# Patient Record
Sex: MALE | Race: WHITE | NOT HISPANIC OR LATINO | Employment: UNEMPLOYED | URBAN - NONMETROPOLITAN AREA
[De-identification: names, ages, dates, MRNs, and addresses within clinical notes are randomized per-mention and may not be internally consistent; named-entity substitution may affect disease eponyms.]

---

## 2019-09-17 ENCOUNTER — OFFICE VISIT (OUTPATIENT)
Dept: CARDIOLOGY | Facility: CLINIC | Age: 79
End: 2019-09-17

## 2019-09-17 VITALS
BODY MASS INDEX: 34.85 KG/M2 | OXYGEN SATURATION: 96 % | HEIGHT: 73 IN | DIASTOLIC BLOOD PRESSURE: 66 MMHG | SYSTOLIC BLOOD PRESSURE: 108 MMHG | WEIGHT: 263 LBS | HEART RATE: 63 BPM

## 2019-09-17 DIAGNOSIS — I48.91 ATRIAL FIBRILLATION, UNSPECIFIED TYPE (HCC): Primary | ICD-10-CM

## 2019-09-17 DIAGNOSIS — E78.2 MIXED HYPERLIPIDEMIA: ICD-10-CM

## 2019-09-17 DIAGNOSIS — I25.84 CORONARY ARTERY DISEASE DUE TO CALCIFIED CORONARY LESION: ICD-10-CM

## 2019-09-17 DIAGNOSIS — Z99.89 OSA ON CPAP: ICD-10-CM

## 2019-09-17 DIAGNOSIS — G47.33 OSA ON CPAP: ICD-10-CM

## 2019-09-17 DIAGNOSIS — R00.2 PALPITATIONS: ICD-10-CM

## 2019-09-17 DIAGNOSIS — Z95.1 S/P CABG (CORONARY ARTERY BYPASS GRAFT): ICD-10-CM

## 2019-09-17 DIAGNOSIS — R07.2 PRECORDIAL PAIN: ICD-10-CM

## 2019-09-17 DIAGNOSIS — I25.10 CORONARY ARTERY DISEASE DUE TO CALCIFIED CORONARY LESION: ICD-10-CM

## 2019-09-17 DIAGNOSIS — R42 DIZZINESS: ICD-10-CM

## 2019-09-17 DIAGNOSIS — I10 ESSENTIAL HYPERTENSION: ICD-10-CM

## 2019-09-17 DIAGNOSIS — R06.02 SHORTNESS OF BREATH: ICD-10-CM

## 2019-09-17 DIAGNOSIS — R53.83 FATIGUE, UNSPECIFIED TYPE: ICD-10-CM

## 2019-09-17 DIAGNOSIS — R60.0 BILATERAL LEG EDEMA: ICD-10-CM

## 2019-09-17 PROBLEM — N40.0 BPH (BENIGN PROSTATIC HYPERPLASIA): Status: ACTIVE | Noted: 2019-09-17

## 2019-09-17 PROBLEM — K21.9 GASTROESOPHAGEAL REFLUX DISEASE WITHOUT ESOPHAGITIS: Status: ACTIVE | Noted: 2019-09-17

## 2019-09-17 PROBLEM — E03.9 HYPOTHYROID: Status: ACTIVE | Noted: 2019-09-17

## 2019-09-17 PROCEDURE — 99204 OFFICE O/P NEW MOD 45 MIN: CPT | Performed by: INTERNAL MEDICINE

## 2019-09-17 PROCEDURE — 93000 ELECTROCARDIOGRAM COMPLETE: CPT | Performed by: INTERNAL MEDICINE

## 2019-09-17 RX ORDER — ALPRAZOLAM 1 MG/1
1 TABLET ORAL 2 TIMES DAILY PRN
Status: ON HOLD | COMMUNITY
End: 2019-10-04

## 2019-09-17 RX ORDER — METOPROLOL TARTRATE 100 MG/1
100 TABLET ORAL 2 TIMES DAILY
COMMUNITY
End: 2019-09-17 | Stop reason: ALTCHOICE

## 2019-09-17 RX ORDER — METOLAZONE 2.5 MG/1
TABLET ORAL
Qty: 30 TABLET | Refills: 5 | Status: ON HOLD | OUTPATIENT
Start: 2019-09-17 | End: 2019-10-04

## 2019-09-17 RX ORDER — PANTOPRAZOLE SODIUM 40 MG/1
40 TABLET, DELAYED RELEASE ORAL DAILY
COMMUNITY
End: 2022-11-11

## 2019-09-17 RX ORDER — LEVOTHYROXINE SODIUM 0.03 MG/1
25 TABLET ORAL DAILY
COMMUNITY

## 2019-09-17 RX ORDER — ASPIRIN 81 MG/1
81 TABLET ORAL DAILY
COMMUNITY
End: 2021-08-10 | Stop reason: SDUPTHER

## 2019-09-17 RX ORDER — OMEGA-3S/DHA/EPA/FISH OIL/D3 300MG-1000
400 CAPSULE ORAL DAILY
Status: ON HOLD | COMMUNITY
End: 2019-10-04

## 2019-09-17 RX ORDER — ATORVASTATIN CALCIUM 80 MG/1
80 TABLET, FILM COATED ORAL NIGHTLY
COMMUNITY
End: 2021-08-10 | Stop reason: SDUPTHER

## 2019-09-17 RX ORDER — LISINOPRIL 20 MG/1
20 TABLET ORAL DAILY
Status: ON HOLD | COMMUNITY
End: 2019-10-04

## 2019-09-17 RX ORDER — VENLAFAXINE HYDROCHLORIDE 150 MG/1
150 CAPSULE, EXTENDED RELEASE ORAL DAILY
COMMUNITY
End: 2020-12-16

## 2019-09-17 RX ORDER — FUROSEMIDE 20 MG/1
60 TABLET ORAL 2 TIMES DAILY
COMMUNITY
End: 2021-08-10 | Stop reason: SDUPTHER

## 2019-09-17 RX ORDER — SENNOSIDES 8.6 MG
650 CAPSULE ORAL EVERY 8 HOURS PRN
COMMUNITY

## 2019-09-17 RX ORDER — TAMSULOSIN HYDROCHLORIDE 0.4 MG/1
1 CAPSULE ORAL DAILY
COMMUNITY

## 2019-09-17 RX ORDER — NITROGLYCERIN 0.4 MG/1
TABLET SUBLINGUAL
Qty: 25 TABLET | Refills: 3 | Status: SHIPPED | OUTPATIENT
Start: 2019-09-17 | End: 2021-01-18 | Stop reason: SDUPTHER

## 2019-09-17 RX ORDER — POTASSIUM CHLORIDE 750 MG/1
20 TABLET, FILM COATED, EXTENDED RELEASE ORAL 3 TIMES DAILY
COMMUNITY
End: 2021-08-10 | Stop reason: SDUPTHER

## 2019-09-17 RX ORDER — LANOLIN ALCOHOL/MO/W.PET/CERES
1000 CREAM (GRAM) TOPICAL NIGHTLY
COMMUNITY
End: 2021-04-21 | Stop reason: SDUPTHER

## 2019-09-17 RX ORDER — CLOPIDOGREL BISULFATE 75 MG/1
75 TABLET ORAL DAILY
Qty: 30 TABLET | Refills: 11 | Status: SHIPPED | OUTPATIENT
Start: 2019-09-17 | End: 2019-09-18 | Stop reason: SDUPTHER

## 2019-09-17 RX ORDER — ALLOPURINOL 300 MG/1
300 TABLET ORAL DAILY
COMMUNITY

## 2019-09-17 RX ORDER — ISOSORBIDE MONONITRATE 30 MG/1
15 TABLET, EXTENDED RELEASE ORAL DAILY
Qty: 15 TABLET | Refills: 11 | Status: SHIPPED | OUTPATIENT
Start: 2019-09-17 | End: 2019-09-18 | Stop reason: SDUPTHER

## 2019-09-17 RX ORDER — METOPROLOL TARTRATE 50 MG/1
50 TABLET, FILM COATED ORAL 2 TIMES DAILY
Qty: 60 TABLET | Refills: 11 | Status: SHIPPED | OUTPATIENT
Start: 2019-09-17 | End: 2019-09-18 | Stop reason: SDUPTHER

## 2019-09-17 NOTE — PATIENT INSTRUCTIONS
"Fat and Cholesterol Restricted Eating Plan  Getting too much fat and cholesterol in your diet may cause health problems. Choosing the right foods helps keep your fat and cholesterol at normal levels. This can keep you from getting certain diseases.  Your doctor may recommend an eating plan that includes:  · Total fat: ______% or less of total calories a day.  · Saturated fat: ______% or less of total calories a day.  · Cholesterol: less than _________mg a day.  · Fiber: ______g a day.  What are tips for following this plan?  General tips    · Work with your doctor to lose weight if you need to.  · Avoid:  ? Foods with added sugar.  ? Fried foods.  ? Foods with partially hydrogenated oils.  · Limit alcohol intake to no more than 1 drink a day for nonpregnant women and 2 drinks a day for men. One drink equals 12 oz of beer, 5 oz of wine, or 1½ oz of hard liquor.  Reading food labels  · Check food labels for:  ? Trans fats.  ? Partially hydrogenated oils.  ? Saturated fat (g) in each serving.  ? Cholesterol (mg) in each serving.  ? Fiber (g) in each serving.  · Choose foods with healthy fats, such as:  ? Monounsaturated fats.  ? Polyunsaturated fats.  ? Omega-3 fats.  · Choose grain products that have whole grains. Look for the word \"whole\" as the first word in the ingredient list.  Cooking  · Cook foods using low-fat methods. These include baking, boiling, grilling, and broiling.  · Eat more home-cooked foods. Eat at restaurants and buffets less often.  · Avoid cooking using saturated fats, such as butter, cream, palm oil, palm kernel oil, and coconut oil.  Meal planning    · At meals, divide your plate into four equal parts:  ? Fill one-half of your plate with vegetables and green salads.  ? Fill one-fourth of your plate with whole grains.  ? Fill one-fourth of your plate with low-fat (lean) protein foods.  · Eat fish that is high in omega-3 fats at least two times a week. This includes mackerel, tuna, sardines, and " salmon.  · Eat foods that are high in fiber, such as whole grains, beans, apples, broccoli, carrots, peas, and barley.  Recommended foods  Grains  · Whole grains, such as whole wheat or whole grain breads, crackers, cereals, and pasta. Unsweetened oatmeal, bulgur, barley, quinoa, or brown rice. Corn or whole wheat flour tortillas.  Vegetables  · Fresh or frozen vegetables (raw, steamed, roasted, or grilled). Green salads.  Fruits  · All fresh, canned (in natural juice), or frozen fruits.  Meats and other protein foods  · Ground beef (85% or leaner), grass-fed beef, or beef trimmed of fat. Skinless chicken or turkey. Ground chicken or turkey. Pork trimmed of fat. All fish and seafood. Egg whites. Dried beans, peas, or lentils. Unsalted nuts or seeds. Unsalted canned beans. Nut butters without added sugar or oil.  Dairy  · Low-fat or nonfat dairy products, such as skim or 1% milk, 2% or reduced-fat cheeses, low-fat and fat-free ricotta or cottage cheese, or plain low-fat and nonfat yogurt.  Fats and oils  · Tub margarine without trans fats. Light or reduced-fat mayonnaise and salad dressings. Avocado. Olive, canola, sesame, or safflower oils.  The items listed above may not be a complete list of recommended foods or beverages. Contact your dietitian for more options.  The items listed above may not be a complete list of foods and beverages [you/your child] can eat. Contact a dietitian for more information.  Foods to avoid  Grains  · White bread. White pasta. White rice. Cornbread. Bagels, pastries, and croissants. Crackers and snack foods that contain trans fat and hydrogenated oils.  Vegetables  · Vegetables cooked in cheese, cream, or butter sauce. Fried vegetables.  Fruits  · Canned fruit in heavy syrup. Fruit in cream or butter sauce. Fried fruit.  Meats and other protein foods  · Fatty cuts of meat. Ribs, chicken wings, kaur, sausage, bologna, salami, chitterlings, fatback, hot dogs, bratwurst, and packaged  lunch meats. Liver and organ meats. Whole eggs and egg yolks. Chicken and turkey with skin. Fried meat.  Dairy  · Whole or 2% milk, cream, half-and-half, and cream cheese. Whole milk cheeses. Whole-fat or sweetened yogurt. Full-fat cheeses. Nondairy creamers and whipped toppings. Processed cheese, cheese spreads, and cheese curds.  Beverages  · Alcohol. Sugar-sweetened drinks such as sodas, lemonade, and fruit drinks.  Fats and oils  · Butter, stick margarine, lard, shortening, ghee, or kaur fat. Coconut, palm kernel, and palm oils.  Sweets and desserts  · Corn syrup, sugars, honey, and molasses. Candy. Jam and jelly. Syrup. Sweetened cereals. Cookies, pies, cakes, donuts, muffins, and ice cream.  The items listed above may not be a complete list of foods and beverages to avoid. Contact your dietitian for more information.  The items listed above may not be a complete list of foods and beverages [you/your child] should avoid. Contact a dietitian for more information.  Summary  · Choosing the right foods helps keep your fat and cholesterol at normal levels. This can keep you from getting certain diseases.  · At meals, fill one-half of your plate with vegetables and green salads.  · Eat high-fiber foods, like whole grains, beans, apples, carrots, peas, and barley.  · Limit added sugar, saturated fats, alcohol, and fried foods.  This information is not intended to replace advice given to you by your health care provider. Make sure you discuss any questions you have with your health care provider.  Document Released: 06/18/2013 Document Revised: 09/04/2018 Document Reviewed: 09/04/2018  [employer] Interactive Patient Education © 2019 [employer]  BMI for Adults    Body mass index (BMI) is a number that is calculated from a person's weight and height. BMI may help to estimate how much of a person's weight is composed of fat. BMI can help identify those who may be at higher risk for certain medical problems.  How is BMI  "used with adults?  BMI is used as a screening tool to identify possible weight problems. It is used to check whether a person is obese, overweight, healthy weight, or underweight.  How is BMI calculated?  BMI measures your weight and compares it to your height. This can be done either in English (U[place] or metric measurements. Note that charts are available to help you find your BMI quickly and easily without having to do these calculations yourself.  To calculate your BMI in English (U.[place] measurements, your health care provider will:  1. Measure your weight in pounds (lb).  2. Multiply the number of pounds by 703.  ? For example, for a person who weighs 180 lb, multiply that number by 703, which equals 126,540.  3. Measure your height in inches (in). Then multiply that number by itself to get a measurement called \"inches squared.\"  ? For example, for a person who is 70 in tall, the \"inches squared\" measurement is 70 in x 70 in, which equals 4900 inches squared.  4. Divide the total from Step 2 (number of lb x 703) by the total from Step 3 (inches squared): 126,540 ÷ 4900 = 25.8. This is your BMI.  To calculate your BMI in metric measurements, your health care provider will:  1. Measure your weight in kilograms (kg).  2. Measure your height in meters (m). Then multiply that number by itself to get a measurement called \"meters squared.\"  ? For example, for a person who is 1.75 m tall, the \"meters squared\" measurement is 1.75 m x 1.75 m, which is equal to 3.1 meters squared.  3. Divide the number of kilograms (your weight) by the meters squared number. In this example: 70 ÷ 3.1 = 22.6. This is your BMI.  How is BMI interpreted?  To interpret your results, your health care provider will use BMI charts to identify whether you are underweight, normal weight, overweight, or obese. The following guidelines will be used:  · Underweight: BMI less than 18.5.  · Normal weight: BMI between 18.5 and 24.9.  · Overweight: BMI " between 25 and 29.9.  · Obese: BMI of 30 and above.  Please note:  · Weight includes both fat and muscle, so someone with a muscular build, such as an athlete, may have a BMI that is higher than 24.9. In cases like these, BMI is not an accurate measure of body fat.  · To determine if excess body fat is the cause of a BMI of 25 or higher, further assessments may need to be done by a health care provider.  · BMI is usually interpreted in the same way for men and women.  Why is BMI a useful tool?  BMI is useful in two ways:  · Identifying a weight problem that may be related to a medical condition, or that may increase the risk for medical problems.  · Promoting lifestyle and diet changes in order to reach a healthy weight.  Summary  · Body mass index (BMI) is a number that is calculated from a person's weight and height.  · BMI may help to estimate how much of a person's weight is composed of fat. BMI can help identify those who may be at higher risk for certain medical problems.  · BMI can be measured using English measurements or metric measurements.  · To interpret your results, your health care provider will use BMI charts to identify whether you are underweight, normal weight, overweight, or obese.  This information is not intended to replace advice given to you by your health care provider. Make sure you discuss any questions you have with your health care provider.  Document Released: 08/29/2005 Document Revised: 10/31/2018 Document Reviewed: 10/31/2018  [employer] Interactive Patient Education © 2019 [employer]

## 2019-09-17 NOTE — PROGRESS NOTES
Subjective   Nick Caraballo is a 79 y.o. male     Chief Complaint   Patient presents with   • Establish Care     Here to establish care        PROBLEM LIST:     1. CAD, MI in 1998  1.1 Stenting x 5 total at [facility]. data  1.2 CABG x2 in Dec. 2014 at [facility] by Dr. Álvarez in setting of ACS, LIMA to LAD and reversed vein graft from aorta to diag. With RLE vein graft  2. A-Fib, s/p CABG per patient report  3. HTN  4. Hyperlipidemia  5. GOPI, uses c-pap  6. Hypothyroidism  7. BPH  8. GERD  9. RA  10. Former smoker                Specialty Problems     None            HPI:  Mr. Nick Caraballo is a 79-year-old male patient of Dr. Selvin Abbott seen today to establish care.    The patient has a long-standing history of coronary artery disease.  He presumably suffered a myocardial infarction in 1998 but did not undergo invasive evaluation at that time.  In the year 2000 he suffered a myocardial infarction and underwent stenting of the LAD by Dr. Lopez with a 4 x 18 mm stent.  Mr. Caraballo was told at that time that he had had a previous myocardial infarction and had significant left ventricular dysfunction.  Per his report, Mr. Caraballo again suffered a myocardial infarction in 2002 in 2003 with stenting on both occasions.    The patient had a symptom-free interval from 2003 until 2014 when he again suffered chest pain.  He was evaluated at [facility] underwent cardiac catheterization and subsequently coronary artery bypass grafting x2 with LIMA to LAD and vein graft to diagonal.  The patient did well after bypass until approximately 1-1/2 to 2 years ago and he began to notice recurrent angina.  Mr. Caraballo will develop chest discomfort with high levels of physical activity or emotional stress.  He describes this as a heaviness or tightness which is sometimes associated with shortness of breath and radiation to the left shoulder.  Symptoms last only minutes, occur approximately  once every 3 to 4 months, and have not been progressive over time.  Mr. Caraballo has not used nitroglycerin for these episodes.    In approximately the same timeframe as the recurrence of chest pain Mr. Caraballo developed lower extremity edema.  This is been progressive over the last several years.  She also describes episodes of palpitation is rapid forceful heartbeats which are associated with lightheadedness, weakness and fatigue.  These last minutes, are without obvious precipitating or ameliorating factors, and occur monthly.  Of note, the patient had postoperative atrial fibrillation documented, but has had no documentation of ongoing dysrhythmia.  The patient denies symptoms of TIA or stroke, and he has no other symptoms to suggest peripheral arterial disease or arterial embolic events.                  CURRENT MEDICATION:    Current Outpatient Medications   Medication Sig Dispense Refill   • acetaminophen (TYLENOL) 650 MG 8 hr tablet Take 650 mg by mouth Every 8 (Eight) Hours As Needed for Mild Pain .     • allopurinol (ZYLOPRIM) 300 MG tablet Take 300 mg by mouth Daily.     • ALPRAZolam (XANAX) 1 MG tablet Take 1 mg by mouth 2 (Two) Times a Day As Needed for Anxiety. Takes 1/2 tablet bid prn anxiety     • aspirin 81 MG EC tablet Take 81 mg by mouth Daily.     • atorvastatin (LIPITOR) 80 MG tablet Take 80 mg by mouth Every Night.     • cholecalciferol (VITAMIN D3) 400 units tablet Take 400 Units by mouth Daily.     • furosemide (LASIX) 20 MG tablet Take 60 mg by mouth 2 (Two) Times a Day.     • levothyroxine (SYNTHROID, LEVOTHROID) 25 MCG tablet Take 25 mcg by mouth Daily.     • lisinopril (PRINIVIL,ZESTRIL) 20 MG tablet Take 20 mg by mouth Daily. Takes 1/2 tablet daily     • Melatonin 3 MG capsule Take 3 mg by mouth Every Night.     • metoprolol tartrate (LOPRESSOR) 100 MG tablet Take 100 mg by mouth 2 (Two) Times a Day. Takes 1/2 tablet bid     • niacin (SLO-NIACIN) 500 MG CR tablet Take 1,000 mg by mouth  Every Night.     • pantoprazole (PROTONIX) 40 MG EC tablet Take 40 mg by mouth Daily.     • potassium chloride ER (K-TAB) 20 MEQ tablet controlled-release ER tablet Take 20 mEq by mouth 2 (Two) Times a Day.     • Probiotic Product (PROBIOTIC DAILY PO) Take  by mouth Daily.     • tamsulosin (FLOMAX) 0.4 MG capsule 24 hr capsule Take 1 capsule by mouth Daily.     • venlafaxine XR (EFFEXOR-XR) 75 MG 24 hr capsule Take 75 mg by mouth Daily.       No current facility-administered medications for this visit.        ALLERGIES:    Patient has no known allergies.    PAST MEDICAL HISTORY:    Past Medical History:   Diagnosis Date   • Acid reflux    • Anxiety    • Coronary artery disease    • Gout    • Hyperlipidemia    • Hypertension    • Hypothyroid    • Myocardial infarction (CMS/HCC)    • Rheumatoid arthritis (CMS/HCC)    • Sleep apnea        SURGICAL HISTORY:    Past Surgical History:   Procedure Laterality Date   • CARDIAC CATHETERIZATION      5 stents    • CATARACT EXTRACTION, BILATERAL     • CORONARY ARTERY BYPASS GRAFT      Double by-pass    • HEMORRHOIDECTOMY     • MULTIPLE TOOTH EXTRACTIONS     • REPLACEMENT TOTAL KNEE BILATERAL     • TONSILLECTOMY         SOCIAL HISTORY:    Social History     Socioeconomic History   • Marital status:      Spouse name: Not on file   • Number of children: Not on file   • Years of education: Not on file   • Highest education level: Not on file   Tobacco Use   • Smoking status: Former Smoker     Packs/day: 1.00     Years: 45.00     Pack years: 45.00     Last attempt to quit:      Years since quittin.7   • Smokeless tobacco: Never Used   Substance and Sexual Activity   • Alcohol use: No     Frequency: Never   • Drug use: No   • Sexual activity: Defer       FAMILY HISTORY:    Family History   Problem Relation Age of Onset   • No Known Problems Mother    • Colon cancer Father    • Heart disease Brother        Review of Systems   Constitutional: Positive for  "diaphoresis and fatigue (Not as much stamina ). Negative for chills, fever and unexpected weight change.   HENT: Negative.    Eyes: Positive for visual disturbance (reading glasses ).   Respiratory: Positive for apnea (Has a Cpap, but hasn't been using it) and shortness of breath (SOA occasionally with exertion ). Negative for chest tightness.    Cardiovascular: Positive for chest pain (Occasional soreness in chest. first noticed approx. 1.5 yrs. ago. ), palpitations (Rare flutters ) and leg swelling (LE edema which doesn't always resolve overnight ).        Nodule in right breast   Gastrointestinal: Negative.  Negative for blood in stool (no melena,hematuria,hemoptysis,hematochezia), constipation and diarrhea.   Endocrine: Positive for cold intolerance and heat intolerance (always hot ).   Genitourinary: Positive for frequency. Negative for dysuria, hematuria and urgency.   Musculoskeletal: Positive for arthralgias (B/L hip pain. Currently getting PT for this). Negative for back pain.   Skin: Negative.  Negative for rash and wound.   Allergic/Immunologic: Negative.  Negative for environmental allergies and food allergies.   Neurological: Positive for light-headedness (when bending over or turning too quickly ). Negative for dizziness and weakness.   Hematological: Bruises/bleeds easily (Bruises and bleeds easily ).   Psychiatric/Behavioral: Negative.  Negative for sleep disturbance (Denies waking with smothering or SOA).       VISIT VITALS:  Vitals:    09/17/19 1318   BP: 108/66   BP Location: Left arm   Patient Position: Sitting   Pulse: 63   SpO2: 96%   Weight: 119 kg (263 lb)   Height: 185.4 cm (73\")      /66 (BP Location: Left arm, Patient Position: Sitting)   Pulse 63   Ht 185.4 cm (73\")   Wt 119 kg (263 lb)   SpO2 96%   BMI 34.70 kg/m²     RECENT LABS:    Objective       Physical Exam   Constitutional: He is oriented to person, place, and time. He appears well-developed and well-nourished. No " distress.   HENT:   Head: Normocephalic and atraumatic.   No oral lesions   Eyes: Conjunctivae and EOM are normal. Pupils are equal, round, and reactive to light.   Mild ptosis left eye, no lid lag  FIORELLA  Extra-ocular motions intact  Arcus senilis bilat.      Neck: Normal range of motion. Neck supple. No JVD present. Carotid bruit is not present. No tracheal deviation present.   Nl. Carotid upstrokes     Cardiovascular: Normal rate, regular rhythm, S1 normal, S2 normal and intact distal pulses. Exam reveals gallop and S4. Exam reveals no S3 and no friction rub.   No murmur heard.  Pulses:       Radial pulses are 2+ on the right side, and 2+ on the left side.   Pulmonary/Chest: Effort normal and breath sounds normal. He has no wheezes. He has no rhonchi. He has no rales.   Nl. Expir. Phase  Nl. Breath sound intensity     Abdominal: Soft. Bowel sounds are normal. He exhibits no distension, no abdominal bruit and no mass. There is no tenderness. There is no rebound and no guarding.   No organomegaly   Musculoskeletal: Normal range of motion. He exhibits edema. He exhibits no tenderness or deformity.   LLE, 2+ edema to mid calf, nl.cap. Refill, DP pedal pulse, unable to palpate PT due to edema  RLE, 3+ edema to mid calf, nl. Cap. Refill, unable to palpate pedal pulses   Neurological: He is alert and oriented to person, place, and time.   Skin: Skin is warm and dry. No rash noted. No erythema. No pallor.   Psychiatric: He has a normal mood and affect. His behavior is normal. Judgment and thought content normal.   Nursing note and vitals reviewed.        ECG 12 Lead  Date/Time: 9/17/2019 2:33 PM  Performed by: Errol Lai MD  Authorized by: Errol Lai MD   Comparison: not compared with previous ECG   Previous ECG: no previous ECG available  Comments: Sinus rhythm at 65 bpm with marked first-degree AV block.  DE interval is roughly 500 ms.              Assessment/Plan   #1.  Coronary artery disease.  The  patient is status post stenting and bypass as described above.  He has had recurrence of angina for the past 18 to 24 months but has not had risk stratification over that time..  Therefore, we will perform stress testing for risk stratification and to direct therapy.    2.  Exertional dyspnea.  Has a history of decreased LV systolic function.  We will utilize echocardiography to reassess ejection fraction, assess LV filling pressures, and to look for nonischemic causes of chest pain and dyspnea.    3.  Palpitations sometimes associated with significant dizziness.  Symptoms are worrisome for the possibility of ventricular tachycardia given known coronary disease and decreased LV systolic function.  We will reassess EF as above and perform 30-day event monitoring to further evaluate dysrhythmic substrate.    4.  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  I would like to decrease metoprolol to 50 mg twice daily and follow symptoms of chest pain, heart rate, and blood pressures closely.    5.  Angina.  We will start isosorbide mononitrate 15 mg daily going to 30 mg daily as tolerated.  The patient will be given a prescription for sublingual nitroglycerin and instructions in its use.  We will add Plavix 75 mg daily to the patient's current regimen.    6.  Lower extremity edema.  I think this is multifactorial in etiology.  I would like to trial adding metolazone 2.5 mg to a.m. Lasix dosing and follow labs closely.  We also discussed physical maneuvers to decrease edema as well as continued sodium restriction.    7.  Mr. Caraballo will follow with Dr. Abbott as instructed, and in our office after testing or on a as needed basis for symptoms, blood pressures, or medication intolerance or labs as discussed.   Diagnosis Plan   1. Atrial fibrillation, unspecified type (CMS/HCC)     2. Coronary artery disease due to calcified coronary lesion     3. Essential hypertension     4. Mixed hyperlipidemia     5.  Palpitations     6. GOPI on CPAP     7. Shortness of breath     8. Precordial pain     9. Dizziness     10. Fatigue, unspecified type     11. S/P CABG (coronary artery bypass graft)         No Follow-up on file.       Patient's Body mass index is 34.7 kg/m². BMI is above normal parameters. Recommendations include: educational material and referral to primary care.       Jinny Perry LPN    Scribed for Dr. Errol Lai by Jinny Perry LPN September 17, 2019 2:04 PM         Electronically signed by:            This note is dictated utilizing voice recognition software.  Although this record has been proof read, transcriptional errors may still be present. If questions occur regarding the content of this record please do not hesitate to call our office.

## 2019-09-18 ENCOUNTER — LAB (OUTPATIENT)
Dept: LAB | Facility: HOSPITAL | Age: 79
End: 2019-09-18

## 2019-09-18 ENCOUNTER — TELEPHONE (OUTPATIENT)
Dept: CARDIOLOGY | Facility: CLINIC | Age: 79
End: 2019-09-18

## 2019-09-18 DIAGNOSIS — Z95.1 S/P CABG (CORONARY ARTERY BYPASS GRAFT): ICD-10-CM

## 2019-09-18 DIAGNOSIS — I25.84 CORONARY ARTERY DISEASE DUE TO CALCIFIED CORONARY LESION: ICD-10-CM

## 2019-09-18 DIAGNOSIS — R07.2 PRECORDIAL PAIN: ICD-10-CM

## 2019-09-18 DIAGNOSIS — I10 ESSENTIAL HYPERTENSION: ICD-10-CM

## 2019-09-18 DIAGNOSIS — R00.2 PALPITATIONS: ICD-10-CM

## 2019-09-18 DIAGNOSIS — R53.83 FATIGUE, UNSPECIFIED TYPE: ICD-10-CM

## 2019-09-18 DIAGNOSIS — R42 DIZZINESS: ICD-10-CM

## 2019-09-18 DIAGNOSIS — E78.2 MIXED HYPERLIPIDEMIA: ICD-10-CM

## 2019-09-18 DIAGNOSIS — I25.10 CORONARY ARTERY DISEASE DUE TO CALCIFIED CORONARY LESION: ICD-10-CM

## 2019-09-18 DIAGNOSIS — R06.02 SHORTNESS OF BREATH: ICD-10-CM

## 2019-09-18 LAB
ANION GAP SERPL CALCULATED.3IONS-SCNC: 12.2 MMOL/L (ref 5–15)
BUN BLD-MCNC: 16 MG/DL (ref 8–23)
BUN/CREAT SERPL: 15.1 (ref 7–25)
CALCIUM SPEC-SCNC: 10 MG/DL (ref 8.6–10.5)
CHLORIDE SERPL-SCNC: 100 MMOL/L (ref 98–107)
CO2 SERPL-SCNC: 28.8 MMOL/L (ref 22–29)
CREAT BLD-MCNC: 1.06 MG/DL (ref 0.76–1.27)
GFR SERPL CREATININE-BSD FRML MDRD: 68 ML/MIN/1.73
GLUCOSE BLD-MCNC: 116 MG/DL (ref 65–99)
MAGNESIUM SERPL-MCNC: 1.9 MG/DL (ref 1.6–2.4)
POTASSIUM BLD-SCNC: 5.2 MMOL/L (ref 3.5–5.2)
SODIUM BLD-SCNC: 141 MMOL/L (ref 136–145)

## 2019-09-18 PROCEDURE — 83735 ASSAY OF MAGNESIUM: CPT | Performed by: INTERNAL MEDICINE

## 2019-09-18 PROCEDURE — 80048 BASIC METABOLIC PNL TOTAL CA: CPT | Performed by: INTERNAL MEDICINE

## 2019-09-18 PROCEDURE — 36415 COLL VENOUS BLD VENIPUNCTURE: CPT

## 2019-09-18 RX ORDER — CLOPIDOGREL BISULFATE 75 MG/1
75 TABLET ORAL DAILY
Qty: 90 TABLET | Refills: 3 | Status: SHIPPED | OUTPATIENT
Start: 2019-09-18 | End: 2020-08-20

## 2019-09-18 RX ORDER — ISOSORBIDE MONONITRATE 30 MG/1
15 TABLET, EXTENDED RELEASE ORAL DAILY
Qty: 45 TABLET | Refills: 3 | Status: ON HOLD | OUTPATIENT
Start: 2019-09-18 | End: 2019-10-04

## 2019-09-18 NOTE — TELEPHONE ENCOUNTER
Patient wanted to get dosing change on metoprolol changed yest. Clarified. States had been getting 100 mg tabs and taking a half tab bid which is what his dose was changed to yest. V/O per Dr. Lai to decrease Metoprolol to 25 mg po bid #180 with 3 refills. Patient aware of dosage change. Patient requesting to have plavix,metoprolol, and imdur scripts sent to [facility] 90 day supplies with 3 refills and [facility] Scripts electro'd. MONIKA,LPN            ----- Message from Alayna Evangelista sent at 9/18/2019 10:29 AM EDT -----   Pt says that his Metoprolol was on his list as 100mg, but it is being filled at 50mg bid. He also wants you to know that his monitor is on it's way and he will be having his labs drawn in [facility] today.

## 2019-09-19 ENCOUNTER — HOSPITAL ENCOUNTER (OUTPATIENT)
Dept: CARDIOLOGY | Facility: HOSPITAL | Age: 79
Discharge: HOME | End: 2019-09-19

## 2019-09-19 DIAGNOSIS — I25.10 CORONARY ARTERY DISEASE DUE TO CALCIFIED CORONARY LESION: ICD-10-CM

## 2019-09-19 DIAGNOSIS — R06.02 SHORTNESS OF BREATH: ICD-10-CM

## 2019-09-19 DIAGNOSIS — R07.2 PRECORDIAL PAIN: ICD-10-CM

## 2019-09-19 DIAGNOSIS — R00.2 PALPITATIONS: ICD-10-CM

## 2019-09-19 DIAGNOSIS — I10 ESSENTIAL HYPERTENSION: ICD-10-CM

## 2019-09-19 DIAGNOSIS — I25.84 CORONARY ARTERY DISEASE DUE TO CALCIFIED CORONARY LESION: ICD-10-CM

## 2019-09-19 DIAGNOSIS — R42 DIZZINESS: ICD-10-CM

## 2019-09-19 DIAGNOSIS — Z95.1 S/P CABG (CORONARY ARTERY BYPASS GRAFT): ICD-10-CM

## 2019-09-19 DIAGNOSIS — R53.83 FATIGUE, UNSPECIFIED TYPE: ICD-10-CM

## 2019-09-19 DIAGNOSIS — E78.2 MIXED HYPERLIPIDEMIA: ICD-10-CM

## 2019-09-19 PROCEDURE — A9500 TC99M SESTAMIBI: HCPCS | Performed by: INTERNAL MEDICINE

## 2019-09-19 PROCEDURE — 0 TECHNETIUM SESTAMIBI: Performed by: INTERNAL MEDICINE

## 2019-09-19 PROCEDURE — 25010000002 REGADENOSON 0.4 MG/5ML SOLUTION: Performed by: INTERNAL MEDICINE

## 2019-09-19 PROCEDURE — 93306 TTE W/DOPPLER COMPLETE: CPT

## 2019-09-19 PROCEDURE — 93017 CV STRESS TEST TRACING ONLY: CPT

## 2019-09-19 PROCEDURE — 93018 CV STRESS TEST I&R ONLY: CPT | Performed by: INTERNAL MEDICINE

## 2019-09-19 PROCEDURE — 78452 HT MUSCLE IMAGE SPECT MULT: CPT

## 2019-09-19 PROCEDURE — 78452 HT MUSCLE IMAGE SPECT MULT: CPT | Performed by: INTERNAL MEDICINE

## 2019-09-19 PROCEDURE — 93306 TTE W/DOPPLER COMPLETE: CPT | Performed by: INTERNAL MEDICINE

## 2019-09-19 RX ADMIN — TECHNETIUM TC 99M SESTAMIBI 1 DOSE: 1 INJECTION INTRAVENOUS at 10:45

## 2019-09-19 RX ADMIN — TECHNETIUM TC 99M SESTAMIBI 1 DOSE: 1 INJECTION INTRAVENOUS at 08:54

## 2019-09-19 RX ADMIN — REGADENOSON 0.4 MG: 0.08 INJECTION, SOLUTION INTRAVENOUS at 10:45

## 2019-09-20 ENCOUNTER — TELEPHONE (OUTPATIENT)
Dept: CARDIOLOGY | Facility: CLINIC | Age: 79
End: 2019-09-20

## 2019-09-20 NOTE — TELEPHONE ENCOUNTER
QUESTIONS ADDRESSED ABOUT EVENT MONITOR. RASHI FRANK            ----- Message from Alayna Evangelista sent at 9/20/2019 11:28 AM EDT -----   PT RQ TO SPEAK TO YOU. DIDN'T LEAVE A REASON ON VM. CAN BE REACHED [phone]

## 2019-09-24 LAB
BH CV NUCLEAR PRIOR STUDY: 3
BH CV STRESS BP STAGE 1: NORMAL
BH CV STRESS COMMENTS STAGE 1: NORMAL
BH CV STRESS DOSE REGADENOSON STAGE 1: 0.4
BH CV STRESS DURATION MIN STAGE 1: 0
BH CV STRESS DURATION SEC STAGE 1: 10
BH CV STRESS HR STAGE 1: 69
BH CV STRESS PROTOCOL 1: NORMAL
BH CV STRESS RECOVERY BP: NORMAL MMHG
BH CV STRESS RECOVERY HR: 68 BPM
BH CV STRESS STAGE 1: 1
MAXIMAL PREDICTED HEART RATE: 142 BPM
PERCENT MAX PREDICTED HR: 48.59 %
STRESS BASELINE BP: NORMAL MMHG
STRESS BASELINE HR: 48 BPM
STRESS PERCENT HR: 57 %
STRESS POST PEAK BP: NORMAL MMHG
STRESS POST PEAK HR: 69 BPM
STRESS TARGET HR: 121 BPM

## 2019-09-25 ENCOUNTER — TELEPHONE (OUTPATIENT)
Dept: CARDIOLOGY | Facility: CLINIC | Age: 79
End: 2019-09-25

## 2019-09-25 NOTE — TELEPHONE ENCOUNTER
PATIENT AWARE STRESS TEST ABNL. AND APPT. SCHEDULED FOR [facility]. AM. PATIENT STATES WILL BE HERE. RASHI FRANK        ----- Message from Errol Lai MD sent at 9/25/2019  1:05 PM EDT -----  1-2 week f/u.

## 2019-09-25 NOTE — TELEPHONE ENCOUNTER
BELOW DISCUSSED WITH PATIENT AND IS COMING TO OFFICE IN AM FOR F/U ON ABNL. STRESS TEST. PH,LPN          ----- Message from Annie Hunter sent at 9/25/2019  1:10 PM EDT -----  Regarding: [employer] harassment  Contact: [phone]  Ms. Aaron this patient called and said that [employer] has got on his last nerve. He is very agitated. He wants to know if Dr. Lai ordered the protocol of the company calling him each time he pushes a button with a sympton. He is tired of answering their phone calls but wants to be in compliance with what Dr. Lai needs information wise from the monitor.

## 2019-09-26 ENCOUNTER — OFFICE VISIT (OUTPATIENT)
Dept: CARDIOLOGY | Facility: CLINIC | Age: 79
End: 2019-09-26

## 2019-09-26 ENCOUNTER — LAB (OUTPATIENT)
Dept: LAB | Facility: HOSPITAL | Age: 79
End: 2019-09-26

## 2019-09-26 VITALS
DIASTOLIC BLOOD PRESSURE: 63 MMHG | WEIGHT: 256.2 LBS | OXYGEN SATURATION: 96 % | SYSTOLIC BLOOD PRESSURE: 92 MMHG | HEART RATE: 90 BPM | HEIGHT: 73 IN | BODY MASS INDEX: 33.95 KG/M2

## 2019-09-26 DIAGNOSIS — R07.2 PRECORDIAL PAIN: Primary | ICD-10-CM

## 2019-09-26 DIAGNOSIS — R07.2 PRECORDIAL PAIN: ICD-10-CM

## 2019-09-26 DIAGNOSIS — I48.91 ATRIAL FIBRILLATION, UNSPECIFIED TYPE (HCC): ICD-10-CM

## 2019-09-26 DIAGNOSIS — I25.10 CORONARY ARTERY DISEASE DUE TO CALCIFIED CORONARY LESION: ICD-10-CM

## 2019-09-26 DIAGNOSIS — I10 ESSENTIAL HYPERTENSION: ICD-10-CM

## 2019-09-26 DIAGNOSIS — R53.83 FATIGUE, UNSPECIFIED TYPE: ICD-10-CM

## 2019-09-26 DIAGNOSIS — R06.02 SHORTNESS OF BREATH: ICD-10-CM

## 2019-09-26 DIAGNOSIS — I25.84 CORONARY ARTERY DISEASE DUE TO CALCIFIED CORONARY LESION: ICD-10-CM

## 2019-09-26 DIAGNOSIS — Z99.89 OSA ON CPAP: ICD-10-CM

## 2019-09-26 DIAGNOSIS — Z95.1 S/P CABG (CORONARY ARTERY BYPASS GRAFT): ICD-10-CM

## 2019-09-26 DIAGNOSIS — E78.2 MIXED HYPERLIPIDEMIA: ICD-10-CM

## 2019-09-26 DIAGNOSIS — R94.39 ABNORMAL STRESS TEST: ICD-10-CM

## 2019-09-26 DIAGNOSIS — G47.33 OSA ON CPAP: ICD-10-CM

## 2019-09-26 DIAGNOSIS — R00.2 PALPITATIONS: ICD-10-CM

## 2019-09-26 LAB
BASOPHILS # BLD AUTO: 0.02 10*3/MM3 (ref 0–0.2)
BASOPHILS NFR BLD AUTO: 0.2 % (ref 0–1.5)
DEPRECATED RDW RBC AUTO: 47.5 FL (ref 37–54)
EOSINOPHIL # BLD AUTO: 0.21 10*3/MM3 (ref 0–0.4)
EOSINOPHIL NFR BLD AUTO: 2.4 % (ref 0.3–6.2)
ERYTHROCYTE [DISTWIDTH] IN BLOOD BY AUTOMATED COUNT: 14.5 % (ref 12.3–15.4)
HCT VFR BLD AUTO: 47.5 % (ref 37.5–51)
HGB BLD-MCNC: 15.7 G/DL (ref 13–17.7)
IMM GRANULOCYTES # BLD AUTO: 0.02 10*3/MM3 (ref 0–0.05)
IMM GRANULOCYTES NFR BLD AUTO: 0.2 % (ref 0–0.5)
LYMPHOCYTES # BLD AUTO: 2.88 10*3/MM3 (ref 0.7–3.1)
LYMPHOCYTES NFR BLD AUTO: 32.5 % (ref 19.6–45.3)
MCH RBC QN AUTO: 30.1 PG (ref 26.6–33)
MCHC RBC AUTO-ENTMCNC: 33.1 G/DL (ref 31.5–35.7)
MCV RBC AUTO: 91.2 FL (ref 79–97)
MONOCYTES # BLD AUTO: 1.31 10*3/MM3 (ref 0.1–0.9)
MONOCYTES NFR BLD AUTO: 14.8 % (ref 5–12)
NEUTROPHILS # BLD AUTO: 4.43 10*3/MM3 (ref 1.7–7)
NEUTROPHILS NFR BLD AUTO: 49.9 % (ref 42.7–76)
PLATELET # BLD AUTO: 298 10*3/MM3 (ref 140–450)
PMV BLD AUTO: 10.5 FL (ref 6–12)
RBC # BLD AUTO: 5.21 10*6/MM3 (ref 4.14–5.8)
WBC NRBC COR # BLD: 8.87 10*3/MM3 (ref 3.4–10.8)

## 2019-09-26 PROCEDURE — 99214 OFFICE O/P EST MOD 30 MIN: CPT | Performed by: INTERNAL MEDICINE

## 2019-09-26 PROCEDURE — 36415 COLL VENOUS BLD VENIPUNCTURE: CPT

## 2019-09-26 PROCEDURE — 85025 COMPLETE CBC W/AUTO DIFF WBC: CPT | Performed by: INTERNAL MEDICINE

## 2019-09-26 NOTE — PATIENT INSTRUCTIONS
Obesity, Adult  Obesity is the condition of having too much total body fat. Being overweight or obese means that your weight is greater than what is considered healthy for your body size. Obesity is determined by a measurement called BMI. BMI is an estimate of body fat and is calculated from height and weight. For adults, a BMI of 30 or higher is considered obese.  Obesity can eventually lead to other health concerns and major illnesses, including:  · Stroke.  · Coronary artery disease (CAD).  · Type 2 diabetes.  · Some types of cancer, including cancers of the colon, breast, uterus, and gallbladder.  · Osteoarthritis.  · High blood pressure (hypertension).  · High cholesterol.  · Sleep apnea.  · Gallbladder stones.  · Infertility problems.  What are the causes?  The main cause of obesity is taking in (consuming) more calories than your body uses for energy. Other factors that contribute to this condition may include:  · Being born with genes that make you more likely to become obese.  · Having a medical condition that causes obesity. These conditions include:  ? Hypothyroidism.  ? Polycystic ovarian syndrome (PCOS).  ? Binge-eating disorder.  ? Cushing syndrome.  · Taking certain medicines, such as steroids, antidepressants, and seizure medicines.  · Not being physically active (sedentary lifestyle).  · Living where there are limited places to exercise safely or buy healthy foods.  · Not getting enough sleep.  What increases the risk?  The following factors may increase your risk of this condition:  · Having a family history of obesity.  · Being a woman of -American descent.  · Being a man of  descent.  What are the signs or symptoms?  Having excessive body fat is the main symptom of this condition.  How is this diagnosed?  This condition may be diagnosed based on:  · Your symptoms.  · Your medical history.  · A physical exam. Your health care provider may measure:  ? Your BMI. If you are an adult  with a BMI between 25 and less than 30, you are considered overweight. If you are an adult with a BMI of 30 or higher, you are considered obese.  ? The distances around your hips and your waist (circumferences). These may be compared to each other to help diagnose your condition.  ? Your skinfold thickness. Your health care provider may gently pinch a fold of your skin and measure it.  How is this treated?  Treatment for this condition often includes changing your lifestyle. Treatment may include some or all of the following:  · Dietary changes. Work with your health care provider and a dietitian to set a weight-loss goal that is healthy and reasonable for you. Dietary changes may include eating:  ? Smaller portions. A portion size is the amount of a particular food that is healthy for you to eat at one time. This varies from person to person.  ? Low-calorie or low-fat options.  ? More whole grains, fruits, and vegetables.  · Regular physical activity. This may include aerobic activity (cardio) and strength training.  · Medicine to help you lose weight. Your health care provider may prescribe medicine if you are unable to lose 1 pound a week after 6 weeks of eating more healthily and doing more physical activity.  · Surgery. Surgical options may include gastric banding and gastric bypass. Surgery may be done if:  ? Other treatments have not helped to improve your condition.  ? You have a BMI of 40 or higher.  ? You have life-threatening health problems related to obesity.  Follow these instructions at home:    Eating and drinking    · Follow recommendations from your health care provider about what you eat and drink. Your health care provider may advise you to:  ? Limit fast foods, sweets, and processed snack foods.  ? Choose low-fat options, such as low-fat milk instead of whole milk.  ? Eat 5 or more servings of fruits or vegetables every day.  ? Eat at home more often. This gives you more control over what you  eat.  ? Choose healthy foods when you eat out.  ? Learn what a healthy portion size is.  ? Keep low-fat snacks on hand.  ? Avoid sugary drinks, such as soda, fruit juice, iced tea sweetened with sugar, and flavored milk.  ? Eat a healthy breakfast.  · Drink enough water to keep your urine clear or pale yellow.  · Do not go without eating for long periods of time (do not fast) or follow a fad diet. Fasting and fad diets can be unhealthy and even dangerous.  Physical Activity  · Exercise regularly, as told by your health care provider. Ask your health care provider what types of exercise are safe for you and how often you should exercise.  · Warm up and stretch before being active.  · Cool down and stretch after being active.  · Rest between periods of activity.  Lifestyle  · Limit the time that you spend in front of your TV, computer, or video game system.  · Find ways to reward yourself that do not involve food.  · Limit alcohol intake to no more than 1 drink a day for nonpregnant women and 2 drinks a day for men. One drink equals 12 oz of beer, 5 oz of wine, or 1½ oz of hard liquor.  General instructions  · Keep a weight loss journal to keep track of the food you eat and how much you exercise you get.  · Take over-the-counter and prescription medicines only as told by your health care provider.  · Take vitamins and supplements only as told by your health care provider.  · Consider joining a support group. Your health care provider may be able to recommend a support group.  · Keep all follow-up visits as told by your health care provider. This is important.  Contact a health care provider if:  · You are unable to meet your weight loss goal after 6 weeks of dietary and lifestyle changes.  This information is not intended to replace advice given to you by your health care provider. Make sure you discuss any questions you have with your health care provider.  Document Released: 01/25/2006 Document Revised: 05/22/2017  Document Reviewed: 10/05/2016  [employer] Interactive Patient Education © 2019 [employer]  MyPlate from [employer]    MyPlate is an outline of a general healthy diet based on the 2010 Dietary Guidelines for Americans, from the U.S. Department of Agriculture (USDA). It sets guidelines for how much food you should eat from each food group based on your age, sex, and level of physical activity.  What are tips for following MyPlate?  To follow MyPlate recommendations:  · Eat a wide variety of fruits and vegetables, grains, and protein foods.  · Serve smaller portions and eat less food throughout the day.  · Limit portion sizes to avoid overeating.  · Enjoy your food.  · Get at least 150 minutes of exercise every week. This is about 30 minutes each day, 5 or more days per week.  It can be difficult to have every meal look like MyPlate. Think about MyPlate as eating guidelines for an entire day, rather than each individual meal.  Fruits and vegetables  · Make half of your plate fruits and vegetables.  · Eat many different colors of fruits and vegetables each day.  · For a 2,000 calorie daily food plan, eat:  ? 2½ cups of vegetables every day.  ? 2 cups of fruit every day.  · 1 cup is equal to:  ? 1 cup raw or cooked vegetables.  ? 1 cup raw fruit.  ? 1 medium-sized orange, apple, or banana.  ? 1 cup 100% fruit or vegetable juice.  ? 2 cups raw leafy greens, such as lettuce, spinach, or kale.  ? ½ cup dried fruit.  Grains  · One fourth of your plate should be grains.  · Make at least half of the grains you eat each day whole grains.  · For a 2,000 calorie daily food plan, eat 6 oz of grains every day.  · 1 oz is equal to:  ? 1 slice bread.  ? 1 cup cereal.  ? ½ cup cooked rice, cereal, or pasta.  Protein  · One fourth of your plate should be protein.  · Eat a wide variety of protein foods, including meat, poultry, fish, eggs, beans, nuts, and tofu.  · For a 2,000 calorie daily food plan, eat 5½ oz of protein every day.  · 1 oz  is equal to:  ? 1 oz meat, poultry, or fish.  ? ¼ cup cooked beans.  ? 1 egg.  ? ½ oz nuts or seeds.  ? 1 Tbsp peanut butter.  Dairy  · Drink fat-free or low-fat (1%) milk.  · Eat or drink dairy as a side to meals.  · For a 2,000 calorie daily food plan, eat or drink 3 cups of dairy every day.  · 1 cup is equal to:  ? 1 cup milk, yogurt, cottage cheese, or soy milk (soy beverage).  ? 2 oz processed cheese.  ? 1½ oz natural cheese.  Fats, oils, salt, and sugars  · Only small amounts of oils are recommended.  · Avoid foods that are high in calories and low in nutritional value (empty calories), like foods high in fat or added sugars.  · Choose foods that are low in salt (sodium). Choose foods that have less than 140 milligrams (mg) of sodium per serving.  · Drink water instead of sugary drinks. Drink enough water each day to keep your urine pale yellow.  Where to find support  · Work with your health care provider or a nutrition specialist (dietitian) to develop a customized eating plan that is right for you.  · Download an luna (mobile application) to help you track your daily food intake.  Where to find more information  · Go to ChooseMyPlate.gov for more information.  · Learn more and log your daily food intake according to MyPlate using USDA's SuperTracker: www.supertracker.usda.gov  Summary  · MyPlate is a general guideline for healthy eating from the USDA. It is based on the 2010 Dietary Guidelines for Americans.  · In general, fruits and vegetables should take up ½ of your plate, grains should take up ¼ of your plate, and protein should take up ¼ of your plate.  This information is not intended to replace advice given to you by your health care provider. Make sure you discuss any questions you have with your health care provider.  Document Released: 01/06/2009 Document Revised: 03/19/2018 Document Reviewed: 03/19/2018  [employer] Interactive Patient Education © 2019 [employer]

## 2019-09-26 NOTE — PROGRESS NOTES
Subjective   Nick Caraballo is a 79 y.o. male     Chief Complaint   Patient presents with   • Coronary Artery Disease     Here for abnl. stress test   • Hypertension   • Hyperlipidemia   • Atrial Fibrillation   • Sleep Apnea   • Chest Pain   • Shortness of Breath       PROBLEM LIST:       1. CAD, MI in 1998  1.1 Stenting x 5 total at [facility]. data  1.2 CABG x2 in Dec. 2014 at [facility] by Dr. Álvarez in setting of ACS, LIMA to LAD and reversed vein graft from aorta to diag. With RLE vein graft  1.3 Stress test, 9-19-19, mild anterolateral ischemia on scintigraphy.  2. A-Fib, s/p CABG per patient report  3. HTN  4. Hyperlipidemia  5. GOPI, uses c-pap  6. Hypothyroidism  7. BPH  8. GERD  9. RA  10. Former smoker    HPI: Ilya is a 79-year-old  male who presented to the office today for follow-up of stress test, and echo,.  Mr. Caraballo has previously had CABG x2 with the last being in 2014 by Dr. Álvarez at [facility].  Patient over the last 8 months has experienced multiple symptoms that have continued to increase in frequency and severity.  Patient can planes of fatigue point where he has no energy and just wants to sleep, diaphoresis becomes clammy when just sitting, chest pressure in the left chest and the left shoulder down the under the arm described as a mild ache and also midsternal.  Shortness of air with very little activity such as bringing the groceries in, he often wakes up and feels like he is panting for breath and orthopnea.  Increasing imbalance.  Patient also verbalized having a decreased exercise tolerance with frequent palpitations that occur daily.  Describes the rotations as rapid and irregular in its worst in the morning and with any activity.  Reports also having significant issues with swelling in his legs and his feet and bruises easily even despite wearing shoes.  Patient reports having to sleep in a lazy boy for comfort and shortness of air.  Also  complains of decreased mobility due to arthralgias as well.  Patient did deny any strokelike symptoms.  Does get regular exercise through physical therapy when which she is undergoing for his hips he walks approximately 1 mile and pedals about 3 miles which does induce some pressure and some mild pain into his chest and shortness of air.      Specialty Problems        Cardiology Problems    Atrial fibrillation (CMS/HCC)        Coronary artery disease due to calcified coronary lesion        Essential hypertension        Mixed hyperlipidemia        Palpitations            CURRENT MEDICATION:    Current Outpatient Medications   Medication Sig Dispense Refill   • acetaminophen (TYLENOL) 650 MG 8 hr tablet Take 650 mg by mouth Every 8 (Eight) Hours As Needed for Mild Pain .     • allopurinol (ZYLOPRIM) 300 MG tablet Take 300 mg by mouth Daily.     • ALPRAZolam (XANAX) 1 MG tablet Take 1 mg by mouth 2 (Two) Times a Day As Needed for Anxiety. Takes 1/2 tablet bid prn anxiety     • aspirin 81 MG EC tablet Take 81 mg by mouth Daily.     • atorvastatin (LIPITOR) 80 MG tablet Take 80 mg by mouth Every Night.     • cholecalciferol (VITAMIN D3) 400 units tablet Take 400 Units by mouth Daily.     • clopidogrel (PLAVIX) 75 MG tablet Take 1 tablet by mouth Daily. 90 tablet 3   • furosemide (LASIX) 20 MG tablet Take 60 mg by mouth 2 (Two) Times a Day.     • isosorbide mononitrate (IMDUR) 30 MG 24 hr tablet Take 0.5 tablets by mouth Daily. 45 tablet 3   • levothyroxine (SYNTHROID, LEVOTHROID) 25 MCG tablet Take 25 mcg by mouth Daily.     • lisinopril (PRINIVIL,ZESTRIL) 20 MG tablet Take 20 mg by mouth Daily. Takes 1/2 tablet daily     • Melatonin 3 MG capsule Take 3 mg by mouth Every Night.     • metOLazone (ZAROXOLYN) 2.5 MG tablet Take 2.5 mg po daily minutes 30 minutes prior to am lasix dose 30 tablet 5   • metoprolol tartrate (LOPRESSOR) 25 MG tablet Take 1 tablet by mouth 2 (Two) Times a Day. 180 tablet 3   • niacin (SLO-NIACIN)  500 MG CR tablet Take 1,000 mg by mouth Every Night.     • pantoprazole (PROTONIX) 40 MG EC tablet Take 40 mg by mouth Daily.     • potassium chloride ER (K-TAB) 20 MEQ tablet controlled-release ER tablet Take 20 mEq by mouth 2 (Two) Times a Day.     • Probiotic Product (PROBIOTIC DAILY PO) Take  by mouth Daily.     • tamsulosin (FLOMAX) 0.4 MG capsule 24 hr capsule Take 1 capsule by mouth Daily.     • venlafaxine XR (EFFEXOR-XR) 75 MG 24 hr capsule Take 75 mg by mouth Daily.     • nitroglycerin (NITROSTAT) 0.4 MG SL tablet 1 under the tongue as needed for angina, may repeat q5mins for up three doses within 15 minutes 25 tablet 3     No current facility-administered medications for this visit.        ALLERGIES:    Patient has no known allergies.    PAST MEDICAL HISTORY:    Past Medical History:   Diagnosis Date   • Acid reflux    • Anxiety    • Coronary artery disease    • Gout    • Hyperlipidemia    • Hypertension    • Hypothyroid    • Myocardial infarction (CMS/HCC)    • Rheumatoid arthritis (CMS/[facility])    • Sleep apnea        SURGICAL HISTORY:    Past Surgical History:   Procedure Laterality Date   • CARDIAC CATHETERIZATION      5 stents    • CATARACT EXTRACTION, BILATERAL     • CORONARY ARTERY BYPASS GRAFT      Double by-pass    • CYST REMOVAL     • HEMORRHOIDECTOMY     • MULTIPLE TOOTH EXTRACTIONS     • REPLACEMENT TOTAL KNEE BILATERAL     • TONSILLECTOMY         SOCIAL HISTORY:    Social History     Socioeconomic History   • Marital status:      Spouse name: Not on file   • Number of children: Not on file   • Years of education: Not on file   • Highest education level: Not on file   Tobacco Use   • Smoking status: Former Smoker     Packs/day: 1.00     Years: 45.00     Pack years: 45.00     Last attempt to quit:      Years since quittin.7   • Smokeless tobacco: Never Used   Substance and Sexual Activity   • Alcohol use: No     Frequency: Never   • Drug use: No   • Sexual activity:  "Defer       FAMILY HISTORY:    Family History   Problem Relation Age of Onset   • No Known Problems Mother    • Colon cancer Father    • Heart disease Brother        Review of Systems   Constitutional: Positive for diaphoresis (Becomes Clammy at times) and fatigue (to the point where he just wants to sleep all the time).   HENT: Negative.    Eyes: Positive for visual disturbance (glasses prn).   Respiratory: Positive for shortness of breath (with any exertion).    Cardiovascular: Positive for chest pain (Pressure in left shoulder down left arm and under the arm.  Also midsternal), palpitations (rapid irregular occurs daily) and leg swelling.   Gastrointestinal: Negative.    Endocrine: Negative.    Genitourinary: Negative.    Musculoskeletal: Positive for arthralgias (bilat hips and knees) and myalgias.   Skin: Negative.    Allergic/Immunologic: Negative.    Neurological: Positive for dizziness and light-headedness.   Hematological: Bruises/bleeds easily (bruises just by wearing socks and shoes).   Psychiatric/Behavioral: Negative.        VISIT VITALS:  Vitals:    09/26/19 0845   BP: 92/63   BP Location: Left arm   Patient Position: Sitting   Pulse: 90   SpO2: 96%   Weight: 116 kg (256 lb 3.2 oz)   Height: 185.4 cm (72.99\")      BP 92/63 (BP Location: Left arm, Patient Position: Sitting)   Pulse 90   Ht 185.4 cm (72.99\")   Wt 116 kg (256 lb 3.2 oz)   SpO2 96%   BMI 33.81 kg/m²     RECENT LABS:    Objective       Physical Exam   Constitutional: He is oriented to person, place, and time. Vital signs are normal. He appears well-developed and well-nourished.   HENT:   Head: Normocephalic and atraumatic.   Eyes: Conjunctivae, EOM and lids are normal. Pupils are equal, round, and reactive to light.   Neck: Trachea normal and normal range of motion. Neck supple.   Cardiovascular: Normal rate, regular rhythm, normal heart sounds and intact distal pulses.   Pulses:       Dorsalis pedis pulses are 2+ on the right side, " and 2+ on the left side.   Pulmonary/Chest: Effort normal and breath sounds normal.   Abdominal: Soft. Normal appearance and bowel sounds are normal. There is no tenderness.   Neurological: He is alert and oriented to person, place, and time. He has normal strength. A sensory deficit is present.   deCrease sensation in bilateral lower extremities   Skin: Skin is warm, dry and intact.   Psychiatric: He has a normal mood and affect. His speech is normal and behavior is normal. Judgment and thought content normal. Cognition and memory are normal.       Procedures      Assessment/Plan      Diagnosis Plan   1. Precordial pain  [facility] Cath    CBC & Differential   2. Atrial fibrillation, unspecified type (CMS/HCC)     3. Coronary artery disease due to calcified coronary lesion  [facility] Cath    CBC & Differential   4. Essential hypertension  [facility]    CBC & Differential   5. Mixed hyperlipidemia     6. Palpitations     7. GOPI on CPAP     8. Shortness of breath  [facility] Cath    CBC & Differential   9. Fatigue, unspecified type     10. S/P CABG (coronary artery bypass graft)     11. Abnormal stress test  [facility] Cath    CBC & Differential       Return [facility] F/U, TO BE RESCHEDULED FOR NEXT WEEK.     #1 coronary artery disease, precordial chest pain, fatigue, shortness of air, abnormal stress test, atrial fibrillation, and palpitations will all be assessed through heart catheterization.  Patient reports anginal and anginal type pain and symptoms.  Times are progressively gotten worse over the last 8 months.  She had a abnormal stress test with anterior lateral portion with an ejection fraction of 50% and no evidence of pharmacological induced ischemia in the dilation nor or increase of lung uptake of radiopharmaceutical.  Due to the high risk symptoms and an abnormal stress test patient will be scheduled for left heart catheterization with Dr. Lai within the next week.  #2 blood  pressure on today's visit was 92/63 in the left arm she is currently asymptomatic and denies dizziness we will continue current medication regimen with Prinivil 1 and metoprolol 100 mg twice daily,.0 mg p.o. daily,  #3 hyperlipidemia is stable adjustments of medications will be made patient should continue atorvastatin 80 mg which is a high intensity statin which is appropriate.  #4 should also complains of decreased sensation in the legs will evaluate B12, folate, PRP, and sed rate.  Martha with patient about any new or worsening symptoms to give the office a call or follow-up with the emergency department.  Patient will be scheduled for left heart catheterization within the next week.  She should continue all regular medications and does not require any adjustments of medications at this time.         Patient's Body mass index is 33.81 kg/m². BMI is above normal parameters. Recommendations include: educational material and referral to primary care.     DAVID Stone seen patient in conjunction with Dr. Lai and served as scribe  DAVID Stone    Scribed for Dr. Errol Lai by Jinny Perry LPN September 26, 2019 2:01 PM         Electronically signed by:            This note is dictated utilizing voice recognition software.  Although this record has been proof read, transcriptional errors may still be present. If questions occur regarding the content of this record please do not hesitate to call our office.

## 2019-09-27 ENCOUNTER — TELEPHONE (OUTPATIENT)
Dept: CARDIOLOGY | Facility: CLINIC | Age: 79
End: 2019-09-27

## 2019-09-27 NOTE — TELEPHONE ENCOUNTER
Patient contacts office stating his event monitor through [email or web address] has been defective since receiving the device.  Reports he is not sure his device is recording his symptoms and is upset because he does not want to be charged for something he is not able to use.  States when he presses the button he is getting an error message and is not able to get the proper signal with the device.  Encouraged patient to contact [email or web address] for a new device.  A specialist with the company will be able to view his cardiac activity and consider if a new device is needed.  Patient reassured if a new device is necessary, Sudha can and will get a new device overnighted to his home.  Patient verbalizes understanding.  He will contact [email or web address] for a new monitor.

## 2019-09-29 LAB
BH CV ECHO MEAS - ACS: 1.9 CM
BH CV ECHO MEAS - AO MEAN PG: 2.3 MMHG
BH CV ECHO MEAS - AO ROOT AREA (BSA CORRECTED): 1.3
BH CV ECHO MEAS - AO ROOT AREA: 8.3 CM^2
BH CV ECHO MEAS - AO ROOT DIAM: 3.2 CM
BH CV ECHO MEAS - AO V2 MEAN: 72.4 CM/SEC
BH CV ECHO MEAS - AO V2 VTI: 24.1 CM
BH CV ECHO MEAS - BSA(HAYCOCK): 2.5 M^2
BH CV ECHO MEAS - BSA: 2.4 M^2
BH CV ECHO MEAS - BZI_BMI: 34.7 KILOGRAMS/M^2
BH CV ECHO MEAS - BZI_METRIC_HEIGHT: 185.4 CM
BH CV ECHO MEAS - BZI_METRIC_WEIGHT: 119.3 KG
BH CV ECHO MEAS - EDV(CUBED): 42.2 ML
BH CV ECHO MEAS - EDV(MOD-SP4): 106 ML
BH CV ECHO MEAS - EDV(TEICH): 50.2 ML
BH CV ECHO MEAS - EF(CUBED): 58.9 %
BH CV ECHO MEAS - EF(MOD-SP4): 63.2 %
BH CV ECHO MEAS - EF(TEICH): 51.5 %
BH CV ECHO MEAS - ESV(CUBED): 17.4 ML
BH CV ECHO MEAS - ESV(MOD-SP4): 39 ML
BH CV ECHO MEAS - ESV(TEICH): 24.4 ML
BH CV ECHO MEAS - FS: 25.6 %
BH CV ECHO MEAS - IVS/LVPW: 1.1
BH CV ECHO MEAS - IVSD: 1.7 CM
BH CV ECHO MEAS - LA DIMENSION: 4.3 CM
BH CV ECHO MEAS - LA/AO: 1.3
BH CV ECHO MEAS - LV DIASTOLIC VOL/BSA (35-75): 43.8 ML/M^2
BH CV ECHO MEAS - LV IVRT: 0.09 SEC
BH CV ECHO MEAS - LV MASS(C)D: 212.1 GRAMS
BH CV ECHO MEAS - LV MASS(C)DI: 87.7 GRAMS/M^2
BH CV ECHO MEAS - LV SYSTOLIC VOL/BSA (12-30): 16.1 ML/M^2
BH CV ECHO MEAS - LVIDD: 3.5 CM
BH CV ECHO MEAS - LVIDS: 2.6 CM
BH CV ECHO MEAS - LVLD AP4: 8.1 CM
BH CV ECHO MEAS - LVLS AP4: 6.8 CM
BH CV ECHO MEAS - LVOT AREA (M): 4.2 CM^2
BH CV ECHO MEAS - LVOT AREA: 4.1 CM^2
BH CV ECHO MEAS - LVOT DIAM: 2.3 CM
BH CV ECHO MEAS - LVPWD: 1.5 CM
BH CV ECHO MEAS - MV DEC SLOPE: 471.5 CM/SEC^2
BH CV ECHO MEAS - MV E MAX VEL: 89.8 CM/SEC
BH CV ECHO MEAS - RAP SYSTOLE: 10 MMHG
BH CV ECHO MEAS - RVDD: 3.8 CM
BH CV ECHO MEAS - RVSP: 40.4 MMHG
BH CV ECHO MEAS - SI(AO): 82.4 ML/M^2
BH CV ECHO MEAS - SI(CUBED): 10.3 ML/M^2
BH CV ECHO MEAS - SI(MOD-SP4): 27.7 ML/M^2
BH CV ECHO MEAS - SI(TEICH): 10.7 ML/M^2
BH CV ECHO MEAS - SV(AO): 199.3 ML
BH CV ECHO MEAS - SV(CUBED): 24.9 ML
BH CV ECHO MEAS - SV(MOD-SP4): 67 ML
BH CV ECHO MEAS - SV(TEICH): 25.9 ML
BH CV ECHO MEAS - TR MAX VEL: 275.5 CM/SEC
MAXIMAL PREDICTED HEART RATE: 142 BPM
STRESS TARGET HR: 121 BPM

## 2019-10-02 ENCOUNTER — OUTSIDE FACILITY SERVICE (OUTPATIENT)
Dept: CARDIOLOGY | Facility: CLINIC | Age: 79
End: 2019-10-02

## 2019-10-02 PROCEDURE — 93459 L HRT ART/GRFT ANGIO: CPT | Performed by: INTERNAL MEDICINE

## 2019-10-03 ENCOUNTER — DOCUMENTATION (OUTPATIENT)
Dept: CARDIOLOGY | Facility: CLINIC | Age: 79
End: 2019-10-03

## 2019-10-03 NOTE — PROGRESS NOTES
T/O PER DR. SWANN TO HAVE PATIENT COME IN FOR AN APPT, IN NEXT DAY ORM SO TO REVIEW CHART FOR POSSIBLE PPM. GRAFTS PATENT AT [facility], 10-20-19, BUT HAS HIGH GRADE CONDUCTION DISEASE. APPT. SCHEDULED FOR ALFRED. AT 9:00 AM. PATIENT AWARE STATES WILL BE HERE. RASHI FRANK

## 2019-10-04 ENCOUNTER — HOSPITAL ENCOUNTER (INPATIENT)
Facility: HOSPITAL | Age: 79
LOS: 1 days | Discharge: HOME | End: 2019-10-05
Attending: INTERNAL MEDICINE | Admitting: INTERNAL MEDICINE

## 2019-10-04 ENCOUNTER — OFFICE VISIT (OUTPATIENT)
Dept: CARDIOLOGY | Facility: CLINIC | Age: 79
End: 2019-10-04

## 2019-10-04 VITALS
OXYGEN SATURATION: 95 % | DIASTOLIC BLOOD PRESSURE: 70 MMHG | HEART RATE: 102 BPM | HEIGHT: 72 IN | WEIGHT: 261 LBS | BODY MASS INDEX: 35.35 KG/M2 | SYSTOLIC BLOOD PRESSURE: 100 MMHG

## 2019-10-04 DIAGNOSIS — R06.02 SHORTNESS OF BREATH: ICD-10-CM

## 2019-10-04 DIAGNOSIS — I25.10 CORONARY ARTERY DISEASE DUE TO CALCIFIED CORONARY LESION: ICD-10-CM

## 2019-10-04 DIAGNOSIS — Z95.1 S/P CABG (CORONARY ARTERY BYPASS GRAFT): ICD-10-CM

## 2019-10-04 DIAGNOSIS — I44.2 HEART BLOCK AV COMPLETE (HCC): Primary | ICD-10-CM

## 2019-10-04 DIAGNOSIS — I25.84 CORONARY ARTERY DISEASE DUE TO CALCIFIED CORONARY LESION: ICD-10-CM

## 2019-10-04 DIAGNOSIS — R00.2 PALPITATIONS: ICD-10-CM

## 2019-10-04 DIAGNOSIS — R42 DIZZINESS: ICD-10-CM

## 2019-10-04 DIAGNOSIS — R53.82 CHRONIC FATIGUE: ICD-10-CM

## 2019-10-04 PROBLEM — I49.5 SSS (SICK SINUS SYNDROME): Status: ACTIVE | Noted: 2019-10-04

## 2019-10-04 PROCEDURE — 33208 INSRT HEART PM ATRIAL & VENT: CPT | Performed by: INTERNAL MEDICINE

## 2019-10-04 PROCEDURE — 0JH606Z INSERTION OF PACEMAKER, DUAL CHAMBER INTO CHEST SUBCUTANEOUS TISSUE AND FASCIA, OPEN APPROACH: ICD-10-PCS | Performed by: INTERNAL MEDICINE

## 2019-10-04 PROCEDURE — 25010000002 FENTANYL CITRATE (PF) 100 MCG/2ML SOLUTION: Performed by: INTERNAL MEDICINE

## 2019-10-04 PROCEDURE — 99213 OFFICE O/P EST LOW 20 MIN: CPT | Performed by: NURSE PRACTITIONER

## 2019-10-04 PROCEDURE — C1898 LEAD, PMKR, OTHER THAN TRANS: HCPCS | Performed by: INTERNAL MEDICINE

## 2019-10-04 PROCEDURE — C1785 PMKR, DUAL, RATE-RESP: HCPCS | Performed by: INTERNAL MEDICINE

## 2019-10-04 PROCEDURE — 02H63JZ INSERTION OF PACEMAKER LEAD INTO RIGHT ATRIUM, PERCUTANEOUS APPROACH: ICD-10-PCS | Performed by: INTERNAL MEDICINE

## 2019-10-04 PROCEDURE — 25010000003 LIDOCAINE 1 % SOLUTION: Performed by: INTERNAL MEDICINE

## 2019-10-04 PROCEDURE — 25010000002 VANCOMYCIN: Performed by: INTERNAL MEDICINE

## 2019-10-04 PROCEDURE — 02HK3JZ INSERTION OF PACEMAKER LEAD INTO RIGHT VENTRICLE, PERCUTANEOUS APPROACH: ICD-10-PCS | Performed by: INTERNAL MEDICINE

## 2019-10-04 PROCEDURE — C1892 INTRO/SHEATH,FIXED,PEEL-AWAY: HCPCS | Performed by: INTERNAL MEDICINE

## 2019-10-04 PROCEDURE — 25010000002 MIDAZOLAM PER 1 MG: Performed by: INTERNAL MEDICINE

## 2019-10-04 PROCEDURE — 0 IOPAMIDOL PER 1 ML: Performed by: INTERNAL MEDICINE

## 2019-10-04 DEVICE — GEN PM ASSURITY MRI DR RF PM2272: Type: IMPLANTABLE DEVICE | Status: FUNCTIONAL

## 2019-10-04 DEVICE — LD PM TENDRIL STS 6F52CM 2088TC52: Type: IMPLANTABLE DEVICE | Status: FUNCTIONAL

## 2019-10-04 DEVICE — LD PM TENDRIL STS 6F58CM 2088TC58: Type: IMPLANTABLE DEVICE | Status: FUNCTIONAL

## 2019-10-04 RX ORDER — LIDOCAINE HYDROCHLORIDE 10 MG/ML
INJECTION, SOLUTION INFILTRATION; PERINEURAL AS NEEDED
Status: DISCONTINUED | OUTPATIENT
Start: 2019-10-04 | End: 2019-10-04 | Stop reason: HOSPADM

## 2019-10-04 RX ORDER — CLOPIDOGREL BISULFATE 75 MG/1
75 TABLET ORAL DAILY
Status: DISCONTINUED | OUTPATIENT
Start: 2019-10-05 | End: 2019-10-05 | Stop reason: HOSPADM

## 2019-10-04 RX ORDER — MELATONIN
2000 DAILY
Status: DISCONTINUED | OUTPATIENT
Start: 2019-10-05 | End: 2019-10-05 | Stop reason: HOSPADM

## 2019-10-04 RX ORDER — TAMSULOSIN HYDROCHLORIDE 0.4 MG/1
0.4 CAPSULE ORAL DAILY
Status: DISCONTINUED | OUTPATIENT
Start: 2019-10-05 | End: 2019-10-05 | Stop reason: HOSPADM

## 2019-10-04 RX ORDER — MIDAZOLAM HYDROCHLORIDE 1 MG/ML
INJECTION INTRAMUSCULAR; INTRAVENOUS AS NEEDED
Status: DISCONTINUED | OUTPATIENT
Start: 2019-10-04 | End: 2019-10-04 | Stop reason: HOSPADM

## 2019-10-04 RX ORDER — ATORVASTATIN CALCIUM 40 MG/1
80 TABLET, FILM COATED ORAL NIGHTLY
Status: DISCONTINUED | OUTPATIENT
Start: 2019-10-04 | End: 2019-10-05 | Stop reason: HOSPADM

## 2019-10-04 RX ORDER — METOPROLOL TARTRATE 5 MG/5ML
INJECTION INTRAVENOUS AS NEEDED
Status: DISCONTINUED | OUTPATIENT
Start: 2019-10-04 | End: 2019-10-04 | Stop reason: HOSPADM

## 2019-10-04 RX ORDER — MORPHINE SULFATE 2 MG/ML
1 INJECTION, SOLUTION INTRAMUSCULAR; INTRAVENOUS EVERY 4 HOURS PRN
Status: DISCONTINUED | OUTPATIENT
Start: 2019-10-04 | End: 2019-10-05 | Stop reason: HOSPADM

## 2019-10-04 RX ORDER — ALLOPURINOL 300 MG/1
300 TABLET ORAL DAILY
Status: DISCONTINUED | OUTPATIENT
Start: 2019-10-05 | End: 2019-10-05 | Stop reason: HOSPADM

## 2019-10-04 RX ORDER — ACETAMINOPHEN 325 MG/1
650 TABLET ORAL EVERY 6 HOURS PRN
Status: DISCONTINUED | OUTPATIENT
Start: 2019-10-04 | End: 2019-10-05 | Stop reason: HOSPADM

## 2019-10-04 RX ORDER — SODIUM CHLORIDE 9 MG/ML
250 INJECTION, SOLUTION INTRAVENOUS CONTINUOUS
Status: ACTIVE | OUTPATIENT
Start: 2019-10-04 | End: 2019-10-04

## 2019-10-04 RX ORDER — NALOXONE HCL 0.4 MG/ML
0.4 VIAL (ML) INJECTION
Status: DISCONTINUED | OUTPATIENT
Start: 2019-10-04 | End: 2019-10-05 | Stop reason: HOSPADM

## 2019-10-04 RX ORDER — LEVOTHYROXINE SODIUM 0.03 MG/1
25 TABLET ORAL
Status: DISCONTINUED | OUTPATIENT
Start: 2019-10-05 | End: 2019-10-05 | Stop reason: HOSPADM

## 2019-10-04 RX ORDER — ONDANSETRON 2 MG/ML
4 INJECTION INTRAMUSCULAR; INTRAVENOUS EVERY 6 HOURS PRN
Status: DISCONTINUED | OUTPATIENT
Start: 2019-10-04 | End: 2019-10-05 | Stop reason: HOSPADM

## 2019-10-04 RX ORDER — ALPRAZOLAM 0.5 MG/1
0.5 TABLET ORAL 2 TIMES DAILY PRN
COMMUNITY
End: 2020-12-21

## 2019-10-04 RX ORDER — TEMAZEPAM 7.5 MG/1
7.5 CAPSULE ORAL NIGHTLY PRN
Status: DISCONTINUED | OUTPATIENT
Start: 2019-10-04 | End: 2019-10-05 | Stop reason: HOSPADM

## 2019-10-04 RX ORDER — CHOLECALCIFEROL (VITAMIN D3) 125 MCG
2000 CAPSULE ORAL DAILY
COMMUNITY

## 2019-10-04 RX ORDER — FENTANYL CITRATE 50 UG/ML
INJECTION, SOLUTION INTRAMUSCULAR; INTRAVENOUS AS NEEDED
Status: DISCONTINUED | OUTPATIENT
Start: 2019-10-04 | End: 2019-10-04 | Stop reason: HOSPADM

## 2019-10-04 RX ORDER — ALPRAZOLAM 0.5 MG/1
0.5 TABLET ORAL 2 TIMES DAILY PRN
Status: DISCONTINUED | OUTPATIENT
Start: 2019-10-04 | End: 2019-10-05 | Stop reason: HOSPADM

## 2019-10-04 RX ORDER — PANTOPRAZOLE SODIUM 40 MG/1
40 TABLET, DELAYED RELEASE ORAL
Status: DISCONTINUED | OUTPATIENT
Start: 2019-10-05 | End: 2019-10-05 | Stop reason: HOSPADM

## 2019-10-04 RX ORDER — LISINOPRIL 10 MG/1
10 TABLET ORAL DAILY
COMMUNITY
End: 2020-12-16

## 2019-10-04 RX ORDER — ASPIRIN 81 MG/1
81 TABLET ORAL DAILY
Status: DISCONTINUED | OUTPATIENT
Start: 2019-10-05 | End: 2019-10-05 | Stop reason: HOSPADM

## 2019-10-04 RX ORDER — VENLAFAXINE HYDROCHLORIDE 75 MG/1
75 CAPSULE, EXTENDED RELEASE ORAL DAILY
Status: DISCONTINUED | OUTPATIENT
Start: 2019-10-05 | End: 2019-10-05 | Stop reason: HOSPADM

## 2019-10-04 RX ADMIN — ALPRAZOLAM 0.5 MG: 0.5 TABLET ORAL at 20:27

## 2019-10-04 RX ADMIN — ATORVASTATIN CALCIUM 80 MG: 40 TABLET, FILM COATED ORAL at 20:27

## 2019-10-04 RX ADMIN — TEMAZEPAM 7.5 MG: 7.5 CAPSULE ORAL at 22:50

## 2019-10-04 RX ADMIN — VANCOMYCIN HYDROCHLORIDE 1750 MG: 10 INJECTION, POWDER, LYOPHILIZED, FOR SOLUTION INTRAVENOUS at 17:20

## 2019-10-04 RX ADMIN — METOPROLOL TARTRATE 25 MG: 25 TABLET ORAL at 20:27

## 2019-10-04 NOTE — PROGRESS NOTES
Subjective    HPI    Nick Caraballo is a 79 y.o. male who presents to day for Follow-up (cath , potential pacemaker placement, currently wearing holter monitor).  Patient was reevaluated and EKG was repeated.  Patient had a left heart catheterization earlier this week which identified no obstructive disease.  However patient did have a great conduction disease.  Reported that his symptoms in which sent him to heart cath has continued and are currently present.  Patient reports chest tightness, shortness of air all the time, orthopnea to the point where he sleeps in a recliner, dizziness, unsteady gait, constant fatigue where he wants to sleep all the time, lower extremity edema with the right being worse than the left, palpitations, generalized weakness, and exercise intolerance.  Patient reports at times his heart pounding.  Also got a critical alert on his event monitor to identify his tachycardia with focal PVCs and couplets.    CHIEF COMPLIANT  Chief Complaint   Patient presents with   • Follow-up     cath , potential pacemaker placement, currently wearing holter monitor     PROBLEM LIST:         1. CAD, MI in 1998  1.1 Stenting x 5 total at [facility]. data  1.2 CABG x2 in Dec. 2014 at [facility] by Dr. Álvarez in setting of ACS, LIMA to LAD and reversed vein graft from aorta to diag. With RLE vein graft  1.3 Stress test, 9-19-19, mild anterolateral ischemia on scintigraphy.  2. A-Fib, s/p CABG per patient report  3. HTN  4. Hyperlipidemia  5. GOPI, uses c-pap  6. Hypothyroidism  7. BPH  8. GERD  9. RA  10. Former smoker  Active Problems:  Problem List Items Addressed This Visit        Cardiovascular and Mediastinum    Palpitations    Relevant Orders    ECG 12 Lead    Coronary artery disease due to calcified coronary lesion    Relevant Orders    ECG 12 Lead       Respiratory    Shortness of breath       Other    Fatigue    Dizziness    S/P CABG (coronary artery bypass graft)      Other Visit Diagnoses      Heart block AV complete (CMS/HCC)    -  Primary          HPI  HPI    MEDS  Current Outpatient Medications   Medication Sig Dispense Refill   • acetaminophen (TYLENOL) 650 MG 8 hr tablet Take 650 mg by mouth Every 8 (Eight) Hours As Needed for Mild Pain .     • allopurinol (ZYLOPRIM) 300 MG tablet Take 300 mg by mouth Daily.     • ALPRAZolam (XANAX) 1 MG tablet Take 1 mg by mouth 2 (Two) Times a Day As Needed for Anxiety. Takes 1/2 tablet bid prn anxiety     • aspirin 81 MG EC tablet Take 81 mg by mouth Daily.     • atorvastatin (LIPITOR) 80 MG tablet Take 80 mg by mouth Every Night.     • cholecalciferol (VITAMIN D3) 400 units tablet Take 400 Units by mouth Daily.     • clopidogrel (PLAVIX) 75 MG tablet Take 1 tablet by mouth Daily. 90 tablet 3   • furosemide (LASIX) 20 MG tablet Take 60 mg by mouth 2 (Two) Times a Day.     • isosorbide mononitrate (IMDUR) 30 MG 24 hr tablet Take 0.5 tablets by mouth Daily. (Patient taking differently: Take 30 mg by mouth Daily.) 45 tablet 3   • levothyroxine (SYNTHROID, LEVOTHROID) 25 MCG tablet Take 25 mcg by mouth Daily.     • lisinopril (PRINIVIL,ZESTRIL) 20 MG tablet Take 20 mg by mouth Daily. Takes 1/2 tablet daily     • Melatonin 3 MG capsule Take 3 mg by mouth Every Night.     • metOLazone (ZAROXOLYN) 2.5 MG tablet Take 2.5 mg po daily minutes 30 minutes prior to am lasix dose 30 tablet 5   • Multiple Vitamins-Minerals (MULTIVITAMIN ADULT PO) Take  by mouth Daily.     • niacin (SLO-NIACIN) 500 MG CR tablet Take 1,000 mg by mouth Every Night.     • nitroglycerin (NITROSTAT) 0.4 MG SL tablet 1 under the tongue as needed for angina, may repeat q5mins for up three doses within 15 minutes 25 tablet 3   • pantoprazole (PROTONIX) 40 MG EC tablet Take 40 mg by mouth Daily.     • potassium chloride ER (K-TAB) 20 MEQ tablet controlled-release ER tablet Take 20 mEq by mouth 2 (Two) Times a Day.     • Probiotic Product (PROBIOTIC DAILY PO) Take  by mouth Daily.     • tamsulosin  (FLOMAX) 0.4 MG capsule 24 hr capsule Take 1 capsule by mouth Daily.     • venlafaxine XR (EFFEXOR-XR) 75 MG 24 hr capsule Take 75 mg by mouth Daily.       No current facility-administered medications for this visit.        ALLERGIES  Patient has no known allergies.    HISTORY  Past Medical History:   Diagnosis Date   • Acid reflux    • Anxiety    • Complete heart block (CMS/HCC)    • Coronary artery disease    • Gout    • Hyperlipidemia    • Hypertension    • Hypothyroid    • Myocardial infarction (CMS/HCC)    • Rheumatoid arthritis (CMS/HCC)    • Sleep apnea        Social History     Socioeconomic History   • Marital status:      Spouse name: Not on file   • Number of children: Not on file   • Years of education: Not on file   • Highest education level: Not on file   Tobacco Use   • Smoking status: Former Smoker     Packs/day: 1.     Years: 45.00     Pack years: 45.00     Last attempt to quit:      Years since quittin.7   • Smokeless tobacco: Never Used   Substance and Sexual Activity   • Alcohol use: No     Frequency: Never   • Drug use: No   • Sexual activity: Defer       Family History   Problem Relation Age of Onset   • No Known Problems Mother    • Colon cancer Father    • Heart disease Brother        Review of Systems   Constitutional: Positive for fatigue (chronic. want to sleep all the time). Negative for chills and fever.   HENT: Positive for postnasal drip and sore throat. Negative for congestion.    Eyes: Positive for visual disturbance (readers).   Respiratory: Positive for apnea (GOPI cpap), cough (productive phlegm white in color, daily for 6 months), chest tightness (pressure , heaviness x one month) and shortness of breath (worse with minimal activity, ).    Cardiovascular: Positive for chest pain (yesterday evening), palpitations (thumping in the morning, when asleep feels like having an electrical ) and leg swelling (more than usual).   Gastrointestinal: Negative.  Negative  "for abdominal pain, blood in stool, constipation, diarrhea, nausea and vomiting.   Endocrine: Positive for heat intolerance. Negative for cold intolerance.   Genitourinary: Positive for difficulty urinating and frequency. Negative for dysuria, hematuria and urgency.   Musculoskeletal: Positive for arthralgias, back pain and neck pain.   Skin: Positive for rash (right lower leg). Negative for wound.   Allergic/Immunologic: Positive for environmental allergies (seasonal). Negative for food allergies.   Neurological: Positive for dizziness (changing positions), weakness (generalized weakness bilat) and light-headedness.   Hematological: Bruises/bleeds easily (bruises and bleedsblood thinner).   Psychiatric/Behavioral: Positive for confusion (one period of confusion and was evaluated in the ED). Negative for suicidal ideas (denies waking with soa or cp, uses cpap).       Objective     VITALS: /70 (BP Location: Left arm, Patient Position: Sitting)   Pulse 102   Ht 182.9 cm (72\")   Wt 118 kg (261 lb)   SpO2 95%   BMI 35.40 kg/m²     LABS:   Lab Results (most recent)     None          IMAGING:   No Images in the past 120 days found..    EXAM:  Physical Exam   Constitutional: He is oriented to person, place, and time. Vital signs are normal. He appears well-developed and well-nourished.   HENT:   Head: Normocephalic and atraumatic.   Eyes: Conjunctivae and EOM are normal. Pupils are equal, round, and reactive to light.   Neck: Trachea normal. Neck supple.   Cardiovascular: Normal heart sounds and intact distal pulses. An irregular rhythm present. Frequent extrasystoles are present. Tachycardia present.   No murmur heard.  Pulses:       Radial pulses are 2+ on the right side, and 2+ on the left side.        Posterior tibial pulses are 2+ on the right side, and 2+ on the left side.   Pulmonary/Chest: Effort normal. He has decreased breath sounds in the right lower field and the left lower field.   Abdominal: Soft. " Normal appearance and bowel sounds are normal. He exhibits no distension. There is no tenderness.   Musculoskeletal: Normal range of motion. He exhibits edema (2+ right worse than left).   Neurological: He is alert and oriented to person, place, and time.   Skin: Skin is warm, dry and intact.   Psychiatric: He has a normal mood and affect. His speech is normal and behavior is normal. Judgment and thought content normal. Cognition and memory are normal.       Procedure     ECG 12 Lead  Date/Time: 10/4/2019 9:08 AM  Performed by: Coleman Harman APRN  Authorized by: Coleman Harman APRN   Comparison: compared with previous ECG from 9/17/2019  Comparison to previous ECG: Patient compares to be in atrial rate of 100 and a ventricular rate of 78 with no association between the atrium and ventricle.  Patient has normal axis.  Rate: normal  BPM: 78  Conduction: 3rd degree AV block  Q waves: II, III and aVF    QRS axis: normal    Clinical impression: abnormal EKG             Assessment/Plan    Diagnosis Plan   1. Heart block AV complete (CMS/HCC)     2. Palpitations  ECG 12 Lead   3. Coronary artery disease due to calcified coronary lesion  ECG 12 Lead   4. Shortness of breath     5. Dizziness     6. Chronic fatigue     7. S/P CABG (coronary artery bypass graft)       Patient seen status post heart catheterization Dr. Lai's recommendations.  Patient had a left heart catheterization that identified no obstructive coronary artery disease.  However patient was determined to have high-grade conduction disease.  Discussing with patient reviewing his event monitor.  Patient has periods of tachycardia with multi PVCs and couplets that are polymorphic.  Patient's symptoms have continued and have gotten no better symptomatic in the office today with dizziness unsteady gait chest pain shortness of air.  Talk with Dr. Claros about patient's current EKG Dr. Claros advised to send patient to the emergency department and have  transferred to [facility] for further evaluation.  She was offered an ambulance and refused.  Patient informed of risk and benefit of a month's transfer to the emergency department.  She called his daughter who transported him to the emergency department.  Return in about 4 weeks (around 11/1/2019), or if symptoms worsen or fail to improve.    Nick was seen today for follow-up.    Diagnoses and all orders for this visit:    Heart block AV complete (CMS/HCC)    Palpitations  -     ECG 12 Lead    Coronary artery disease due to calcified coronary lesion  -     ECG 12 Lead    Shortness of breath    Dizziness    Chronic fatigue    S/P CABG (coronary artery bypass graft)                    Patient's Body mass index is 35.4 kg/m². BMI is above normal parameters. Recommendations include: educational material.           MEDS ORDERED DURING VISIT:  No orders of the defined types were placed in this encounter.          This document has been electronically signed by DAVID Stone Jr.  October 4, 2019 1:35 PM

## 2019-10-04 NOTE — CONSULTS
[facility] Consult Note       Referring Provider: Jamshid Claros MD  Reason for Consultation:      Patient Care Team:  Selvin Abbott MD as PCP - General (Family Medicine)    Chief complaint:   Dizzy spells and near syncope  Subjective .     History of present illness: 79-year-old  white male presenting for evaluation of high degree heart block.  Patient underwent bypass surgery in 2014.  He has chronic hypertension gout hyperlipidemia and hypothyroidism.  For the past 6 to 8 months he has had frequent episodes of dizziness and near syncope.  He describes feeling a pounding in his chest and lightheadedness associated with mild dyspnea which lasts for a few minutes.  He has recently undergone echocardiography which showed an ejection fraction of 50 ±5%.  Nuclear stress testing yesterday was negative for ischemia.  He has not had darion syncope and other than when his chest pounds he denies angina orthopnea or PND.  To evaluate his complaints of palpitations he had a external extended event recorder fitted on September 26.    Review of Systems  A 14 point review of systems was negative except as was stated in the HPI; he does complain of gouty arthritis in his knees and elbows.  He has occasional constipation    History  Past Medical History:   Diagnosis Date   • Acid reflux    • Anxiety    • Complete heart block (CMS/HCC)    • Coronary artery disease    • Gout    • Hyperlipidemia    • Hypertension    • Hypothyroid    • Myocardial infarction (CMS/HCC) 1998   • Rheumatoid arthritis (CMS/HCC)    • Sleep apnea      Past Surgical History:   Procedure Laterality Date   • CARDIAC CATHETERIZATION      5 stents    • CATARACT EXTRACTION, BILATERAL     • CORONARY ARTERY BYPASS GRAFT  2014    Double by-pass    • CYST REMOVAL     • HEMORRHOIDECTOMY  1965   • MULTIPLE TOOTH EXTRACTIONS     • REPLACEMENT TOTAL KNEE BILATERAL     • TONSILLECTOMY  1964     Family History   Problem Relation Age of  Onset   • No Known Problems Mother    • Colon cancer Father    • Heart disease Brother      Social History     Tobacco Use   • Smoking status: Former Smoker     Packs/day: 1.00     Years: 45.00     Pack years: 45.00     Last attempt to quit:      Years since quittin.7   • Smokeless tobacco: Never Used   Substance Use Topics   • Alcohol use: No     Frequency: Never   • Drug use: No     Medications Prior to Admission   Medication Sig Dispense Refill Last Dose   • acetaminophen (TYLENOL) 650 MG 8 hr tablet Take 650 mg by mouth Every 8 (Eight) Hours As Needed for Mild Pain .   Taking   • allopurinol (ZYLOPRIM) 300 MG tablet Take 300 mg by mouth Daily.   Taking   • ALPRAZolam (XANAX) 0.5 MG tablet Take 0.5 mg by mouth 2 (Two) Times a Day As Needed for Anxiety.      • aspirin 81 MG EC tablet Take 81 mg by mouth Daily.   Taking   • atorvastatin (LIPITOR) 80 MG tablet Take 80 mg by mouth Every Night.   Taking   • cholecalciferol (VITAMIN D3) 1000 units tablet Take 2,000 Units by mouth Daily.      • clopidogrel (PLAVIX) 75 MG tablet Take 1 tablet by mouth Daily. 90 tablet 3 Taking   • furosemide (LASIX) 20 MG tablet Take 60 mg by mouth 2 (Two) Times a Day.   10/4/2019 at Unknown time   • isosorbide mononitrate (IMDUR) 30 MG 24 hr tablet Take 0.5 tablets by mouth Daily. (Patient taking differently: Take 30 mg by mouth Daily.) 45 tablet 3 Taking   • levothyroxine (SYNTHROID, LEVOTHROID) 25 MCG tablet Take 25 mcg by mouth Daily.   Taking   • lisinopril (PRINIVIL,ZESTRIL) 10 MG tablet Take 10 mg by mouth Daily.      • Melatonin 3 MG capsule Take 3 mg by mouth Every Night.   Taking   • Multiple Vitamins-Minerals (MULTIVITAMIN ADULT PO) Take  by mouth Daily.      • niacin (SLO-NIACIN) 500 MG CR tablet Take 1,000 mg by mouth Every Night.   Taking   • nitroglycerin (NITROSTAT) 0.4 MG SL tablet 1 under the tongue as needed for angina, may repeat q5mins for up three doses within 15 minutes 25 tablet 3 Taking   • pantoprazole  (PROTONIX) 40 MG EC tablet Take 40 mg by mouth Daily.   Taking   • potassium chloride ER (K-TAB) 20 MEQ tablet controlled-release ER tablet Take 10 mEq by mouth 2 (Two) Times a Day.   Taking   • tamsulosin (FLOMAX) 0.4 MG capsule 24 hr capsule Take 1 capsule by mouth Daily.   Taking   • venlafaxine XR (EFFEXOR-XR) 75 MG 24 hr capsule Take 75 mg by mouth Daily.   Taking     Scheduled Meds:    Continuous Infusions:    No current facility-administered medications for this encounter.   PRN Meds:     Allergies:  Patient has no known allergies.    Objective     Vital Sign Min/Max for last 24 hours  No Data Recorded   BP  Min: 100/70  Max: 100/70   Pulse  Min: 102  Max: 102   No Data Recorded   SpO2  Min: 95 %  Max: 95 %   No Data Recorded   Weight  Min: 118 kg (261 lb)  Max: 118 kg (261 lb)              Physical Exam: Amiable elderly gentleman  General Appearance: Alert, appears stated age and cooperative  Lungs: Clear to auscultation  Heart:: RRR  No Murmurs, Rubs or Gallops  Abdomen: Soft and nontender with adequate bowel sounds.  No organomegaly  Extremities: No cyanosis, clubbing\.  Trace pretibial edema  Pulses: Pulses palpable and equal bilaterally  Skin: Warm and dry with no rash  Psych: Normal  Results Review:       EKG shows sinus rhythm with periodic high degree AV block and a baseline prolonged CO interval.            Impression      Sick sinus syndrome  Clinically stable chronic coronary artery disease  Hypertension            Plan     Dual-chamber pacemaker implantation  Addition of beta-blocker to medical regimen.  Further pending his response to therapy          I discussed the patients findings and my recommendations with patient and family.  He is agreeable to proceeding    Selvin Valerio MD  10/04/19  4:27 PM    Time:

## 2019-10-04 NOTE — NURSING NOTE
ACC REVIEW REPORT: [facility]        PATIENT NAME: Nick Caraballo    PATIENT ID: [id number]    BED: 9801    BED TYPE: CVOU    BED GIVEN TO: Darline in the ED    TIME BED GIVEN: 12:30    YOB: 1940    AGE: 79    GENDER: M    PREVIOUS ADMIT TO [facility]:     PREVIOUS ADMISSION DATE:     PATIENT CLASS:     TODAY'S DATE: 10/4/2019    TRANSFER DATE: 10-4-19    ETA: after 1445    TRANSFERRING FACILITY: [facility]    TRANSFERRING FACILITY PHONE # : [phone]    TRANSFERRING MD: Ignacio    DATE/TIME REQUEST RECEIVED: 10-4-19@10:19    [facility] RN: Isela Alegre    REPORT FROM: Darline    TIME REPORT TAKEN: 12:30    DIAGNOSIS: CHB    REASON FOR TRANSFER TO [facility]: higher level of care    TRANSPORTATION: EMS    CLINICAL REASON FOR TRANSFER TO [facility]: 79 y.o. Was seen by his local cardiologist (Dr Lai) this morning to f/u heart cath that was done 10/2/19 - Dr Lai recommended he seek ER eval due to persistent CP, dizziness, weakness, soa (x6mo) for eval for a pacemaker      CLINICAL INFORMATION    HEIGHT:    WEIGHT: 118kg    ALLERGIES: NKA    MIRANDA: no    INFECTIOUS DISEASE:     ISOLATION:     LAST VITAL SIGNS:  TIME: 12:00  TEMP:   PULSE: 103  B/P: 144/84  RESP:     LAB INFORMATION: bun 29, Cr 1.3, K+ 3.3, glucose 114     CULTURE INFORMATION:     MEDS/IV FLUIDS: #18 rt ac - SL - no meds given in the ED      CARDIAC SYSTEM:    CHEST PAIN: none at time of report    RHYTHM: EKG - AV dissociation, narrow complex, irreg QRS    Is patient taking or has patient been given any drugs that could increase bleeding? yes  (Plavix, Brilinta, Effient, Eliquis, Xarelto, Warfarin, Integrilin, Angiomax)    DRUG: plavix     DOSE/FREQUENCY: 75mg daily    CARDIAC ENZYMES:    DATE: 10-4-19  TIME: 11:04  TROP: <0.015      HEART CATH: done on 10/2/19    HEART CATH RESULTS: widely patent grafts, EF 50-55%    CARDIAC NOTES: unknown cath site; pt does take metoprolol      RESPIRATORY SYSTEM:    LUNG SOUNDS:  clear    OXYGEN: no    O2  SAT: 95% on RA    RADIOLOGY RESULTS: CXR not done    RESPIRATORY STATUS: unlabored      CNS/MUSCULOSKELETAL -    CNS/MUSCULOSKELETAL NOTES: a&o, ambulatory    PAST MEDICAL HISTORY: CAD/stents/CABG, GOPI with CPAP, BPH, GERD, afib, htn, RA, CHF, nargis knee surgeries      Mary Alegre RN  10/4/2019  12:34 PM

## 2019-10-04 NOTE — PATIENT INSTRUCTIONS
"Fat and Cholesterol Restricted Eating Plan  Getting too much fat and cholesterol in your diet may cause health problems. Choosing the right foods helps keep your fat and cholesterol at normal levels. This can keep you from getting certain diseases.  Your doctor may recommend an eating plan that includes:  · Total fat: ______% or less of total calories a day.  · Saturated fat: ______% or less of total calories a day.  · Cholesterol: less than _________mg a day.  · Fiber: ______g a day.  What are tips for following this plan?  General tips    · Work with your doctor to lose weight if you need to.  · Avoid:  ? Foods with added sugar.  ? Fried foods.  ? Foods with partially hydrogenated oils.  · Limit alcohol intake to no more than 1 drink a day for nonpregnant women and 2 drinks a day for men. One drink equals 12 oz of beer, 5 oz of wine, or 1½ oz of hard liquor.  Reading food labels  · Check food labels for:  ? Trans fats.  ? Partially hydrogenated oils.  ? Saturated fat (g) in each serving.  ? Cholesterol (mg) in each serving.  ? Fiber (g) in each serving.  · Choose foods with healthy fats, such as:  ? Monounsaturated fats.  ? Polyunsaturated fats.  ? Omega-3 fats.  · Choose grain products that have whole grains. Look for the word \"whole\" as the first word in the ingredient list.  Cooking  · Cook foods using low-fat methods. These include baking, boiling, grilling, and broiling.  · Eat more home-cooked foods. Eat at restaurants and buffets less often.  · Avoid cooking using saturated fats, such as butter, cream, palm oil, palm kernel oil, and coconut oil.  Meal planning    · At meals, divide your plate into four equal parts:  ? Fill one-half of your plate with vegetables and green salads.  ? Fill one-fourth of your plate with whole grains.  ? Fill one-fourth of your plate with low-fat (lean) protein foods.  · Eat fish that is high in omega-3 fats at least two times a week. This includes mackerel, tuna, sardines, and " salmon.  · Eat foods that are high in fiber, such as whole grains, beans, apples, broccoli, carrots, peas, and barley.  Recommended foods  Grains  · Whole grains, such as whole wheat or whole grain breads, crackers, cereals, and pasta. Unsweetened oatmeal, bulgur, barley, quinoa, or brown rice. Corn or whole wheat flour tortillas.  Vegetables  · Fresh or frozen vegetables (raw, steamed, roasted, or grilled). Green salads.  Fruits  · All fresh, canned (in natural juice), or frozen fruits.  Meats and other protein foods  · Ground beef (85% or leaner), grass-fed beef, or beef trimmed of fat. Skinless chicken or turkey. Ground chicken or turkey. Pork trimmed of fat. All fish and seafood. Egg whites. Dried beans, peas, or lentils. Unsalted nuts or seeds. Unsalted canned beans. Nut butters without added sugar or oil.  Dairy  · Low-fat or nonfat dairy products, such as skim or 1% milk, 2% or reduced-fat cheeses, low-fat and fat-free ricotta or cottage cheese, or plain low-fat and nonfat yogurt.  Fats and oils  · Tub margarine without trans fats. Light or reduced-fat mayonnaise and salad dressings. Avocado. Olive, canola, sesame, or safflower oils.  The items listed above may not be a complete list of recommended foods or beverages. Contact your dietitian for more options.  The items listed above may not be a complete list of foods and beverages [you/your child] can eat. Contact a dietitian for more information.  Foods to avoid  Grains  · White bread. White pasta. White rice. Cornbread. Bagels, pastries, and croissants. Crackers and snack foods that contain trans fat and hydrogenated oils.  Vegetables  · Vegetables cooked in cheese, cream, or butter sauce. Fried vegetables.  Fruits  · Canned fruit in heavy syrup. Fruit in cream or butter sauce. Fried fruit.  Meats and other protein foods  · Fatty cuts of meat. Ribs, chicken wings, kaur, sausage, bologna, salami, chitterlings, fatback, hot dogs, bratwurst, and packaged  lunch meats. Liver and organ meats. Whole eggs and egg yolks. Chicken and turkey with skin. Fried meat.  Dairy  · Whole or 2% milk, cream, half-and-half, and cream cheese. Whole milk cheeses. Whole-fat or sweetened yogurt. Full-fat cheeses. Nondairy creamers and whipped toppings. Processed cheese, cheese spreads, and cheese curds.  Beverages  · Alcohol. Sugar-sweetened drinks such as sodas, lemonade, and fruit drinks.  Fats and oils  · Butter, stick margarine, lard, shortening, ghee, or kaur fat. Coconut, palm kernel, and palm oils.  Sweets and desserts  · Corn syrup, sugars, honey, and molasses. Candy. Jam and jelly. Syrup. Sweetened cereals. Cookies, pies, cakes, donuts, muffins, and ice cream.  The items listed above may not be a complete list of foods and beverages to avoid. Contact your dietitian for more information.  The items listed above may not be a complete list of foods and beverages [you/your child] should avoid. Contact a dietitian for more information.  Summary  · Choosing the right foods helps keep your fat and cholesterol at normal levels. This can keep you from getting certain diseases.  · At meals, fill one-half of your plate with vegetables and green salads.  · Eat high-fiber foods, like whole grains, beans, apples, carrots, peas, and barley.  · Limit added sugar, saturated fats, alcohol, and fried foods.  This information is not intended to replace advice given to you by your health care provider. Make sure you discuss any questions you have with your health care provider.  Document Released: 06/18/2013 Document Revised: 09/04/2018 Document Reviewed: 09/04/2018  [employer] Interactive Patient Education © 2019 [employer]  BMI for Adults    Body mass index (BMI) is a number that is calculated from a person's weight and height. BMI may help to estimate how much of a person's weight is composed of fat. BMI can help identify those who may be at higher risk for certain medical problems.  How is BMI  "used with adults?  BMI is used as a screening tool to identify possible weight problems. It is used to check whether a person is obese, overweight, healthy weight, or underweight.  How is BMI calculated?  BMI measures your weight and compares it to your height. This can be done either in English ([place] or metric measurements. Note that charts are available to help you find your BMI quickly and easily without having to do these calculations yourself.  To calculate your BMI in English ([place] measurements, your health care provider will:  1. Measure your weight in pounds (lb).  2. Multiply the number of pounds by 703.  ? For example, for a person who weighs 180 lb, multiply that number by 703, which equals 126,540.  3. Measure your height in inches (in). Then multiply that number by itself to get a measurement called \"inches squared.\"  ? For example, for a person who is 70 in tall, the \"inches squared\" measurement is 70 in x 70 in, which equals 4900 inches squared.  4. Divide the total from Step 2 (number of lb x 703) by the total from Step 3 (inches squared): 126,540 ÷ 4900 = 25.8. This is your BMI.  To calculate your BMI in metric measurements, your health care provider will:  1. Measure your weight in kilograms (kg).  2. Measure your height in meters (m). Then multiply that number by itself to get a measurement called \"meters squared.\"  ? For example, for a person who is 1.75 m tall, the \"meters squared\" measurement is 1.75 m x 1.75 m, which is equal to 3.1 meters squared.  3. Divide the number of kilograms (your weight) by the meters squared number. In this example: 70 ÷ 3.1 = 22.6. This is your BMI.  How is BMI interpreted?  To interpret your results, your health care provider will use BMI charts to identify whether you are underweight, normal weight, overweight, or obese. The following guidelines will be used:  · Underweight: BMI less than 18.5.  · Normal weight: BMI between 18.5 and 24.9.  · Overweight: BMI " between 25 and 29.9.  · Obese: BMI of 30 and above.  Please note:  · Weight includes both fat and muscle, so someone with a muscular build, such as an athlete, may have a BMI that is higher than 24.9. In cases like these, BMI is not an accurate measure of body fat.  · To determine if excess body fat is the cause of a BMI of 25 or higher, further assessments may need to be done by a health care provider.  · BMI is usually interpreted in the same way for men and women.  Why is BMI a useful tool?  BMI is useful in two ways:  · Identifying a weight problem that may be related to a medical condition, or that may increase the risk for medical problems.  · Promoting lifestyle and diet changes in order to reach a healthy weight.  Summary  · Body mass index (BMI) is a number that is calculated from a person's weight and height.  · BMI may help to estimate how much of a person's weight is composed of fat. BMI can help identify those who may be at higher risk for certain medical problems.  · BMI can be measured using English measurements or metric measurements.  · To interpret your results, your health care provider will use BMI charts to identify whether you are underweight, normal weight, overweight, or obese.  This information is not intended to replace advice given to you by your health care provider. Make sure you discuss any questions you have with your health care provider.  Document Released: 08/29/2005 Document Revised: 10/31/2018 Document Reviewed: 10/31/2018  [employer] Interactive Patient Education © 2019 [employer]

## 2019-10-05 ENCOUNTER — APPOINTMENT (OUTPATIENT)
Dept: GENERAL RADIOLOGY | Facility: HOSPITAL | Age: 79
End: 2019-10-05

## 2019-10-05 VITALS
BODY MASS INDEX: 34.17 KG/M2 | DIASTOLIC BLOOD PRESSURE: 68 MMHG | TEMPERATURE: 97.9 F | HEIGHT: 73 IN | RESPIRATION RATE: 18 BRPM | HEART RATE: 96 BPM | SYSTOLIC BLOOD PRESSURE: 120 MMHG | OXYGEN SATURATION: 97 % | WEIGHT: 257.8 LBS

## 2019-10-05 PROCEDURE — 71046 X-RAY EXAM CHEST 2 VIEWS: CPT

## 2019-10-05 PROCEDURE — 25010000002 VANCOMYCIN 10 G RECONSTITUTED SOLUTION: Performed by: INTERNAL MEDICINE

## 2019-10-05 PROCEDURE — 25010000002 MORPHINE PER 10 MG: Performed by: INTERNAL MEDICINE

## 2019-10-05 PROCEDURE — 99238 HOSP IP/OBS DSCHRG MGMT 30/<: CPT | Performed by: INTERNAL MEDICINE

## 2019-10-05 PROCEDURE — 93005 ELECTROCARDIOGRAM TRACING: CPT | Performed by: INTERNAL MEDICINE

## 2019-10-05 RX ADMIN — ALPRAZOLAM 0.5 MG: 0.5 TABLET ORAL at 08:16

## 2019-10-05 RX ADMIN — CLOPIDOGREL BISULFATE 75 MG: 75 TABLET ORAL at 08:55

## 2019-10-05 RX ADMIN — METOPROLOL TARTRATE 25 MG: 25 TABLET ORAL at 08:55

## 2019-10-05 RX ADMIN — VENLAFAXINE HYDROCHLORIDE 75 MG: 75 CAPSULE, EXTENDED RELEASE ORAL at 08:55

## 2019-10-05 RX ADMIN — MORPHINE SULFATE 1 MG: 2 INJECTION, SOLUTION INTRAMUSCULAR; INTRAVENOUS at 08:16

## 2019-10-05 RX ADMIN — TAMSULOSIN HYDROCHLORIDE 0.4 MG: 0.4 CAPSULE ORAL at 08:55

## 2019-10-05 RX ADMIN — ALLOPURINOL 300 MG: 300 TABLET ORAL at 08:55

## 2019-10-05 RX ADMIN — ASPIRIN 81 MG: 81 TABLET, COATED ORAL at 08:55

## 2019-10-05 RX ADMIN — VANCOMYCIN HYDROCHLORIDE 1750 MG: 10 INJECTION, POWDER, LYOPHILIZED, FOR SOLUTION INTRAVENOUS at 05:58

## 2019-10-05 RX ADMIN — PANTOPRAZOLE SODIUM 40 MG: 40 TABLET, DELAYED RELEASE ORAL at 05:58

## 2019-10-05 RX ADMIN — LEVOTHYROXINE SODIUM 25 MCG: 25 TABLET ORAL at 05:58

## 2019-10-05 RX ADMIN — MORPHINE SULFATE 1 MG: 2 INJECTION, SOLUTION INTRAMUSCULAR; INTRAVENOUS at 13:14

## 2019-10-05 RX ADMIN — VITAMIN D, TAB 1000IU (100/BT) 2000 UNITS: 25 TAB at 08:55

## 2019-10-05 NOTE — PROGRESS NOTES
"[facility] Discharge Note       LOS: 1 day   Patient Care Team:  Selvin Abbott MD as PCP - General (Family Medicine)    Chief Complaint: Sick sinus syndrome    Subjective     Subjective: No complaints this morning.  Minimal discomfort at the pacemaker site.  Had a good night.     Review of Systems:   As above.    Medications:    allopurinol 300 mg Oral Daily   aspirin 81 mg Oral Daily   atorvastatin 80 mg Oral Nightly   cholecalciferol 2,000 Units Oral Daily   clopidogrel 75 mg Oral Daily   levothyroxine 25 mcg Oral Q AM   metoprolol tartrate 25 mg Oral Q12H   pantoprazole 40 mg Oral QAM AC   tamsulosin 0.4 mg Oral Daily   venlafaxine XR 75 mg Oral Daily       Objective     Vital Sign Min/Max for last 24 hours  Temp  Min: 97.8 °F (36.6 °C)  Max: 98.5 °F (36.9 °C)   BP  Min: 100/70  Max: 147/93   Pulse  Min: 74  Max: 112   Resp  Min: 16  Max: 18   SpO2  Min: 87 %  Max: 99 %   Flow (L/min)  Min: 2  Max: 2   Weight  Min: 116 kg (256 lb 2.8 oz)  Max: 118 kg (261 lb)      Intake/Output Summary (Last 24 hours) at 10/5/2019 0802  Last data filed at 10/5/2019 0600  Gross per 24 hour   Intake 800 ml   Output 2100 ml   Net -1300 ml        Flowsheet Rows      First Filed Value   Admission Height  185.4 cm (73\") Documented at 10/04/2019 1604   Admission Weight  116 kg (256 lb 2.8 oz) Documented at 10/04/2019 1604          Physical Exam:    General: Alert and oriented.   Cardiovascular: Heart has a nondisplaced focal PMI. Regular rate and rhythm without murmur, gallop or rub.  The pacemaker site is clean and dry.  Lungs: Clear without rales or wheezes. Equal expansion is noted.   Abdomen: Soft, nontender.  Extremities: Show no edema.   Skin: warm and dry.     Results Review:    I reviewed the patient's new clinical results.  EKG:  Tele: Sinus rhythm with V pacing  CXR: No pneumothorax.  Leads in good position.  Labs:          Invalid input(s): LABALBU, PROT        Invalid input(s): NEUTOPHILPCT,  EOSPCT  No " results found for: TROPONINI, TROPONINT  No results found for: CHOL  No results found for: TRIG  No results found for: HDL  No components found for: LDLCALC  No results found for: INR, PROTIME        Assessment   Assessment:    Sick sinus syndrome s/p [employer] model PM 7882 serial #[id number]  Clinically stable chronic coronary artery disease  Hypertension    Plan:  Home today   No change in home medications  Follow-up with Dr. Lai for wound check in 7 to 10 days    Judie Tadeo MD  10/05/19  8:02 AM

## 2019-10-05 NOTE — PLAN OF CARE
Problem: Patient Care Overview  Goal: Plan of Care Review  Outcome: Ongoing (interventions implemented as appropriate)   10/05/19 5815   Coping/Psychosocial   Plan of Care Reviewed With patient   Plan of Care Review   Progress no change   OTHER   Outcome Summary VSS. No complaints noted during shift. Was NSR earlier in shift and now appears V-Paced on monitor. 2L NC applied while sleeping. Left arm still remains in sling and chest dressing has been clean, dry, and intact.        Problem: Cardiac Rhythm Management Device (Adult)  Goal: Signs and Symptoms of Listed Potential Problems Will be Absent, Minimized or Managed (Cardiac Rhythm Management Device)  Outcome: Ongoing (interventions implemented as appropriate)

## 2019-10-06 ENCOUNTER — NURSE TRIAGE (OUTPATIENT)
Dept: CALL CENTER | Facility: HOSPITAL | Age: 79
End: 2019-10-06

## 2019-10-06 NOTE — TELEPHONE ENCOUNTER
"I accessed the [employer] website and read to patient: The transmitter is easy to set up and use. Patients need an electrical outlet and a telephone landline, cellular adaptor or broadband service. They simply plug in the transmitter next to where they sleep and the RF transmitter automatically monitors their device as they sleep.  He will call back if has any further issues.  He has they customer service phone number and did call it but no one answered.        Reason for Disposition  • General information question, no triage required and triager able to answer question    Additional Information  • Negative: [1] Caller is not with the adult (patient) AND [2] reporting urgent symptoms  • Negative: Lab result questions  • Negative: Medication questions  • Negative: Caller cannot be reached by phone  • Negative: Caller has already spoken to PCP or another triager  • Negative: RN needs further essential information from caller in order to complete triage  • Negative: Requesting regular office appointment  • Negative: [1] Caller requesting NON-URGENT health information AND [2] PCP's office is the best resource  • Negative: Health Information question, no triage required and triager able to answer question    Answer Assessment - Initial Assessment Questions  1. REASON FOR CALL or QUESTION: \"What is your reason for calling today?\" or \"How can I best help you?\" or \"What question do you have that I can help answer?\"      Hooking up the monitor for his pacemaker, [employer] system.    Protocols used: INFORMATION ONLY CALL-ADULT-      "

## 2019-10-07 ENCOUNTER — TELEPHONE (OUTPATIENT)
Dept: CARDIOLOGY | Facility: CLINIC | Age: 79
End: 2019-10-07

## 2019-10-07 NOTE — TELEPHONE ENCOUNTER
S/P PPM implant on Friday and was told by hosp. Can send event monitor back. V/O per Dr. Ming dhillon to send monitor back, which patient has already done. PH,LPN

## 2019-10-07 NOTE — TELEPHONE ENCOUNTER
Patient called office this morning wanting to let us know he had a pacemaker placed on Friday at [facility]. Stated he was doing well. Alayna PRINCE made patient appointment for wound check with a nurse visit on 10-18-19 and has a follow up to see Dr Lai on 10-29-19. Informed patient to call office if anything needed before appointments, Patient verbalized understanding. Danitza Lacey LPN

## 2019-10-08 ENCOUNTER — TELEPHONE (OUTPATIENT)
Dept: CARDIOLOGY | Facility: CLINIC | Age: 79
End: 2019-10-08

## 2019-10-08 NOTE — TELEPHONE ENCOUNTER
Patient had walked in late yest. Evening wanting s/p PPM care instructions, arm sling, shower etc. Instructions. Per Dr. Lai have DR. Olson's office answer these questions. Spoke with Lucy Tang, nurse, at their office and the following are their instructions:    1. Wear arm sling 1 week post implant  2. No heaving lifting > than a gallon of milk for 4 weeks  3. May shower, facing away from direct water for 1-2 weeks  4. No sleeping on left side x 4 weeks  5. Monitor for s/s's of infection.    Relayed these instrucitons to Mr. Caraballo. He has a wound/post-op f/u at their office on Monday and scheduled to have dressing removed then. Will be having home remote checks done through their office. I will confirm if patient needs to be sooner than 10-29-19 f/u appt. Here, or  for a wound check.before that and let him know. PH,LPN       10-9-19 V/O PER DR. LAI DOES NOT NEED VISIT APPT. SINCE WOUND CARE BEING ADDRESSED ON MONDAY AND CAN JUST KEEP 10-29-19 APPT. PATIENT CALLED AWARE. PH,LPN

## 2019-10-14 ENCOUNTER — TELEPHONE (OUTPATIENT)
Dept: CARDIOLOGY | Facility: CLINIC | Age: 79
End: 2019-10-14

## 2019-10-14 NOTE — TELEPHONE ENCOUNTER
----- Message from Danitza Lacey LPN sent at 10/7/2019 10:53 AM EDT -----  Regarding: Patient call  Patient called Monday, would like to talk to you. He is the patient that needed to go to the hospital, had a pacemaker same day her saw .   [phone]

## 2019-10-29 ENCOUNTER — OFFICE VISIT (OUTPATIENT)
Dept: CARDIOLOGY | Facility: CLINIC | Age: 79
End: 2019-10-29

## 2019-10-29 VITALS
OXYGEN SATURATION: 97 % | DIASTOLIC BLOOD PRESSURE: 64 MMHG | SYSTOLIC BLOOD PRESSURE: 98 MMHG | HEART RATE: 80 BPM | HEIGHT: 73 IN | WEIGHT: 255 LBS | BODY MASS INDEX: 33.8 KG/M2

## 2019-10-29 DIAGNOSIS — I48.91 ATRIAL FIBRILLATION, UNSPECIFIED TYPE (HCC): ICD-10-CM

## 2019-10-29 DIAGNOSIS — Z95.1 S/P CABG (CORONARY ARTERY BYPASS GRAFT): ICD-10-CM

## 2019-10-29 DIAGNOSIS — R53.82 CHRONIC FATIGUE: ICD-10-CM

## 2019-10-29 DIAGNOSIS — Z99.89 OSA ON CPAP: ICD-10-CM

## 2019-10-29 DIAGNOSIS — I10 ESSENTIAL HYPERTENSION: ICD-10-CM

## 2019-10-29 DIAGNOSIS — G47.33 OSA ON CPAP: ICD-10-CM

## 2019-10-29 DIAGNOSIS — I25.84 CORONARY ARTERY DISEASE DUE TO CALCIFIED CORONARY LESION: ICD-10-CM

## 2019-10-29 DIAGNOSIS — R06.02 SHORTNESS OF BREATH: ICD-10-CM

## 2019-10-29 DIAGNOSIS — I49.5 SSS (SICK SINUS SYNDROME) (HCC): ICD-10-CM

## 2019-10-29 DIAGNOSIS — E78.2 MIXED HYPERLIPIDEMIA: ICD-10-CM

## 2019-10-29 DIAGNOSIS — I25.10 CORONARY ARTERY DISEASE DUE TO CALCIFIED CORONARY LESION: ICD-10-CM

## 2019-10-29 DIAGNOSIS — R42 DIZZINESS: ICD-10-CM

## 2019-10-29 DIAGNOSIS — R07.2 PRECORDIAL PAIN: ICD-10-CM

## 2019-10-29 DIAGNOSIS — R00.2 PALPITATIONS: Primary | ICD-10-CM

## 2019-10-29 PROCEDURE — 99213 OFFICE O/P EST LOW 20 MIN: CPT | Performed by: INTERNAL MEDICINE

## 2019-10-29 RX ORDER — METOLAZONE 2.5 MG/1
2.5 TABLET ORAL DAILY
COMMUNITY
End: 2021-08-10 | Stop reason: SDUPTHER

## 2019-10-29 NOTE — PATIENT INSTRUCTIONS
"Fat and Cholesterol Restricted Eating Plan  Getting too much fat and cholesterol in your diet may cause health problems. Choosing the right foods helps keep your fat and cholesterol at normal levels. This can keep you from getting certain diseases.  Your doctor may recommend an eating plan that includes:  · Total fat: ______% or less of total calories a day.  · Saturated fat: ______% or less of total calories a day.  · Cholesterol: less than _________mg a day.  · Fiber: ______g a day.  What are tips for following this plan?  General tips    · Work with your doctor to lose weight if you need to.  · Avoid:  ? Foods with added sugar.  ? Fried foods.  ? Foods with partially hydrogenated oils.  · Limit alcohol intake to no more than 1 drink a day for nonpregnant women and 2 drinks a day for men. One drink equals 12 oz of beer, 5 oz of wine, or 1½ oz of hard liquor.  Reading food labels  · Check food labels for:  ? Trans fats.  ? Partially hydrogenated oils.  ? Saturated fat (g) in each serving.  ? Cholesterol (mg) in each serving.  ? Fiber (g) in each serving.  · Choose foods with healthy fats, such as:  ? Monounsaturated fats.  ? Polyunsaturated fats.  ? Omega-3 fats.  · Choose grain products that have whole grains. Look for the word \"whole\" as the first word in the ingredient list.  Cooking  · Cook foods using low-fat methods. These include baking, boiling, grilling, and broiling.  · Eat more home-cooked foods. Eat at restaurants and buffets less often.  · Avoid cooking using saturated fats, such as butter, cream, palm oil, palm kernel oil, and coconut oil.  Meal planning    · At meals, divide your plate into four equal parts:  ? Fill one-half of your plate with vegetables and green salads.  ? Fill one-fourth of your plate with whole grains.  ? Fill one-fourth of your plate with low-fat (lean) protein foods.  · Eat fish that is high in omega-3 fats at least two times a week. This includes mackerel, tuna, sardines, and " salmon.  · Eat foods that are high in fiber, such as whole grains, beans, apples, broccoli, carrots, peas, and barley.  Recommended foods  Grains  · Whole grains, such as whole wheat or whole grain breads, crackers, cereals, and pasta. Unsweetened oatmeal, bulgur, barley, quinoa, or brown rice. Corn or whole wheat flour tortillas.  Vegetables  · Fresh or frozen vegetables (raw, steamed, roasted, or grilled). Green salads.  Fruits  · All fresh, canned (in natural juice), or frozen fruits.  Meats and other protein foods  · Ground beef (85% or leaner), grass-fed beef, or beef trimmed of fat. Skinless chicken or turkey. Ground chicken or turkey. Pork trimmed of fat. All fish and seafood. Egg whites. Dried beans, peas, or lentils. Unsalted nuts or seeds. Unsalted canned beans. Nut butters without added sugar or oil.  Dairy  · Low-fat or nonfat dairy products, such as skim or 1% milk, 2% or reduced-fat cheeses, low-fat and fat-free ricotta or cottage cheese, or plain low-fat and nonfat yogurt.  Fats and oils  · Tub margarine without trans fats. Light or reduced-fat mayonnaise and salad dressings. Avocado. Olive, canola, sesame, or safflower oils.  The items listed above may not be a complete list of recommended foods or beverages. Contact your dietitian for more options.  The items listed above may not be a complete list of foods and beverages [you/your child] can eat. Contact a dietitian for more information.  Foods to avoid  Grains  · White bread. White pasta. White rice. Cornbread. Bagels, pastries, and croissants. Crackers and snack foods that contain trans fat and hydrogenated oils.  Vegetables  · Vegetables cooked in cheese, cream, or butter sauce. Fried vegetables.  Fruits  · Canned fruit in heavy syrup. Fruit in cream or butter sauce. Fried fruit.  Meats and other protein foods  · Fatty cuts of meat. Ribs, chicken wings, kaur, sausage, bologna, salami, chitterlings, fatback, hot dogs, bratwurst, and packaged  lunch meats. Liver and organ meats. Whole eggs and egg yolks. Chicken and turkey with skin. Fried meat.  Dairy  · Whole or 2% milk, cream, half-and-half, and cream cheese. Whole milk cheeses. Whole-fat or sweetened yogurt. Full-fat cheeses. Nondairy creamers and whipped toppings. Processed cheese, cheese spreads, and cheese curds.  Beverages  · Alcohol. Sugar-sweetened drinks such as sodas, lemonade, and fruit drinks.  Fats and oils  · Butter, stick margarine, lard, shortening, ghee, or kaur fat. Coconut, palm kernel, and palm oils.  Sweets and desserts  · Corn syrup, sugars, honey, and molasses. Candy. Jam and jelly. Syrup. Sweetened cereals. Cookies, pies, cakes, donuts, muffins, and ice cream.  The items listed above may not be a complete list of foods and beverages to avoid. Contact your dietitian for more information.  The items listed above may not be a complete list of foods and beverages [you/your child] should avoid. Contact a dietitian for more information.  Summary  · Choosing the right foods helps keep your fat and cholesterol at normal levels. This can keep you from getting certain diseases.  · At meals, fill one-half of your plate with vegetables and green salads.  · Eat high-fiber foods, like whole grains, beans, apples, carrots, peas, and barley.  · Limit added sugar, saturated fats, alcohol, and fried foods.  This information is not intended to replace advice given to you by your health care provider. Make sure you discuss any questions you have with your health care provider.  Document Released: 06/18/2013 Document Revised: 09/04/2018 Document Reviewed: 09/04/2018  [employer] Interactive Patient Education © 2019 [employer]  BMI for Adults    Body mass index (BMI) is a number that is calculated from a person's weight and height. BMI may help to estimate how much of a person's weight is composed of fat. BMI can help identify those who may be at higher risk for certain medical problems.  How is BMI  "used with adults?  BMI is used as a screening tool to identify possible weight problems. It is used to check whether a person is obese, overweight, healthy weight, or underweight.  How is BMI calculated?  BMI measures your weight and compares it to your height. This can be done either in English (U.S[place] or metric measurements. Note that charts are available to help you find your BMI quickly and easily without having to do these calculations yourself.  To calculate your BMI in English (U.S[place] measurements, your health care provider will:  1. Measure your weight in pounds (lb).  2. Multiply the number of pounds by 703.  ? For example, for a person who weighs 180 lb, multiply that number by 703, which equals 126,540.  3. Measure your height in inches (in). Then multiply that number by itself to get a measurement called \"inches squared.\"  ? For example, for a person who is 70 in tall, the \"inches squared\" measurement is 70 in x 70 in, which equals 4900 inches squared.  4. Divide the total from Step 2 (number of lb x 703) by the total from Step 3 (inches squared): 126,540 ÷ 4900 = 25.8. This is your BMI.  To calculate your BMI in metric measurements, your health care provider will:  1. Measure your weight in kilograms (kg).  2. Measure your height in meters (m). Then multiply that number by itself to get a measurement called \"meters squared.\"  ? For example, for a person who is 1.75 m tall, the \"meters squared\" measurement is 1.75 m x 1.75 m, which is equal to 3.1 meters squared.  3. Divide the number of kilograms (your weight) by the meters squared number. In this example: 70 ÷ 3.1 = 22.6. This is your BMI.  How is BMI interpreted?  To interpret your results, your health care provider will use BMI charts to identify whether you are underweight, normal weight, overweight, or obese. The following guidelines will be used:  · Underweight: BMI less than 18.5.  · Normal weight: BMI between 18.5 and 24.9.  · Overweight: BMI " between 25 and 29.9.  · Obese: BMI of 30 and above.  Please note:  · Weight includes both fat and muscle, so someone with a muscular build, such as an athlete, may have a BMI that is higher than 24.9. In cases like these, BMI is not an accurate measure of body fat.  · To determine if excess body fat is the cause of a BMI of 25 or higher, further assessments may need to be done by a health care provider.  · BMI is usually interpreted in the same way for men and women.  Why is BMI a useful tool?  BMI is useful in two ways:  · Identifying a weight problem that may be related to a medical condition, or that may increase the risk for medical problems.  · Promoting lifestyle and diet changes in order to reach a healthy weight.  Summary  · Body mass index (BMI) is a number that is calculated from a person's weight and height.  · BMI may help to estimate how much of a person's weight is composed of fat. BMI can help identify those who may be at higher risk for certain medical problems.  · BMI can be measured using English measurements or metric measurements.  · To interpret your results, your health care provider will use BMI charts to identify whether you are underweight, normal weight, overweight, or obese.  This information is not intended to replace advice given to you by your health care provider. Make sure you discuss any questions you have with your health care provider.  Document Released: 08/29/2005 Document Revised: 10/31/2018 Document Reviewed: 10/31/2018  [employer] Interactive Patient Education © 2019 [employer]

## 2019-10-29 NOTE — PROGRESS NOTES
Subjective   Nick Caraballo is a 79 y.o. male     Chief Complaint   Patient presents with   • Atrial Fibrillation     Here for a follow up on heart cath    • Coronary Artery Disease   • Hypertension       PROBLEM LIST:     1. CAD, MI in 1998  1.1 Stenting x 5 total at [facility]. data  1.2 CABG x2 in Dec. 2014 at [facility] by Dr. Álvarez in setting of ACS, LIMA to LAD and reversed vein graft from aorta to diag. With RLE vein graft  1.3 Stress test, 9-19-19, mild anterolateral ischemia on scintigraphy.  2. A-Fib, s/p CABG per patient report  3. HTN  4. Hyperlipidemia  5. GOPI, uses c-pap  6. Hypothyroidism  7. BPH  8. GERD  9. RA  10. Former smoker        Specialty Problems        Cardiology Problems    Atrial fibrillation (CMS/HCC)        Coronary artery disease due to calcified coronary lesion        Essential hypertension        Mixed hyperlipidemia        Palpitations        Coronary artery disease        SSS (sick sinus syndrome) (CMS/[facility])                HPI:  Mr. Caraballo returns for follow-up after pacemaker implantation by Dr. Valerio.    He has had marked improvement with complete pleat resolution of his symptoms.  Currently, he denies any type of chest pain, orthopnea, PND, or change in lower extremity edema.  He has no palpitations, dizziness, presyncope, or syncope.  Mr. Caraballo has no procedural related complaints.  He has no hematoma or ecchymosis, no drainage from his incision site, and he denies chills, fevers, or left arm swelling.                      PRIOR MEDICATIONS    Current Outpatient Medications on File Prior to Visit   Medication Sig Dispense Refill   • acetaminophen (TYLENOL) 650 MG 8 hr tablet Take 650 mg by mouth Every 8 (Eight) Hours As Needed for Mild Pain .     • allopurinol (ZYLOPRIM) 300 MG tablet Take 300 mg by mouth Daily.     • ALPRAZolam (XANAX) 0.5 MG tablet Take 0.5 mg by mouth 2 (Two) Times a Day As Needed for Anxiety.     • aspirin 81 MG EC tablet Take 81 mg by mouth  Daily.     • atorvastatin (LIPITOR) 80 MG tablet Take 80 mg by mouth Every Night.     • cholecalciferol (VITAMIN D3) 1000 units tablet Take 2,000 Units by mouth Daily.     • clopidogrel (PLAVIX) 75 MG tablet Take 1 tablet by mouth Daily. 90 tablet 3   • furosemide (LASIX) 20 MG tablet Take 60 mg by mouth 2 (Two) Times a Day.     • levothyroxine (SYNTHROID, LEVOTHROID) 25 MCG tablet Take 25 mcg by mouth Daily.     • lisinopril (PRINIVIL,ZESTRIL) 10 MG tablet Take 10 mg by mouth Daily.     • Melatonin 3 MG capsule Take 3 mg by mouth Every Night.     • metOLazone (ZAROXOLYN) 2.5 MG tablet Take 2.5 mg by mouth Daily.     • metoprolol tartrate (LOPRESSOR) 25 MG tablet Take 1 tablet by mouth Every 12 (Twelve) Hours. 60 tablet 11   • Multiple Vitamins-Minerals (MULTIVITAMIN ADULT PO) Take  by mouth Daily.     • niacin (SLO-NIACIN) 500 MG CR tablet Take 1,000 mg by mouth Every Night.     • pantoprazole (PROTONIX) 40 MG EC tablet Take 40 mg by mouth Daily.     • potassium chloride ER (K-TAB) 20 MEQ tablet controlled-release ER tablet Take 10 mEq by mouth 2 (Two) Times a Day.     • tamsulosin (FLOMAX) 0.4 MG capsule 24 hr capsule Take 1 capsule by mouth Daily.     • venlafaxine XR (EFFEXOR-XR) 75 MG 24 hr capsule Take 75 mg by mouth Daily.     • nitroglycerin (NITROSTAT) 0.4 MG SL tablet 1 under the tongue as needed for angina, may repeat q5mins for up three doses within 15 minutes 25 tablet 3     No current facility-administered medications on file prior to visit.        ALLERGIES:    Patient has no known allergies.    PAST MEDICAL HISTORY:    Past Medical History:   Diagnosis Date   • Acid reflux    • Anxiety    • Complete heart block (CMS/HCC)    • Coronary artery disease    • Gout    • Hyperlipidemia    • Hypertension    • Hypothyroid    • Myocardial infarction (CMS/HCC) 1998   • Rheumatoid arthritis (CMS/HCC)    • Sleep apnea        SURGICAL HISTORY:    Past Surgical History:   Procedure Laterality Date   • CARDIAC  CATHETERIZATION      5 stents    • CARDIAC CATHETERIZATION  10/02/2019    No stents    • CARDIAC ELECTROPHYSIOLOGY PROCEDURE N/A 10/4/2019    Procedure: PACEMAKER IMPLANTATION- DC;  Surgeon: Selvin Valerio MD;  Location: [facility] INVASIVE LOCATION;  Service: Cardiology   • CATARACT EXTRACTION, BILATERAL     • CORONARY ARTERY BYPASS GRAFT      Double by-pass    • CYST REMOVAL     • HEMORRHOIDECTOMY     • INSERT / REPLACE / REMOVE PACEMAKER     • MULTIPLE TOOTH EXTRACTIONS     • REPLACEMENT TOTAL KNEE BILATERAL     • TONSILLECTOMY  1964       SOCIAL HISTORY:    Social History     Socioeconomic History   • Marital status:      Spouse name: Not on file   • Number of children: Not on file   • Years of education: Not on file   • Highest education level: Not on file   Tobacco Use   • Smoking status: Former Smoker     Packs/day: 1.00     Years: 45.00     Pack years: 45.00     Last attempt to quit:      Years since quittin.8   • Smokeless tobacco: Never Used   Substance and Sexual Activity   • Alcohol use: No     Frequency: Never   • Drug use: No   • Sexual activity: Defer       FAMILY HISTORY:    Family History   Problem Relation Age of Onset   • No Known Problems Mother    • Colon cancer Father    • Heart disease Brother        Review of Systems   Constitutional: Negative.    HENT: Negative.    Eyes: Positive for visual disturbance (glasses prn).   Respiratory: Positive for cough. Negative for shortness of breath.    Cardiovascular: Negative.    Gastrointestinal: Negative.    Endocrine: Negative.    Genitourinary: Negative.    Musculoskeletal: Positive for arthralgias and myalgias.   Skin: Negative.    Allergic/Immunologic: Positive for environmental allergies.   Neurological: Negative.    Hematological: Bruises/bleeds easily (bruise).   Psychiatric/Behavioral: Negative.        VISIT VITALS:  Vitals:    10/29/19 1054   BP: 98/64   BP Location: Left arm   Patient Position: Sitting   Pulse:  "80   SpO2: 97%   Weight: 116 kg (255 lb)   Height: 185.4 cm (73\")      BP 98/64 (BP Location: Left arm, Patient Position: Sitting)   Pulse 80   Ht 185.4 cm (73\")   Wt 116 kg (255 lb)   SpO2 97%   BMI 33.64 kg/m²     RECENT LABS:    Objective       Physical Exam   Constitutional: He is oriented to person, place, and time. He appears well-developed and well-nourished. No distress.   HENT:   Head: Normocephalic and atraumatic.   Eyes: Conjunctivae and EOM are normal. Pupils are equal, round, and reactive to light.   Neck: Normal range of motion. Neck supple. No hepatojugular reflux and no JVD present. Carotid bruit is not present. No tracheal deviation present.   Slightly decreased Carotid upstrokes   Cardiovascular: Normal rate, regular rhythm, S2 normal and intact distal pulses. Exam reveals no gallop, no S3 and no friction rub.   Murmur heard.   Systolic murmur is present.  2/6 systolic ejection murmur > at  RUSB than LUSB  S1 decreased  PPM site clear   Pulmonary/Chest: Effort normal and breath sounds normal. He has no wheezes. He has no rhonchi. He has no rales.   Nl. Expir. Phase  Nl. Breath sound intensity   Abdominal: Soft. Bowel sounds are normal. He exhibits no distension, no abdominal bruit and no mass. There is no tenderness. There is no rebound and no guarding.   No organomegaly   Musculoskeletal: Normal range of motion. He exhibits no edema, tenderness or deformity.   BLE, no edema, palpable pedal pulses  No supraclavicular or intra clavicular    Neurological: He is alert and oriented to person, place, and time.   Skin: Skin is warm and dry. No rash noted. No erythema. No pallor.   Psychiatric: He has a normal mood and affect. His behavior is normal. Judgment and thought content normal.   Nursing note and vitals reviewed.      Procedures      Assessment/Plan   #1.  Conduction system disease.  Symptoms have completely resolved status post dual-chamber pacemaker implantation.  At home interrogation " routine follow-up will be performed.    2.  Coronary artery disease with a history of chest pain and dyspnea with widely patent grafts by recent catheterization.  We will continue risk modification in that regard.    3.  Controlled systemic hypertension.    4.  Treated dyslipidemia    5.  Department will follow with Dr. Abbott per his instructions, and we will plan on seeing him in 6 months or on a as needed basis as discussed today.   Diagnosis Plan   1. Palpitations     2. Coronary artery disease due to calcified coronary lesion     3. Essential hypertension     4. Mixed hyperlipidemia     5. Atrial fibrillation, unspecified type (CMS/HCC)     6. SSS (sick sinus syndrome) (CMS/HCC)     7. Shortness of breath     8. GOPI on CPAP     9. Chronic fatigue     10. S/P CABG (coronary artery bypass graft)     11. Precordial pain     12. Dizziness         No Follow-up on file.         Patient's Body mass index is 33.64 kg/m². BMI is above normal parameters. Recommendations include: educational material and referral to primary care.       Jinny Perry LPN    Scribed for Dr. Errol Lai by Jinny Perry LPN October 29, 2019 11:08 AM         Electronically signed by:            This note is dictated utilizing voice recognition software.  Although this record has been proof read, transcriptional errors may still be present. If questions occur regarding the content of this record please do not hesitate to call our office.

## 2019-10-30 ENCOUNTER — TELEPHONE (OUTPATIENT)
Dept: CARDIOLOGY | Facility: CLINIC | Age: 79
End: 2019-10-30

## 2019-10-30 NOTE — TELEPHONE ENCOUNTER
THE PT INQUIRED ABOUT ABILITY TO LIFT 20LBS OF MUSIC EQUIPMENT; DR. SWANN APPROVED HIM TO CARRY HIS SPEAKERS INTO THE NURSING HOME.

## 2019-11-05 ENCOUNTER — DOCUMENTATION (OUTPATIENT)
Dept: CARDIOLOGY | Facility: CLINIC | Age: 79
End: 2019-11-05

## 2019-11-05 NOTE — PROGRESS NOTES
INCORRECT PROCESS FOLLOWED WITH DOCUMENTATION ORIGINALLY COMPLETED FOR 9/26/19 VISIT.  DAVID LONGORIA ACTED AS SCRIBE FOR DR. SWANN.

## 2019-11-12 ENCOUNTER — TELEPHONE (OUTPATIENT)
Dept: CARDIOLOGY | Facility: CLINIC | Age: 79
End: 2019-11-12

## 2019-11-12 NOTE — TELEPHONE ENCOUNTER
Pt called and stated he is being scheduled for follow up appts with Dr. Valerio's office ( [facility] [place]) and is confused as to why he needs to follow up there. Pt receives cardiac care from Dr. Lai. Pt was referred to [facility]. Discussed with Pat. Pt informed he does not need to keep appts at [facility] due to Dr. Lai being his primary cardio provider. Pt vu. Pt instructed to call and cancel any further appts with office and continue follow up with us.

## 2019-11-21 ENCOUNTER — TELEPHONE (OUTPATIENT)
Dept: CARDIOLOGY | Facility: CLINIC | Age: 79
End: 2019-11-21

## 2019-11-21 NOTE — TELEPHONE ENCOUNTER
Received voicemail message from patient requesting antibiotic or medication for sinus infection.  Tried contacting patient.  No answer.  Left message for patient to return call.

## 2020-02-24 ENCOUNTER — TELEPHONE (OUTPATIENT)
Dept: CARDIOLOGY | Facility: CLINIC | Age: 80
End: 2020-02-24

## 2020-02-24 NOTE — TELEPHONE ENCOUNTER
Pt is scheduled for a pacemaker check at our office on 5/1/2020 at 8am.     Informed pt of the above, pt states that he started w/ Dr. Lai and that Dr. Lai referred him to Dr. Valerio. States that if he's going to drive to [place], [facility]   He verbalized understanding of pacer appointment.

## 2020-02-24 NOTE — TELEPHONE ENCOUNTER
"----- Message from Jinny Perry LPN sent at 2/24/2020  9:47 AM EST -----  He needs to either get device checks done here at our office or if done at their office, results need to be faxed to our office. Please let him know this. RASHI FRANK  ----- Message -----  From: Lucy Willis  Sent: 2/20/2020  10:32 AM EST  To: Jinny Perry LPN    Pt LVM stating he called regd monitor and wanted to talk to Pat. Asked \"Where do we go from here?\"   #[phone]    Called pt back:  Pt asked if something happens to him what he would need to do where he would need to go, ER vs Urgent care and here vs [place]. I informed pt that if he had CP and nitro wouldn't help w/ it, that he would need to go to the ER, not urgent care. Advised him to go wherever would be closest to him at the time of the CP.   Pt stated that he also sees [facility] and wasn't sure whether to see them or us. Pt stated that his pacemaker is done through his office and that we need to be the ones getting them.     Per chart review, telephone encounter from 11/12/19 states: \"Pt called and stated he is being scheduled for follow up appts with Dr. Valerio's office ( [facility] [place]) and is confused as to why he needs to follow up there. Pt receives cardiac care from Dr. Lai. Pt was referred to [facility]. Discussed with Pat. Pt informed he does not need to keep appts at [facility] office due to Dr. Lai being his primary cardio provider. Pt vu. Pt instructed to call and cancel any further appts with office and continue follow up with us.\"    Pt states that Pat knows the situation and to have her contact their office regd pacemaker reports.       Pat: Please talk to me about this when you can.     "

## 2020-03-17 ENCOUNTER — TELEPHONE (OUTPATIENT)
Dept: CARDIOLOGY | Facility: CLINIC | Age: 80
End: 2020-03-17

## 2020-03-17 NOTE — TELEPHONE ENCOUNTER
"Patient wanted to give our office heads up in case [facility] contacts our office regarding ambulance charges from being transported for ppm placement. PH,LPN          ----- Message from Lucy Willis sent at 3/17/2020  3:19 PM EDT -----  Pt left an additional VM stating that he wants to speak w/ an  or Pat, specificially. Stated that he has a \"matter that's of concern with me right now dealing with the [facility] and [place] [facility].\"   #[phone]  ----- Message -----  From: Lucy Willis  Sent: 3/17/2020  10:50 AM EDT  [facility]    Pt LVM stating he needs to speak w/ someone this am regd services rendered on 10/4/19.   #[phone]      Per chart review, pt had pacemaker implanted on that date.       "

## 2020-03-20 NOTE — TELEPHONE ENCOUNTER
"Pt LVM stating he needed to speak w/ Kristina or Pat   #[phone]    Pt stated that he and Kristina discussed his ambulance bill and he wanted to tell her that he \"fought it and won.\" States that they didn't process [facility] properly through the [facility].     "

## 2020-04-30 ENCOUNTER — OFFICE VISIT (OUTPATIENT)
Dept: CARDIOLOGY | Facility: CLINIC | Age: 80
End: 2020-04-30

## 2020-04-30 VITALS — HEART RATE: 96 BPM | SYSTOLIC BLOOD PRESSURE: 126 MMHG | TEMPERATURE: 99 F | DIASTOLIC BLOOD PRESSURE: 89 MMHG

## 2020-04-30 DIAGNOSIS — I25.10 CORONARY ARTERY DISEASE DUE TO CALCIFIED CORONARY LESION: Primary | ICD-10-CM

## 2020-04-30 DIAGNOSIS — I25.84 CORONARY ARTERY DISEASE DUE TO CALCIFIED CORONARY LESION: Primary | ICD-10-CM

## 2020-04-30 DIAGNOSIS — I49.5 SSS (SICK SINUS SYNDROME) (HCC): ICD-10-CM

## 2020-04-30 DIAGNOSIS — Z95.1 S/P CABG (CORONARY ARTERY BYPASS GRAFT): ICD-10-CM

## 2020-04-30 DIAGNOSIS — I10 ESSENTIAL HYPERTENSION: ICD-10-CM

## 2020-04-30 DIAGNOSIS — E78.2 MIXED HYPERLIPIDEMIA: ICD-10-CM

## 2020-04-30 PROCEDURE — 99442 PR PHYS/QHP TELEPHONE EVALUATION 11-20 MIN: CPT | Performed by: NURSE PRACTITIONER

## 2020-04-30 NOTE — PROGRESS NOTES
Subjective   You have chosen to receive care through a telephone visit. Do you consent to use a telephone visit for your medical care today? Yes    Nick Caraballo is a 79 y.o. male who presents to day for Coronary Artery Disease (dual chamber pacemaker).    CHIEF COMPLIANT  Chief Complaint   Patient presents with   • Coronary Artery Disease     dual chamber pacemaker       Active Problems:  Problem List Items Addressed This Visit        Cardiovascular and Mediastinum    Coronary artery disease due to calcified coronary lesion - Primary    Essential hypertension    Mixed hyperlipidemia    SSS (sick sinus syndrome) (CMS/HCC)       Other    S/P CABG (coronary artery bypass graft)      PROBLEM LIST:      1. CAD, MI in 1998  1.1 Stenting x 5 total at [facility] , [facility]. data  1.2 CABG x2 in Dec. 2014 at [facility] by Dr. Álvarez in setting of ACS, LIMA to LAD and reversed vein graft from aorta to diag. With RLE vein graft  1.3 Stress test, 9-19-19, mild anterolateral ischemia on scintigraphy.  1.4 Cath 10/19: Widely patent grafts with no progressive disease in the RCA or circumflex ejection fraction 50 to 55%, LVEDP 8 to 12 mmHg  2. A-Fib, s/p CABG per patient report  3. HTN  4. Hyperlipidemia  5. GOPI, uses c-pap  6. Hypothyroidism  7. BPH  8. GERD  9. RA  10. Former smoker  11. 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 Caraballo is a 79-year-old male patient who is being evaluated today via telephone visit due to the COVID-19 pandemic.  Patient states that he is doing well overall and is doing well from the cardiovascular standpoint.  He states that his blood pressures been running good and reported a blood pressure of 126/89 with a heart rate of 78 today.  Patient does say that he has some chronic tenderness in his chest that he feels is associated with his open heart surgery says and at times it even throbs a little bit but he really truly does not think it is heart.  No other associated symptoms were  identified as well as aggravating or relieving factors.  Patient states that he has been watching what he is eaten and being healthier and has lost approximately 20 pounds.  Patient also reports that he follows up with his PCP at the end of May and is expected to have an extensive laboratory work-up in which he has approximately every 6 months.  I did request that the patient's lipids to be drawn at this time.  In regards to his Saint Audi dual-chamber pacemaker he says that he uses the Merlin device to report his pacemaker findings and diagnostics.  He also complains of significant allergies in which she is taken Mucinex, Flonase, Allegra, and occasional Coricidin HBP.  Also question whether he should receive the Shingrix vaccination and that he [facility] the [facility]  I will try to get this information for the patient which vaccine he did receive and how long ago it was.  Patient denies any chest pain, shortness of breath, PND, orthopnea, syncope, fatigue, or other neurological changes  PRIOR MEDS  Current Outpatient Medications on File Prior to Visit   Medication Sig Dispense Refill   • acetaminophen (TYLENOL) 650 MG 8 hr tablet Take 650 mg by mouth Every 8 (Eight) Hours As Needed for Mild Pain .     • allopurinol (ZYLOPRIM) 300 MG tablet Take 300 mg by mouth Daily.     • ALPRAZolam (XANAX) 0.5 MG tablet Take 0.5 mg by mouth 2 (Two) Times a Day As Needed for Anxiety.     • aspirin 81 MG EC tablet Take 81 mg by mouth Daily.     • atorvastatin (LIPITOR) 80 MG tablet Take 80 mg by mouth Every Night.     • Cholecalciferol (VITAMIN D3) 50 MCG (2000 UT) tablet Take 2,000 Units by mouth Daily.     • clopidogrel (PLAVIX) 75 MG tablet Take 1 tablet by mouth Daily. 90 tablet 3   • furosemide (LASIX) 20 MG tablet Take 60 mg by mouth 2 (Two) Times a Day.     • levothyroxine (SYNTHROID, LEVOTHROID) 25 MCG tablet Take 25 mcg by mouth Daily.     • lisinopril  (PRINIVIL,ZESTRIL) 10 MG tablet Take 10 mg by mouth Daily.     • Melatonin 3 MG capsule Take 3 mg by mouth Every Night.     • metOLazone (ZAROXOLYN) 2.5 MG tablet Take 2.5 mg by mouth Daily.     • metoprolol tartrate (LOPRESSOR) 25 MG tablet Take 1 tablet by mouth Every 12 (Twelve) Hours. 60 tablet 11   • Multiple Vitamins-Minerals (MULTIVITAMIN ADULT PO) Take  by mouth Daily.     • niacin (SLO-NIACIN) 500 MG CR tablet Take 1,000 mg by mouth Every Night.     • nitroglycerin (NITROSTAT) 0.4 MG SL tablet 1 under the tongue as needed for angina, may repeat q5mins for up three doses within 15 minutes 25 tablet 3   • pantoprazole (PROTONIX) 40 MG EC tablet Take 40 mg by mouth Daily.     • potassium chloride (K-DUR) 10 MEQ CR tablet Take 10 mEq by mouth 2 (Two) Times a Day. 2 tabs twice a day     • tamsulosin (FLOMAX) 0.4 MG capsule 24 hr capsule Take 1 capsule by mouth Daily.     • venlafaxine XR (EFFEXOR-XR) 150 MG 24 hr capsule Take 150 mg by mouth Daily.       No current facility-administered medications on file prior to visit.        ALLERGIES  Patient has no known allergies.    HISTORY  Past Medical History:   Diagnosis Date   • Acid reflux    • Anxiety    • Complete heart block (CMS/HCC)    • Coronary artery disease    • Gout    • Hyperlipidemia    • Hypertension    • Hypothyroid    • Myocardial infarction (CMS/HCC)    • Rheumatoid arthritis (CMS/HCC)    • Sleep apnea        Social History     Socioeconomic History   • Marital status:      Spouse name: Not on file   • Number of children: Not on file   • Years of education: Not on file   • Highest education level: Not on file   Tobacco Use   • Smoking status: Former Smoker     Packs/day: 1.00     Years: 45.00     Pack years: 45.00     Last attempt to quit:      Years since quittin.3   • Smokeless tobacco: Never Used   Substance and Sexual Activity   • Alcohol use: No     Frequency: Never   • Drug use: No   • Sexual activity: Defer       Family  History   Problem Relation Age of Onset   • No Known Problems Mother    • Colon cancer Father    • Heart disease Brother        Review of Systems   Constitutional: Negative.  Negative for chills and fatigue.   HENT: Positive for congestion and sinus pressure.    Eyes: Positive for visual disturbance (readers).   Respiratory: Positive for apnea. Negative for cough, chest tightness and shortness of breath.    Cardiovascular: Positive for chest pain (states not heart related thinks related to surgery). Negative for palpitations and leg swelling.   Gastrointestinal: Negative.  Negative for abdominal pain, blood in stool, constipation, diarrhea, nausea and vomiting.   Endocrine: Negative.  Negative for cold intolerance and heat intolerance.   Genitourinary: Positive for frequency and urgency. Negative for dysuria and hematuria.   Musculoskeletal: Positive for arthralgias. Negative for back pain and neck pain.   Skin: Negative.  Negative for rash and wound.   Allergic/Immunologic: Positive for environmental allergies (seasonal).   Neurological: Positive for dizziness (when lookin up at times). Negative for syncope and light-headedness.   Hematological: Bruises/bleeds easily.   Psychiatric/Behavioral: Positive for sleep disturbance (stopped using cpap, denies waking with soa or cp, sleeps in recliner).       Objective     VITALS: /89 Comment: per patient  Pulse 96   Temp 99 °F (37.2 °C)     LABS:   Lab Results (most recent)     None          IMAGING:   No Images in the past 120 days found..    EXAM:  Physical Exam  Patient was evaluated via telephone due to the coronavirus pandemic.  Per auscultation over the telephone patient's breathing rate was within normal limits, speech was clear, no audible wheezing was noted.  Was able to speak in full sentences without difficulty.  Their mood was appropriate for the situation was able to answer questions appropriately.  There is no signs of apparent distress.  Procedure    Procedures       Assessment/Plan    Diagnosis Plan   1. Coronary artery disease due to calcified coronary lesion     2. S/P CABG (coronary artery bypass graft)     3. SSS (sick sinus syndrome) (CMS/HCC)     4. Essential hypertension     5. Mixed hyperlipidemia     1.  Patient seems to be doing well from the cardiovascular standpoint and denies any angina or anginal equivalent symptoms.  He had a recent heart cath that identified patent grafts from his previous CABG and no progression of disease in the RCA and circumflex with preserved ejection fraction of 50 to 55%.  2.  Ever since the Saint Audi dual-chamber pacemaker was placed patient's symptoms have decreased from his sick sinus syndrome.  Patient states he is doing great no further intervention or medication changes are warranted at this time.  3.  Patient's blood pressure is well controlled on current blood pressure medication regimen.  No medication changes are warranted at this time.  Patient advised to monitor blood pressure on a daily basis and report any persistent highs or lows.  Set goal blood pressure for patient at 130/80 or below.  4.  Patient will have his labs done by his PCP at the end of May.  Requested they send a copy of the laboratory findings to us.  5.  Patient advised signs and symptoms of ACS and advised to seek emergent treatment for any new worsening symptoms.  Patient also advised to follow-up with his family doctor as needed.    Return in about 6 months (around 10/30/2020), or if symptoms worsen or fail to improve.    Nick was seen today for coronary artery disease.    Diagnoses and all orders for this visit:    Coronary artery disease due to calcified coronary lesion    S/P CABG (coronary artery bypass graft)    SSS (sick sinus syndrome) (CMS/HCC)    Essential hypertension    Mixed hyperlipidemia        Nick Caraballo  reports that he quit smoking about 22 years ago. He has a 45.00 pack-year smoking history. He has never used  smokeless tobacco..        Office visit changed to Telephone visit due to Covid19 per CDC recommendations and Gov. Beshear.         Advance Care Planning   ACP discussion was held with the patient during this visit. Patient has an advance directive (not in EMR), copy requested.    MEDS ORDERED DURING VISIT:  No orders of the defined types were placed in this encounter.      This visit has been rescheduled as a phone visit to comply with patient safety concerns in accordance with CDC recommendations. Total time of discussion was 15 minutes.        This document has been electronically signed by Coleman Harman Jr., APRN  April 30, 2020 13:47

## 2020-04-30 NOTE — PATIENT INSTRUCTIONS
Acute Coronary Syndrome    Acute coronary syndrome (ACS) is a serious problem in which there is suddenly not enough blood and oxygen reaching the heart. ACS can result in chest pain or a heart attack.  This condition is a medical emergency. If you have any symptoms of this condition, get help right away.  What are the causes?  This condition may be caused by:  · Buildup of fat and cholesterol inside of the arteries (atherosclerosis). This is the most common cause. The buildup (plaque) can cause blood vessels in the heart (coronary arteries) to become narrow or blocked, which reduces blood flow to the heart. Plaque can also break off and lead to a clot, which can block an artery and cause a heart attack or stroke.  · Sudden tightening of the muscles around the coronary arteries (coronary spasm).  · Tearing of a coronary artery (spontaneous coronary artery dissection).  · Very low blood pressure (hypotension).  · An abnormal heartbeat (arrhythmia).  · Other medical conditions that cause a decrease of oxygen to the heart, such as anemiaorrespiratory failure.  · Using cocaine or methamphetamine.  What increases the risk?  The following factors may make you more likely to develop this condition:  · Age. The risk for ACS increases as you get older.  · History of chest pain, heart attack, peripheral artery disease, or stroke.  · Having taken chemotherapy or immune-suppressing medicines.  · Being male.  · Family history of chest pain, heart disease, or stroke.  · Smoking.  · Not exercising enough.  · Being overweight.  · High cholesterol.  · High blood pressure (hypertension).  · Diabetes.  · Excessive alcohol use.  What are the signs or symptoms?  Common symptoms of this condition include:  · Chest pain. The pain may last a long time, or it may stop and come back (recur). It may feel like:  ? Crushing or squeezing.  ? Tightness, pressure, fullness, or heaviness.  · Arm, neck, jaw, or back pain.  · Heartburn or  indigestion.  · Shortness of breath.  · Nausea.  · Sudden cold sweats.  · Light-headedness.  · Dizziness, or passing out.  · Tiredness (fatigue).  Sometimes there are no symptoms.  How is this diagnosed?  This condition may be diagnosed based on:  · Your medical history and symptoms.  · An electrocardiogram (ECG). This imaging test measures the heart's electrical activity.  · Blood tests. Cardiac blood tests may need to be repeated at designated time intervals.  · Chest X-ray.  · A CT scan of the chest.  · A coronary angiogram. This is a procedure in which dye is injected into the bloodstream and then X-rays are taken to show if there is a blockage in a coronary artery.  · Exercise stress testing.  · Echocardiography. This is a test that uses sound waves to produce detailed images of the heart.  How is this treated?  The treatment is to restore blood flow to the heart as soon as possible. Treatment for this condition may include:  · Oxygen therapy.  · Medicines, such as:  ? Antiplatelet medicines and blood-thinning medicines, such as aspirin. These help prevent blood clots.  ? Medicine that dissolves any blood clots (fibrinolytic therapy).  ? Blood pressure medicines.  ? Nitroglycerin. This helps relieve chest pain and widens blood vessels to improve blood flow.  ? Pain medicine.  ? Cholesterol-lowering medicine.  · Surgery, such as:  ? Coronary angioplasty with stent placement. This involves placing a small piece of metal that looks like mesh or a spring into a narrow coronary artery. This widens the artery and keep it open.  ? Coronary artery bypass surgery. This involves taking a section of a blood vessel from a different part of your body, and placing it on the blocked coronary artery to allow blood to flow around (bypass) the blockage.  · Cardiac rehabilitation. This is a program that helps improve your health and well-being. It includes exercise training, education, and counseling to help you recover.  Follow  these instructions at home:  Eating and drinking  · Eat a heart-healthy diet that includes whole grains, fruits and vegetables, lean proteins, and low-fat or nonfat dairy products.  · Limit how much salt (sodium) you eat as told by your health care provider. Follow instructions from your health care provider about any other eating or drinking restrictions, such as limiting foods that are high in fat and processed sugars.  · Use healthy cooking methods such as roasting, grilling, broiling, baking, poaching, steaming, or stir-frying.  · Talk with a dietitian to learn about healthy cooking methods and how to eat less sodium.  Medicines  · Take over-the-counter and prescription medicines only as told by your health care provider.  · Do not take these medicines unless your health care provider approves:  ? Vitamin supplements that contain vitamin A or vitamin E.  ? Nonsteroidal anti-inflammatory drugs (NSAIDs), such as ibuprofen, naproxen, or celecoxib.  ? Hormone replacement therapy that contains estrogen.  If you are taking blood thinners:  · Talk with your health care provider before you take any medicines that contain aspirin or NSAIDs. These medicines increase your risk for dangerous bleeding.  · Take your medicine exactly as told, at the same time every day.  · Avoid activities that could cause injury or bruising, and follow instructions about how to prevent falls.  · Wear a medical alert bracelet, and carry a card that lists what medicines you take.  Activity  · Join a cardiac rehabilitation program. An exercise plan will be developed for you.  · Ask your health care provider:  ? What activities and exercises are safe for you.  ? If you should follow specific instructions about lifting, driving, or climbing stairs.  Lifestyle  · Do not use any products that contain nicotine or tobacco, such as cigarettes and e-cigarettes. If you need help quitting, ask your health care provider.  · If your health care provider  says that alcohol is safe for you, limit your alcohol intake to no more than 1 drink a day. One drink equals 12 oz of beer, 5 oz of wine, or 1½ oz of hard liquor.  · Maintain a healthy weight. If you need to lose weight, work with your health care provider to do so safely.  General instructions  · Tell all the health care providers who care for you about your heart condition, including your dentist. This may affect the medicines or treatment you receive.  · Manage any other health conditions you have, such as hypertension or diabetes. These conditions affect your heart.  · Learn ways to manage stress.  · Get screened for depression, and get mental health treatment if you need it. People with ACS are at higher risk for depression.  · Keep your vaccinations up to date. Get the flu shot (influenza vaccine) every year.  · If directed, monitor your blood pressure at home.  · Keep all follow-up visits as told by your health care provider. This is important.  Contact a health care provider if:  · You feel overwhelmed or sad.  · You have trouble doing your daily activities.  Get help right away if:  · You have pain in your chest, neck, arm, jaw, stomach, or back that recurs, and:  ? It lasts for more than a few minutes.  ? It is not relieved by taking the medicineyour health care provider prescribed.  · You have unexplained:  ? Heavy sweating.  ? Heartburn or indigestion.  ? Nausea or vomiting.  ? Shortness of breath.  ? Difficulty breathing.  ? Fatigue.  ? Nervousness or anxiety.  ? Weakness.  ? Diarrhea.  ? Dark stools or blood in your stool.  · You have sudden light-headedness or dizziness.  · Your blood pressure is higher than 180/120.  · You faint.  · You have thoughts about hurting yourself.  These symptoms may represent a serious problem that is an emergency. Do not wait to see if the symptoms will go away. Get medical help right away. Call your local emergency services (911 in the [place] Do not drive yourself to the  hospital.   If you ever feel like you may hurt yourself or others, or have thoughts about taking your own life, get help right away. You can go to your nearest emergency department or call:  · Emergency services (911 in the [place]  · A suicide crisis helpline, such as the National Suicide Prevention Lifeline at 1-417.335.5675. This is open 24 hours a day.  Summary  · Acute coronary syndrome (ACS) is when there is not enough blood and oxygen being supplied to the heart. ACS can result in chest pain or a heart attack.  · Acute coronary syndrome is a medical emergency. If you have any symptoms of this condition, get help right away.  · Treatment includes medicines and procedures to open the blocked arteries and restore blood flow.  This information is not intended to replace advice given to you by your health care provider. Make sure you discuss any questions you have with your health care provider.  Document Released: 12/18/2006 Document Revised: 08/28/2018 Document Reviewed: 08/28/2018  [employer] Interactive Patient Education © 2019 [employer]

## 2020-05-19 ENCOUNTER — TELEPHONE (OUTPATIENT)
Dept: CARDIOLOGY | Facility: CLINIC | Age: 80
End: 2020-05-19

## 2020-05-19 NOTE — TELEPHONE ENCOUNTER
----- Message from DAVID Stone sent at 5/19/2020 12:53 PM EDT -----  Please call  malini patel get the information my apologies I have overlooked this message and that if I need to order the shingles vaccine I will do so  ----- Message -----  From: Lucy Willis  Sent: 5/6/2020   3:57 PM EDT  To: DAVID Stone    Pt LVM stating he received a message from JR sadler shingles shot. Stated that he has the info that  requ[facility]. Stated  can call him back at [phone].

## 2020-08-20 RX ORDER — ISOSORBIDE MONONITRATE 30 MG/1
TABLET, EXTENDED RELEASE ORAL
Qty: 45 TABLET | Refills: 2 | OUTPATIENT
Start: 2020-08-20

## 2020-08-20 RX ORDER — CLOPIDOGREL BISULFATE 75 MG/1
TABLET ORAL
Qty: 90 TABLET | Refills: 2 | Status: SHIPPED | OUTPATIENT
Start: 2020-08-20 | End: 2021-05-24 | Stop reason: SDUPTHER

## 2020-08-24 RX ORDER — ISOSORBIDE MONONITRATE 30 MG/1
TABLET, EXTENDED RELEASE ORAL
Qty: 30 TABLET | Refills: 2 | Status: SHIPPED | OUTPATIENT
Start: 2020-08-24 | End: 2020-08-26 | Stop reason: SDUPTHER

## 2020-08-24 NOTE — PROGRESS NOTES
Coleman Harman APRN [place]mstead, Melissa, LPN   Caller: PATIENT              Ok to refill    Previous Messages      ----- Message -----   From: Jinny Perry LPN   Sent: 8/21/2020  12:52 PM EDT   To: DAVID Stoen     REQUESTED REFILL ON [facility] 1/2 OF A 30 MG TAB DAILY. SCRIPT NO LONGER ON MED LIST. STATES HE HAS BEEN TAKING IT. HAS OCCAS. CHEST DISCOMFORT, NOTICES WITH HUMIDITY. REFILL OR NOT? USES [facility]

## 2020-08-26 RX ORDER — ISOSORBIDE MONONITRATE 30 MG/1
TABLET, EXTENDED RELEASE ORAL
Qty: 45 TABLET | Refills: 3 | Status: SHIPPED | OUTPATIENT
Start: 2020-08-26 | End: 2021-08-10 | Stop reason: SDUPTHER

## 2020-08-26 NOTE — TELEPHONE ENCOUNTER
Patient requests that Imdur be sent to [facility]. Rx sent.  MANA JO

## 2020-09-28 ENCOUNTER — TELEPHONE (OUTPATIENT)
Dept: CARDIOLOGY | Facility: CLINIC | Age: 80
End: 2020-09-28

## 2020-09-28 ENCOUNTER — OFFICE VISIT (OUTPATIENT)
Dept: CARDIOLOGY | Facility: CLINIC | Age: 80
End: 2020-09-28

## 2020-09-28 VITALS
HEART RATE: 105 BPM | OXYGEN SATURATION: 96 % | HEIGHT: 73 IN | SYSTOLIC BLOOD PRESSURE: 144 MMHG | BODY MASS INDEX: 35.36 KG/M2 | WEIGHT: 266.8 LBS | DIASTOLIC BLOOD PRESSURE: 79 MMHG

## 2020-09-28 DIAGNOSIS — I49.5 SSS (SICK SINUS SYNDROME) (HCC): ICD-10-CM

## 2020-09-28 DIAGNOSIS — R00.2 PALPITATIONS: Primary | ICD-10-CM

## 2020-09-28 DIAGNOSIS — R06.02 SHORTNESS OF BREATH: ICD-10-CM

## 2020-09-28 DIAGNOSIS — I25.10 CORONARY ARTERY DISEASE DUE TO CALCIFIED CORONARY LESION: ICD-10-CM

## 2020-09-28 DIAGNOSIS — I48.0 PAROXYSMAL ATRIAL FIBRILLATION (HCC): ICD-10-CM

## 2020-09-28 DIAGNOSIS — E78.2 MIXED HYPERLIPIDEMIA: ICD-10-CM

## 2020-09-28 DIAGNOSIS — R42 DIZZINESS: ICD-10-CM

## 2020-09-28 DIAGNOSIS — I25.84 CORONARY ARTERY DISEASE DUE TO CALCIFIED CORONARY LESION: ICD-10-CM

## 2020-09-28 DIAGNOSIS — I10 ESSENTIAL HYPERTENSION: ICD-10-CM

## 2020-09-28 DIAGNOSIS — R53.82 CHRONIC FATIGUE: ICD-10-CM

## 2020-09-28 DIAGNOSIS — R07.2 PRECORDIAL PAIN: ICD-10-CM

## 2020-09-28 PROCEDURE — 99214 OFFICE O/P EST MOD 30 MIN: CPT | Performed by: NURSE PRACTITIONER

## 2020-09-28 PROCEDURE — 93000 ELECTROCARDIOGRAM COMPLETE: CPT | Performed by: NURSE PRACTITIONER

## 2020-09-28 NOTE — TELEPHONE ENCOUNTER
"PATIENT ANSWERED PHONE AND HAD JUST WALKED FROM [place] TO THE PHONE AND HE WAS SHORT OF BREATH. STATES FOR APPROX. A WEEK NOW HE HAS HAD INCREASED SHORTNESS OF BRETH, VERY DIAPHORETIC DESPITE HAVING AC AND CHEST TIGHTNESS. STATES LAST Monday HAD AN EPISODE IN [facility] WITH MASK ON HAD NAUSEA, DIZZINESS, \"TINGLY ALL OVER\", SHORTNESS OF BREATH, AND WENT HE ARRIVED HOME HAD PROFUSE DIAPHORESIS. RELAYED HAS TAKEN X 2 SL NTG. THIS AM. APPT. SCHEDULED FOR TODAY AT 3:00 WITH . PATIENT AWARE, STATES WILL BE HERE. RASHI FRANK            ----- Message from Yaneli Garza MA sent at 9/28/2020 10:43 AM EDT -----  Patient left a msg that he needs to speak to Pat or . Did not say what it was regarding       "

## 2020-10-14 ENCOUNTER — OFFICE VISIT (OUTPATIENT)
Dept: CARDIOLOGY | Facility: CLINIC | Age: 80
End: 2020-10-14

## 2020-10-14 ENCOUNTER — TELEPHONE (OUTPATIENT)
Dept: CARDIOLOGY | Facility: CLINIC | Age: 80
End: 2020-10-14

## 2020-10-14 VITALS
WEIGHT: 264.2 LBS | BODY MASS INDEX: 35.02 KG/M2 | DIASTOLIC BLOOD PRESSURE: 64 MMHG | HEART RATE: 83 BPM | SYSTOLIC BLOOD PRESSURE: 103 MMHG | OXYGEN SATURATION: 97 % | HEIGHT: 73 IN

## 2020-10-14 DIAGNOSIS — I10 ESSENTIAL HYPERTENSION: ICD-10-CM

## 2020-10-14 DIAGNOSIS — I48.0 PAROXYSMAL ATRIAL FIBRILLATION (HCC): ICD-10-CM

## 2020-10-14 DIAGNOSIS — I50.20 HFREF (HEART FAILURE WITH REDUCED EJECTION FRACTION) (HCC): Primary | ICD-10-CM

## 2020-10-14 DIAGNOSIS — B37.9 CANDIDIASIS: ICD-10-CM

## 2020-10-14 DIAGNOSIS — R94.30 CARDIAC LV EJECTION FRACTION 30-35%: ICD-10-CM

## 2020-10-14 PROCEDURE — 99214 OFFICE O/P EST MOD 30 MIN: CPT | Performed by: NURSE PRACTITIONER

## 2020-10-14 RX ORDER — NYSTATIN 100000 [USP'U]/G
POWDER TOPICAL 2 TIMES DAILY
Qty: 30 G | Refills: 1 | Status: SHIPPED | OUTPATIENT
Start: 2020-10-14 | End: 2021-01-18

## 2020-10-14 RX ORDER — SACUBITRIL AND VALSARTAN 24; 26 MG/1; MG/1
1 TABLET, FILM COATED ORAL 2 TIMES DAILY
Qty: 180 TABLET | Refills: 3 | Status: SHIPPED | OUTPATIENT
Start: 2020-10-14 | End: 2021-04-28 | Stop reason: SDUPTHER

## 2020-10-14 RX ORDER — FLUCONAZOLE 100 MG/1
TABLET ORAL
Qty: 2 TABLET | Refills: 0 | Status: SHIPPED | OUTPATIENT
Start: 2020-10-14 | End: 2020-12-21

## 2020-10-14 NOTE — PATIENT INSTRUCTIONS
"Fat and Cholesterol Restricted Eating Plan  Getting too much fat and cholesterol in your diet may cause health problems. Choosing the right foods helps keep your fat and cholesterol at normal levels. This can keep you from getting certain diseases.  Your doctor may recommend an eating plan that includes:  · Total fat: ______% or less of total calories a day.  · Saturated fat: ______% or less of total calories a day.  · Cholesterol: less than _________mg a day.  · Fiber: ______g a day.  What are tips for following this plan?  Meal planning  · At meals, divide your plate into four equal parts:  ? Fill one-half of your plate with vegetables and green salads.  ? Fill one-fourth of your plate with whole grains.  ? Fill one-fourth of your plate with low-fat (lean) protein foods.  · Eat fish that is high in omega-3 fats at least two times a week. This includes mackerel, tuna, sardines, and salmon.  · Eat foods that are high in fiber, such as whole grains, beans, apples, broccoli, carrots, peas, and barley.  General tips    · Work with your doctor to lose weight if you need to.  · Avoid:  ? Foods with added sugar.  ? Fried foods.  ? Foods with partially hydrogenated oils.  · Limit alcohol intake to no more than 1 drink a day for nonpregnant women and 2 drinks a day for men. One drink equals 12 oz of beer, 5 oz of wine, or 1½ oz of hard liquor.  Reading food labels  · Check food labels for:  ? Trans fats.  ? Partially hydrogenated oils.  ? Saturated fat (g) in each serving.  ? Cholesterol (mg) in each serving.  ? Fiber (g) in each serving.  · Choose foods with healthy fats, such as:  ? Monounsaturated fats.  ? Polyunsaturated fats.  ? Omega-3 fats.  · Choose grain products that have whole grains. Look for the word \"whole\" as the first word in the ingredient list.  Cooking  · Cook foods using low-fat methods. These include baking, boiling, grilling, and broiling.  · Eat more home-cooked foods. Eat at restaurants and buffets " less often.  · Avoid cooking using saturated fats, such as butter, cream, palm oil, palm kernel oil, and coconut oil.  Recommended foods    Fruits  · All fresh, canned (in natural juice), or frozen fruits.  Vegetables  · Fresh or frozen vegetables (raw, steamed, roasted, or grilled). Green salads.  Grains  · Whole grains, such as whole wheat or whole grain breads, crackers, cereals, and pasta. Unsweetened oatmeal, bulgur, barley, quinoa, or brown rice. Corn or whole wheat flour tortillas.  Meats and other protein foods  · Ground beef (85% or leaner), grass-fed beef, or beef trimmed of fat. Skinless chicken or turkey. Ground chicken or turkey. Pork trimmed of fat. All fish and seafood. Egg whites. Dried beans, peas, or lentils. Unsalted nuts or seeds. Unsalted canned beans. Nut butters without added sugar or oil.  Dairy  · Low-fat or nonfat dairy products, such as skim or 1% milk, 2% or reduced-fat cheeses, low-fat and fat-free ricotta or cottage cheese, or plain low-fat and nonfat yogurt.  Fats and oils  · Tub margarine without trans fats. Light or reduced-fat mayonnaise and salad dressings. Avocado. Olive, canola, sesame, or safflower oils.  The items listed above may not be a complete list of foods and beverages you can eat. Contact a dietitian for more information.  Foods to avoid  Fruits  · Canned fruit in heavy syrup. Fruit in cream or butter sauce. Fried fruit.  Vegetables  · Vegetables cooked in cheese, cream, or butter sauce. Fried vegetables.  Grains  · White bread. White pasta. White rice. Cornbread. Bagels, pastries, and croissants. Crackers and snack foods that contain trans fat and hydrogenated oils.  Meats and other protein foods  · Fatty cuts of meat. Ribs, chicken wings, kaur, sausage, bologna, salami, chitterlings, fatback, hot dogs, bratwurst, and packaged lunch meats. Liver and organ meats. Whole eggs and egg yolks. Chicken and turkey with skin. Fried meat.  Dairy  · Whole or 2% milk, cream,  half-and-half, and cream cheese. Whole milk cheeses. Whole-fat or sweetened yogurt. Full-fat cheeses. Nondairy creamers and whipped toppings. Processed cheese, cheese spreads, and cheese curds.  Beverages  · Alcohol. Sugar-sweetened drinks such as sodas, lemonade, and fruit drinks.  Fats and oils  · Butter, stick margarine, lard, shortening, ghee, or kaur fat. Coconut, palm kernel, and palm oils.  Sweets and desserts  · Corn syrup, sugars, honey, and molasses. Candy. Jam and jelly. Syrup. Sweetened cereals. Cookies, pies, cakes, donuts, muffins, and ice cream.  The items listed above may not be a complete list of foods and beverages you should avoid. Contact a dietitian for more information.  Summary  · Choosing the right foods helps keep your fat and cholesterol at normal levels. This can keep you from getting certain diseases.  · At meals, fill one-half of your plate with vegetables and green salads.  · Eat high-fiber foods, like whole grains, beans, apples, carrots, peas, and barley.  · Limit added sugar, saturated fats, alcohol, and fried foods.  This information is not intended to replace advice given to you by your health care provider. Make sure you discuss any questions you have with your health care provider.  Document Released: 06/18/2013 Document Revised: 08/21/2019 Document Reviewed: 09/04/2018  [employer] Patient Education © 2020 [employer]  BMI for Adults  What is BMI?  Body mass index (BMI) is a number that is calculated from a person's weight and height. BMI can help estimate how much of a person's weight is composed of fat. BMI does not measure body fat directly. Rather, it is an alternative to procedures that directly measure body fat, which can be difficult and expensive.  BMI can help identify people who may be at higher risk for certain medical problems.  What are BMI measurements used for?  BMI is used as a screening tool to identify possible weight problems. It helps determine whether a  "person is obese, overweight, a healthy weight, or underweight.  BMI is useful for:  · Identifying a weight problem that may be related to a medical condition or may increase the risk for medical problems.  · Promoting changes, such as changes in diet and exercise, to help reach a healthy weight. BMI screening can be repeated to see if these changes are working.  How is BMI calculated?  BMI involves measuring your weight in relation to your height. Both height and weight are measured, and the BMI is calculated from those numbers. This can be done either in English ([place] or metric measurements. Note that charts and online BMI calculators are available to help you find your BMI quickly and easily without having to do these calculations yourself.  To calculate your BMI in English ([place] measurements:    1. Measure your weight in pounds (lb).  2. Multiply the number of pounds by 703.  ? For example, for a person who weighs 180 lb, multiply that number by 703, which equals 126,540.  3. Measure your height in inches. Then multiply that number by itself to get a measurement called \"inches squared.\"  ? For example, for a person who is 70 inches tall, the \"inches squared\" measurement is 70 inches x 70 inches, which equals 4,900 inches squared.  4. Divide the total from step 2 (number of lb x 703) by the total from step 3 (inches squared): 126,540 ÷ 4,900 = 25.8. This is your BMI.  To calculate your BMI in metric measurements:  1. Measure your weight in kilograms (kg).  2. Measure your height in meters (m). Then multiply that number by itself to get a measurement called \"meters squared.\"  ? For example, for a person who is 1.75 m tall, the \"meters squared\" measurement is 1.75 m x 1.75 m, which is equal to 3.1 meters squared.  3. Divide the number of kilograms (your weight) by the meters squared number. In this example: 70 ÷ 3.1 = 22.6. This is your BMI.  What do the results mean?  BMI charts are used to identify whether you " are underweight, normal weight, overweight, or obese. The following guidelines will be used:  · Underweight: BMI less than 18.5.  · Normal weight: BMI between 18.5 and 24.9.  · Overweight: BMI between 25 and 29.9.  · Obese: BMI of 30 or above.  Keep these notes in mind:  · Weight includes both fat and muscle, so someone with a muscular build, such as an athlete, may have a BMI that is higher than 24.9. In cases like these, BMI is not an accurate measure of body fat.  · To determine if excess body fat is the cause of a BMI of 25 or higher, further assessments may need to be done by a health care provider.  · BMI is usually interpreted in the same way for men and women.  Where to find more information  For more information about BMI, including tools to quickly calculate your BMI, go to these websites:  · Centers for Disease Control and Prevention: www.cdc.gov  · American Heart Association: www.heart.org  · National Heart, Lung, and [employer] [place]: www.nhlbi.nih.gov  Summary  · Body mass index (BMI) is a number that is calculated from a person's weight and height.  · BMI may help estimate how much of a person's weight is composed of fat. BMI can help identify those who may be at higher risk for certain medical problems.  · BMI can be measured using English measurements or metric measurements.  · BMI charts are used to identify whether you are underweight, normal weight, overweight, or obese.  This information is not intended to replace advice given to you by your health care provider. Make sure you discuss any questions you have with your health care provider.  Document Released: 08/29/2005 Document Revised: 09/09/2020 Document Reviewed: 07/17/2020  [employer] Patient Education © 2020 [employer]    Heart Failure, Diagnosis    Heart failure means that your heart is not able to pump blood in the right way. This makes it hard for your body to work well. Heart failure is usually a long-term (chronic) condition. You must  take good care of yourself and follow your treatment plan from your doctor.  What are the causes?  This condition may be caused by:  · High blood pressure.  · Build up of cholesterol and fat in the arteries.  · Heart attack. This injures the heart muscle.  · Heart valves that do not open and close properly.  · Damage of the heart muscle. This is also called cardiomyopathy.  · Lung disease.  · Abnormal heart rhythms.  What increases the risk?  The risk of heart failure goes up as a person ages. This condition is also more likely to develop in people who:  · Are overweight.  · Are male.  · Smoke or chew tobacco.  · Abuse alcohol or illegal drugs.  · Have taken medicines that can damage the heart.  · Have diabetes.  · Have abnormal heart rhythms.  · Have thyroid problems.  · Have low blood counts (anemia).  What are the signs or symptoms?  Symptoms of this condition include:  · Shortness of breath.  · Coughing.  · Swelling of the feet, ankles, legs, or belly.  · Losing weight for no reason.  · Trouble breathing.  · Waking from sleep because of the need to sit up and get more air.  · Rapid heartbeat.  · Being very tired.  · Feeling dizzy, or feeling like you may pass out (faint).  · Having no desire to eat.  · Feeling like you may vomit (nauseous).  · Peeing (urinating) more at night.  · Feeling confused.  How is this treated?         This condition may be treated with:  · Medicines. These can be given to treat blood pressure and to make the heart muscles stronger.  · Changes in your daily life. These may include eating a healthy diet, staying at a healthy body weight, quitting tobacco and illegal drug use, or doing exercises.  · Surgery. Surgery can be done to open blocked valves, or to put devices in the heart, such as pacemakers.  · A donor heart (heart transplant). You will receive a healthy heart from a donor.  Follow these instructions at home:  · Treat other conditions as told by your doctor. These may include  high blood pressure, diabetes, thyroid disease, or abnormal heart rhythms.  · Learn as much as you can about heart failure.  · Get support as you need it.  · Keep all follow-up visits as told by your doctor. This is important.  Summary  · Heart failure means that your heart is not able to pump blood in the right way.  · This condition is caused by high blood pressure, heart attack, or damage of the heart muscle.  · Symptoms of this condition include shortness of breath and swelling of the feet, ankles, legs, or belly. You may also feel very tired or feel like you may vomit.  · You may be treated with medicines, surgery, or changes in your daily life.  · Treat other health conditions as told by your doctor.  This information is not intended to replace advice given to you by your health care provider. Make sure you discuss any questions you have with your health care provider.  Document Released: 09/26/2009 Document Revised: 03/06/2020 Document Reviewed: 03/06/2020  [employer] Patient Education © 2020 [employer]    Acute Coronary Syndrome  Acute coronary syndrome (ACS) is a serious problem in which there is suddenly not enough blood and oxygen reaching the heart. ACS can result in chest pain or a heart attack.  This condition is a medical emergency. If you have any symptoms of this condition, get help right away.  What are the causes?  This condition may be caused by:  · A buildup of fat and cholesterol inside the arteries (atherosclerosis). This is the most common cause. The buildup (plaque) can cause blood vessels in the heart (coronary arteries) to become narrow or blocked, which reduces blood flow to the heart. Plaque can also break off and lead to a clot, which can block an artery and cause a heart attack or stroke.  · Sudden tightening of the muscles around the coronary arteries (coronary spasm).  · Tearing of a coronary artery (spontaneous coronary artery dissection).  · Very low blood pressure  (hypotension).  · An abnormal heartbeat (arrhythmia).  · Other medical conditions that cause a decrease of oxygen to the heart, such as anemiaorrespiratory failure.  · Using cocaine or methamphetamine.  What increases the risk?  The following factors may make you more likely to develop this condition:  · Age. The risk for ACS increases as you get older.  · History of chest pain, heart attack, peripheral artery disease, or stroke.  · Having taken chemotherapy or immune-suppressing medicines.  · Being male.  · Family history of chest pain, heart disease, or stroke.  · Smoking.  · Not exercising enough.  · Being overweight.  · High cholesterol.  · High blood pressure (hypertension).  · Diabetes.  · Excessive alcohol use.  What are the signs or symptoms?  Common symptoms of this condition include:  · Chest pain. The pain may last a long time, or it may stop and come back (recur). It may feel like:  ? Crushing or squeezing.  ? Tightness, pressure, fullness, or heaviness.  · Arm, neck, jaw, or back pain.  · Heartburn or indigestion.  · Shortness of breath.  · Nausea.  · Sudden cold sweats.  · Light-headedness.  · Dizziness or passing out.  · Tiredness (fatigue).  Sometimes there are no symptoms.  How is this diagnosed?  This condition may be diagnosed based on:  · Your medical history and symptoms.  · Imaging tests, such as:  ? An electrocardiogram (ECG). This measures the heart's electrical activity.  ? X-rays.  ? CT scan.  ? A coronary angiogram. For this test, dye is injected into the heart arteries and then X-rays are taken.  ? Myocardial perfusion imaging. This test shows how well blood flows through your heart muscle.  · Blood tests. These may be repeated at certain time intervals.  · Exercise stress testing.  · Echocardiogram. This is a test that uses sound waves to produce detailed images of the heart.  How is this treated?  Treatment for this condition may include:  · Oxygen therapy.  · Medicines, such  as:  ? Antiplatelet medicines and blood-thinning medicines, such as aspirin. These help prevent blood clots.  ? Medicine that dissolves any blood clots (fibrinolytic therapy).  ? Blood pressure medicines.  ? Nitroglycerin. This helps widen blood vessels to improve blood flow.  ? Pain medicine.  ? Cholesterol-lowering medicine.  · Surgery, such as:  ? Coronary angioplasty with stent placement. This involves placing a small piece of metal that looks like mesh or a spring into a narrow coronary artery. This widens the artery and keeps it open.  ? Coronary artery bypass surgery. This involves taking a section of a blood vessel from a different part of your body and placing it on the blocked coronary artery to allow blood to flow around the blockage.  · Cardiac rehabilitation. This is a program that includes exercise training, education, and counseling to help you recover.  Follow these instructions at home:  Eating and drinking  · Eat a heart-healthy diet that includes whole grains, fruits and vegetables, lean proteins, and low-fat or nonfat dairy products.  · Limit how much salt (sodium) you eat as told by your health care provider. Follow instructions from your health care provider about any other eating or drinking restrictions, such as limiting foods that are high in fat and processed sugars.  · Use healthy cooking methods such as roasting, grilling, broiling, baking, poaching, steaming, or stir-frying.  · Work with a dietitian to follow a heart-healthy eating plan.  Medicines  · Take over-the-counter and prescription medicines only as told by your health care provider.  · Do not take these medicines unless your health care provider approves:  ? Vitamin supplements that contain vitamin A or vitamin E.  ? NSAIDs, such as ibuprofen, naproxen, or celecoxib.  ? Hormone replacement therapy that contains estrogen.  · If you are taking blood thinners:  ? Talk with your health care provider before you take any medicines  that contain aspirin or NSAIDs. These medicines increase your risk for dangerous bleeding.  ? Take your medicine exactly as told, at the same time every day.  ? Avoid activities that could cause injury or bruising, and follow instructions about how to prevent falls.  ? Wear a medical alert bracelet, and carry a card that lists what medicines you take.  Activity  · Follow your cardiac rehabilitation program. Do exercises as told by your physical therapist.  · Ask your health care provider what activities and exercises are safe for you. Follow his or her instructions about lifting, driving, or climbing stairs.  Lifestyle  · Do not use any products that contain nicotine or tobacco, such as cigarettes, e-cigarettes, and chewing tobacco. If you need help quitting, ask your health care provider.  · Do not drink alcohol if your health care provider tells you not to drink.  · If you drink alcohol:  ? Limit how much you have to 0-1 drink a day.  ? Be aware of how much alcohol is in your drink. In the [place] one drink equals one 12 oz bottle of beer (355 mL), one 5 oz glass of wine (148 mL), or one 1½ oz glass of hard liquor (44 mL).  · Maintain a healthy weight. If you need to lose weight, work with your health care provider to do so safely.  General instructions  · Tell all the health care providers who provide care for you about your heart condition, including your dentist. This may affect the medicines or treatment you receive.  · Manage any other health conditions you have, such as hypertension or diabetes. These conditions affect your heart.  · Pay attention to your mental health. You may be at higher risk for depression.  ? Find ways to manage stress.  ? Talk to your health care provider about depression screening and treatment.  · Keep your vaccinations up to date.  ? Get the flu shot (influenza vaccine) every year.  ? Get the pneumococcal vaccine if you are age 65 or older.  · If directed, monitor your blood pressure  at home.  · Keep all follow-up visits as told by your health care provider. This is important.  Contact a health care provider if you:  · Feel overwhelmed or sad.  · Have trouble doing your daily activities.  Get help right away if you:  · Have pain in your chest, neck, arm, jaw, stomach, or back that recurs, and:  ? It lasts for more than a few minutes.  ? It is not relieved by taking the medicineyour health care provider prescribed.  · Have unexplained:  ? Heavy sweating.  ? Heartburn or indigestion.  ? Nausea or vomiting.  ? Shortness of breath.  ? Difficulty breathing.  ? Fatigue.  ? Nervousness or anxiety.  ? Weakness.  ? Diarrhea.  ? Dark stools or blood in your stool.  · Have sudden light-headedness or dizziness.  · Have blood pressure that is higher than 180/120.  · Faint.  · Have thoughts about hurting yourself.  These symptoms may represent a serious problem that is an emergency. Do not wait to see if the symptoms will go away. Get medical help right away. Call your local emergency services (911 in the [place] Do not drive yourself to the hospital.   Summary  · Acute coronary syndrome (ACS) is when there is not enough blood and oxygen being supplied to the heart. ACS can result in chest pain or a heart attack.  · Acute coronary syndrome is a medical emergency. If you have any symptoms of this condition, get help right away.  · Treatment includes medicines and procedures to open the blocked arteries and restore blood flow.  This information is not intended to replace advice given to you by your health care provider. Make sure you discuss any questions you have with your health care provider.  Document Released: 12/18/2006 Document Revised: 05/20/2020 Document Reviewed: 12/30/2019  Elsevier Patient Education © 2020 [employer]

## 2020-10-14 NOTE — TELEPHONE ENCOUNTER
Returned call, left message to call our office. Danitza Lacey LPN    ----- Message from Yaneli Garza MA sent at 10/14/2020  2:47 PM EDT -----  Regarding: Med question  Call from Elsie at [facility] ([phone]  ext. [phone]). She has questions about 2 of the meds sent in. She mentioned Entresto but not the other one. Would like a call back.

## 2020-10-14 NOTE — PROGRESS NOTES
Subjective     Nick Caraballo is a 80 y.o. male who presents to day for Follow-up (Here for a follow up on heart cath ).    CHIEF COMPLIANT  Chief Complaint   Patient presents with   • Follow-up     Here for a follow up on heart cath        Active Problems:  Problem List Items Addressed This Visit        Cardiovascular and Mediastinum    Essential hypertension    Atrial fibrillation (CMS/HCC)    Relevant Medications    sacubitril-valsartan (Entresto) 24-26 MG tablet      Other Visit Diagnoses     HFrEF (heart failure with reduced ejection fraction) (CMS/HCC)    -  Primary    Relevant Medications    sacubitril-valsartan (Entresto) 24-26 MG tablet    Other Relevant Orders    NUCLEAR MEDICINE CARDIAC BLOOD POOL MUGA REST STRESS    Cardiac LV ejection fraction 30-35%        Relevant Medications    sacubitril-valsartan (Entresto) 24-26 MG tablet    Other Relevant Orders    NUCLEAR MEDICINE CARDIAC BLOOD POOL MUGA REST STRESS    Candidiasis        Relevant Medications    fluconazole (Diflucan) 100 MG tablet    nystatin (MYCOSTATIN) 879974 UNIT/GM powder      PROBLEM LIST:      1. CAD, MI in 1998  1.1 Stenting x 5 total at [facility]. data  1.2 CABG x2 in Dec. 2014 at [facility] by Dr. Álvarez in setting of ACS, LIMA to LAD and reversed vein graft from aorta to diag. With RLE vein graft  1.3 Stress test, 9-19-19, mild anterolateral ischemia on scintigraphy.  1.4 Cath 10/19: Widely patent grafts with no progressive disease in the RCA or circumflex ejection fraction 50 to 55%, LVEDP 8 to 12 mmHg  1.5 left heart catheterization 9/20: Left main normal, LAD  occluded in the mid vessel with a patent LIMA beyond the occluded area, first diagonal has proximal 80% stenosis with a patent SVG to first D1, circumflex normal, RCA normal, EF 30 to 35% with mid anterior and apical wall akinesis and inferior wall hypokinesis  2. A-Fib, s/p CABG per patient report  3. HTN  4. Hyperlipidemia  5. GOPI, uses c-pap  6.  Hypothyroidism  7. BPH  8. GERD  9. RA  10. Former smoker  11. 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 Caraballo is a 80-year-old male patient who is being followed up today status post heart catheterization with hospitalization at [facility].  Patient previously came into the office in severe distress with chest pain and shortness of breath and was sent emergently to the emergency department for evaluation.  During his hospital stay he did go under a left heart catheterization which found that he was relatively well revascularized from previous bypass however his ejection fraction had decreased to 30 to 35% which previously was 50 to 55% in September 2019.  At this time he was started on Entresto to replace his lisinopril.  Patient states that he is doing well since.  He says he does has his chronic tenderness in his chest that has been present since his open heart surgery.  He does feel that this is a associated with that.  He does state that it does intensify at times and feels like a frog but he really does not think it is his heart.  He did not really have any associated symptoms with it.  Patient continues to watch what he eats and monitor his diet.  He explains that he prepares his own meals and has been using a lot healthier choices.  He also has chronic shortness of breath that occurs with mild exertion this is unchanged and historically been about the same.  He also reports palpitations which are not as severe that only occur about 3-4 times a week.  He does have chronic lower extremity edema that does not resolve overnight.  His lower extremity edema is being treated with Lasix.  He is tolerating this therapy well.  His renal function was within normal range while at [facility].  Other labs that patient had performed were unremarkable including a lipid panel that is within goal.  Patient also has chronic arterial hypertension which is well controlled today.  However I would not titrate his medications due  to the soon-to-be initiation of Entresto which hopefully will better control his blood pressure.  Patient denies any syncope, PND, orthopnea, or other neurological changes.    PRIOR MEDS  Current Outpatient Medications on File Prior to Visit   Medication Sig Dispense Refill   • acetaminophen (TYLENOL) 650 MG 8 hr tablet Take 650 mg by mouth Every 8 (Eight) Hours As Needed for Mild Pain .     • allopurinol (ZYLOPRIM) 300 MG tablet Take 300 mg by mouth Daily.     • ALPRAZolam (XANAX) 0.5 MG tablet Take 0.5 mg by mouth 2 (Two) Times a Day As Needed for Anxiety.     • aspirin 81 MG EC tablet Take 81 mg by mouth Daily.     • atorvastatin (LIPITOR) 80 MG tablet Take 80 mg by mouth Every Night.     • Cholecalciferol (VITAMIN D3) 50 MCG (2000 UT) tablet Take 2,000 Units by mouth Daily.     • clopidogrel (PLAVIX) 75 MG tablet TAKE ONE TABLET BY MOUTH DAILY 90 tablet 2   • furosemide (LASIX) 20 MG tablet Take 60 mg by mouth 2 (Two) Times a Day.     • isosorbide mononitrate (IMDUR) 30 MG 24 hr tablet Half tablet daily 45 tablet 3   • levothyroxine (SYNTHROID, LEVOTHROID) 25 MCG tablet Take 25 mcg by mouth Daily.     • lisinopril (PRINIVIL,ZESTRIL) 10 MG tablet Take 10 mg by mouth Daily.     • Melatonin 3 MG capsule Take 3 mg by mouth Every Night.     • metOLazone (ZAROXOLYN) 2.5 MG tablet Take 2.5 mg by mouth Daily.     • metoprolol tartrate (LOPRESSOR) 25 MG tablet Take 1 tablet by mouth Every 12 (Twelve) Hours. 60 tablet 11   • Multiple Vitamins-Minerals (MULTIVITAMIN ADULT PO) Take  by mouth Daily.     • niacin (SLO-NIACIN) 500 MG CR tablet Take 1,000 mg by mouth Every Night.     • nitroglycerin (NITROSTAT) 0.4 MG SL tablet 1 under the tongue as needed for angina, may repeat q5mins for up three doses within 15 minutes 25 tablet 3   • pantoprazole (PROTONIX) 40 MG EC tablet Take 40 mg by mouth Daily.     • potassium chloride (K-DUR) 10 MEQ CR tablet Take 10 mEq by mouth 2 (Two) Times a Day. 2 tabs twice a day     •  tamsulosin (FLOMAX) 0.4 MG capsule 24 hr capsule Take 1 capsule by mouth Daily.     • venlafaxine XR (EFFEXOR-XR) 150 MG 24 hr capsule Take 150 mg by mouth Daily.       No current facility-administered medications on file prior to visit.        ALLERGIES  Patient has no known allergies.    HISTORY  Past Medical History:   Diagnosis Date   • Acid reflux    • Anxiety    • Complete heart block (CMS/HCC)    • Coronary artery disease    • Gout    • Hyperlipidemia    • Hypertension    • Hypothyroid    • Myocardial infarction (CMS/HCC)    • Rheumatoid arthritis (CMS/HCC)    • Sleep apnea        Social History     Socioeconomic History   • Marital status:      Spouse name: Not on file   • Number of children: Not on file   • Years of education: Not on file   • Highest education level: Not on file   Tobacco Use   • Smoking status: Former Smoker     Packs/day: 1.00     Years: 45.00     Pack years: 45.00     Quit date:      Years since quittin.8   • Smokeless tobacco: Never Used   Substance and Sexual Activity   • Alcohol use: No     Frequency: Never   • Drug use: No   • Sexual activity: Defer       Family History   Problem Relation Age of Onset   • No Known Problems Mother    • Colon cancer Father    • Heart disease Brother        Review of Systems   Constitutional: Positive for fatigue (stays tired ). Negative for chills and fever.   HENT: Negative.    Eyes: Positive for visual disturbance (reading glasses ).   Respiratory: Positive for apnea (uses a bipap nightly ), chest tightness (Pressure on chest most of the time, especially in the mornings ) and shortness of breath (with any exertion ).    Cardiovascular: Positive for chest pain, palpitations (Flutters 3-4 times weekly ) and leg swelling (LE edema which doesn't resolve overnight ).   Gastrointestinal: Negative.  Negative for abdominal pain, blood in stool, nausea and vomiting.   Endocrine: Positive for heat intolerance. Negative for cold intolerance.  "  Genitourinary: Positive for difficulty urinating (has to sit to urinate ). Negative for dysuria, frequency, hematuria and urgency.   Musculoskeletal: Positive for arthralgias (joints ) and back pain (Low back pain ).   Skin: Negative.  Negative for rash and wound.   Allergic/Immunologic: Positive for environmental allergies (seasonal ). Negative for food allergies.   Neurological: Positive for weakness (generalized ) and numbness (B/L feet ). Negative for dizziness, syncope and light-headedness.   Hematological: Bruises/bleeds easily (bruises easily ).   Psychiatric/Behavioral: Negative.  Negative for sleep disturbance (denies waking with smothering ).       Objective     VITALS: /64 (BP Location: Left arm, Patient Position: Sitting)   Pulse 83   Ht 185.4 cm (73\")   Wt 120 kg (264 lb 3.2 oz)   SpO2 97%   BMI 34.86 kg/m²     LABS:   Lab Results (most recent)     None          IMAGING:   No Images in the past 120 days found..    EXAM:  Physical Exam  Vitals signs and nursing note reviewed.   Constitutional:       Appearance: Normal appearance. He is well-developed.   HENT:      Head: Normocephalic and atraumatic.   Eyes:      Pupils: Pupils are equal, round, and reactive to light.   Neck:      Musculoskeletal: Neck supple.      Thyroid: No thyroid mass.      Vascular: No carotid bruit or JVD.      Trachea: Trachea and phonation normal.   Cardiovascular:      Rate and Rhythm: Normal rate and regular rhythm.      Pulses:           Radial pulses are 2+ on the right side and 2+ on the left side.        Posterior tibial pulses are 2+ on the right side and 2+ on the left side.      Heart sounds: Normal heart sounds. No murmur. No friction rub. No gallop.    Pulmonary:      Effort: Pulmonary effort is normal. No respiratory distress.      Breath sounds: Normal breath sounds. No wheezing or rales.   Abdominal:      General: Bowel sounds are normal. There is no distension or abdominal bruit.      Palpations: " Abdomen is soft.      Tenderness: There is no abdominal tenderness.   Musculoskeletal: Normal range of motion.         General: Swelling (1+) present.   Skin:     General: Skin is warm and dry.      Capillary Refill: Capillary refill takes less than 2 seconds.      Findings: No rash.   Neurological:      Mental Status: He is alert and oriented to person, place, and time.      Cranial Nerves: No cranial nerve deficit.      Sensory: No sensory deficit.   Psychiatric:         Speech: Speech normal.         Behavior: Behavior normal.         Thought Content: Thought content normal.         Judgment: Judgment normal.         Procedure   Procedures       Assessment/Plan    Diagnosis Plan   1. HFrEF (heart failure with reduced ejection fraction) (CMS/HCC)  sacubitril-valsartan (Entresto) 24-26 MG tablet    NUCLEAR MEDICINE CARDIAC BLOOD POOL MUGA REST STRESS   2. Cardiac LV ejection fraction 30-35%  sacubitril-valsartan (Entresto) 24-26 MG tablet    NUCLEAR MEDICINE CARDIAC BLOOD POOL MUGA REST STRESS   3. Paroxysmal atrial fibrillation (CMS/HCC)     4. Essential hypertension     5. Candidiasis  fluconazole (Diflucan) 100 MG tablet    nystatin (MYCOSTATIN) [id number] UNIT/GM powder   1.  Patient is status post left heart catheterization in which he denies any complication from the cath site.  He was found to be relatively well revascularized from the previous coronary artery bypass grafting however found that his ejection fraction was severely depressed to 30 to 35% per left heart cath.  Patient was started on Entresto by Dr. Selvin Farooq and a MUGA scan was recommended.  Had extensive conversation with patient in regards to an ejection fraction of 30 to 35% and what that means to him.  We did discuss upgrading his pacemaker to an ICD.  However we will try Entresto for a short period of time to see if we can increase his ejection fraction and maybe not need the ICD.  Patient has a [place] heart association heart failure  scale of 3 where he becomes quite symptomatic with exertion but does relatively well during rest.  He has tried lisinopril in the past.  Patient has not started the Entresto yet and is continued lisinopril.  Today his blood pressure is 103/64 with a heart rate of 83 therefore I do not feel it is appropriate to continue to increase his lisinopril and wean would like to move forth with the Entresto.  He is advised to stop his lisinopril for 3 days prior to starting the Entresto.  We will switch his metoprolol tartrate to metoprolol succinate to better follow the guidelines associated with heart failure with reduced ejection fraction.  2.  Patient's atrial fibrillation does seem to be relatively well controlled on his current treatment with metoprolol however we will be switching him to metoprolol succinate to meet the heart failure guidelines.  We will continue the dosing at 25 mg.  3.  Patient's blood pressure is well controlled on current blood pressure medication regimen.  No medication changes are warranted at this time.  Patient advised to monitor blood pressure on a daily basis and report any persistent highs or lows.  Set goal blood pressure for patient at 130/80 or below.  4.  Patient does have significant bilateral lower extremity edema we will do metolazone 2.5 mg for 3 days and discontinue.  After that the Entresto will hopefully give a better diuresis effect if not we may need to switch patient to Bumex.  5.  Informed of signs and symptoms of ACS and advised to seek emergent treatment for any new worsening symptoms.  Patient also advised sooner follow-up as needed.  Also advised to follow-up with family doctor as needed  This note is dictated utilizing voice recognition software.  Although this record has been proof read, transcriptional errors may still be present. If questions occur regarding the content of this record please do not hesitate to call our office.  I have reviewed and confirmed the accuracy  of the ROS as documented by the MA/LPN/RN Coleman Harman, APRN    Return in about 8 weeks (around 12/9/2020), or if symptoms worsen or fail to improve.    Diagnoses and all orders for this visit:    1. HFrEF (heart failure with reduced ejection fraction) (CMS/HCC) (Primary)  -     sacubitril-valsartan (Entresto) 24-26 MG tablet; Take 1 tablet by mouth 2 (Two) Times a Day.  Dispense: 180 tablet; Refill: 3  -     NUCLEAR MEDICINE CARDIAC BLOOD POOL MUGA REST STRESS; Future    2. Cardiac LV ejection fraction 30-35%  -     sacubitril-valsartan (Entresto) 24-26 MG tablet; Take 1 tablet by mouth 2 (Two) Times a Day.  Dispense: 180 tablet; Refill: 3  -     NUCLEAR MEDICINE CARDIAC BLOOD POOL MUGA REST STRESS; Future    3. Paroxysmal atrial fibrillation (CMS/HCC)    4. Essential hypertension    5. Candidiasis  -     fluconazole (Diflucan) 100 MG tablet; One today and second dose in three days  Dispense: 2 tablet; Refill: 0  -     nystatin (MYCOSTATIN) 861750 UNIT/GM powder; Apply  topically to the appropriate area as directed 2 (Two) Times a Day.  Dispense: 30 g; Refill: 1        Nick Caraballo  reports that he quit smoking about 22 years ago. He has a 45.00 pack-year smoking history. He has never used smokeless tobacco.      Patient's Body mass index is 34.86 kg/m². BMI is above normal parameters. Recommendations include: educational material and referral to primary care.           MEDS ORDERED DURING VISIT:  New Medications Ordered This Visit   Medications   • fluconazole (Diflucan) 100 MG tablet     Sig: One today and second dose in three days     Dispense:  2 tablet     Refill:  0   • nystatin (MYCOSTATIN) 469124 UNIT/GM powder     Sig: Apply  topically to the appropriate area as directed 2 (Two) Times a Day.     Dispense:  30 g     Refill:  1   • sacubitril-valsartan (Entresto) 24-26 MG tablet     Sig: Take 1 tablet by mouth 2 (Two) Times a Day.     Dispense:  180 tablet     Refill:  3           This document has been  electronically signed by Coleman Harman Jr., APRN  October 15, 2020 08:19 EDT

## 2020-10-15 ENCOUNTER — TELEPHONE (OUTPATIENT)
Dept: CARDIOLOGY | Facility: CLINIC | Age: 80
End: 2020-10-15

## 2020-10-15 RX ORDER — METOPROLOL SUCCINATE 25 MG/1
25 TABLET, EXTENDED RELEASE ORAL DAILY
Qty: 90 TABLET | Refills: 3 | Status: SHIPPED | OUTPATIENT
Start: 2020-10-15 | End: 2020-12-21 | Stop reason: SDUPTHER

## 2020-10-15 NOTE — TELEPHONE ENCOUNTER
Patient notified of JOEL HERNANDEZ instructions, patient verbalized understanding. Danitza Lacey LPN      ----- Message from DAVID Stone sent at 10/15/2020  8:25 AM EDT -----  Can you please let Mr. Caraballo that we have completed the requirements by [facility] to get his Entresto.  However I do have to switch his metoprolol from metoprolol tartrate to metoprolol succinate which we will go from twice daily to once daily this will be at the same dose.

## 2020-10-15 NOTE — TELEPHONE ENCOUNTER
Attempted to reach [facility] Elsie, if she calls again please notify me. Danitza Lacey LPN

## 2020-10-20 DIAGNOSIS — B37.9 CANDIDIASIS: Primary | ICD-10-CM

## 2020-10-20 RX ORDER — MICONAZOLE NITRATE
1 POWDER (GRAM) MISCELLANEOUS 2 TIMES DAILY
Qty: 100 G | Refills: 1 | Status: ON HOLD | OUTPATIENT
Start: 2020-10-20 | End: 2021-12-09

## 2020-11-04 ENCOUNTER — HOSPITAL ENCOUNTER (OUTPATIENT)
Dept: CARDIOLOGY | Facility: HOSPITAL | Age: 80
End: 2020-11-04

## 2020-12-16 ENCOUNTER — TELEMEDICINE (OUTPATIENT)
Dept: CARDIOLOGY | Facility: CLINIC | Age: 80
End: 2020-12-16

## 2020-12-16 VITALS — HEART RATE: 92 BPM | SYSTOLIC BLOOD PRESSURE: 120 MMHG | TEMPERATURE: 98.4 F | DIASTOLIC BLOOD PRESSURE: 80 MMHG

## 2020-12-16 DIAGNOSIS — R42 DIZZINESS: ICD-10-CM

## 2020-12-16 DIAGNOSIS — G57.93 NEUROPATHIC PAIN OF BOTH LEGS: ICD-10-CM

## 2020-12-16 DIAGNOSIS — I25.84 CORONARY ARTERY DISEASE DUE TO CALCIFIED CORONARY LESION: ICD-10-CM

## 2020-12-16 DIAGNOSIS — I48.0 PAROXYSMAL ATRIAL FIBRILLATION (HCC): ICD-10-CM

## 2020-12-16 DIAGNOSIS — I25.10 CORONARY ARTERY DISEASE DUE TO CALCIFIED CORONARY LESION: ICD-10-CM

## 2020-12-16 DIAGNOSIS — I10 ESSENTIAL HYPERTENSION: ICD-10-CM

## 2020-12-16 DIAGNOSIS — I20.8 STABLE ANGINA PECTORIS (HCC): ICD-10-CM

## 2020-12-16 DIAGNOSIS — R06.02 SOB (SHORTNESS OF BREATH): ICD-10-CM

## 2020-12-16 DIAGNOSIS — I50.20 HEART FAILURE WITH REDUCED EJECTION FRACTION (HCC): Primary | ICD-10-CM

## 2020-12-16 PROCEDURE — 99214 OFFICE O/P EST MOD 30 MIN: CPT | Performed by: NURSE PRACTITIONER

## 2020-12-16 RX ORDER — DULOXETIN HYDROCHLORIDE 60 MG/1
60 CAPSULE, DELAYED RELEASE ORAL DAILY
Qty: 30 CAPSULE | Refills: 11 | Status: SHIPPED | OUTPATIENT
Start: 2020-12-16 | End: 2021-01-12 | Stop reason: SDUPTHER

## 2020-12-16 NOTE — PATIENT INSTRUCTIONS
Acute Coronary Syndrome  Acute coronary syndrome (ACS) is a serious problem in which there is suddenly not enough blood and oxygen reaching the heart. ACS can result in chest pain or a heart attack.  This condition is a medical emergency. If you have any symptoms of this condition, get help right away.  What are the causes?  This condition may be caused by:  · A buildup of fat and cholesterol inside the arteries (atherosclerosis). This is the most common cause. The buildup (plaque) can cause blood vessels in the heart (coronary arteries) to become narrow or blocked, which reduces blood flow to the heart. Plaque can also break off and lead to a clot, which can block an artery and cause a heart attack or stroke.  · Sudden tightening of the muscles around the coronary arteries (coronary spasm).  · Tearing of a coronary artery (spontaneous coronary artery dissection).  · Very low blood pressure (hypotension).  · An abnormal heartbeat (arrhythmia).  · Other medical conditions that cause a decrease of oxygen to the heart, such as anemiaorrespiratory failure.  · Using cocaine or methamphetamine.  What increases the risk?  The following factors may make you more likely to develop this condition:  · Age. The risk for ACS increases as you get older.  · History of chest pain, heart attack, peripheral artery disease, or stroke.  · Having taken chemotherapy or immune-suppressing medicines.  · Being male.  · Family history of chest pain, heart disease, or stroke.  · Smoking.  · Not exercising enough.  · Being overweight.  · High cholesterol.  · High blood pressure (hypertension).  · Diabetes.  · Excessive alcohol use.  What are the signs or symptoms?  Common symptoms of this condition include:  · Chest pain. The pain may last a long time, or it may stop and come back (recur). It may feel like:  ? Crushing or squeezing.  ? Tightness, pressure, fullness, or heaviness.  · Arm, neck, jaw, or back pain.  · Heartburn or  indigestion.  · Shortness of breath.  · Nausea.  · Sudden cold sweats.  · Light-headedness.  · Dizziness or passing out.  · Tiredness (fatigue).  Sometimes there are no symptoms.  How is this diagnosed?  This condition may be diagnosed based on:  · Your medical history and symptoms.  · Imaging tests, such as:  ? An electrocardiogram (ECG). This measures the heart's electrical activity.  ? X-rays.  ? CT scan.  ? A coronary angiogram. For this test, dye is injected into the heart arteries and then X-rays are taken.  ? Myocardial perfusion imaging. This test shows how well blood flows through your heart muscle.  · Blood tests. These may be repeated at certain time intervals.  · Exercise stress testing.  · Echocardiogram. This is a test that uses sound waves to produce detailed images of the heart.  How is this treated?  Treatment for this condition may include:  · Oxygen therapy.  · Medicines, such as:  ? Antiplatelet medicines and blood-thinning medicines, such as aspirin. These help prevent blood clots.  ? Medicine that dissolves any blood clots (fibrinolytic therapy).  ? Blood pressure medicines.  ? Nitroglycerin. This helps widen blood vessels to improve blood flow.  ? Pain medicine.  ? Cholesterol-lowering medicine.  · Surgery, such as:  ? Coronary angioplasty with stent placement. This involves placing a small piece of metal that looks like mesh or a spring into a narrow coronary artery. This widens the artery and keeps it open.  ? Coronary artery bypass surgery. This involves taking a section of a blood vessel from a different part of your body and placing it on the blocked coronary artery to allow blood to flow around the blockage.  · Cardiac rehabilitation. This is a program that includes exercise training, education, and counseling to help you recover.  Follow these instructions at home:  Eating and drinking  · Eat a heart-healthy diet that includes whole grains, fruits and vegetables, lean proteins, and  low-fat or nonfat dairy products.  · Limit how much salt (sodium) you eat as told by your health care provider. Follow instructions from your health care provider about any other eating or drinking restrictions, such as limiting foods that are high in fat and processed sugars.  · Use healthy cooking methods such as roasting, grilling, broiling, baking, poaching, steaming, or stir-frying.  · Work with a dietitian to follow a heart-healthy eating plan.  Medicines  · Take over-the-counter and prescription medicines only as told by your health care provider.  · Do not take these medicines unless your health care provider approves:  ? Vitamin supplements that contain vitamin A or vitamin E.  ? NSAIDs, such as ibuprofen, naproxen, or celecoxib.  ? Hormone replacement therapy that contains estrogen.  · If you are taking blood thinners:  ? Talk with your health care provider before you take any medicines that contain aspirin or NSAIDs. These medicines increase your risk for dangerous bleeding.  ? Take your medicine exactly as told, at the same time every day.  ? Avoid activities that could cause injury or bruising, and follow instructions about how to prevent falls.  ? Wear a medical alert bracelet, and carry a card that lists what medicines you take.  Activity  · Follow your cardiac rehabilitation program. Do exercises as told by your physical therapist.  · Ask your health care provider what activities and exercises are safe for you. Follow his or her instructions about lifting, driving, or climbing stairs.  Lifestyle  · Do not use any products that contain nicotine or tobacco, such as cigarettes, e-cigarettes, and chewing tobacco. If you need help quitting, ask your health care provider.  · Do not drink alcohol if your health care provider tells you not to drink.  · If you drink alcohol:  ? Limit how much you have to 0-1 drink a day.  ? Be aware of how much alcohol is in your drink. In the [place] one drink equals one 12 oz  bottle of beer (355 mL), one 5 oz glass of wine (148 mL), or one 1½ oz glass of hard liquor (44 mL).  · Maintain a healthy weight. If you need to lose weight, work with your health care provider to do so safely.  General instructions  · Tell all the health care providers who provide care for you about your heart condition, including your dentist. This may affect the medicines or treatment you receive.  · Manage any other health conditions you have, such as hypertension or diabetes. These conditions affect your heart.  · Pay attention to your mental health. You may be at higher risk for depression.  ? Find ways to manage stress.  ? Talk to your health care provider about depression screening and treatment.  · Keep your vaccinations up to date.  ? Get the flu shot (influenza vaccine) every year.  ? Get the pneumococcal vaccine if you are age 65 or older.  · If directed, monitor your blood pressure at home.  · Keep all follow-up visits as told by your health care provider. This is important.  Contact a health care provider if you:  · Feel overwhelmed or sad.  · Have trouble doing your daily activities.  Get help right away if you:  · Have pain in your chest, neck, arm, jaw, stomach, or back that recurs, and:  ? It lasts for more than a few minutes.  ? It is not relieved by taking the medicineyour health care provider prescribed.  · Have unexplained:  ? Heavy sweating.  ? Heartburn or indigestion.  ? Nausea or vomiting.  ? Shortness of breath.  ? Difficulty breathing.  ? Fatigue.  ? Nervousness or anxiety.  ? Weakness.  ? Diarrhea.  ? Dark stools or blood in your stool.  · Have sudden light-headedness or dizziness.  · Have blood pressure that is higher than 180/120.  · Faint.  · Have thoughts about hurting yourself.  These symptoms may represent a serious problem that is an emergency. Do not wait to see if the symptoms will go away. Get medical help right away. Call your local emergency services (911 in the [place] Do  not drive yourself to the hospital.   Summary  · Acute coronary syndrome (ACS) is when there is not enough blood and oxygen being supplied to the heart. ACS can result in chest pain or a heart attack.  · Acute coronary syndrome is a medical emergency. If you have any symptoms of this condition, get help right away.  · Treatment includes medicines and procedures to open the blocked arteries and restore blood flow.  This information is not intended to replace advice given to you by your health care provider. Make sure you discuss any questions you have with your health care provider.  Document Revised: 05/20/2020 Document Reviewed: 12/30/2019  [employer] Patient Education © 2020 [employer]      Acute Coronary Syndrome  Acute coronary syndrome (ACS) is a serious problem in which there is suddenly not enough blood and oxygen reaching the heart. ACS can result in chest pain or a heart attack.  This condition is a medical emergency. If you have any symptoms of this condition, get help right away.  What are the causes?  This condition may be caused by:  · A buildup of fat and cholesterol inside the arteries (atherosclerosis). This is the most common cause. The buildup (plaque) can cause blood vessels in the heart (coronary arteries) to become narrow or blocked, which reduces blood flow to the heart. Plaque can also break off and lead to a clot, which can block an artery and cause a heart attack or stroke.  · Sudden tightening of the muscles around the coronary arteries (coronary spasm).  · Tearing of a coronary artery (spontaneous coronary artery dissection).  · Very low blood pressure (hypotension).  · An abnormal heartbeat (arrhythmia).  · Other medical conditions that cause a decrease of oxygen to the heart, such as anemiaorrespiratory failure.  · Using cocaine or methamphetamine.  What increases the risk?  The following factors may make you more likely to develop this condition:  · Age. The risk for ACS increases as  you get older.  · History of chest pain, heart attack, peripheral artery disease, or stroke.  · Having taken chemotherapy or immune-suppressing medicines.  · Being male.  · Family history of chest pain, heart disease, or stroke.  · Smoking.  · Not exercising enough.  · Being overweight.  · High cholesterol.  · High blood pressure (hypertension).  · Diabetes.  · Excessive alcohol use.  What are the signs or symptoms?  Common symptoms of this condition include:  · Chest pain. The pain may last a long time, or it may stop and come back (recur). It may feel like:  ? Crushing or squeezing.  ? Tightness, pressure, fullness, or heaviness.  · Arm, neck, jaw, or back pain.  · Heartburn or indigestion.  · Shortness of breath.  · Nausea.  · Sudden cold sweats.  · Light-headedness.  · Dizziness or passing out.  · Tiredness (fatigue).  Sometimes there are no symptoms.  How is this diagnosed?  This condition may be diagnosed based on:  · Your medical history and symptoms.  · Imaging tests, such as:  ? An electrocardiogram (ECG). This measures the heart's electrical activity.  ? X-rays.  ? CT scan.  ? A coronary angiogram. For this test, dye is injected into the heart arteries and then X-rays are taken.  ? Myocardial perfusion imaging. This test shows how well blood flows through your heart muscle.  · Blood tests. These may be repeated at certain time intervals.  · Exercise stress testing.  · Echocardiogram. This is a test that uses sound waves to produce detailed images of the heart.  How is this treated?  Treatment for this condition may include:  · Oxygen therapy.  · Medicines, such as:  ? Antiplatelet medicines and blood-thinning medicines, such as aspirin. These help prevent blood clots.  ? Medicine that dissolves any blood clots (fibrinolytic therapy).  ? Blood pressure medicines.  ? Nitroglycerin. This helps widen blood vessels to improve blood flow.  ? Pain medicine.  ? Cholesterol-lowering medicine.  · Surgery, such  as:  ? Coronary angioplasty with stent placement. This involves placing a small piece of metal that looks like mesh or a spring into a narrow coronary artery. This widens the artery and keeps it open.  ? Coronary artery bypass surgery. This involves taking a section of a blood vessel from a different part of your body and placing it on the blocked coronary artery to allow blood to flow around the blockage.  · Cardiac rehabilitation. This is a program that includes exercise training, education, and counseling to help you recover.  Follow these instructions at home:  Eating and drinking  · Eat a heart-healthy diet that includes whole grains, fruits and vegetables, lean proteins, and low-fat or nonfat dairy products.  · Limit how much salt (sodium) you eat as told by your health care provider. Follow instructions from your health care provider about any other eating or drinking restrictions, such as limiting foods that are high in fat and processed sugars.  · Use healthy cooking methods such as roasting, grilling, broiling, baking, poaching, steaming, or stir-frying.  · Work with a dietitian to follow a heart-healthy eating plan.  Medicines  · Take over-the-counter and prescription medicines only as told by your health care provider.  · Do not take these medicines unless your health care provider approves:  ? Vitamin supplements that contain vitamin A or vitamin E.  ? NSAIDs, such as ibuprofen, naproxen, or celecoxib.  ? Hormone replacement therapy that contains estrogen.  · If you are taking blood thinners:  ? Talk with your health care provider before you take any medicines that contain aspirin or NSAIDs. These medicines increase your risk for dangerous bleeding.  ? Take your medicine exactly as told, at the same time every day.  ? Avoid activities that could cause injury or bruising, and follow instructions about how to prevent falls.  ? Wear a medical alert bracelet, and carry a card that lists what medicines you  take.  Activity  · Follow your cardiac rehabilitation program. Do exercises as told by your physical therapist.  · Ask your health care provider what activities and exercises are safe for you. Follow his or her instructions about lifting, driving, or climbing stairs.  Lifestyle  · Do not use any products that contain nicotine or tobacco, such as cigarettes, e-cigarettes, and chewing tobacco. If you need help quitting, ask your health care provider.  · Do not drink alcohol if your health care provider tells you not to drink.  · If you drink alcohol:  ? Limit how much you have to 0-1 drink a day.  ? Be aware of how much alcohol is in your drink. In the [place] one drink equals one 12 oz bottle of beer (355 mL), one 5 oz glass of wine (148 mL), or one 1½ oz glass of hard liquor (44 mL).  · Maintain a healthy weight. If you need to lose weight, work with your health care provider to do so safely.  General instructions  · Tell all the health care providers who provide care for you about your heart condition, including your dentist. This may affect the medicines or treatment you receive.  · Manage any other health conditions you have, such as hypertension or diabetes. These conditions affect your heart.  · Pay attention to your mental health. You may be at higher risk for depression.  ? Find ways to manage stress.  ? Talk to your health care provider about depression screening and treatment.  · Keep your vaccinations up to date.  ? Get the flu shot (influenza vaccine) every year.  ? Get the pneumococcal vaccine if you are age 65 or older.  · If directed, monitor your blood pressure at home.  · Keep all follow-up visits as told by your health care provider. This is important.  Contact a health care provider if you:  · Feel overwhelmed or sad.  · Have trouble doing your daily activities.  Get help right away if you:  · Have pain in your chest, neck, arm, jaw, stomach, or back that recurs, and:  ? It lasts for more than a few  minutes.  ? It is not relieved by taking the medicineyour health care provider prescribed.  · Have unexplained:  ? Heavy sweating.  ? Heartburn or indigestion.  ? Nausea or vomiting.  ? Shortness of breath.  ? Difficulty breathing.  ? Fatigue.  ? Nervousness or anxiety.  ? Weakness.  ? Diarrhea.  ? Dark stools or blood in your stool.  · Have sudden light-headedness or dizziness.  · Have blood pressure that is higher than 180/120.  · Faint.  · Have thoughts about hurting yourself.  These symptoms may represent a serious problem that is an emergency. Do not wait to see if the symptoms will go away. Get medical help right away. Call your local emergency services (911 in the [place] Do not drive yourself to the hospital.   Summary  · Acute coronary syndrome (ACS) is when there is not enough blood and oxygen being supplied to the heart. ACS can result in chest pain or a heart attack.  · Acute coronary syndrome is a medical emergency. If you have any symptoms of this condition, get help right away.  · Treatment includes medicines and procedures to open the blocked arteries and restore blood flow.  This information is not intended to replace advice given to you by your health care provider. Make sure you discuss any questions you have with your health care provider.  Document Revised: 05/20/2020 Document Reviewed: 12/30/2019  [employer] Patient Education © 2020 [employer]      Atrial Fibrillation    Atrial fibrillation is a type of heartbeat that is irregular or fast. If you have this condition, your heart beats without any order. This makes it hard for your heart to pump blood in a normal way.  Atrial fibrillation may come and go, or it may become a long-lasting problem. If this condition is not treated, it can put you at higher risk for stroke, heart failure, and other heart problems.  What are the causes?  This condition may be caused by diseases that damage the heart. They include:  · High blood pressure.  · Heart  failure.  · Heart valve disease.  · Heart surgery.  Other causes include:  · Diabetes.  · Thyroid disease.  · Being overweight.  · Kidney disease.  Sometimes the cause is not known.  What increases the risk?  You are more likely to develop this condition if:  · You are older.  · You smoke.  · You exercise often and very hard.  · You have a family history of this condition.  · You are a man.  · You use drugs.  · You drink a lot of alcohol.  · You have lung conditions, such as emphysema, pneumonia, or COPD.  · You have sleep apnea.  What are the signs or symptoms?  Common symptoms of this condition include:  · A feeling that your heart is beating very fast.  · Chest pain or discomfort.  · Feeling short of breath.  · Suddenly feeling light-headed or weak.  · Getting tired easily during activity.  · Fainting.  · Sweating.  In some cases, there are no symptoms.  How is this treated?  Treatment for this condition depends on underlying conditions and how you feel when you have atrial fibrillation. They include:  · Medicines to:  ? Prevent blood clots.  ? Treat heart rate or heart rhythm problems.  · Using devices, such as a pacemaker, to correct heart rhythm problems.  · Doing surgery to remove the part of the heart that sends bad signals.  · Closing an area where clots can form in the heart (left atrial appendage).  In some cases, your doctor will treat other underlying conditions.  Follow these instructions at home:  Medicines  · Take over-the-counter and prescription medicines only as told by your doctor.  · Do not take any new medicines without first talking to your doctor.  · If you are taking blood thinners:  ? Talk with your doctor before you take any medicines that have aspirin or NSAIDs, such as ibuprofen, in them.  ? Take your medicine exactly as told by your doctor. Take it at the same time each day.  ? Avoid activities that could hurt or bruise you. Follow instructions about how to prevent falls.  ? Wear a  "bracelet that says you are taking blood thinners. Or, carry a card that lists what medicines you take.  Lifestyle         · Do not use any products that have nicotine or tobacco in them. These include cigarettes, e-cigarettes, and chewing tobacco. If you need help quitting, ask your doctor.  · Eat heart-healthy foods. Talk with your doctor about the right eating plan for you.  · Exercise regularly as told by your doctor.  · Do not drink alcohol.  · Lose weight if you are overweight.  · Do not use drugs, including cannabis.  General instructions  · If you have a condition that causes breathing to stop for a short period of time (apnea), treat it as told by your doctor.  · Keep a healthy weight. Do not use diet pills unless your doctor says they are safe for you. Diet pills may make heart problems worse.  · Keep all follow-up visits as told by your doctor. This is important.  Contact a doctor if:  · You notice a change in the speed, rhythm, or strength of your heartbeat.  · You are taking a blood-thinning medicine and you get more bruising.  · You get tired more easily when you move or exercise.  · You have a sudden change in weight.  Get help right away if:    · You have pain in your chest or your belly (abdomen).  · You have trouble breathing.  · You have side effects of blood thinners, such as blood in your vomit, poop (stool), or pee (urine), or bleeding that cannot stop.  · You have any signs of a stroke. \"BE FAST\" is an easy way to remember the main warning signs:  ? B - Balance. Signs are dizziness, sudden trouble walking, or loss of balance.  ? E - Eyes. Signs are trouble seeing or a change in how you see.  ? F - Face. Signs are sudden weakness or loss of feeling in the face, or the face or eyelid drooping on one side.  ? A - Arms. Signs are weakness or loss of feeling in an arm. This happens suddenly and usually on one side of the body.  ? S - Speech. Signs are sudden trouble speaking, slurred speech, or " trouble understanding what people say.  ? T - Time. Time to call emergency services. Write down what time symptoms started.  · You have other signs of a stroke, such as:  ? A sudden, very bad headache with no known cause.  ? Feeling like you may vomit (nausea).  ? Vomiting.  ? A seizure.  These symptoms may be an emergency. Do not wait to see if the symptoms will go away. Get medical help right away. Call your local emergency services (911 in the [place] Do not drive yourself to the hospital.  Summary  · Atrial fibrillation is a type of heartbeat that is irregular or fast.  · You are at higher risk of this condition if you smoke, are older, have diabetes, or are overweight.  · Follow your doctor's instructions about medicines, diet, exercise, and follow-up visits.  · Get help right away if you have signs or symptoms of a stroke.  · Get help right away if you cannot catch your breath, or you have chest pain or discomfort.  This information is not intended to replace advice given to you by your health care provider. Make sure you discuss any questions you have with your health care provider.  Document Revised: 06/10/2020 Document Reviewed: 06/10/2020  [employer] Patient Education © 2020 [employer]      Hypertension, Adult  Hypertension is another name for high blood pressure. High blood pressure forces your heart to work harder to pump blood. This can cause problems over time.  There are two numbers in a blood pressure reading. There is a top number (systolic) over a bottom number (diastolic). It is best to have a blood pressure that is below 120/80. Healthy choices can help lower your blood pressure, or you may need medicine to help lower it.  What are the causes?  The cause of this condition is not known. Some conditions may be related to high blood pressure.  What increases the risk?  · Smoking.  · Having type 2 diabetes mellitus, high cholesterol, or both.  · Not getting enough exercise or physical  activity.  · Being overweight.  · Having too much fat, sugar, calories, or salt (sodium) in your diet.  · Drinking too much alcohol.  · Having long-term (chronic) kidney disease.  · Having a family history of high blood pressure.  · Age. Risk increases with age.  · Race. You may be at higher risk if you are .  · Gender. Men are at higher risk than women before age 45. After age 65, women are at higher risk than men.  · Having obstructive sleep apnea.  · Stress.  What are the signs or symptoms?  · High blood pressure may not cause symptoms. Very high blood pressure (hypertensive crisis) may cause:  ? Headache.  ? Feelings of worry or nervousness (anxiety).  ? Shortness of breath.  ? Nosebleed.  ? A feeling of being sick to your stomach (nausea).  ? Throwing up (vomiting).  ? Changes in how you see.  ? Very bad chest pain.  ? Seizures.  How is this treated?  · This condition is treated by making healthy lifestyle changes, such as:  ? Eating healthy foods.  ? Exercising more.  ? Drinking less alcohol.  · Your health care provider may prescribe medicine if lifestyle changes are not enough to get your blood pressure under control, and if:  ? Your top number is above 130.  ? Your bottom number is above 80.  · Your personal target blood pressure may vary.  Follow these instructions at home:  Eating and drinking    · If told, follow the DASH eating plan. To follow this plan:  ? Fill one half of your plate at each meal with fruits and vegetables.  ? Fill one fourth of your plate at each meal with whole grains. Whole grains include whole-wheat pasta, brown rice, and whole-grain bread.  ? Eat or drink low-fat dairy products, such as skim milk or low-fat yogurt.  ? Fill one fourth of your plate at each meal with low-fat (lean) proteins. Low-fat proteins include fish, chicken without skin, eggs, beans, and tofu.  ? Avoid fatty meat, cured and processed meat, or chicken with skin.  ? Avoid pre-made or processed  food.  · Eat less than 1,500 mg of salt each day.  · Do not drink alcohol if:  ? Your doctor tells you not to drink.  ? You are pregnant, may be pregnant, or are planning to become pregnant.  · If you drink alcohol:  ? Limit how much you use to:  § 0-1 drink a day for women.  § 0-2 drinks a day for men.  ? Be aware of how much alcohol is in your drink. In the [place] one drink equals one 12 oz bottle of beer (355 mL), one 5 oz glass of wine (148 mL), or one 1½ oz glass of hard liquor (44 mL).  Lifestyle    · Work with your doctor to stay at a healthy weight or to lose weight. Ask your doctor what the best weight is for you.  · Get at least 30 minutes of exercise most days of the week. This may include walking, swimming, or biking.  · Get at least 30 minutes of exercise that strengthens your muscles (resistance exercise) at least 3 days a week. This may include lifting weights or doing Pilates.  · Do not use any products that contain nicotine or tobacco, such as cigarettes, e-cigarettes, and chewing tobacco. If you need help quitting, ask your doctor.  · Check your blood pressure at home as told by your doctor.  · Keep all follow-up visits as told by your doctor. This is important.  Medicines  · Take over-the-counter and prescription medicines only as told by your doctor. Follow directions carefully.  · Do not skip doses of blood pressure medicine. The medicine does not work as well if you skip doses. Skipping doses also puts you at risk for problems.  · Ask your doctor about side effects or reactions to medicines that you should watch for.  Contact a doctor if you:  · Think you are having a reaction to the medicine you are taking.  · Have headaches that keep coming back (recurring).  · Feel dizzy.  · Have swelling in your ankles.  · Have trouble with your vision.  Get help right away if you:  · Get a very bad headache.  · Start to feel mixed up (confused).  · Feel weak or numb.  · Feel faint.  · Have very bad pain in  your:  ? Chest.  ? Belly (abdomen).  · Throw up more than once.  · Have trouble breathing.  Summary  · Hypertension is another name for high blood pressure.  · High blood pressure forces your heart to work harder to pump blood.  · For most people, a normal blood pressure is less than 120/80.  · Making healthy choices can help lower blood pressure. If your blood pressure does not get lower with healthy choices, you may need to take medicine.  This information is not intended to replace advice given to you by your health care provider. Make sure you discuss any questions you have with your health care provider.  Document Revised: 08/28/2019 Document Reviewed: 08/28/2019  [employer] Patient Education © 2020 [employer]      Palpitations  Palpitations are feelings that your heartbeat is not normal. Your heartbeat may feel like it is:  · Uneven.  · Faster than normal.  · Fluttering.  · Skipping a beat.  This is usually not a serious problem. In some cases, you may need tests to rule out any serious problems.  Follow these instructions at home:  Pay attention to any changes in your condition. Take these actions to help manage your symptoms:  Eating and drinking  · Avoid:  ? Coffee, tea, soft drinks, and energy drinks.  ? Chocolate.  ? Alcohol.  ? Diet pills.  Lifestyle    · Try to lower your stress. These things can help you relax:  ? Yoga.  ? Deep breathing and meditation.  ? Exercise.  ? Using words and images to create positive thoughts (guided imagery).  ? Using your mind to control things in your body (biofeedback).  · Do not use drugs.  · Get plenty of rest and sleep. Keep a regular bed time.  General instructions    · Take over-the-counter and prescription medicines only as told by your doctor.  · Do not use any products that contain nicotine or tobacco, such as cigarettes and e-cigarettes. If you need help quitting, ask your doctor.  · Keep all follow-up visits as told by your doctor. This is important. You may  need more tests if palpitations do not go away or get worse.  Contact a doctor if:  · Your symptoms last more than 24 hours.  · Your symptoms occur more often.  Get help right away if you:  · Have chest pain.  · Feel short of breath.  · Have a very bad headache.  · Feel dizzy.  · Pass out (faint).  Summary  · Palpitations are feelings that your heartbeat is uneven or faster than normal. It may feel like your heart is fluttering or skipping a beat.  · Avoid food and drinks that may cause palpitations. These include caffeine, chocolate, and alcohol.  · Try to lower your stress. Do not smoke or use drugs.  · Get help right away if you faint or have chest pain, shortness of breath, a severe headache, or dizziness.  This information is not intended to replace advice given to you by your health care provider. Make sure you discuss any questions you have with your health care provider.  Document Revised: 01/30/2019 Document Reviewed: 01/30/2019  [employer] Patient Education © 2020 [employer]

## 2020-12-16 NOTE — PROGRESS NOTES
Subjective     Nick Caraballo is a 80 y.o. male who presents to day for Congestive Heart Failure and Coronary Artery Disease.    CHIEF COMPLIANT  Chief Complaint   Patient presents with   • Congestive Heart Failure   • Coronary Artery Disease       Active Problems:  Problem List Items Addressed This Visit        Cardiovascular and Mediastinum    Coronary artery disease due to calcified coronary lesion    Essential hypertension    Atrial fibrillation (CMS/HCC)       Other    Dizziness    Relevant Orders    Duplex Carotid Ultrasound CAR      Other Visit Diagnoses     Heart failure with reduced ejection fraction (CMS/HCC)    -  Primary    Stable angina pectoris (CMS/HCC)        SOB (shortness of breath)        Neuropathic pain of both legs        Relevant Medications    DULoxetine (Cymbalta) 60 MG capsule      PROBLEM LIST:      1. CAD, MI in 1998  1.1 Stenting x 5 total at [facility] , [facility]. data  1.2 CABG x2 in Dec. 2014 at [facility] by Dr. Álvarez in setting of ACS, LIMA to LAD and reversed vein graft from aorta to diag. With RLE vein graft  1.3 Stress test, 9-19-19, mild anterolateral ischemia on scintigraphy.  1.4 Cath 10/19: Widely patent grafts with no progressive disease in the RCA or circumflex ejection fraction 50 to 55%, LVEDP 8 to 12 mmHg  1.5 left heart catheterization 9/20: Left main normal, LAD  occluded in the mid vessel with a patent LIMA beyond the occluded area, first diagonal has proximal 80% stenosis with a patent SVG to first D1, circumflex normal, RCA normal, EF 30 to 35% with mid anterior and apical wall akinesis and inferior wall hypokinesis  2. A-Fib, s/p CABG per patient report  3. HTN  4. Hyperlipidemia  5. GOPI, uses c-pap  6. Hypothyroidism  7. BPH  8. GERD  9. RA  10. Former smoker  11. 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 Caraballo is a 80-year-old male patient who is being followed up today for her heart failure with reduced ejection fraction with an ejection fraction of  30 to 35%.  Patient reports overall that he is doing well and that the Entresto has made a huge difference in his quality of life.  He says that he still has some fatigue if he gets up and tries to do something or is very active.  It usually does resolve with rest.  This is really unchanged from previous but overall his quality of life he feels has improved.  With the Entresto.  He does report some chronic midsternal chest discomfort which he describes as tightness and pressure type sensation is intermittent and occurs at random.  He does say that he has noticed that he has associated shortness of breath with it.  He says if he bends over or is active for seems to be when it occurs at most.  It does resolve with rest.  He has not had any other treatments for it and it usually mild in nature.  Patient also has chronic arterial hypertension which seems to be well controlled on his current blood pressure medication regimen.  We will continue his current meds without any changes.  He denies any associated symptoms.  1 of these new complaint is that if he looks up or closes his eyes for any period of time especially when getting out of shower he becomes dizzy and feels as if he is going to fall.  He does report palpitations which he describes as a pressure type sensation that occurs about 1 time a week and at random.  They are short-lived and do not interfere with his activities of daily living.  He also reports that he has some lower extremity pain where it feels like they are sore and his ankles and shins stay store and if he touches them while asleep and it wakes him up.  It does hurt to the touch.  He says that he has had a history of musculoskeletal issues with bilateral knee replacements and what seems to be neuropathy in his feet where he can even feel his toes.  He denies any claudication type symptoms other than some foot pain with ambulation in which he has had for quite some time.  He also reports that he had  [facility].  He said they took about 13 L of fluid from his stomach through injury to.  These records are not available to us at this time we will try to obtain them this for laboratory follow-up.  Overall it seems as patient is doing quite a bit better and seems to be in good nature and is extremely excited due to his ability to rest a lot better due to getting a Tempur-Pedic adjustable bed were he no longer has to sleep in the recliner.  He still has some degree of shortness of breath that is worse with exertion has been chronic and this new bed allows him to sleep in the bed and he is resting a lot better.        Patient denies any syncope PND, or other neurological changes not previously mentioned.                                             PRIOR MEDS  Current Outpatient Medications on File Prior to Visit   Medication Sig Dispense Refill   • acetaminophen (TYLENOL) 650 MG 8 hr tablet Take 650 mg by mouth Every 8 (Eight) Hours As Needed for Mild Pain .     • allopurinol (ZYLOPRIM) 300 MG tablet Take 300 mg by mouth Daily.     • aspirin 81 MG EC tablet Take 81 mg by mouth Daily.     • atorvastatin (LIPITOR) 80 MG tablet Take 80 mg by mouth Every Night.     • Cholecalciferol (VITAMIN D3) 50 MCG (2000 UT) tablet Take 2,000 Units by mouth Daily.     • clopidogrel (PLAVIX) 75 MG tablet TAKE ONE TABLET BY MOUTH DAILY 90 tablet 2   • furosemide (LASIX) 20 MG tablet Take 60 mg by mouth 2 (Two) Times a Day.     • isosorbide mononitrate (IMDUR) 30 MG 24 hr tablet Half tablet daily 45 tablet 3   • levothyroxine (SYNTHROID, LEVOTHROID) 25 MCG tablet Take 25 mcg by mouth Daily.     • Melatonin 3 MG capsule Take 3 mg by mouth Every Night.     • metOLazone (ZAROXOLYN) 2.5 MG tablet Take 2.5 mg by mouth Daily.     • metoprolol succinate XL (TOPROL-XL) 25 MG 24 hr tablet Take 1 tablet by mouth Daily. 90 tablet 3   • Miconazole Nitrate  2 % powder Apply 1 application topically to the appropriate area as directed 2 (two) times a day. 100 g 1   • Multiple Vitamins-Minerals (MULTIVITAMIN ADULT PO) Take  by mouth Daily.     • niacin (SLO-NIACIN) 500 MG CR tablet Take 1,000 mg by mouth Every Night.     • nitroglycerin (NITROSTAT) 0.4 MG SL tablet 1 under the tongue as needed for angina, may repeat q5mins for up three doses within 15 minutes 25 tablet 3   • nystatin (MYCOSTATIN) 835773 UNIT/GM powder Apply  topically to the appropriate area as directed 2 (Two) Times a Day. 30 g 1   • pantoprazole (PROTONIX) 40 MG EC tablet Take 40 mg by mouth Daily.     • potassium chloride (K-DUR) 10 MEQ CR tablet Take 10 mEq by mouth 2 (Two) Times a Day. 2 tabs twice a day     • sacubitril-valsartan (Entresto) 24-26 MG tablet Take 1 tablet by mouth 2 (Two) Times a Day. 180 tablet 3   • tamsulosin (FLOMAX) 0.4 MG capsule 24 hr capsule Take 1 capsule by mouth Daily.     • [DISCONTINUED] venlafaxine XR (EFFEXOR-XR) 150 MG 24 hr capsule Take 150 mg by mouth Daily.     • ALPRAZolam (XANAX) 0.5 MG tablet Take 0.5 mg by mouth 2 (Two) Times a Day As Needed for Anxiety.     • fluconazole (Diflucan) 100 MG tablet One today and second dose in three days 2 tablet 0   • [DISCONTINUED] lisinopril (PRINIVIL,ZESTRIL) 10 MG tablet Take 10 mg by mouth Daily.       No current facility-administered medications on file prior to visit.        ALLERGIES  Patient has no known allergies.    HISTORY  Past Medical History:   Diagnosis Date   • Acid reflux    • Anxiety    • Complete heart block (CMS/HCC)    • Coronary artery disease    • Gout    • Hyperlipidemia    • Hypertension    • Hypothyroid    • Myocardial infarction (CMS/HCC) 1998   • Rheumatoid arthritis (CMS/HCC)    • Sleep apnea        Social History     Socioeconomic History   • Marital status:      Spouse name: Not on file   • Number of children: Not on file   • Years of education: Not on file   • Highest education level: Not  on file   Tobacco Use   • Smoking status: Former Smoker     Packs/day: 1.00     Years: 45.00     Pack years: 45.00     Quit date:      Years since quittin.9   • Smokeless tobacco: Never Used   Substance and Sexual Activity   • Alcohol use: No     Frequency: Never   • Drug use: No   • Sexual activity: Defer       Family History   Problem Relation Age of Onset   • No Known Problems Mother    • Colon cancer Father    • Heart disease Brother        Review of Systems   Constitutional: Positive for fatigue (run out of energy with activity). Negative for chills and fever.   HENT: Positive for congestion, postnasal drip and rhinorrhea.    Eyes: Positive for visual disturbance.   Respiratory: Positive for chest tightness (occassionally) and shortness of breath. Negative for cough.    Cardiovascular: Positive for chest pain, palpitations (very little any more 1 x week) and leg swelling.   Gastrointestinal: Negative for abdominal distention, blood in stool, constipation, diarrhea, nausea and vomiting.   Endocrine: Positive for polyuria. Negative for polydipsia and polyphagia.   Genitourinary: Positive for frequency (diuretic).   Musculoskeletal: Positive for arthralgias, back pain and gait problem (dizzy at times\).   Skin: Positive for rash.   Allergic/Immunologic: Positive for environmental allergies.   Neurological: Positive for dizziness and light-headedness (closing eyes). Negative for syncope.   Hematological: Bruises/bleeds easily.   Psychiatric/Behavioral: Positive for sleep disturbance (+ orthopnea).       Objective     VITALS: /80   Pulse 92   Temp 98.4 °F (36.9 °C)     LABS:   Lab Results (most recent)     None        IMAGING:   No Images in the past 120 days found..    EXAM:  Physical Exam  Vitals signs reviewed.   Constitutional:       Appearance: Normal appearance.   HENT:      Head: Normocephalic and atraumatic.   Neck:      Musculoskeletal: Normal range of motion.   Pulmonary:      Effort:  Pulmonary effort is normal.   Neurological:      General: No focal deficit present.      Mental Status: He is alert and oriented to person, place, and time.   Psychiatric:         Mood and Affect: Mood normal.         Behavior: Behavior normal.         Thought Content: Thought content normal.         Judgment: Judgment normal.       Procedure   Procedures     Assessment/Plan    Diagnosis Plan   1. Heart failure with reduced ejection fraction (CMS/HCC)     2. Coronary artery disease due to calcified coronary lesion     3. Stable angina pectoris (CMS/HCC)     4. Essential hypertension     5. SOB (shortness of breath)     6. Dizziness  Duplex Carotid Ultrasound CAR   7. Paroxysmal atrial fibrillation (CMS/HCC)     8. Neuropathic pain of both legs  DULoxetine (Cymbalta) 60 MG capsule   1.  Patient does have new onset of acute heart failure with reduced ejection fraction that was determined during his last hospitalization in which she was sent over to the hospital from our office due to advancing symptoms of fatigue and dizziness.  Patient then was found to have a decreased ejection fractions with a relatively stable coronary artery anatomy.  At that time he was replaced from lisinopril to Entresto to better control his heart failure.  He states that the Entresto has made a significant difference and he is doing a significant about better overall.  In addition he is on metoprolol 25 mg daily for guideline driven therapy for his reduced ejection fraction.  He is to have a follow-up echocardiogram tomorrow.  2.  Patient does have coronary artery disease status post coronary artery bypass grafting in the past.  He still has some chronic chest pain is short-lived and occurs with activity.  Overall he feels he is doing well and the chest pain resolves with rest.  He did have stable coronary artery disease on previous heart catheterization.  3.  Patient's blood pressure is well controlled on current blood pressure medication  regimen.  No medication changes are warranted at this time.  Patient advised to monitor blood pressure on a daily basis and report any persistent highs or lows.  Set goal blood pressure for patient at 130/80 or below.  4.  Patient does have a history of proximal atrial fibrillation after bypass surgery however he has not had any reoccurrence.  He is on metoprolol for rate control.  5.  We did have a discussion in regards to his lower extremity neuropathic sounding pain.  It does seem to me as if patient has some level of neuropathy.  Really does not meet the qualifications for claudication.  I would like to try patient on Cymbalta to see if we can control his pain prior to any further vascular studies at this time.  Unfortunately we will have to stop the Effexor in which he is previously taking.  6.  Informed of signs and symptoms of ACS and advised to seek emergent treatment for any new worsening symptoms.  Patient also advised sooner follow-up as needed.  Also advised to follow-up with family doctor as needed  This note is dictated utilizing voice recognition software.  Although this record has been proof read, transcriptional errors may still be present. If questions occur regarding the content of this record please do not hesitate to call our office.  I have reviewed and confirmed the accuracy of the ROS as documented by the MA/LPN/RN DAVID Stone    No follow-ups on file.    Diagnoses and all orders for this visit:    1. Heart failure with reduced ejection fraction (CMS/HCC) (Primary)    2. Coronary artery disease due to calcified coronary lesion    3. Stable angina pectoris (CMS/HCC)    4. Essential hypertension    5. SOB (shortness of breath)    6. Dizziness  -     Duplex Carotid Ultrasound CAR; Future    7. Paroxysmal atrial fibrillation (CMS/HCC)    8. Neuropathic pain of both legs  -     DULoxetine (Cymbalta) 60 MG capsule; Take 1 capsule by mouth Daily.  Dispense: 30 capsule; Refill:  11        Nick Caraballo  reports that he quit smoking about 22 years ago. He has a 45.00 pack-year smoking history. He has never used smokeless tobacco.    MEDS ORDERED DURING VISIT:  New Medications Ordered This Visit   Medications   • DULoxetine (Cymbalta) 60 MG capsule     Sig: Take 1 capsule by mouth Daily.     Dispense:  30 capsule     Refill:  11       This document has been electronically signed by Coleman Harman Jr., APRN  December 16, 2020 10:39 EST

## 2020-12-17 ENCOUNTER — HOSPITAL ENCOUNTER (OUTPATIENT)
Dept: CARDIOLOGY | Facility: HOSPITAL | Age: 80
Discharge: HOME | End: 2020-12-17

## 2020-12-17 DIAGNOSIS — I50.20 HFREF (HEART FAILURE WITH REDUCED EJECTION FRACTION) (HCC): ICD-10-CM

## 2020-12-17 DIAGNOSIS — R94.30 CARDIAC LV EJECTION FRACTION 30-35%: ICD-10-CM

## 2020-12-17 PROCEDURE — A9560 TC99M LABELED RBC: HCPCS | Performed by: INTERNAL MEDICINE

## 2020-12-17 PROCEDURE — 78472 GATED HEART PLANAR SINGLE: CPT | Performed by: INTERNAL MEDICINE

## 2020-12-17 PROCEDURE — 78472 GATED HEART PLANAR SINGLE: CPT

## 2020-12-17 PROCEDURE — 0 TECHNETIUM LABELED RED BLOOD CELLS: Performed by: INTERNAL MEDICINE

## 2020-12-17 RX ADMIN — TECHNETIUM TC 99M-LABELED RED BLOOD CELLS 1 DOSE: KIT at 15:10

## 2020-12-19 LAB
MAXIMAL PREDICTED HEART RATE: 141 BPM
STRESS TARGET HR: 120 BPM

## 2020-12-21 ENCOUNTER — HOSPITAL ENCOUNTER (OUTPATIENT)
Dept: CARDIOLOGY | Facility: HOSPITAL | Age: 80
Discharge: HOME | End: 2020-12-21
Admitting: NURSE PRACTITIONER

## 2020-12-21 ENCOUNTER — OFFICE VISIT (OUTPATIENT)
Dept: CARDIOLOGY | Facility: CLINIC | Age: 80
End: 2020-12-21

## 2020-12-21 ENCOUNTER — TELEPHONE (OUTPATIENT)
Dept: CARDIOLOGY | Facility: CLINIC | Age: 80
End: 2020-12-21

## 2020-12-21 VITALS
OXYGEN SATURATION: 91 % | WEIGHT: 267.6 LBS | HEART RATE: 76 BPM | DIASTOLIC BLOOD PRESSURE: 68 MMHG | SYSTOLIC BLOOD PRESSURE: 111 MMHG | BODY MASS INDEX: 35.47 KG/M2 | HEIGHT: 73 IN

## 2020-12-21 DIAGNOSIS — I25.5 ISCHEMIC CARDIOMYOPATHY: Primary | ICD-10-CM

## 2020-12-21 DIAGNOSIS — Z99.89 OSA ON CPAP: ICD-10-CM

## 2020-12-21 DIAGNOSIS — R06.02 SHORTNESS OF BREATH: ICD-10-CM

## 2020-12-21 DIAGNOSIS — G47.33 OSA ON CPAP: ICD-10-CM

## 2020-12-21 DIAGNOSIS — R42 DIZZINESS: ICD-10-CM

## 2020-12-21 DIAGNOSIS — I10 ESSENTIAL HYPERTENSION: ICD-10-CM

## 2020-12-21 DIAGNOSIS — I25.10 CORONARY ARTERY DISEASE DUE TO CALCIFIED CORONARY LESION: ICD-10-CM

## 2020-12-21 DIAGNOSIS — I48.0 PAROXYSMAL ATRIAL FIBRILLATION (HCC): ICD-10-CM

## 2020-12-21 DIAGNOSIS — I49.5 SSS (SICK SINUS SYNDROME) (HCC): ICD-10-CM

## 2020-12-21 DIAGNOSIS — E78.2 MIXED HYPERLIPIDEMIA: ICD-10-CM

## 2020-12-21 DIAGNOSIS — R07.2 PRECORDIAL PAIN: ICD-10-CM

## 2020-12-21 DIAGNOSIS — I25.84 CORONARY ARTERY DISEASE DUE TO CALCIFIED CORONARY LESION: ICD-10-CM

## 2020-12-21 DIAGNOSIS — R53.82 CHRONIC FATIGUE: ICD-10-CM

## 2020-12-21 DIAGNOSIS — Z95.1 S/P CABG (CORONARY ARTERY BYPASS GRAFT): ICD-10-CM

## 2020-12-21 DIAGNOSIS — R00.2 PALPITATIONS: ICD-10-CM

## 2020-12-21 PROCEDURE — 99214 OFFICE O/P EST MOD 30 MIN: CPT | Performed by: SPECIALIST

## 2020-12-21 PROCEDURE — 93880 EXTRACRANIAL BILAT STUDY: CPT | Performed by: INTERNAL MEDICINE

## 2020-12-21 PROCEDURE — 93880 EXTRACRANIAL BILAT STUDY: CPT

## 2020-12-21 RX ORDER — CARVEDILOL 6.25 MG/1
6.25 TABLET ORAL 2 TIMES DAILY
Qty: 180 TABLET | Refills: 1 | Status: SHIPPED | OUTPATIENT
Start: 2020-12-21 | End: 2020-12-21 | Stop reason: SDUPTHER

## 2020-12-21 RX ORDER — CARVEDILOL 6.25 MG/1
6.25 TABLET ORAL 2 TIMES DAILY
Qty: 180 TABLET | Refills: 1 | Status: SHIPPED | OUTPATIENT
Start: 2020-12-21 | End: 2021-08-10 | Stop reason: SDUPTHER

## 2020-12-21 NOTE — TELEPHONE ENCOUNTER
Left message for patient to call office. Needs to come in Tuesday Decemer 22nd @ 10:15 to see Gurdeep to discuss [facility] Results.        Coleman Harman APRN [place]stead, Melissa, LPN             Severely depressed EF will need to discuss ICD and or life vest    Previous Messages          PACS Images     Radiology Images   Nuclear Medicine Cardiac Blood Pool [facility]  Order: [id number]  Status:  Final result   Visible to patient:  No (not released) Dx:  Cardiac LV ejection fraction 30-35%; ...  Details    Reading Physician Reading Date Result Priority   Errol Lai MD  [phone] 12/17/2020 Routine      Result Text     1.  Severely depressed post-rest ejection fraction of 19%.     2.  LV volumes are not significantly increased.     3.  Peak filling rates are compatible with diastolic dysfunction without severely elevated LV filling pressures.

## 2020-12-21 NOTE — PROGRESS NOTES
Subjective   Follow up, ischemic cardiomyopathy  Nick Caraballo is a 80 y.o. male who presents to day for Follow-up (Here for MUGA results), Cardiomyopathy, Coronary Artery Disease, and Shortness of Breath.    CHIEF COMPLIANT  Chief Complaint   Patient presents with   • Follow-up     Here for MUGA results   • Cardiomyopathy   • Coronary Artery Disease   • Shortness of Breath     PROBLEM LIST:      1. CAD, MI in 1998  1.1 Stenting x 5 total at [facility] , [facility]. data  1.2 CABG x2 in Dec. 2014 at [facility] by Dr. Álvarez in setting of ACS, LIMA to LAD and reversed vein graft from aorta to diag. With RLE vein graft  1.3 Stress test, 9-19-19, mild anterolateral ischemia on scintigraphy.  1.4 Cath 10/19: Widely patent grafts with no progressive disease in the RCA or circumflex ejection fraction 50 to 55%, LVEDP 8 to 12 mmHg  1.5 left heart catheterization 9/20: Left main normal, LAD  occluded in the mid vessel with a patent LIMA beyond the occluded area, first diagonal has proximal 80% stenosis with a patent SVG to first D1, circumflex normal, RCA normal, EF 30 to 35% with mid anterior and apical wall akinesis and inferior wall hypokinesis  2. A-Fib, s/p CABG per patient report  3. HTN  4. Hyperlipidemia  5. GOPI, uses c-pap  6. Hypothyroidism  7. BPH  8. GERD  9. RA  10. Former smoker  11. 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    Problem List Items Addressed This Visit        Cardiovascular and Mediastinum    Palpitations    Coronary artery disease due to calcified coronary lesion    Essential hypertension    Mixed hyperlipidemia    Atrial fibrillation (CMS/HCC)    SSS (sick sinus syndrome) (CMS/HCC)       Respiratory    Shortness of breath    GOPI on CPAP       Nervous and Auditory    Precordial pain       Other    Fatigue    Dizziness    S/P CABG (coronary artery bypass graft)      Other Visit Diagnoses     Ischemic cardiomyopathy    -  Primary          HPI  Is feeling better but he still quite short of breath class  III to class IIIb he said that he also has constant chest pressure this has been going on since at least September when he had episode of congestive heart failure he also has lower extremity edema he thinks getting better he denies any palpitations he gets dizzy if he closed his eyes and feels out of balance or if you have found turns or stands up from sitting position sleep was a an ankle with orthopnea location PND maybe once or twice a week when he wakes up coughing with use of CPAP every night [facility] also with bowel obstruction about a month ago but this has resolved now                PRIOR MEDS  Current Outpatient Medications on File Prior to Visit   Medication Sig Dispense Refill   • acetaminophen (TYLENOL) 650 MG 8 hr tablet Take 650 mg by mouth Every 8 (Eight) Hours As Needed for Mild Pain .     • allopurinol (ZYLOPRIM) 300 MG tablet Take 300 mg by mouth Daily.     • aspirin 81 MG EC tablet Take 81 mg by mouth Daily.     • atorvastatin (LIPITOR) 80 MG tablet Take 80 mg by mouth Every Night.     • Cholecalciferol (VITAMIN D3) 50 MCG (2000 UT) tablet Take 2,000 Units by mouth Daily.     • clopidogrel (PLAVIX) 75 MG tablet TAKE ONE TABLET BY MOUTH DAILY 90 tablet 2   • DULoxetine (Cymbalta) 60 MG capsule Take 1 capsule by mouth Daily. 30 capsule 11   • furosemide (LASIX) 20 MG tablet Take 60 mg by mouth 2 (Two) Times a Day.     • isosorbide mononitrate (IMDUR) 30 MG 24 hr tablet Half tablet daily 45 tablet 3   • levothyroxine (SYNTHROID, LEVOTHROID) 25 MCG tablet Take 25 mcg by mouth Daily.     • Melatonin 3 MG capsule Take 3 mg by mouth Every Night.     • metOLazone (ZAROXOLYN) 2.5 MG tablet Take 2.5 mg by mouth Daily.     • metoprolol succinate XL (TOPROL-XL) 25 MG 24 hr tablet Take 1 tablet by mouth Daily. 90 tablet 3   • Miconazole Nitrate 2 % powder Apply 1 application topically to the appropriate area as directed 2 (two) times a day. 100 g 1   •  Multiple Vitamins-Minerals (MULTIVITAMIN ADULT PO) Take  by mouth Daily.     • niacin (SLO-NIACIN) 500 MG CR tablet Take 1,000 mg by mouth Every Night.     • nystatin (MYCOSTATIN) 894200 UNIT/GM powder Apply  topically to the appropriate area as directed 2 (Two) Times a Day. 30 g 1   • pantoprazole (PROTONIX) 40 MG EC tablet Take 40 mg by mouth Daily.     • potassium chloride (K-DUR) 10 MEQ CR tablet Take 20 mEq by mouth 2 (Two) Times a Day.     • sacubitril-valsartan (Entresto) 24-26 MG tablet Take 1 tablet by mouth 2 (Two) Times a Day. 180 tablet 3   • tamsulosin (FLOMAX) 0.4 MG capsule 24 hr capsule Take 1 capsule by mouth Daily.     • nitroglycerin (NITROSTAT) 0.4 MG SL tablet 1 under the tongue as needed for angina, may repeat q5mins for up three doses within 15 minutes 25 tablet 3   • [DISCONTINUED] ALPRAZolam (XANAX) 0.5 MG tablet Take 0.5 mg by mouth 2 (Two) Times a Day As Needed for Anxiety.     • [DISCONTINUED] fluconazole (Diflucan) 100 MG tablet One today and second dose in three days 2 tablet 0     No current facility-administered medications on file prior to visit.        ALLERGIES  Patient has no known allergies.    HISTORY  Past Medical History:   Diagnosis Date   • Acid reflux    • Anxiety    • Complete heart block (CMS/HCC)    • Coronary artery disease    • Gout    • Hyperlipidemia    • Hypertension    • Hypothyroid    • Myocardial infarction (CMS/HCC)    • Rheumatoid arthritis (CMS/HCC)    • Sleep apnea        Social History     Socioeconomic History   • Marital status:      Spouse name: Not on file   • Number of children: Not on file   • Years of education: Not on file   • Highest education level: Not on file   Tobacco Use   • Smoking status: Former Smoker     Packs/day: 1.00     Years: 45.00     Pack years: 45.00     Quit date:      Years since quittin.9   • Smokeless tobacco: Never Used   Substance and Sexual Activity   • Alcohol use: No     Frequency: Never   • Drug use: No  "  • Sexual activity: Defer       Family History   Problem Relation Age of Onset   • No Known Problems Mother    • Colon cancer Father    • Heart disease Brother        Review of Systems   Constitutional: Positive for fatigue. Negative for activity change and appetite change.   HENT: Positive for tinnitus. Negative for congestion.    Eyes: Positive for visual disturbance (glasses prn).   Respiratory: Positive for cough and shortness of breath. Negative for apnea, choking, chest tightness, wheezing and stridor.    Cardiovascular: Positive for chest pain (pressure) and leg swelling. Negative for palpitations.   Gastrointestinal: Positive for abdominal pain. Negative for abdominal distention.   Endocrine: Negative.  Negative for cold intolerance and heat intolerance.   Genitourinary: Positive for flank pain.   Musculoskeletal: Positive for arthralgias and myalgias.   Skin: Negative.  Negative for color change and pallor.   Allergic/Immunologic: Negative.    Neurological: Positive for dizziness and light-headedness. Negative for syncope.        Imbalance   Hematological: Bruises/bleeds easily.   Psychiatric/Behavioral: Negative.  Negative for agitation and behavioral problems.       Objective     VITALS: /68 (BP Location: Left arm, Patient Position: Sitting)   Pulse 76   Ht 185.4 cm (72.99\")   Wt 121 kg (267 lb 9.6 oz)   SpO2 91%   BMI 35.31 kg/m²     LABS:   Lab Results (most recent)     None          IMAGING:   No Images in the past 120 days found..    EXAM:  Physical Exam  Constitutional:       Appearance: He is well-developed.   HENT:      Head: Normocephalic and atraumatic.   Eyes:      Pupils: Pupils are equal, round, and reactive to light.   Neck:      Musculoskeletal: Neck supple. No edema.      Thyroid: No thyromegaly.      Vascular: No JVD.   Cardiovascular:      Rate and Rhythm: Normal rate and regular rhythm.      Chest Wall: PMI is not displaced.      Pulses: Normal pulses.      Heart sounds: " Normal heart sounds, S1 normal and S2 normal. No murmur. No friction rub. No gallop.       Comments: Well healed thoracotomy scar  Mild bilateral LE edema  Pulmonary:      Effort: Pulmonary effort is normal. No respiratory distress.      Breath sounds: Normal breath sounds. No stridor. No wheezing or rales.   Chest:      Chest wall: No tenderness.   Abdominal:      General: Bowel sounds are normal. There is no distension.      Palpations: Abdomen is soft. There is no mass.      Tenderness: There is no abdominal tenderness. There is no guarding or rebound.   Skin:     General: Skin is warm and dry.      Coloration: Skin is not pale.      Findings: No erythema or rash.   Neurological:      Mental Status: He is alert and oriented to person, place, and time.      Cranial Nerves: No cranial nerve deficit.      Coordination: Coordination normal.   Psychiatric:         Behavior: Behavior normal.         Procedure   Procedures       Assessment/Plan    Diagnosis Plan   1. Ischemic cardiomyopathy     2. Paroxysmal atrial fibrillation (CMS/HCC)     3. Coronary artery disease due to calcified coronary lesion     4. Essential hypertension     5. Mixed hyperlipidemia     6. Palpitations     7. SSS (sick sinus syndrome) (CMS/HCC)     8. GOPI on CPAP     9. Shortness of breath     10. Precordial pain     11. Dizziness     12. Chronic fatigue     13. S/P CABG (coronary artery bypass graft)     1.  I reviewed the records in details including the cardiac catheterization report and echocardiogram report patient had worsening of ejection fraction he is taken relatively small dose of metoprolol succinate I do not think will be able to increase the dose because of his relatively low blood pressure so I will try to switch this to carvedilol 6.25 mg twice daily we will try to give him relatively more benefits from the beta-blockade he also will benefit from adding spironolactone however I noticed that his most recent potassium available to  me was 5.2 so I am going to repeat his CMP to assess his creatinine level and his potassium and probably will add Spironolactone to his medication we need to repeat the echo after optimizing all of his medications in about 3 months to assess his ejection fraction and whether or not he will need an internal defibrillator, in the interim he will need a LifeVest I discussed that with him in details we will refer him for a LifeVest placement  2.  His blood pressure is controlled as mentioned above we will continue current management  3.  Currently is not in overt CHF we will continue the current management as planned above  4.  He is using CPAP every night and  [facility]  5.  Apparently had atrial fibrillation postoperatively after this cardiac bypass surgery 4 years ago with no recurrence he is not anticoagulated but had no recurrence we will continue current management      No follow-ups on file.    Diagnoses and all orders for this visit:    1. Ischemic cardiomyopathy (Primary)    2. Paroxysmal atrial fibrillation (CMS/HCC)    3. Coronary artery disease due to calcified coronary lesion    4. Essential hypertension    5. Mixed hyperlipidemia    6. Palpitations    7. SSS (sick sinus syndrome) (CMS/HCC)    8. GOPI on CPAP    9. Shortness of breath    10. Precordial pain    11. Dizziness    12. Chronic fatigue    13. S/P CABG (coronary artery bypass graft)                 MEDS ORDERED DURING VISIT:  No orders of the defined types were placed in this encounter.        This document has been electronically signed by Diane Ladd MD  December 21, 2020 17:02 EST

## 2020-12-21 NOTE — PATIENT INSTRUCTIONS
Obesity, Adult  Obesity is the condition of having too much total body fat. Being overweight or obese means that your weight is greater than what is considered healthy for your body size. Obesity is determined by a measurement called BMI. BMI is an estimate of body fat and is calculated from height and weight. For adults, a BMI of 30 or higher is considered obese.  Obesity can lead to other health concerns and major illnesses, including:  · Stroke.  · Coronary artery disease (CAD).  · Type 2 diabetes.  · Some types of cancer, including cancers of the colon, breast, uterus, and gallbladder.  · Osteoarthritis.  · High blood pressure (hypertension).  · High cholesterol.  · Sleep apnea.  · Gallbladder stones.  · Infertility problems.  What are the causes?  Common causes of this condition include:  · Eating daily meals that are high in calories, sugar, and fat.  · Being born with genes that may make you more likely to become obese.  · Having a medical condition that causes obesity, including:  ? Hypothyroidism.  ? Polycystic ovarian syndrome (PCOS).  ? Binge-eating disorder.  ? Cushing syndrome.  · Taking certain medicines, such as steroids, antidepressants, and seizure medicines.  · Not being physically active (sedentary lifestyle).  · Not getting enough sleep.  · Drinking high amounts of sugar-sweetened beverages, such as soft drinks.  What increases the risk?  The following factors may make you more likely to develop this condition:  · Having a family history of obesity.  · Being a woman of  descent.  · Being a man of  descent.  · Living in an area with limited access to:  ? Ca, recreation centers, or sidewalks.  ? Healthy food choices, such as grocery stores and farmers' markets.  What are the signs or symptoms?  The main sign of this condition is having too much body fat.  How is this diagnosed?  This condition is diagnosed based on:  · Your BMI. If you are an adult with a BMI of 30 or  higher, you are considered obese.  · Your waist circumference. This measures the distance around your waistline.  · Your skinfold thickness. Your health care provider may gently pinch a fold of your skin and measure it.  You may have other tests to check for underlying conditions.  How is this treated?  Treatment for this condition often includes changing your lifestyle. Treatment may include some or all of the following:  · Dietary changes. This may include developing a healthy meal plan.  · Regular physical activity. This may include activity that causes your heart to beat faster (aerobic exercise) and strength training. Work with your health care provider to design an exercise program that works for you.  · Medicine to help you lose weight if you are unable to lose 1 pound a week after 6 weeks of healthy eating and more physical activity.  · Treating conditions that cause the obesity (underlying conditions).  · Surgery. Surgical options may include gastric banding and gastric bypass. Surgery may be done if:  ? Other treatments have not helped to improve your condition.  ? You have a BMI of 40 or higher.  ? You have life-threatening health problems related to obesity.  Follow these instructions at home:  Eating and drinking    · Follow recommendations from your health care provider about what you eat and drink. Your health care provider may advise you to:  ? Limit fast food, sweets, and processed snack foods.  ? Choose low-fat options, such as low-fat milk instead of whole milk.  ? Eat 5 or more servings of fruits or vegetables every day.  ? Eat at home more often. This gives you more control over what you eat.  ? Choose healthy foods when you eat out.  ? Learn to read food labels. This will help you understand how much food is considered 1 serving.  ? Learn what a healthy serving size is.  ? Keep low-fat snacks available.  ? Limit sugary drinks, such as soda, fruit juice, sweetened iced tea, and flavored  milk.  · Drink enough water to keep your urine pale yellow.  · Do not follow a fad diet. Fad diets can be unhealthy and even dangerous.  Physical activity  · Exercise regularly, as told by your health care provider.  ? Most adults should get up to 150 minutes of moderate-intensity exercise every week.  ? Ask your health care provider what types of exercise are safe for you and how often you should exercise.  · Warm up and stretch before being active.  · Cool down and stretch after being active.  · Rest between periods of activity.  Lifestyle  · Work with your health care provider and a dietitian to set a weight-loss goal that is healthy and reasonable for you.  · Limit your screen time.  · Find ways to reward yourself that do not involve food.  · Do not drink alcohol if:  ? Your health care provider tells you not to drink.  ? You are pregnant, may be pregnant, or are planning to become pregnant.  · If you drink alcohol:  ? Limit how much you use to:  § 0-1 drink a day for women.  § 0-2 drinks a day for men.  ? Be aware of how much alcohol is in your drink. In the [place] one drink equals one 12 oz bottle of beer (355 mL), one 5 oz glass of wine (148 mL), or one 1½ oz glass of hard liquor (44 mL).  General instructions  · Keep a weight-loss journal to keep track of the food you eat and how much exercise you get.  · Take over-the-counter and prescription medicines only as told by your health care provider.  · Take vitamins and supplements only as told by your health care provider.  · Consider joining a support group. Your health care provider may be able to recommend a support group.  · Keep all follow-up visits as told by your health care provider. This is important.  Contact a health care provider if:  · You are unable to meet your weight loss goal after 6 weeks of dietary and lifestyle changes.  Get help right away if you are having:  · Trouble breathing.  · Suicidal thoughts or behaviors.  Summary  · Obesity is the  condition of having too much total body fat.  · Being overweight or obese means that your weight is greater than what is considered healthy for your body size.  · Work with your health care provider and a dietitian to set a weight-loss goal that is healthy and reasonable for you.  · Exercise regularly, as told by your health care provider. Ask your health care provider what types of exercise are safe for you and how often you should exercise.  This information is not intended to replace advice given to you by your health care provider. Make sure you discuss any questions you have with your health care provider.  Document Revised: 08/22/2019 Document Reviewed: 08/22/2019  [employer] Patient Education © 2020 [employer]  MyPlate from [employer]    MyPlate is an outline of a general healthy diet based on the 2010 Dietary Guidelines for Americans, from the [place]. Department of Agriculture (USDA). It sets guidelines for how much food you should eat from each food group based on your age, sex, and level of physical activity.  What are tips for following MyPlate?  To follow MyPlate recommendations:  · Eat a wide variety of fruits and vegetables, grains, and protein foods.  · Serve smaller portions and eat less food throughout the day.  · Limit portion sizes to avoid overeating.  · Enjoy your food.  · Get at least 150 minutes of exercise every week. This is about 30 minutes each day, 5 or more days per week.  It can be difficult to have every meal look like MyPlate. Think about MyPlate as eating guidelines for an entire day, rather than each individual meal.  Fruits and vegetables  · Make half of your plate fruits and vegetables.  · Eat many different colors of fruits and vegetables each day.  · For a 2,000 calorie daily food plan, eat:  ? 2½ cups of vegetables every day.  ? 2 cups of fruit every day.  · 1 cup is equal to:  ? 1 cup raw or cooked vegetables.  ? 1 cup raw fruit.  ? 1 medium-sized orange, apple, or banana.  ? 1 cup  100% fruit or vegetable juice.  ? 2 cups raw leafy greens, such as lettuce, spinach, or kale.  ? ½ cup dried fruit.  Grains  · One fourth of your plate should be grains.  · Make at least half of the grains you eat each day whole grains.  · For a 2,000 calorie daily food plan, eat 6 oz of grains every day.  · 1 oz is equal to:  ? 1 slice bread.  ? 1 cup cereal.  ? ½ cup cooked rice, cereal, or pasta.  Protein  · One fourth of your plate should be protein.  · Eat a wide variety of protein foods, including meat, poultry, fish, eggs, beans, nuts, and tofu.  · For a 2,000 calorie daily food plan, eat 5½ oz of protein every day.  · 1 oz is equal to:  ? 1 oz meat, poultry, or fish.  ? ¼ cup cooked beans.  ? 1 egg.  ? ½ oz nuts or seeds.  ? 1 Tbsp peanut butter.  Dairy  · Drink fat-free or low-fat (1%) milk.  · Eat or drink dairy as a side to meals.  · For a 2,000 calorie daily food plan, eat or drink 3 cups of dairy every day.  · 1 cup is equal to:  ? 1 cup milk, yogurt, cottage cheese, or soy milk (soy beverage).  ? 2 oz processed cheese.  ? 1½ oz natural cheese.  Fats, oils, salt, and sugars  · Only small amounts of oils are recommended.  · Avoid foods that are high in calories and low in nutritional value (empty calories), like foods high in fat or added sugars.  · Choose foods that are low in salt (sodium). Choose foods that have less than 140 milligrams (mg) of sodium per serving.  · Drink water instead of sugary drinks. Drink enough water each day to keep your urine pale yellow.  Where to find support  · Work with your health care provider or a nutrition specialist (dietitian) to develop a customized eating plan that is right for you.  · Download an luna (mobile application) to help you track your daily food intake.  Where to find more information  · Go to ChooseMyPlate.gov for more information.  Summary  · MyPlate is a general guideline for healthy eating from the USDA. It is based on the 2010 Dietary Guidelines for  Americans.  · In general, fruits and vegetables should take up ½ of your plate, grains should take up ¼ of your plate, and protein should take up ¼ of your plate.  This information is not intended to replace advice given to you by your health care provider. Make sure you discuss any questions you have with your health care provider.  Document Revised: 05/21/2020 Document Reviewed: 03/19/2018  Elsevier Patient Education © 2020 [employer] Inc.

## 2020-12-22 ENCOUNTER — LAB (OUTPATIENT)
Dept: LAB | Facility: HOSPITAL | Age: 80
End: 2020-12-22

## 2020-12-22 DIAGNOSIS — E78.2 MIXED HYPERLIPIDEMIA: ICD-10-CM

## 2020-12-22 LAB
ALBUMIN SERPL-MCNC: 4.51 G/DL (ref 3.5–5.2)
ALBUMIN/GLOB SERPL: 1.9 G/DL
ALP SERPL-CCNC: 56 U/L (ref 39–117)
ALT SERPL W P-5'-P-CCNC: 24 U/L (ref 1–41)
ANION GAP SERPL CALCULATED.3IONS-SCNC: 10.6 MMOL/L (ref 5–15)
AST SERPL-CCNC: 24 U/L (ref 1–40)
BILIRUB SERPL-MCNC: 0.5 MG/DL (ref 0–1.2)
BUN SERPL-MCNC: 25 MG/DL (ref 8–23)
BUN/CREAT SERPL: 22.3 (ref 7–25)
CALCIUM SPEC-SCNC: 9.8 MG/DL (ref 8.6–10.5)
CHLORIDE SERPL-SCNC: 97 MMOL/L (ref 98–107)
CHOLEST SERPL-MCNC: 138 MG/DL (ref 0–200)
CO2 SERPL-SCNC: 33.4 MMOL/L (ref 22–29)
CREAT SERPL-MCNC: 1.12 MG/DL (ref 0.76–1.27)
GFR SERPL CREATININE-BSD FRML MDRD: 63 ML/MIN/1.73
GLOBULIN UR ELPH-MCNC: 2.4 GM/DL
GLUCOSE SERPL-MCNC: 132 MG/DL (ref 65–99)
HDLC SERPL-MCNC: 45 MG/DL (ref 40–60)
LDLC SERPL CALC-MCNC: 62 MG/DL (ref 0–100)
LDLC/HDLC SERPL: 1.22 {RATIO}
POTASSIUM SERPL-SCNC: 3.6 MMOL/L (ref 3.5–5.2)
PROT SERPL-MCNC: 6.9 G/DL (ref 6–8.5)
SODIUM SERPL-SCNC: 141 MMOL/L (ref 136–145)
TRIGL SERPL-MCNC: 190 MG/DL (ref 0–150)
VLDLC SERPL-MCNC: 31 MG/DL (ref 5–40)

## 2020-12-22 PROCEDURE — 80061 LIPID PANEL: CPT

## 2020-12-22 PROCEDURE — 80053 COMPREHEN METABOLIC PANEL: CPT

## 2020-12-22 PROCEDURE — 36415 COLL VENOUS BLD VENIPUNCTURE: CPT

## 2021-01-07 ENCOUNTER — TELEPHONE (OUTPATIENT)
Dept: CARDIOLOGY | Facility: CLINIC | Age: 81
End: 2021-01-07

## 2021-01-07 NOTE — TELEPHONE ENCOUNTER
"Called pt, he stated that he wants to speak w/ JR or Kristina. I informed him that they were both unavailable at the moment and asked how I can assist. He stated he was having more issues w/ the hospital. [facility] sent him a bill for [facility]1400 and that [facility] and that [facility]'re \"lying to him.\" stated that was when JR sent him there via ambulance. Pt stated that they billed it to [employer] and are lying and saying they billed [facility] to [facility] and that they refused to pay. Pt stated that [employer] is a collections company and that he's very upset. Stated he spoke w/ someone named Billie at the hospital. Stated \"Don't ever send me over there again, let me die or send me to [place].\" Stated that there's no way they tried to bill the [facility] because we told them it was an emergency situation and that the hospital just keeps lying and giving him \"the run around.\"   I informed him that I'm unsure what we can do, but that I'll speak w/ Kristina. Stated that he's [facility] to the [facility] and that he's very upset and trying to stay calm. Stated \"It's against the law for them to do this. They're a corrupt bunch.\"   Stated \"I need to know what to do.\"   "

## 2021-01-07 NOTE — TELEPHONE ENCOUNTER
----- Message from Nick Caraballo sent at 1/7/2021  4:00 PM EST -----  Regarding: Visit Follow-Up Question  Contact: [phone]  Could I please request that you call me at [phone]. I am in a trauma situation with that hospital again, and I need to know how to handle the stress right now. Thank you

## 2021-01-08 NOTE — TELEPHONE ENCOUNTER
The PM followed up with Mr. Caraballo and gave him Consuelo Josue' contact information to call and discuss his billing concerns and services frustrations.  The PM also send Daniela an email making her aware.

## 2021-01-10 LAB
BH CV ECHO MEAS - BSA(HAYCOCK): 2.5 M^2
BH CV ECHO MEAS - BSA: 2.4 M^2
BH CV ECHO MEAS - BZI_BMI: 34.8 KILOGRAMS/M^2
BH CV ECHO MEAS - BZI_METRIC_HEIGHT: 185.4 CM
BH CV ECHO MEAS - BZI_METRIC_WEIGHT: 119.8 KG
BH CV XLRA MEAS LEFT BULB EDV: 9.1 CM/SEC
BH CV XLRA MEAS LEFT BULB PSV: 36.7 CM/SEC
BH CV XLRA MEAS LEFT CCA RATIO VEL: 37.7 CM/SEC
BH CV XLRA MEAS LEFT DIST CCA EDV: 12.1 CM/SEC
BH CV XLRA MEAS LEFT DIST CCA PSV: 38 CM/SEC
BH CV XLRA MEAS LEFT DIST ICA EDV: -17 CM/SEC
BH CV XLRA MEAS LEFT DIST ICA PSV: -38.4 CM/SEC
BH CV XLRA MEAS LEFT ICA RATIO VEL: -71.1 CM/SEC
BH CV XLRA MEAS LEFT ICA/CCA RATIO: -1.9
BH CV XLRA MEAS LEFT MID ICA EDV: -29.9 CM/SEC
BH CV XLRA MEAS LEFT MID ICA PSV: -71.5 CM/SEC
BH CV XLRA MEAS LEFT PROX CCA EDV: -12.9 CM/SEC
BH CV XLRA MEAS LEFT PROX CCA PSV: -59.7 CM/SEC
BH CV XLRA MEAS LEFT PROX ECA EDV: -21.6 CM/SEC
BH CV XLRA MEAS LEFT PROX ECA PSV: -90.1 CM/SEC
BH CV XLRA MEAS LEFT PROX ICA EDV: -21.3 CM/SEC
BH CV XLRA MEAS LEFT PROX ICA PSV: -54.5 CM/SEC
BH CV XLRA MEAS LEFT VERTEBRAL A EDV: 13.5 CM/SEC
BH CV XLRA MEAS LEFT VERTEBRAL A PSV: 34.5 CM/SEC
BH CV XLRA MEAS RIGHT BULB EDV: 11.6 CM/SEC
BH CV XLRA MEAS RIGHT BULB PSV: 44 CM/SEC
BH CV XLRA MEAS RIGHT CCA RATIO VEL: 53.4 CM/SEC
BH CV XLRA MEAS RIGHT DIST CCA EDV: 15.7 CM/SEC
BH CV XLRA MEAS RIGHT DIST CCA PSV: 53.7 CM/SEC
BH CV XLRA MEAS RIGHT DIST ICA EDV: -21.2 CM/SEC
BH CV XLRA MEAS RIGHT DIST ICA PSV: -64.8 CM/SEC
BH CV XLRA MEAS RIGHT ICA RATIO VEL: -64.4 CM/SEC
BH CV XLRA MEAS RIGHT ICA/CCA RATIO: -1.2
BH CV XLRA MEAS RIGHT MID ICA EDV: -18.9 CM/SEC
BH CV XLRA MEAS RIGHT MID ICA PSV: -50.6 CM/SEC
BH CV XLRA MEAS RIGHT PROX CCA EDV: 7.9 CM/SEC
BH CV XLRA MEAS RIGHT PROX CCA PSV: 62.9 CM/SEC
BH CV XLRA MEAS RIGHT PROX ECA EDV: -12.7 CM/SEC
BH CV XLRA MEAS RIGHT PROX ECA PSV: -71.6 CM/SEC
BH CV XLRA MEAS RIGHT PROX ICA EDV: -15 CM/SEC
BH CV XLRA MEAS RIGHT PROX ICA PSV: -38.8 CM/SEC
BH CV XLRA MEAS RIGHT VERTEBRAL A EDV: 6.1 CM/SEC
BH CV XLRA MEAS RIGHT VERTEBRAL A PSV: 23 CM/SEC

## 2021-01-12 DIAGNOSIS — G57.93 NEUROPATHIC PAIN OF BOTH LEGS: ICD-10-CM

## 2021-01-12 RX ORDER — DULOXETIN HYDROCHLORIDE 60 MG/1
60 CAPSULE, DELAYED RELEASE ORAL DAILY
Qty: 90 CAPSULE | Refills: 3 | Status: SHIPPED | OUTPATIENT
Start: 2021-01-12 | End: 2021-06-24 | Stop reason: SDUPTHER

## 2021-01-13 ENCOUNTER — TELEPHONE (OUTPATIENT)
Dept: CARDIOLOGY | Facility: CLINIC | Age: 81
End: 2021-01-13

## 2021-01-13 NOTE — TELEPHONE ENCOUNTER
Patient called office, stated after starting Duloxetine, second day, became dizzy and fell. Patient wanted to know if dizziness was a side effect. Per JOEL HERNANDEZ, notified patient dizziness was a side of effect. Patient stated he is still taking Cymbalta, dizziness was getting better. Patient has appointment to see JR this coming Monday. Danitza Lacey LPN

## 2021-01-14 ENCOUNTER — TELEPHONE (OUTPATIENT)
Dept: CARDIOLOGY | Facility: CLINIC | Age: 81
End: 2021-01-14

## 2021-01-14 NOTE — TELEPHONE ENCOUNTER
Patient has appointment this Monday to discuss results. Danitza Lacey LPN      ----- Message from DAVID Stone sent at 1/10/2021 10:42 PM EST -----  Regarding: FW:  Nonobstructive carotid artery disease keep follow up  ----- Message -----  From: Errol Lai MD  Sent: 1/10/2021   2:01 PM EST  To: DAVID Stone

## 2021-01-18 ENCOUNTER — OFFICE VISIT (OUTPATIENT)
Dept: CARDIOLOGY | Facility: CLINIC | Age: 81
End: 2021-01-18

## 2021-01-18 VITALS
DIASTOLIC BLOOD PRESSURE: 75 MMHG | OXYGEN SATURATION: 97 % | HEIGHT: 73 IN | BODY MASS INDEX: 35.39 KG/M2 | HEART RATE: 86 BPM | WEIGHT: 267 LBS | SYSTOLIC BLOOD PRESSURE: 121 MMHG | TEMPERATURE: 97.7 F

## 2021-01-18 DIAGNOSIS — E78.2 MIXED HYPERLIPIDEMIA: ICD-10-CM

## 2021-01-18 DIAGNOSIS — R06.2 WHEEZES: ICD-10-CM

## 2021-01-18 DIAGNOSIS — I10 ESSENTIAL HYPERTENSION: ICD-10-CM

## 2021-01-18 DIAGNOSIS — I25.10 CORONARY ARTERY DISEASE DUE TO CALCIFIED CORONARY LESION: ICD-10-CM

## 2021-01-18 DIAGNOSIS — R07.2 PRECORDIAL PAIN: Primary | ICD-10-CM

## 2021-01-18 DIAGNOSIS — R09.89 CARDIAC LV EJECTION FRACTION 10-20%: ICD-10-CM

## 2021-01-18 DIAGNOSIS — I25.84 CORONARY ARTERY DISEASE DUE TO CALCIFIED CORONARY LESION: ICD-10-CM

## 2021-01-18 DIAGNOSIS — I48.0 PAROXYSMAL ATRIAL FIBRILLATION (HCC): ICD-10-CM

## 2021-01-18 DIAGNOSIS — I49.5 SSS (SICK SINUS SYNDROME) (HCC): ICD-10-CM

## 2021-01-18 DIAGNOSIS — R06.02 SOB (SHORTNESS OF BREATH): ICD-10-CM

## 2021-01-18 DIAGNOSIS — R42 DIZZINESS: ICD-10-CM

## 2021-01-18 PROBLEM — R93.1 CARDIAC LV EJECTION FRACTION 10-20%: Status: ACTIVE | Noted: 2021-01-18

## 2021-01-18 PROCEDURE — 99214 OFFICE O/P EST MOD 30 MIN: CPT | Performed by: NURSE PRACTITIONER

## 2021-01-18 RX ORDER — NITROGLYCERIN 0.4 MG/1
TABLET SUBLINGUAL
Qty: 25 TABLET | Refills: 3 | Status: SHIPPED | OUTPATIENT
Start: 2021-01-18 | End: 2021-08-10 | Stop reason: SDUPTHER

## 2021-01-18 RX ORDER — NITROGLYCERIN 0.4 MG/1
TABLET SUBLINGUAL
Qty: 25 TABLET | Refills: 3 | Status: SHIPPED | OUTPATIENT
Start: 2021-01-18 | End: 2021-01-18

## 2021-01-18 RX ORDER — ALBUTEROL SULFATE 90 UG/1
2 AEROSOL, METERED RESPIRATORY (INHALATION) EVERY 4 HOURS PRN
Qty: 8 G | Refills: 1 | Status: SHIPPED | OUTPATIENT
Start: 2021-01-18 | End: 2021-06-24 | Stop reason: SDUPTHER

## 2021-01-18 NOTE — PROGRESS NOTES
Subjective     Nick Caraballo is a 80 y.o. male who presents to day for Cardiomyopathy (Currently wearing lifevest).    CHIEF COMPLIANT  Chief Complaint   Patient presents with   • Cardiomyopathy     Currently wearing lifevest       Active Problems:  Problem List Items Addressed This Visit        Other    Precordial pain    Shortness of breath - Primary    Dizziness    Coronary artery disease due to calcified coronary lesion    Essential hypertension    Mixed hyperlipidemia    Atrial fibrillation (CMS/HCC)    S/P CABG (coronary artery bypass graft)    SSS (sick sinus syndrome) (CMS/[facility])           PROBLEM LIST:      1. CAD, MI in 1998  1.1 Stenting x 5 total at [facility] , [facility]. data  1.2 CABG x2 in Dec. 2014 at [facility] by Dr. Álvarez in setting of ACS, LIMA to LAD and reversed vein graft from aorta to diag. With RLE vein graft  1.3 Stress test, 9-19-19, mild anterolateral ischemia on scintigraphy.  1.4 Cath 10/19: Widely patent grafts with no progressive disease in the RCA or circumflex ejection fraction 50 to 55%, LVEDP 8 to 12 mmHg  1.5 left heart catheterization 9/20: Left main normal, LAD  occluded in the mid vessel with a patent LIMA beyond the occluded area, first diagonal has proximal 80% stenosis with a patent SVG to first D1, circumflex normal, RCA normal, EF 30 to 35% with mid anterior and apical wall akinesis and inferior wall hypokinesis  1.6 MUGA scan 12/20: EF 19%  2. A-Fib, s/p CABG per patient report  3. HTN  4. Hyperlipidemia  5. GOPI, uses c-pap  6. Hypothyroidism  7. BPH  8. GERD  9. RA  10. Former smoker  11. 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 Caraballo is a 80-year-old male patient who is being followed up today for heart failure with reduced ejection fraction per MUGA scan of 19%.  Patient originally started having symptoms in September 2020 in which he was sent from our office to the emergency  department and went under left heart catheterization which identified a relatively well revascularized with a history of CABG.  However it was noted that his ejection fraction had dropped to 30 to 35% at that time with previous echocardiogram in September 2019 identifying a preserved ejection fraction of 50 to 55%.  At this time patient was placed on Entresto and guideline driven therapy with beta-blockers.  In December a MUGA scan was repeated which identified that his ejection fraction has decreased to 19%.  He is currently wearing a LifeVest.  He did report one episode the other evening where it alarmed and he disarmed it.  He had no significant symptoms at this time.  Patient does report new chest pain that is mild in nature that started within the last couple weeks when he was bending over straightening up his LifeVest it became very severe that to the point where it almost made him want to cry.  This radiated been to the front of his chest.  He says that when it does occur that it usually when he is standing.  Patient also reports chronic shortness of air that seems like it is increased.  It is mainly with any type of exertion however it does occur minimally at rest.  Patient also reports that he had a recent fall where he got up and he just fell.  He complains of right arm bruising and right knee bruising.  However he has not had any repeated falls since.  Patient does report some lower extremity edema that occurs on a daily basis.  Patient does report fatigue where he is tired all the time.  He denies any palpitations, syncope, PND, orthopnea, or other neurological problems.  PRIOR MEDS  Current Outpatient Medications on File Prior to Visit   Medication Sig Dispense Refill   • acetaminophen (TYLENOL) 650 MG 8 hr tablet Take 650 mg by mouth Every 8 (Eight) Hours As Needed for Mild Pain .     • allopurinol (ZYLOPRIM) 300 MG tablet Take 300 mg by mouth Daily.     • aspirin 81 MG EC tablet Take 81 mg by mouth  Daily.     • atorvastatin (LIPITOR) 80 MG tablet Take 80 mg by mouth Every Night.     • carvedilol (COREG) 6.25 MG tablet Take 1 tablet by mouth 2 (Two) Times a Day. 180 tablet 1   • Cholecalciferol (VITAMIN D3) 50 MCG (2000 UT) tablet Take 2,000 Units by mouth Daily.     • clopidogrel (PLAVIX) 75 MG tablet TAKE ONE TABLET BY MOUTH DAILY 90 tablet 2   • DULoxetine (Cymbalta) 60 MG capsule Take 1 capsule by mouth Daily. 90 capsule 3   • furosemide (LASIX) 20 MG tablet Take 60 mg by mouth 2 (Two) Times a Day.     • isosorbide mononitrate (IMDUR) 30 MG 24 hr tablet Half tablet daily 45 tablet 3   • levothyroxine (SYNTHROID, LEVOTHROID) 25 MCG tablet Take 25 mcg by mouth Daily.     • Melatonin 3 MG capsule Take 3 mg by mouth Every Night.     • metOLazone (ZAROXOLYN) 2.5 MG tablet Take 2.5 mg by mouth Daily.     • Miconazole Nitrate 2 % powder Apply 1 application topically to the appropriate area as directed 2 (two) times a day. 100 g 1   • Multiple Vitamins-Minerals (MULTIVITAMIN ADULT PO) Take  by mouth Daily.     • niacin (SLO-NIACIN) 500 MG CR tablet Take 1,000 mg by mouth Every Night.     • nitroglycerin (NITROSTAT) 0.4 MG SL tablet 1 under the tongue as needed for angina, may repeat q5mins for up three doses within 15 minutes 25 tablet 3   • pantoprazole (PROTONIX) 40 MG EC tablet Take 40 mg by mouth Daily.     • potassium chloride (K-DUR) 10 MEQ CR tablet Take 20 mEq by mouth 2 (Two) Times a Day.     • sacubitril-valsartan (Entresto) 24-26 MG tablet Take 1 tablet by mouth 2 (Two) Times a Day. 180 tablet 3   • tamsulosin (FLOMAX) 0.4 MG capsule 24 hr capsule Take 1 capsule by mouth Daily.     • nystatin (MYCOSTATIN) 034509 UNIT/GM powder Apply  topically to the appropriate area as directed 2 (Two) Times a Day. 30 g 1     No current facility-administered medications on file prior to visit.        ALLERGIES  Patient has no known allergies.    HISTORY  Past Medical History:   Diagnosis Date   • Acid reflux    •  Anxiety    • Complete heart block (CMS/HCC)    • Coronary artery disease    • Gout    • Hyperlipidemia    • Hypertension    • Hypothyroid    • Myocardial infarction (CMS/[facility])    • Rheumatoid arthritis (CMS/[facility])    • Sleep apnea        Social History     Socioeconomic History   • Marital status:      Spouse name: Not on file   • Number of children: Not on file   • Years of education: Not on file   • Highest education level: Not on file   Tobacco Use   • Smoking status: Former Smoker     Packs/day: 1.00     Years: 45.00     Pack years: 45.00     Quit date:      Years since quittin.0   • Smokeless tobacco: Never Used   Substance and Sexual Activity   • Alcohol use: No     Frequency: Never   • Drug use: No   • Sexual activity: Defer       Family History   Problem Relation Age of Onset   • No Known Problems Mother    • Colon cancer Father    • Heart disease Brother        Review of Systems   Constitutional: Positive for fatigue. Negative for chills and fever.   HENT: Positive for sinus pressure. Negative for congestion and sore throat.    Eyes: Positive for visual disturbance (glasses).   Respiratory: Positive for apnea (GOPI) and shortness of breath. Negative for chest tightness.    Cardiovascular: Positive for chest pain (pressure at times, gets soa) and leg swelling. Negative for palpitations.   Gastrointestinal: Negative.  Negative for abdominal pain, blood in stool, constipation, diarrhea, nausea and vomiting.   Endocrine: Negative.  Negative for cold intolerance and heat intolerance.   Genitourinary: Positive for difficulty urinating and frequency. Negative for dysuria, hematuria and urgency.   Musculoskeletal: Positive for arthralgias and back pain (reprots pain bewtween shoulders ). Negative for neck pain.   Skin: Positive for wound (bruises, fell a few weeks ago). Negative for rash.   Allergic/Immunologic: Negative.  Negative for environmental allergies and food allergies.   Neurological:  "Positive for dizziness (in the mornings, goes away, also gets dizzy if closes eyes in shower washing hair) and light-headedness. Negative for syncope.   Hematological: Bruises/bleeds easily.   Psychiatric/Behavioral: Positive for sleep disturbance (CPAP, denies waking with soa or cp).       Objective     VITALS: /75 (BP Location: Left arm, Patient Position: Sitting)   Pulse 86   Temp 97.7 °F (36.5 °C)   Ht 185.4 cm (73\")   Wt 121 kg (267 lb)   SpO2 97%   BMI 35.23 kg/m²     LABS:   Lab Results (most recent)     None          IMAGING:   No Images in the past 120 days found..    EXAM:  Physical Exam  Vitals signs and nursing note reviewed.   Constitutional:       Appearance: He is well-developed.   HENT:      Head: Normocephalic and atraumatic.   Eyes:      Pupils: Pupils are equal, round, and reactive to light.   Neck:      Musculoskeletal: Neck supple.      Vascular: No carotid bruit or JVD.   Cardiovascular:      Rate and Rhythm: Normal rate and regular rhythm.      Pulses:           Carotid pulses are 2+ on the right side and 2+ on the left side.       Radial pulses are 2+ on the right side and 2+ on the left side.        Posterior tibial pulses are 2+ on the right side and 2+ on the left side.      Heart sounds: Normal heart sounds. No murmur. No gallop.    Pulmonary:      Effort: Pulmonary effort is normal. No respiratory distress.      Breath sounds: Normal breath sounds.   Abdominal:      General: Bowel sounds are normal. There is no distension.      Palpations: Abdomen is soft.      Tenderness: There is no abdominal tenderness.   Musculoskeletal: Normal range of motion.         General: Swelling (1+) present.   Skin:     General: Skin is warm and dry.   Neurological:      Mental Status: He is alert and oriented to person, place, and time.      Cranial Nerves: No cranial nerve deficit.      Sensory: No sensory deficit.   Psychiatric:         Speech: Speech normal.         Behavior: Behavior normal. "         Thought Content: Thought content normal.         Judgment: Judgment normal.         Procedure   Procedures       Assessment/Plan    Diagnosis Plan   1. Shortness of breath     2. Precordial pain     3. Dizziness     4. Coronary artery disease due to calcified coronary lesion     5. Essential hypertension     6. Mixed hyperlipidemia     7. Paroxysmal atrial fibrillation (CMS/HCC)     8. S/P CABG (coronary artery bypass graft)     9. SSS (sick sinus syndrome) (CMS/HCC)     1.  Due to patient's ischemic cardiomyopathy with an ejection fraction of 19% per MUGA scan of December 2020 I would like to refer patient for implantable cardioverter defibrillator upgrade from his pacemaker.  Patient currently wearing LifeVest and has had one episode were the LifeVest alarmed and he was able to disarm.  He will request to be referred to Dr. Valerio which had implanted his pacemaker.  2.  Patient's blood pressure is well controlled on current blood pressure medication regimen.  No medication changes are warranted at this time.  Patient advised to monitor blood pressure on a daily basis and report any persistent highs or lows.  Set goal blood pressure for patient at 130/80 or below.  3.  Patient does have ischemic cardiomyopathy which he is on guideline driven therapy with Entresto and carvedilol.  4.  Informed of signs and symptoms of ACS and advised to seek emergent treatment for any new worsening symptoms.  Patient also advised sooner follow-up as needed.  Also advised to follow-up with family doctor as needed  This note is dictated utilizing voice recognition software.  Although this record has been proof read, transcriptional errors may still be present. If questions occur regarding the content of this record please do not hesitate to call our office.  I have reviewed and confirmed the accuracy of the ROS as documented by the MA/LPN/RN DAVID Stone    No follow-ups on file.    Diagnoses and all orders for this  visit:    1. Shortness of breath (Primary)    2. Precordial pain    3. Dizziness    4. Coronary artery disease due to calcified coronary lesion    5. Essential hypertension    6. Mixed hyperlipidemia    7. Paroxysmal atrial fibrillation (CMS/[facility])    8. S/P CABG (coronary artery bypass graft)    9. SSS (sick sinus syndrome) (CMS/[facility])        Nick Caraballo  reports that he quit smoking about 23 years ago. He has a 45.00 pack-year smoking history. He has never used smokeless tobacco..        Patient's Body mass index is 35.23 kg/m². BMI is above normal parameters. Recommendations include: educational material.    Advance Care Planning   ACP discussion was held with the patient during this visit. Patient has an advance directive in EMR which is still valid.          MEDS ORDERED DURING VISIT:  No orders of the defined types were placed in this encounter.          This document has been electronically signed by Coleman Harman Jr., APRN  January 18, 2021 13:50 EST

## 2021-01-18 NOTE — PATIENT INSTRUCTIONS
Acute Coronary Syndrome  Acute coronary syndrome (ACS) is a serious problem in which there is suddenly not enough blood and oxygen reaching the heart. ACS can result in chest pain or a heart attack.  This condition is a medical emergency. If you have any symptoms of this condition, get help right away.  What are the causes?  This condition may be caused by:  · A buildup of fat and cholesterol inside the arteries (atherosclerosis). This is the most common cause. The buildup (plaque) can cause blood vessels in the heart (coronary arteries) to become narrow or blocked, which reduces blood flow to the heart. Plaque can also break off and lead to a clot, which can block an artery and cause a heart attack or stroke.  · Sudden tightening of the muscles around the coronary arteries (coronary spasm).  · Tearing of a coronary artery (spontaneous coronary artery dissection).  · Very low blood pressure (hypotension).  · An abnormal heartbeat (arrhythmia).  · Other medical conditions that cause a decrease of oxygen to the heart, such as anemiaorrespiratory failure.  · Using cocaine or methamphetamine.  What increases the risk?  The following factors may make you more likely to develop this condition:  · Age. The risk for ACS increases as you get older.  · History of chest pain, heart attack, peripheral artery disease, or stroke.  · Having taken chemotherapy or immune-suppressing medicines.  · Being male.  · Family history of chest pain, heart disease, or stroke.  · Smoking.  · Not exercising enough.  · Being overweight.  · High cholesterol.  · High blood pressure (hypertension).  · Diabetes.  · Excessive alcohol use.  What are the signs or symptoms?  Common symptoms of this condition include:  · Chest pain. The pain may last a long time, or it may stop and come back (recur). It may feel like:  ? Crushing or squeezing.  ? Tightness, pressure, fullness, or heaviness.  · Arm, neck, jaw, or back pain.  · Heartburn or  indigestion.  · Shortness of breath.  · Nausea.  · Sudden cold sweats.  · Light-headedness.  · Dizziness or passing out.  · Tiredness (fatigue).  Sometimes there are no symptoms.  How is this diagnosed?  This condition may be diagnosed based on:  · Your medical history and symptoms.  · Imaging tests, such as:  ? An electrocardiogram (ECG). This measures the heart's electrical activity.  ? X-rays.  ? CT scan.  ? A coronary angiogram. For this test, dye is injected into the heart arteries and then X-rays are taken.  ? Myocardial perfusion imaging. This test shows how well blood flows through your heart muscle.  · Blood tests. These may be repeated at certain time intervals.  · Exercise stress testing.  · Echocardiogram. This is a test that uses sound waves to produce detailed images of the heart.  How is this treated?  Treatment for this condition may include:  · Oxygen therapy.  · Medicines, such as:  ? Antiplatelet medicines and blood-thinning medicines, such as aspirin. These help prevent blood clots.  ? Medicine that dissolves any blood clots (fibrinolytic therapy).  ? Blood pressure medicines.  ? Nitroglycerin. This helps widen blood vessels to improve blood flow.  ? Pain medicine.  ? Cholesterol-lowering medicine.  · Surgery, such as:  ? Coronary angioplasty with stent placement. This involves placing a small piece of metal that looks like mesh or a spring into a narrow coronary artery. This widens the artery and keeps it open.  ? Coronary artery bypass surgery. This involves taking a section of a blood vessel from a different part of your body and placing it on the blocked coronary artery to allow blood to flow around the blockage.  · Cardiac rehabilitation. This is a program that includes exercise training, education, and counseling to help you recover.  Follow these instructions at home:  Eating and drinking  · Eat a heart-healthy diet that includes whole grains, fruits and vegetables, lean proteins, and  low-fat or nonfat dairy products.  · Limit how much salt (sodium) you eat as told by your health care provider. Follow instructions from your health care provider about any other eating or drinking restrictions, such as limiting foods that are high in fat and processed sugars.  · Use healthy cooking methods such as roasting, grilling, broiling, baking, poaching, steaming, or stir-frying.  · Work with a dietitian to follow a heart-healthy eating plan.  Medicines  · Take over-the-counter and prescription medicines only as told by your health care provider.  · Do not take these medicines unless your health care provider approves:  ? Vitamin supplements that contain vitamin A or vitamin E.  ? NSAIDs, such as ibuprofen, naproxen, or celecoxib.  ? Hormone replacement therapy that contains estrogen.  · If you are taking blood thinners:  ? Talk with your health care provider before you take any medicines that contain aspirin or NSAIDs. These medicines increase your risk for dangerous bleeding.  ? Take your medicine exactly as told, at the same time every day.  ? Avoid activities that could cause injury or bruising, and follow instructions about how to prevent falls.  ? Wear a medical alert bracelet, and carry a card that lists what medicines you take.  Activity  · Follow your cardiac rehabilitation program. Do exercises as told by your physical therapist.  · Ask your health care provider what activities and exercises are safe for you. Follow his or her instructions about lifting, driving, or climbing stairs.  Lifestyle  · Do not use any products that contain nicotine or tobacco, such as cigarettes, e-cigarettes, and chewing tobacco. If you need help quitting, ask your health care provider.  · Do not drink alcohol if your health care provider tells you not to drink.  · If you drink alcohol:  ? Limit how much you have to 0-1 drink a day.  ? Be aware of how much alcohol is in your drink. In the [place] one drink equals one 12 oz  bottle of beer (355 mL), one 5 oz glass of wine (148 mL), or one 1½ oz glass of hard liquor (44 mL).  · Maintain a healthy weight. If you need to lose weight, work with your health care provider to do so safely.  General instructions  · Tell all the health care providers who provide care for you about your heart condition, including your dentist. This may affect the medicines or treatment you receive.  · Manage any other health conditions you have, such as hypertension or diabetes. These conditions affect your heart.  · Pay attention to your mental health. You may be at higher risk for depression.  ? Find ways to manage stress.  ? Talk to your health care provider about depression screening and treatment.  · Keep your vaccinations up to date.  ? Get the flu shot (influenza vaccine) every year.  ? Get the pneumococcal vaccine if you are age 65 or older.  · If directed, monitor your blood pressure at home.  · Keep all follow-up visits as told by your health care provider. This is important.  Contact a health care provider if you:  · Feel overwhelmed or sad.  · Have trouble doing your daily activities.  Get help right away if you:  · Have pain in your chest, neck, arm, jaw, stomach, or back that recurs, and:  ? It lasts for more than a few minutes.  ? It is not relieved by taking the medicineyour health care provider prescribed.  · Have unexplained:  ? Heavy sweating.  ? Heartburn or indigestion.  ? Nausea or vomiting.  ? Shortness of breath.  ? Difficulty breathing.  ? Fatigue.  ? Nervousness or anxiety.  ? Weakness.  ? Diarrhea.  ? Dark stools or blood in your stool.  · Have sudden light-headedness or dizziness.  · Have blood pressure that is higher than 180/120.  · Faint.  · Have thoughts about hurting yourself.  These symptoms may represent a serious problem that is an emergency. Do not wait to see if the symptoms will go away. Get medical help right away. Call your local emergency services (911 in the [place] Do  not drive yourself to the hospital.   Summary  · Acute coronary syndrome (ACS) is when there is not enough blood and oxygen being supplied to the heart. ACS can result in chest pain or a heart attack.  · Acute coronary syndrome is a medical emergency. If you have any symptoms of this condition, get help right away.  · Treatment includes medicines and procedures to open the blocked arteries and restore blood flow.  This information is not intended to replace advice given to you by your health care provider. Make sure you discuss any questions you have with your health care provider.  Document Revised: 05/20/2020 Document Reviewed: 12/30/2019  [employer] Patient Education © 2020 [employer]      Hypertension, Adult  Hypertension is another name for high blood pressure. High blood pressure forces your heart to work harder to pump blood. This can cause problems over time.  There are two numbers in a blood pressure reading. There is a top number (systolic) over a bottom number (diastolic). It is best to have a blood pressure that is below 120/80. Healthy choices can help lower your blood pressure, or you may need medicine to help lower it.  What are the causes?  The cause of this condition is not known. Some conditions may be related to high blood pressure.  What increases the risk?  · Smoking.  · Having type 2 diabetes mellitus, high cholesterol, or both.  · Not getting enough exercise or physical activity.  · Being overweight.  · Having too much fat, sugar, calories, or salt (sodium) in your diet.  · Drinking too much alcohol.  · Having long-term (chronic) kidney disease.  · Having a family history of high blood pressure.  · Age. Risk increases with age.  · Race. You may be at higher risk if you are .  · Gender. Men are at higher risk than women before age 45. After age 65, women are at higher risk than men.  · Having obstructive sleep apnea.  · Stress.  What are the signs or symptoms?  · High blood  pressure may not cause symptoms. Very high blood pressure (hypertensive crisis) may cause:  ? Headache.  ? Feelings of worry or nervousness (anxiety).  ? Shortness of breath.  ? Nosebleed.  ? A feeling of being sick to your stomach (nausea).  ? Throwing up (vomiting).  ? Changes in how you see.  ? Very bad chest pain.  ? Seizures.  How is this treated?  · This condition is treated by making healthy lifestyle changes, such as:  ? Eating healthy foods.  ? Exercising more.  ? Drinking less alcohol.  · Your health care provider may prescribe medicine if lifestyle changes are not enough to get your blood pressure under control, and if:  ? Your top number is above 130.  ? Your bottom number is above 80.  · Your personal target blood pressure may vary.  Follow these instructions at home:  Eating and drinking    · If told, follow the DASH eating plan. To follow this plan:  ? Fill one half of your plate at each meal with fruits and vegetables.  ? Fill one fourth of your plate at each meal with whole grains. Whole grains include whole-wheat pasta, brown rice, and whole-grain bread.  ? Eat or drink low-fat dairy products, such as skim milk or low-fat yogurt.  ? Fill one fourth of your plate at each meal with low-fat (lean) proteins. Low-fat proteins include fish, chicken without skin, eggs, beans, and tofu.  ? Avoid fatty meat, cured and processed meat, or chicken with skin.  ? Avoid pre-made or processed food.  · Eat less than 1,500 mg of salt each day.  · Do not drink alcohol if:  ? Your doctor tells you not to drink.  ? You are pregnant, may be pregnant, or are planning to become pregnant.  · If you drink alcohol:  ? Limit how much you use to:  § 0-1 drink a day for women.  § 0-2 drinks a day for men.  ? Be aware of how much alcohol is in your drink. In the [place] one drink equals one 12 oz bottle of beer (355 mL), one 5 oz glass of wine (148 mL), or one 1½ oz glass of hard liquor (44 mL).  Lifestyle    · Work with your  doctor to stay at a healthy weight or to lose weight. Ask your doctor what the best weight is for you.  · Get at least 30 minutes of exercise most days of the week. This may include walking, swimming, or biking.  · Get at least 30 minutes of exercise that strengthens your muscles (resistance exercise) at least 3 days a week. This may include lifting weights or doing Pilates.  · Do not use any products that contain nicotine or tobacco, such as cigarettes, e-cigarettes, and chewing tobacco. If you need help quitting, ask your doctor.  · Check your blood pressure at home as told by your doctor.  · Keep all follow-up visits as told by your doctor. This is important.  Medicines  · Take over-the-counter and prescription medicines only as told by your doctor. Follow directions carefully.  · Do not skip doses of blood pressure medicine. The medicine does not work as well if you skip doses. Skipping doses also puts you at risk for problems.  · Ask your doctor about side effects or reactions to medicines that you should watch for.  Contact a doctor if you:  · Think you are having a reaction to the medicine you are taking.  · Have headaches that keep coming back (recurring).  · Feel dizzy.  · Have swelling in your ankles.  · Have trouble with your vision.  Get help right away if you:  · Get a very bad headache.  · Start to feel mixed up (confused).  · Feel weak or numb.  · Feel faint.  · Have very bad pain in your:  ? Chest.  ? Belly (abdomen).  · Throw up more than once.  · Have trouble breathing.  Summary  · Hypertension is another name for high blood pressure.  · High blood pressure forces your heart to work harder to pump blood.  · For most people, a normal blood pressure is less than 120/80.  · Making healthy choices can help lower blood pressure. If your blood pressure does not get lower with healthy choices, you may need to take medicine.  This information is not intended to replace advice given to you by your health  care provider. Make sure you discuss any questions you have with your health care provider.  Document Revised: 08/28/2019 Document Reviewed: 08/28/2019  [employer] Patient Education © 2020 [employer]

## 2021-01-22 ENCOUNTER — TELEPHONE (OUTPATIENT)
Dept: CARDIOLOGY | Facility: CLINIC | Age: 81
End: 2021-01-22

## 2021-01-22 NOTE — TELEPHONE ENCOUNTER
Referred to Gali Garcia PAC to contact [facility] to expedite patients appointment. Danitza Lacey LPN      ----- Message from DAVID Stone sent at 1/21/2021  2:58 PM EST -----  We please have Mr. Caraballo's latest studies including echo, heart cath, and MUGA scan sent to [facility].  They are contacting him to try to get him to repeat an echocardiogram and he is is had recent studies performed.I also like to see if we can expedite his ICD upgrade with Dr. Tereso Lemus his LifeVest has went off again but he was able to disarm it.

## 2021-02-04 ENCOUNTER — TRANSCRIBE ORDERS (OUTPATIENT)
Dept: ADMINISTRATIVE | Facility: HOSPITAL | Age: 81
End: 2021-02-04

## 2021-02-04 ENCOUNTER — HOSPITAL ENCOUNTER (OUTPATIENT)
Facility: HOSPITAL | Age: 81
Setting detail: HOSPITAL OUTPATIENT SURGERY
End: 2021-02-04
Attending: INTERNAL MEDICINE | Admitting: INTERNAL MEDICINE

## 2021-02-04 DIAGNOSIS — I50.22 CHRONIC SYSTOLIC HEART FAILURE (HCC): ICD-10-CM

## 2021-02-04 DIAGNOSIS — I50.22 CHRONIC SYSTOLIC HEART FAILURE (HCC): Primary | ICD-10-CM

## 2021-02-10 ENCOUNTER — HOSPITAL ENCOUNTER (OUTPATIENT)
Facility: HOSPITAL | Age: 81
Setting detail: HOSPITAL OUTPATIENT SURGERY
End: 2021-02-10
Attending: INTERNAL MEDICINE | Admitting: INTERNAL MEDICINE

## 2021-02-10 ENCOUNTER — TRANSCRIBE ORDERS (OUTPATIENT)
Dept: ADMINISTRATIVE | Facility: HOSPITAL | Age: 81
End: 2021-02-10

## 2021-02-10 DIAGNOSIS — I50.22 CHRONIC SYSTOLIC HEART FAILURE (HCC): ICD-10-CM

## 2021-02-10 DIAGNOSIS — I50.22 CHRONIC SYSTOLIC HEART FAILURE (HCC): Primary | ICD-10-CM

## 2021-02-11 ENCOUNTER — TELEPHONE (OUTPATIENT)
Dept: CARDIOLOGY | Facility: CLINIC | Age: 81
End: 2021-02-11

## 2021-02-11 NOTE — TELEPHONE ENCOUNTER
----- Message from DAVID Rios sent at 2/10/2021  5:29 PM EST -----  Claritin, zyrtec, allegra are best options, or if she wants to try a flonase.   ----- Message -----  From: Kristina Mejia RegSched Rep  Sent: 2/10/2021   5:22 PM EST  To: DAVID Rios      ----- Message -----  From: Gali Garcia RegSched Rep  Sent: 2/9/2021  12:04 PM EST  To: Celeste Kaye [place]    CALLED AND ASK WHAT IS THE BEST OTC ALLERGY MEDS  I WILL CALL PT BACK....  JUST LET ME KNOW WHAT TO TELL HIM

## 2021-02-11 NOTE — TELEPHONE ENCOUNTER
Left detailed voicemail for patient w/ recommendations from Gurdeep and option to take Zyrtec or call in Singulair. GWENDOLYN, CMA.       ----- Message from KATIE Sanches sent at 2/11/2021 11:11 AM EST -----  He can take Zyrtec.  However, you can send him in Singulair 10 mg daily  ----- Message -----  From: Lissette Ventura  Sent: 2/11/2021   9:53 AM EST  To: KATIE Sanches    Any recommendations    ----- Message -----  From: Gali Garcia RegSched Rep  Sent: 2/9/2021  12:04 PM EST  To: Celeste Kaye [place]    CALLED AND ASK WHAT IS THE BEST OTC ALLERGY MEDS  I WILL CALL PT BACK....  JUST LET ME KNOW WHAT TO TELL HIM

## 2021-02-17 ENCOUNTER — TRANSCRIBE ORDERS (OUTPATIENT)
Dept: ADMINISTRATIVE | Facility: HOSPITAL | Age: 81
End: 2021-02-17

## 2021-02-17 DIAGNOSIS — I50.22 CHRONIC SYSTOLIC CONGESTIVE HEART FAILURE (HCC): Primary | ICD-10-CM

## 2021-02-24 ENCOUNTER — HOSPITAL ENCOUNTER (OUTPATIENT)
Facility: HOSPITAL | Age: 81
Discharge: HOME | End: 2021-02-25
Attending: INTERNAL MEDICINE | Admitting: INTERNAL MEDICINE

## 2021-02-24 DIAGNOSIS — I50.22 CHRONIC SYSTOLIC CONGESTIVE HEART FAILURE (HCC): ICD-10-CM

## 2021-02-24 LAB
ANION GAP SERPL CALCULATED.3IONS-SCNC: 13 MMOL/L (ref 5–15)
BUN SERPL-MCNC: 23 MG/DL (ref 8–23)
BUN/CREAT SERPL: 21.3 (ref 7–25)
CALCIUM SPEC-SCNC: 9.6 MG/DL (ref 8.6–10.5)
CHLORIDE SERPL-SCNC: 97 MMOL/L (ref 98–107)
CO2 SERPL-SCNC: 29 MMOL/L (ref 22–29)
CREAT BLDA-MCNC: 1.2 MG/DL (ref 0.6–1.3)
CREAT SERPL-MCNC: 1.08 MG/DL (ref 0.76–1.27)
DEPRECATED RDW RBC AUTO: 47.6 FL (ref 37–54)
ERYTHROCYTE [DISTWIDTH] IN BLOOD BY AUTOMATED COUNT: 14.5 % (ref 12.3–15.4)
GFR SERPL CREATININE-BSD FRML MDRD: 66 ML/MIN/1.73
GLUCOSE SERPL-MCNC: 138 MG/DL (ref 65–99)
HCT VFR BLD AUTO: 45.3 % (ref 37.5–51)
HGB BLD-MCNC: 15.4 G/DL (ref 13–17.7)
MCH RBC QN AUTO: 30.9 PG (ref 26.6–33)
MCHC RBC AUTO-ENTMCNC: 34 G/DL (ref 31.5–35.7)
MCV RBC AUTO: 91 FL (ref 79–97)
PLATELET # BLD AUTO: 229 10*3/MM3 (ref 140–450)
PMV BLD AUTO: 9.7 FL (ref 6–12)
POTASSIUM SERPL-SCNC: 3.2 MMOL/L (ref 3.5–5.2)
RBC # BLD AUTO: 4.98 10*6/MM3 (ref 4.14–5.8)
SODIUM SERPL-SCNC: 139 MMOL/L (ref 136–145)
WBC # BLD AUTO: 6.75 10*3/MM3 (ref 3.4–10.8)

## 2021-02-24 PROCEDURE — C1900 LEAD, CORONARY VENOUS: HCPCS | Performed by: INTERNAL MEDICINE

## 2021-02-24 PROCEDURE — 25010000002 DIPHENHYDRAMINE PER 50 MG: Performed by: INTERNAL MEDICINE

## 2021-02-24 PROCEDURE — 85027 COMPLETE CBC AUTOMATED: CPT

## 2021-02-24 PROCEDURE — 25010000002 FENTANYL CITRATE (PF) 100 MCG/2ML SOLUTION: Performed by: INTERNAL MEDICINE

## 2021-02-24 PROCEDURE — C1730 CATH, EP, 19 OR FEW ELECT: HCPCS | Performed by: INTERNAL MEDICINE

## 2021-02-24 PROCEDURE — G0378 HOSPITAL OBSERVATION PER HR: HCPCS

## 2021-02-24 PROCEDURE — 99152 MOD SED SAME PHYS/QHP 5/>YRS: CPT | Performed by: INTERNAL MEDICINE

## 2021-02-24 PROCEDURE — C1882 AICD, OTHER THAN SING/DUAL: HCPCS | Performed by: INTERNAL MEDICINE

## 2021-02-24 PROCEDURE — 33233 REMOVAL OF PM GENERATOR: CPT | Performed by: INTERNAL MEDICINE

## 2021-02-24 PROCEDURE — 33225 L VENTRIC PACING LEAD ADD-ON: CPT | Performed by: INTERNAL MEDICINE

## 2021-02-24 PROCEDURE — 93641 EP EVL 1/2CHMB PAC CVDFB TST: CPT | Performed by: INTERNAL MEDICINE

## 2021-02-24 PROCEDURE — C1769 GUIDE WIRE: HCPCS | Performed by: INTERNAL MEDICINE

## 2021-02-24 PROCEDURE — C1892 INTRO/SHEATH,FIXED,PEEL-AWAY: HCPCS | Performed by: INTERNAL MEDICINE

## 2021-02-24 PROCEDURE — C1777 LEAD, AICD, ENDO SINGLE COIL: HCPCS | Performed by: INTERNAL MEDICINE

## 2021-02-24 PROCEDURE — 33249 INSJ/RPLCMT DEFIB W/LEAD(S): CPT | Performed by: INTERNAL MEDICINE

## 2021-02-24 PROCEDURE — 82565 ASSAY OF CREATININE: CPT

## 2021-02-24 PROCEDURE — 25010000002 MIDAZOLAM PER 1 MG: Performed by: INTERNAL MEDICINE

## 2021-02-24 PROCEDURE — 99153 MOD SED SAME PHYS/QHP EA: CPT | Performed by: INTERNAL MEDICINE

## 2021-02-24 PROCEDURE — 0 IOPAMIDOL PER 1 ML: Performed by: INTERNAL MEDICINE

## 2021-02-24 PROCEDURE — C1887 CATHETER, GUIDING: HCPCS | Performed by: INTERNAL MEDICINE

## 2021-02-24 PROCEDURE — 80048 BASIC METABOLIC PNL TOTAL CA: CPT

## 2021-02-24 DEVICE — ICD QUADRA ASSURA NXGN MP CRTD40 CD336940Q: Type: IMPLANTABLE DEVICE | Status: FUNCTIONAL

## 2021-02-24 DEVICE — LD DEFIB DURATA SJ4 65CM 7122Q65: Type: IMPLANTABLE DEVICE | Status: FUNCTIONAL

## 2021-02-24 DEVICE — LD QUARTET CRT VENT LNG SPACING 86CM: Type: IMPLANTABLE DEVICE | Status: FUNCTIONAL

## 2021-02-24 RX ORDER — ACETAMINOPHEN 325 MG/1
650 TABLET ORAL EVERY 8 HOURS PRN
Status: DISCONTINUED | OUTPATIENT
Start: 2021-02-24 | End: 2021-02-25 | Stop reason: HOSPADM

## 2021-02-24 RX ORDER — POTASSIUM CHLORIDE 750 MG/1
10 CAPSULE, EXTENDED RELEASE ORAL EVERY 12 HOURS
Status: DISCONTINUED | OUTPATIENT
Start: 2021-02-24 | End: 2021-02-25

## 2021-02-24 RX ORDER — METOLAZONE 2.5 MG/1
2.5 TABLET ORAL DAILY
Status: DISCONTINUED | OUTPATIENT
Start: 2021-02-25 | End: 2021-02-25 | Stop reason: HOSPADM

## 2021-02-24 RX ORDER — FENTANYL CITRATE 50 UG/ML
INJECTION, SOLUTION INTRAMUSCULAR; INTRAVENOUS AS NEEDED
Status: DISCONTINUED | OUTPATIENT
Start: 2021-02-24 | End: 2021-02-24 | Stop reason: HOSPADM

## 2021-02-24 RX ORDER — CLOPIDOGREL BISULFATE 75 MG/1
75 TABLET ORAL DAILY
Status: DISCONTINUED | OUTPATIENT
Start: 2021-02-25 | End: 2021-02-25 | Stop reason: HOSPADM

## 2021-02-24 RX ORDER — MIDAZOLAM HYDROCHLORIDE 1 MG/ML
INJECTION INTRAMUSCULAR; INTRAVENOUS AS NEEDED
Status: DISCONTINUED | OUTPATIENT
Start: 2021-02-24 | End: 2021-02-24 | Stop reason: HOSPADM

## 2021-02-24 RX ORDER — ATORVASTATIN CALCIUM 40 MG/1
80 TABLET, FILM COATED ORAL NIGHTLY
Status: DISCONTINUED | OUTPATIENT
Start: 2021-02-24 | End: 2021-02-25 | Stop reason: HOSPADM

## 2021-02-24 RX ORDER — SODIUM CHLORIDE 9 MG/ML
250 INJECTION, SOLUTION INTRAVENOUS CONTINUOUS
Status: ACTIVE | OUTPATIENT
Start: 2021-02-24 | End: 2021-02-24

## 2021-02-24 RX ORDER — CHOLECALCIFEROL (VITAMIN D3) 125 MCG
2.5 CAPSULE ORAL NIGHTLY
Status: DISCONTINUED | OUTPATIENT
Start: 2021-02-24 | End: 2021-02-25 | Stop reason: HOSPADM

## 2021-02-24 RX ORDER — LEVOTHYROXINE SODIUM 0.03 MG/1
25 TABLET ORAL
Status: DISCONTINUED | OUTPATIENT
Start: 2021-02-25 | End: 2021-02-25 | Stop reason: HOSPADM

## 2021-02-24 RX ORDER — NITROGLYCERIN 0.4 MG/1
0.4 TABLET SUBLINGUAL
Status: DISCONTINUED | OUTPATIENT
Start: 2021-02-24 | End: 2021-02-25 | Stop reason: HOSPADM

## 2021-02-24 RX ORDER — ALLOPURINOL 300 MG/1
300 TABLET ORAL DAILY
Status: DISCONTINUED | OUTPATIENT
Start: 2021-02-24 | End: 2021-02-25 | Stop reason: HOSPADM

## 2021-02-24 RX ORDER — CARVEDILOL 6.25 MG/1
6.25 TABLET ORAL 2 TIMES DAILY WITH MEALS
Status: DISCONTINUED | OUTPATIENT
Start: 2021-02-24 | End: 2021-02-25 | Stop reason: HOSPADM

## 2021-02-24 RX ORDER — DULOXETIN HYDROCHLORIDE 60 MG/1
60 CAPSULE, DELAYED RELEASE ORAL DAILY
Status: DISCONTINUED | OUTPATIENT
Start: 2021-02-24 | End: 2021-02-25 | Stop reason: HOSPADM

## 2021-02-24 RX ORDER — FUROSEMIDE 20 MG/1
20 TABLET ORAL
Status: DISCONTINUED | OUTPATIENT
Start: 2021-02-24 | End: 2021-02-25 | Stop reason: HOSPADM

## 2021-02-24 RX ORDER — PANTOPRAZOLE SODIUM 40 MG/1
40 TABLET, DELAYED RELEASE ORAL
Status: DISCONTINUED | OUTPATIENT
Start: 2021-02-25 | End: 2021-02-25 | Stop reason: HOSPADM

## 2021-02-24 RX ORDER — DIPHENHYDRAMINE HYDROCHLORIDE 50 MG/ML
INJECTION INTRAMUSCULAR; INTRAVENOUS AS NEEDED
Status: DISCONTINUED | OUTPATIENT
Start: 2021-02-24 | End: 2021-02-24 | Stop reason: HOSPADM

## 2021-02-24 RX ORDER — TAMSULOSIN HYDROCHLORIDE 0.4 MG/1
0.4 CAPSULE ORAL DAILY
Status: DISCONTINUED | OUTPATIENT
Start: 2021-02-24 | End: 2021-02-25 | Stop reason: HOSPADM

## 2021-02-24 RX ORDER — ISOSORBIDE MONONITRATE 30 MG/1
30 TABLET, EXTENDED RELEASE ORAL
Status: DISCONTINUED | OUTPATIENT
Start: 2021-02-25 | End: 2021-02-25 | Stop reason: HOSPADM

## 2021-02-24 RX ADMIN — TAMSULOSIN HYDROCHLORIDE 0.4 MG: 0.4 CAPSULE ORAL at 16:47

## 2021-02-24 RX ADMIN — ALLOPURINOL 300 MG: 300 TABLET ORAL at 16:47

## 2021-02-24 RX ADMIN — Medication 2.5 MG: at 23:00

## 2021-02-24 RX ADMIN — SACUBITRIL AND VALSARTAN 1 TABLET: 24; 26 TABLET, FILM COATED ORAL at 23:00

## 2021-02-24 RX ADMIN — DULOXETINE HYDROCHLORIDE 60 MG: 60 CAPSULE, DELAYED RELEASE ORAL at 16:47

## 2021-02-24 RX ADMIN — ACETAMINOPHEN 650 MG: 325 TABLET ORAL at 22:59

## 2021-02-24 RX ADMIN — CARVEDILOL 6.25 MG: 6.25 TABLET, FILM COATED ORAL at 16:47

## 2021-02-24 RX ADMIN — VANCOMYCIN HYDROCHLORIDE 2500 MG: 1 INJECTION, POWDER, LYOPHILIZED, FOR SOLUTION INTRAVENOUS at 08:40

## 2021-02-24 RX ADMIN — POTASSIUM CHLORIDE 10 MEQ: 10 CAPSULE, COATED, EXTENDED RELEASE ORAL at 22:59

## 2021-02-24 RX ADMIN — ATORVASTATIN CALCIUM 80 MG: 40 TABLET, FILM COATED ORAL at 22:59

## 2021-02-24 RX ADMIN — FUROSEMIDE 20 MG: 20 TABLET ORAL at 16:48

## 2021-02-25 ENCOUNTER — APPOINTMENT (OUTPATIENT)
Dept: GENERAL RADIOLOGY | Facility: HOSPITAL | Age: 81
End: 2021-02-25

## 2021-02-25 VITALS
RESPIRATION RATE: 18 BRPM | HEART RATE: 103 BPM | WEIGHT: 275.35 LBS | OXYGEN SATURATION: 95 % | HEIGHT: 73 IN | BODY MASS INDEX: 36.49 KG/M2 | SYSTOLIC BLOOD PRESSURE: 126 MMHG | TEMPERATURE: 98 F | DIASTOLIC BLOOD PRESSURE: 91 MMHG

## 2021-02-25 LAB
ANION GAP SERPL CALCULATED.3IONS-SCNC: 10 MMOL/L (ref 5–15)
BUN SERPL-MCNC: 25 MG/DL (ref 8–23)
BUN/CREAT SERPL: 21.2 (ref 7–25)
CALCIUM SPEC-SCNC: 9.3 MG/DL (ref 8.6–10.5)
CHLORIDE SERPL-SCNC: 98 MMOL/L (ref 98–107)
CO2 SERPL-SCNC: 31 MMOL/L (ref 22–29)
CREAT SERPL-MCNC: 1.18 MG/DL (ref 0.76–1.27)
DEPRECATED RDW RBC AUTO: 48.9 FL (ref 37–54)
ERYTHROCYTE [DISTWIDTH] IN BLOOD BY AUTOMATED COUNT: 14.6 % (ref 12.3–15.4)
GFR SERPL CREATININE-BSD FRML MDRD: 60 ML/MIN/1.73
GLUCOSE SERPL-MCNC: 145 MG/DL (ref 65–99)
HCT VFR BLD AUTO: 41.5 % (ref 37.5–51)
HGB BLD-MCNC: 14.1 G/DL (ref 13–17.7)
MCH RBC QN AUTO: 31.1 PG (ref 26.6–33)
MCHC RBC AUTO-ENTMCNC: 34 G/DL (ref 31.5–35.7)
MCV RBC AUTO: 91.4 FL (ref 79–97)
PLATELET # BLD AUTO: 199 10*3/MM3 (ref 140–450)
PMV BLD AUTO: 9.5 FL (ref 6–12)
POTASSIUM SERPL-SCNC: 2.9 MMOL/L (ref 3.5–5.2)
RBC # BLD AUTO: 4.54 10*6/MM3 (ref 4.14–5.8)
SODIUM SERPL-SCNC: 139 MMOL/L (ref 136–145)
WBC # BLD AUTO: 7.49 10*3/MM3 (ref 3.4–10.8)

## 2021-02-25 PROCEDURE — 80048 BASIC METABOLIC PNL TOTAL CA: CPT | Performed by: INTERNAL MEDICINE

## 2021-02-25 PROCEDURE — 71046 X-RAY EXAM CHEST 2 VIEWS: CPT

## 2021-02-25 PROCEDURE — G0378 HOSPITAL OBSERVATION PER HR: HCPCS

## 2021-02-25 PROCEDURE — 85027 COMPLETE CBC AUTOMATED: CPT | Performed by: INTERNAL MEDICINE

## 2021-02-25 RX ORDER — POTASSIUM CHLORIDE 7.45 MG/ML
10 INJECTION INTRAVENOUS
Status: DISCONTINUED | OUTPATIENT
Start: 2021-02-25 | End: 2021-02-25 | Stop reason: HOSPADM

## 2021-02-25 RX ORDER — DOXYCYCLINE 100 MG/1
100 CAPSULE ORAL EVERY 12 HOURS SCHEDULED
Status: DISCONTINUED | OUTPATIENT
Start: 2021-02-25 | End: 2021-02-25 | Stop reason: HOSPADM

## 2021-02-25 RX ORDER — POTASSIUM CHLORIDE 750 MG/1
20 CAPSULE, EXTENDED RELEASE ORAL EVERY 12 HOURS
Status: DISCONTINUED | OUTPATIENT
Start: 2021-02-25 | End: 2021-02-25 | Stop reason: HOSPADM

## 2021-02-25 RX ORDER — DOXYCYCLINE 100 MG/1
100 CAPSULE ORAL EVERY 12 HOURS SCHEDULED
Qty: 14 CAPSULE | Refills: 0 | Status: SHIPPED | OUTPATIENT
Start: 2021-02-25 | End: 2021-03-04

## 2021-02-25 RX ORDER — POTASSIUM CHLORIDE 1.5 G/1.77G
40 POWDER, FOR SOLUTION ORAL AS NEEDED
Status: DISCONTINUED | OUTPATIENT
Start: 2021-02-25 | End: 2021-02-25 | Stop reason: HOSPADM

## 2021-02-25 RX ORDER — POTASSIUM CHLORIDE 750 MG/1
40 CAPSULE, EXTENDED RELEASE ORAL AS NEEDED
Status: DISCONTINUED | OUTPATIENT
Start: 2021-02-25 | End: 2021-02-25 | Stop reason: HOSPADM

## 2021-02-25 RX ADMIN — CLOPIDOGREL BISULFATE 75 MG: 75 TABLET ORAL at 09:04

## 2021-02-25 RX ADMIN — POTASSIUM CHLORIDE 40 MEQ: 10 CAPSULE, COATED, EXTENDED RELEASE ORAL at 07:01

## 2021-02-25 RX ADMIN — SACUBITRIL AND VALSARTAN 1 TABLET: 24; 26 TABLET, FILM COATED ORAL at 09:52

## 2021-02-25 RX ADMIN — ACETAMINOPHEN 650 MG: 325 TABLET ORAL at 07:01

## 2021-02-25 RX ADMIN — FUROSEMIDE 20 MG: 20 TABLET ORAL at 09:06

## 2021-02-25 RX ADMIN — ALLOPURINOL 300 MG: 300 TABLET ORAL at 09:04

## 2021-02-25 RX ADMIN — PANTOPRAZOLE SODIUM 40 MG: 40 TABLET, DELAYED RELEASE ORAL at 05:23

## 2021-02-25 RX ADMIN — DOXYCYCLINE 100 MG: 100 CAPSULE ORAL at 09:04

## 2021-02-25 RX ADMIN — TAMSULOSIN HYDROCHLORIDE 0.4 MG: 0.4 CAPSULE ORAL at 09:04

## 2021-02-25 RX ADMIN — LEVOTHYROXINE SODIUM 25 MCG: 25 TABLET ORAL at 05:23

## 2021-02-25 RX ADMIN — DULOXETINE HYDROCHLORIDE 60 MG: 60 CAPSULE, DELAYED RELEASE ORAL at 09:04

## 2021-02-25 RX ADMIN — CARVEDILOL 6.25 MG: 6.25 TABLET, FILM COATED ORAL at 09:04

## 2021-02-25 RX ADMIN — POTASSIUM CHLORIDE 20 MEQ: 10 CAPSULE, COATED, EXTENDED RELEASE ORAL at 09:05

## 2021-02-25 RX ADMIN — ISOSORBIDE MONONITRATE 30 MG: 30 TABLET, EXTENDED RELEASE ORAL at 09:04

## 2021-02-25 RX ADMIN — ACETAMINOPHEN 650 MG: 325 TABLET ORAL at 13:16

## 2021-02-25 RX ADMIN — METOLAZONE 2.5 MG: 2.5 TABLET ORAL at 09:05

## 2021-02-26 ENCOUNTER — READMISSION MANAGEMENT (OUTPATIENT)
Dept: CALL CENTER | Facility: HOSPITAL | Age: 81
End: 2021-02-26

## 2021-02-26 NOTE — OUTREACH NOTE
Prep Survey      Responses   [facility] facility patient discharged from?  [facility]   Is LACE score < 7 ?  Yes   Emergency Room discharge w/ pulse ox?  No   Eligibility  Readm Mgmt   Discharge diagnosis  CHF   Does the patient have one of the following disease processes/diagnoses(primary or secondary)?  CHF   Does the patient have Home health ordered?  No   Is there a DME ordered?  No   Comments regarding appointments  office to call   Prep survey completed?  Yes          Beatriz Brice RN

## 2021-03-01 ENCOUNTER — TELEPHONE (OUTPATIENT)
Dept: CARDIOLOGY | Facility: CLINIC | Age: 81
End: 2021-03-01

## 2021-03-01 ENCOUNTER — OFFICE VISIT (OUTPATIENT)
Dept: CARDIOLOGY | Facility: CLINIC | Age: 81
End: 2021-03-01

## 2021-03-01 VITALS
DIASTOLIC BLOOD PRESSURE: 73 MMHG | OXYGEN SATURATION: 98 % | WEIGHT: 273 LBS | HEIGHT: 73 IN | BODY MASS INDEX: 36.18 KG/M2 | SYSTOLIC BLOOD PRESSURE: 108 MMHG

## 2021-03-01 DIAGNOSIS — I50.22 CHRONIC SYSTOLIC CONGESTIVE HEART FAILURE (HCC): ICD-10-CM

## 2021-03-01 DIAGNOSIS — R42 DIZZINESS: ICD-10-CM

## 2021-03-01 DIAGNOSIS — I25.10 CORONARY ARTERY DISEASE DUE TO CALCIFIED CORONARY LESION: ICD-10-CM

## 2021-03-01 DIAGNOSIS — I10 ESSENTIAL HYPERTENSION: ICD-10-CM

## 2021-03-01 DIAGNOSIS — R06.02 SHORTNESS OF BREATH: ICD-10-CM

## 2021-03-01 DIAGNOSIS — R53.82 CHRONIC FATIGUE: ICD-10-CM

## 2021-03-01 DIAGNOSIS — M79.89 LEFT ARM SWELLING: Primary | ICD-10-CM

## 2021-03-01 DIAGNOSIS — I25.84 CORONARY ARTERY DISEASE DUE TO CALCIFIED CORONARY LESION: ICD-10-CM

## 2021-03-01 PROCEDURE — 99214 OFFICE O/P EST MOD 30 MIN: CPT | Performed by: NURSE PRACTITIONER

## 2021-03-01 NOTE — TELEPHONE ENCOUNTER
Left patient message on answering machine, that his follow up appointment with Dr. Scott is this Thursday March 4 th @ 11 a.m. Danitza Lacey LPN

## 2021-03-01 NOTE — PATIENT INSTRUCTIONS
"Fat and Cholesterol Restricted Eating Plan  Getting too much fat and cholesterol in your diet may cause health problems. Choosing the right foods helps keep your fat and cholesterol at normal levels. This can keep you from getting certain diseases.  Your doctor may recommend an eating plan that includes:  · Total fat: ______% or less of total calories a day.  · Saturated fat: ______% or less of total calories a day.  · Cholesterol: less than _________mg a day.  · Fiber: ______g a day.  What are tips for following this plan?  Meal planning  · At meals, divide your plate into four equal parts:  ? Fill one-half of your plate with vegetables and green salads.  ? Fill one-fourth of your plate with whole grains.  ? Fill one-fourth of your plate with low-fat (lean) protein foods.  · Eat fish that is high in omega-3 fats at least two times a week. This includes mackerel, tuna, sardines, and salmon.  · Eat foods that are high in fiber, such as whole grains, beans, apples, broccoli, carrots, peas, and barley.  General tips    · Work with your doctor to lose weight if you need to.  · Avoid:  ? Foods with added sugar.  ? Fried foods.  ? Foods with partially hydrogenated oils.  · Limit alcohol intake to no more than 1 drink a day for nonpregnant women and 2 drinks a day for men. One drink equals 12 oz of beer, 5 oz of wine, or 1½ oz of hard liquor.  Reading food labels  · Check food labels for:  ? Trans fats.  ? Partially hydrogenated oils.  ? Saturated fat (g) in each serving.  ? Cholesterol (mg) in each serving.  ? Fiber (g) in each serving.  · Choose foods with healthy fats, such as:  ? Monounsaturated fats.  ? Polyunsaturated fats.  ? Omega-3 fats.  · Choose grain products that have whole grains. Look for the word \"whole\" as the first word in the ingredient list.  Cooking  · Cook foods using low-fat methods. These include baking, boiling, grilling, and broiling.  · Eat more home-cooked foods. Eat at restaurants and buffets " less often.  · Avoid cooking using saturated fats, such as butter, cream, palm oil, palm kernel oil, and coconut oil.  Recommended foods    Fruits  · All fresh, canned (in natural juice), or frozen fruits.  Vegetables  · Fresh or frozen vegetables (raw, steamed, roasted, or grilled). Green salads.  Grains  · Whole grains, such as whole wheat or whole grain breads, crackers, cereals, and pasta. Unsweetened oatmeal, bulgur, barley, quinoa, or brown rice. Corn or whole wheat flour tortillas.  Meats and other protein foods  · Ground beef (85% or leaner), grass-fed beef, or beef trimmed of fat. Skinless chicken or turkey. Ground chicken or turkey. Pork trimmed of fat. All fish and seafood. Egg whites. Dried beans, peas, or lentils. Unsalted nuts or seeds. Unsalted canned beans. Nut butters without added sugar or oil.  Dairy  · Low-fat or nonfat dairy products, such as skim or 1% milk, 2% or reduced-fat cheeses, low-fat and fat-free ricotta or cottage cheese, or plain low-fat and nonfat yogurt.  Fats and oils  · Tub margarine without trans fats. Light or reduced-fat mayonnaise and salad dressings. Avocado. Olive, canola, sesame, or safflower oils.  The items listed above may not be a complete list of foods and beverages you can eat. Contact a dietitian for more information.  Foods to avoid  Fruits  · Canned fruit in heavy syrup. Fruit in cream or butter sauce. Fried fruit.  Vegetables  · Vegetables cooked in cheese, cream, or butter sauce. Fried vegetables.  Grains  · White bread. White pasta. White rice. Cornbread. Bagels, pastries, and croissants. Crackers and snack foods that contain trans fat and hydrogenated oils.  Meats and other protein foods  · Fatty cuts of meat. Ribs, chicken wings, kaur, sausage, bologna, salami, chitterlings, fatback, hot dogs, bratwurst, and packaged lunch meats. Liver and organ meats. Whole eggs and egg yolks. Chicken and turkey with skin. Fried meat.  Dairy  · Whole or 2% milk, cream,  half-and-half, and cream cheese. Whole milk cheeses. Whole-fat or sweetened yogurt. Full-fat cheeses. Nondairy creamers and whipped toppings. Processed cheese, cheese spreads, and cheese curds.  Beverages  · Alcohol. Sugar-sweetened drinks such as sodas, lemonade, and fruit drinks.  Fats and oils  · Butter, stick margarine, lard, shortening, ghee, or kaur fat. Coconut, palm kernel, and palm oils.  Sweets and desserts  · Corn syrup, sugars, honey, and molasses. Candy. Jam and jelly. Syrup. Sweetened cereals. Cookies, pies, cakes, donuts, muffins, and ice cream.  The items listed above may not be a complete list of foods and beverages you should avoid. Contact a dietitian for more information.  Summary  · Choosing the right foods helps keep your fat and cholesterol at normal levels. This can keep you from getting certain diseases.  · At meals, fill one-half of your plate with vegetables and green salads.  · Eat high-fiber foods, like whole grains, beans, apples, carrots, peas, and barley.  · Limit added sugar, saturated fats, alcohol, and fried foods.  This information is not intended to replace advice given to you by your health care provider. Make sure you discuss any questions you have with your health care provider.  Document Revised: 08/21/2019 Document Reviewed: 09/04/2018  [employer] Patient Education © 2020 [employer]  BMI for Adults  What is BMI?  Body mass index (BMI) is a number that is calculated from a person's weight and height. BMI can help estimate how much of a person's weight is composed of fat. BMI does not measure body fat directly. Rather, it is an alternative to procedures that directly measure body fat, which can be difficult and expensive.  BMI can help identify people who may be at higher risk for certain medical problems.  What are BMI measurements used for?  BMI is used as a screening tool to identify possible weight problems. It helps determine whether a person is obese, overweight, a  "healthy weight, or underweight.  BMI is useful for:  · Identifying a weight problem that may be related to a medical condition or may increase the risk for medical problems.  · Promoting changes, such as changes in diet and exercise, to help reach a healthy weight. BMI screening can be repeated to see if these changes are working.  How is BMI calculated?  BMI involves measuring your weight in relation to your height. Both height and weight are measured, and the BMI is calculated from those numbers. This can be done either in English ([place] or metric measurements. Note that charts and online BMI calculators are available to help you find your BMI quickly and easily without having to do these calculations yourself.  To calculate your BMI in English ([place] measurements:    1. Measure your weight in pounds (lb).  2. Multiply the number of pounds by 703.  ? For example, for a person who weighs 180 lb, multiply that number by 703, which equals 126,540.  3. Measure your height in inches. Then multiply that number by itself to get a measurement called \"inches squared.\"  ? For example, for a person who is 70 inches tall, the \"inches squared\" measurement is 70 inches x 70 inches, which equals 4,900 inches squared.  4. Divide the total from step 2 (number of lb x 703) by the total from step 3 (inches squared): 126,540 ÷ 4,900 = 25.8. This is your BMI.  To calculate your BMI in metric measurements:  1. Measure your weight in kilograms (kg).  2. Measure your height in meters (m). Then multiply that number by itself to get a measurement called \"meters squared.\"  ? For example, for a person who is 1.75 m tall, the \"meters squared\" measurement is 1.75 m x 1.75 m, which is equal to 3.1 meters squared.  3. Divide the number of kilograms (your weight) by the meters squared number. In this example: 70 ÷ 3.1 = 22.6. This is your BMI.  What do the results mean?  BMI charts are used to identify whether you are underweight, normal weight, " overweight, or obese. The following guidelines will be used:  · Underweight: BMI less than 18.5.  · Normal weight: BMI between 18.5 and 24.9.  · Overweight: BMI between 25 and 29.9.  · Obese: BMI of 30 or above.  Keep these notes in mind:  · Weight includes both fat and muscle, so someone with a muscular build, such as an athlete, may have a BMI that is higher than 24.9. In cases like these, BMI is not an accurate measure of body fat.  · To determine if excess body fat is the cause of a BMI of 25 or higher, further assessments may need to be done by a health care provider.  · BMI is usually interpreted in the same way for men and women.  Where to find more information  For more information about BMI, including tools to quickly calculate your BMI, go to these websites:  · Centers for Disease Control and Prevention: www.cdc.gov  · American Heart Association: www.heart.org  · National Heart, Lung, and Blood [place]: www.nhlbi.nih.gov  Summary  · Body mass index (BMI) is a number that is calculated from a person's weight and height.  · BMI may help estimate how much of a person's weight is composed of fat. BMI can help identify those who may be at higher risk for certain medical problems.  · BMI can be measured using English measurements or metric measurements.  · BMI charts are used to identify whether you are underweight, normal weight, overweight, or obese.  This information is not intended to replace advice given to you by your health care provider. Make sure you discuss any questions you have with your health care provider.  Document Revised: 09/09/2020 Document Reviewed: 07/17/2020  Elsevier Patient Education © 2020 [employer]    Acute Coronary Syndrome  Acute coronary syndrome (ACS) is a serious problem in which there is suddenly not enough blood and oxygen reaching the heart. ACS can result in chest pain or a heart attack.  This condition is a medical emergency. If you have any symptoms of this condition,  get help right away.  What are the causes?  This condition may be caused by:  · A buildup of fat and cholesterol inside the arteries (atherosclerosis). This is the most common cause. The buildup (plaque) can cause blood vessels in the heart (coronary arteries) to become narrow or blocked, which reduces blood flow to the heart. Plaque can also break off and lead to a clot, which can block an artery and cause a heart attack or stroke.  · Sudden tightening of the muscles around the coronary arteries (coronary spasm).  · Tearing of a coronary artery (spontaneous coronary artery dissection).  · Very low blood pressure (hypotension).  · An abnormal heartbeat (arrhythmia).  · Other medical conditions that cause a decrease of oxygen to the heart, such as anemiaorrespiratory failure.  · Using cocaine or methamphetamine.  What increases the risk?  The following factors may make you more likely to develop this condition:  · Age. The risk for ACS increases as you get older.  · History of chest pain, heart attack, peripheral artery disease, or stroke.  · Having taken chemotherapy or immune-suppressing medicines.  · Being male.  · Family history of chest pain, heart disease, or stroke.  · Smoking.  · Not exercising enough.  · Being overweight.  · High cholesterol.  · High blood pressure (hypertension).  · Diabetes.  · Excessive alcohol use.  What are the signs or symptoms?  Common symptoms of this condition include:  · Chest pain. The pain may last a long time, or it may stop and come back (recur). It may feel like:  ? Crushing or squeezing.  ? Tightness, pressure, fullness, or heaviness.  · Arm, neck, jaw, or back pain.  · Heartburn or indigestion.  · Shortness of breath.  · Nausea.  · Sudden cold sweats.  · Light-headedness.  · Dizziness or passing out.  · Tiredness (fatigue).  Sometimes there are no symptoms.  How is this diagnosed?  This condition may be diagnosed based on:  · Your medical history and symptoms.  · Imaging  tests, such as:  ? An electrocardiogram (ECG). This measures the heart's electrical activity.  ? X-rays.  ? CT scan.  ? A coronary angiogram. For this test, dye is injected into the heart arteries and then X-rays are taken.  ? Myocardial perfusion imaging. This test shows how well blood flows through your heart muscle.  · Blood tests. These may be repeated at certain time intervals.  · Exercise stress testing.  · Echocardiogram. This is a test that uses sound waves to produce detailed images of the heart.  How is this treated?  Treatment for this condition may include:  · Oxygen therapy.  · Medicines, such as:  ? Antiplatelet medicines and blood-thinning medicines, such as aspirin. These help prevent blood clots.  ? Medicine that dissolves any blood clots (fibrinolytic therapy).  ? Blood pressure medicines.  ? Nitroglycerin. This helps widen blood vessels to improve blood flow.  ? Pain medicine.  ? Cholesterol-lowering medicine.  · Surgery, such as:  ? Coronary angioplasty with stent placement. This involves placing a small piece of metal that looks like mesh or a spring into a narrow coronary artery. This widens the artery and keeps it open.  ? Coronary artery bypass surgery. This involves taking a section of a blood vessel from a different part of your body and placing it on the blocked coronary artery to allow blood to flow around the blockage.  · Cardiac rehabilitation. This is a program that includes exercise training, education, and counseling to help you recover.  Follow these instructions at home:  Eating and drinking  · Eat a heart-healthy diet that includes whole grains, fruits and vegetables, lean proteins, and low-fat or nonfat dairy products.  · Limit how much salt (sodium) you eat as told by your health care provider. Follow instructions from your health care provider about any other eating or drinking restrictions, such as limiting foods that are high in fat and processed sugars.  · Use healthy  cooking methods such as roasting, grilling, broiling, baking, poaching, steaming, or stir-frying.  · Work with a dietitian to follow a heart-healthy eating plan.  Medicines  · Take over-the-counter and prescription medicines only as told by your health care provider.  · Do not take these medicines unless your health care provider approves:  ? Vitamin supplements that contain vitamin A or vitamin E.  ? NSAIDs, such as ibuprofen, naproxen, or celecoxib.  ? Hormone replacement therapy that contains estrogen.  · If you are taking blood thinners:  ? Talk with your health care provider before you take any medicines that contain aspirin or NSAIDs. These medicines increase your risk for dangerous bleeding.  ? Take your medicine exactly as told, at the same time every day.  ? Avoid activities that could cause injury or bruising, and follow instructions about how to prevent falls.  ? Wear a medical alert bracelet, and carry a card that lists what medicines you take.  Activity  · Follow your cardiac rehabilitation program. Do exercises as told by your physical therapist.  · Ask your health care provider what activities and exercises are safe for you. Follow his or her instructions about lifting, driving, or climbing stairs.  Lifestyle  · Do not use any products that contain nicotine or tobacco, such as cigarettes, e-cigarettes, and chewing tobacco. If you need help quitting, ask your health care provider.  · Do not drink alcohol if your health care provider tells you not to drink.  · If you drink alcohol:  ? Limit how much you have to 0-1 drink a day.  ? Be aware of how much alcohol is in your drink. In the [place] one drink equals one 12 oz bottle of beer (355 mL), one 5 oz glass of wine (148 mL), or one 1½ oz glass of hard liquor (44 mL).  · Maintain a healthy weight. If you need to lose weight, work with your health care provider to do so safely.  General instructions  · Tell all the health care providers who provide care for  you about your heart condition, including your dentist. This may affect the medicines or treatment you receive.  · Manage any other health conditions you have, such as hypertension or diabetes. These conditions affect your heart.  · Pay attention to your mental health. You may be at higher risk for depression.  ? Find ways to manage stress.  ? Talk to your health care provider about depression screening and treatment.  · Keep your vaccinations up to date.  ? Get the flu shot (influenza vaccine) every year.  ? Get the pneumococcal vaccine if you are age 65 or older.  · If directed, monitor your blood pressure at home.  · Keep all follow-up visits as told by your health care provider. This is important.  Contact a health care provider if you:  · Feel overwhelmed or sad.  · Have trouble doing your daily activities.  Get help right away if you:  · Have pain in your chest, neck, arm, jaw, stomach, or back that recurs, and:  ? It lasts for more than a few minutes.  ? It is not relieved by taking the medicineyour health care provider prescribed.  · Have unexplained:  ? Heavy sweating.  ? Heartburn or indigestion.  ? Nausea or vomiting.  ? Shortness of breath.  ? Difficulty breathing.  ? Fatigue.  ? Nervousness or anxiety.  ? Weakness.  ? Diarrhea.  ? Dark stools or blood in your stool.  · Have sudden light-headedness or dizziness.  · Have blood pressure that is higher than 180/120.  · Faint.  · Have thoughts about hurting yourself.  These symptoms may represent a serious problem that is an emergency. Do not wait to see if the symptoms will go away. Get medical help right away. Call your local emergency services (911 in the [place] Do not drive yourself to the hospital.   Summary  · Acute coronary syndrome (ACS) is when there is not enough blood and oxygen being supplied to the heart. ACS can result in chest pain or a heart attack.  · Acute coronary syndrome is a medical emergency. If you have any symptoms of this condition,  get help right away.  · Treatment includes medicines and procedures to open the blocked arteries and restore blood flow.  This information is not intended to replace advice given to you by your health care provider. Make sure you discuss any questions you have with your health care provider.  Document Revised: 05/20/2020 Document Reviewed: 12/30/2019  Elsevier Patient Education © 2020 [employer]      Hypertension, Adult  Hypertension is another name for high blood pressure. High blood pressure forces your heart to work harder to pump blood. This can cause problems over time.  There are two numbers in a blood pressure reading. There is a top number (systolic) over a bottom number (diastolic). It is best to have a blood pressure that is below 120/80. Healthy choices can help lower your blood pressure, or you may need medicine to help lower it.  What are the causes?  The cause of this condition is not known. Some conditions may be related to high blood pressure.  What increases the risk?  · Smoking.  · Having type 2 diabetes mellitus, high cholesterol, or both.  · Not getting enough exercise or physical activity.  · Being overweight.  · Having too much fat, sugar, calories, or salt (sodium) in your diet.  · Drinking too much alcohol.  · Having long-term (chronic) kidney disease.  · Having a family history of high blood pressure.  · Age. Risk increases with age.  · Race. You may be at higher risk if you are .  · Gender. Men are at higher risk than women before age 45. After age 65, women are at higher risk than men.  · Having obstructive sleep apnea.  · Stress.  What are the signs or symptoms?  · High blood pressure may not cause symptoms. Very high blood pressure (hypertensive crisis) may cause:  ? Headache.  ? Feelings of worry or nervousness (anxiety).  ? Shortness of breath.  ? Nosebleed.  ? A feeling of being sick to your stomach (nausea).  ? Throwing up (vomiting).  ? Changes in how you  see.  ? Very bad chest pain.  ? Seizures.  How is this treated?  · This condition is treated by making healthy lifestyle changes, such as:  ? Eating healthy foods.  ? Exercising more.  ? Drinking less alcohol.  · Your health care provider may prescribe medicine if lifestyle changes are not enough to get your blood pressure under control, and if:  ? Your top number is above 130.  ? Your bottom number is above 80.  · Your personal target blood pressure may vary.  Follow these instructions at home:  Eating and drinking    · If told, follow the DASH eating plan. To follow this plan:  ? Fill one half of your plate at each meal with fruits and vegetables.  ? Fill one fourth of your plate at each meal with whole grains. Whole grains include whole-wheat pasta, brown rice, and whole-grain bread.  ? Eat or drink low-fat dairy products, such as skim milk or low-fat yogurt.  ? Fill one fourth of your plate at each meal with low-fat (lean) proteins. Low-fat proteins include fish, chicken without skin, eggs, beans, and tofu.  ? Avoid fatty meat, cured and processed meat, or chicken with skin.  ? Avoid pre-made or processed food.  · Eat less than 1,500 mg of salt each day.  · Do not drink alcohol if:  ? Your doctor tells you not to drink.  ? You are pregnant, may be pregnant, or are planning to become pregnant.  · If you drink alcohol:  ? Limit how much you use to:  § 0-1 drink a day for women.  § 0-2 drinks a day for men.  ? Be aware of how much alcohol is in your drink. In the [place] one drink equals one 12 oz bottle of beer (355 mL), one 5 oz glass of wine (148 mL), or one 1½ oz glass of hard liquor (44 mL).  Lifestyle    · Work with your doctor to stay at a healthy weight or to lose weight. Ask your doctor what the best weight is for you.  · Get at least 30 minutes of exercise most days of the week. This may include walking, swimming, or biking.  · Get at least 30 minutes of exercise that strengthens your muscles (resistance  exercise) at least 3 days a week. This may include lifting weights or doing Pilates.  · Do not use any products that contain nicotine or tobacco, such as cigarettes, e-cigarettes, and chewing tobacco. If you need help quitting, ask your doctor.  · Check your blood pressure at home as told by your doctor.  · Keep all follow-up visits as told by your doctor. This is important.  Medicines  · Take over-the-counter and prescription medicines only as told by your doctor. Follow directions carefully.  · Do not skip doses of blood pressure medicine. The medicine does not work as well if you skip doses. Skipping doses also puts you at risk for problems.  · Ask your doctor about side effects or reactions to medicines that you should watch for.  Contact a doctor if you:  · Think you are having a reaction to the medicine you are taking.  · Have headaches that keep coming back (recurring).  · Feel dizzy.  · Have swelling in your ankles.  · Have trouble with your vision.  Get help right away if you:  · Get a very bad headache.  · Start to feel mixed up (confused).  · Feel weak or numb.  · Feel faint.  · Have very bad pain in your:  ? Chest.  ? Belly (abdomen).  · Throw up more than once.  · Have trouble breathing.  Summary  · Hypertension is another name for high blood pressure.  · High blood pressure forces your heart to work harder to pump blood.  · For most people, a normal blood pressure is less than 120/80.  · Making healthy choices can help lower blood pressure. If your blood pressure does not get lower with healthy choices, you may need to take medicine.  This information is not intended to replace advice given to you by your health care provider. Make sure you discuss any questions you have with your health care provider.  Document Revised: 08/28/2019 Document Reviewed: 08/28/2019  Elsevier Patient Education © 2020 [employer]

## 2021-03-01 NOTE — PROGRESS NOTES
Subjective     Nick Caraballo is a 80 y.o. male who presents to day for Biventricular Device Upgrade (Dr Valerio 2-).    CHIEF COMPLIANT  Chief Complaint   Patient presents with   • Biventricular Device Upgrade     Dr Valerio 2-       Active Problems:  Problem List Items Addressed This Visit        Cardiac and Vasculature    Coronary artery disease due to calcified coronary lesion    Essential hypertension    Chronic systolic congestive heart failure (CMS/HCC)    Overview     Added automatically from request for surgery [id number]            Pulmonary and Pneumonias    Shortness of breath       Symptoms and Signs    Fatigue    Dizziness      Other Visit Diagnoses     Left arm swelling    -  Primary      PROBLEM LIST:      1. CAD, MI in 1998  1.1 Stenting x 5 total at [facility]. data  1.2 CABG x2 in Dec. 2014 at [facility] by Dr. Álvarez in setting of ACS, LIMA to LAD and reversed vein graft from aorta to diag. With RLE vein graft  1.3 Stress test, 9-19-19, mild anterolateral ischemia on scintigraphy.  1.4 Cath 10/19: Widely patent grafts with no progressive disease in the RCA or circumflex ejection fraction 50 to 55%, LVEDP 8 to 12 mmHg  1.5 left heart catheterization 9/20: Left main normal, LAD  occluded in the mid vessel with a patent LIMA beyond the occluded area, first diagonal has proximal 80% stenosis with a patent SVG to first D1, circumflex normal, RCA normal, EF 30 to 35% with mid anterior and apical wall akinesis and inferior wall hypokinesis  1.6 MUGA scan 12/20: EF 19%  2. A-Fib, s/p CABG per patient report  3. HTN  4. Hyperlipidemia  5. GOPI, uses c-pap  6. Hypothyroidism  7. BPH  8. GERD  9. RA  10. Former smoker  11. 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 upgrade to BI-V ICD  12 dizziness                   12.1 carotid duplex 12/20: Nonobstructive carotid artery disease    HPI  HPI  Mr. Caraballo is a 80-year-old male patient who is being followed up today for systolic heart failure  with an ejection fraction of 19% on February 20 for MUGA scan.  On February 24 he went under an upgrade from a dual-chamber pacemaker to a BiV ICD in which she tolerated well.  He denies any complications or complaints other than mild to moderate swelling of his left arm.  He says he actually feels a significant amount better than what he did previously.  He reports improvement in his chronic fatigue, shortness of breath, and he has had no recurrent chest pain.  Patient says he mainly gets dyspneic only if he exerts himself.  He usually does not become dyspneic at rest.  He also has intermittent chronic lower extremity edema that has also seemed to improve.  Usually does resolve overnight.  He is currently on furosemide.  In addition he does report some itching around the upper left aspect of his bandage on his pacemaker.  There is a mild amount of blistering and skin irritation.  The OpSite was changed and lower to allow the irritated area to resolve.  Patient's BiV ICD site is in the left upper chest wall with OpSite and Steri-Strips in place.  There is no exudate or signs of infection at this time.  He denies any chest pain, palpitations, syncope, lightheadedness, PND, orthopnea, or strokelike symptoms.  PRIOR MEDS  Current Outpatient Medications on File Prior to Visit   Medication Sig Dispense Refill   • acetaminophen (TYLENOL) 650 MG 8 hr tablet Take 650 mg by mouth Every 8 (Eight) Hours As Needed for Mild Pain .     • albuterol sulfate  (90 Base) MCG/ACT inhaler Inhale 2 puffs Every 4 (Four) Hours As Needed for Wheezing or Shortness of Air. 8 g 1   • allopurinol (ZYLOPRIM) 300 MG tablet Take 300 mg by mouth Daily.     • aspirin 81 MG EC tablet Take 81 mg by mouth Daily.     • atorvastatin (LIPITOR) 80 MG tablet Take 80 mg by mouth Every Night.     • carvedilol (COREG) 6.25 MG tablet Take 1 tablet by mouth 2 (Two) Times a Day. 180 tablet 1   • Cholecalciferol (VITAMIN D3) 50 MCG (2000 UT) tablet Take  2,000 Units by mouth Daily.     • clopidogrel (PLAVIX) 75 MG tablet TAKE ONE TABLET BY MOUTH DAILY (Patient taking differently: Take 75 mg by mouth Daily.) 90 tablet 2   • DULoxetine (Cymbalta) 60 MG capsule Take 1 capsule by mouth Daily. 90 capsule 3   • furosemide (LASIX) 20 MG tablet Take 60 mg by mouth 2 (Two) Times a Day.     • isosorbide mononitrate (IMDUR) 30 MG 24 hr tablet Half tablet daily (Patient taking differently: Take 30 mg by mouth Daily. Half tablet daily) 45 tablet 3   • levothyroxine (SYNTHROID, LEVOTHROID) 25 MCG tablet Take 25 mcg by mouth Daily.     • Melatonin 3 MG capsule Take 3 mg by mouth Every Night.     • metOLazone (ZAROXOLYN) 2.5 MG tablet Take 2.5 mg by mouth Daily.     • Miconazole Nitrate 2 % powder Apply 1 application topically to the appropriate area as directed 2 (two) times a day. 100 g 1   • Multiple Vitamins-Minerals (MULTIVITAMIN ADULT PO) Take  by mouth Daily.     • niacin (SLO-NIACIN) 500 MG CR tablet Take 1,000 mg by mouth Every Night.     • nitroglycerin (NITROSTAT) 0.4 MG SL tablet 1 under the tongue as needed for angina, may repeat q5mins for up three doses within 15 minutes (Patient taking differently: Place 0.4 mg under the tongue. 1 under the tongue as needed for angina, may repeat q5mins for up three doses within 15 minutes) 25 tablet 3   • pantoprazole (PROTONIX) 40 MG EC tablet Take 40 mg by mouth Daily.     • potassium chloride (K-DUR) 10 MEQ CR tablet Take 20 mEq by mouth 2 (Two) Times a Day.     • sacubitril-valsartan (Entresto) 24-26 MG tablet Take 1 tablet by mouth 2 (Two) Times a Day. 180 tablet 3   • tamsulosin (FLOMAX) 0.4 MG capsule 24 hr capsule Take 1 capsule by mouth Daily.       No current facility-administered medications on file prior to visit.       ALLERGIES  Patient has no known allergies.    HISTORY  Past Medical History:   Diagnosis Date   • Acid reflux    • Anxiety    • Complete heart block (CMS/HCC)    • Coronary artery disease    • Gout    •  Hyperlipidemia    • Hypertension    • Hypothyroid    • Myocardial infarction (CMS/[facility])    • Rheumatoid arthritis (CMS/[facility])    • Sleep apnea        Social History     Socioeconomic History   • Marital status:      Spouse name: Not on file   • Number of children: Not on file   • Years of education: Not on file   • Highest education level: Not on file   Tobacco Use   • Smoking status: Former Smoker     Packs/day: 1.00     Years: 45.00     Pack years: 45.00     Quit date:      Years since quittin.1   • Smokeless tobacco: Never Used   Substance and Sexual Activity   • Alcohol use: No   • Drug use: No   • Sexual activity: Defer       Family History   Problem Relation Age of Onset   • No Known Problems Mother    • Colon cancer Father    • Heart disease Brother        Review of Systems   Constitutional: Positive for fatigue (not as bad). Negative for chills and fever.   HENT: Positive for congestion, rhinorrhea, sinus pressure and sore throat.    Eyes: Positive for visual disturbance.   Respiratory: Positive for apnea (GOPI), cough (phlegm) and shortness of breath (some, not as bad). Negative for chest tightness.    Cardiovascular: Positive for leg swelling (at times). Negative for chest pain and palpitations.   Gastrointestinal: Negative.  Negative for abdominal pain, blood in stool, constipation, diarrhea, nausea and vomiting.   Endocrine: Negative.  Negative for cold intolerance and heat intolerance.   Genitourinary: Positive for frequency. Negative for dysuria, hematuria and urgency.   Musculoskeletal: Positive for arthralgias and joint swelling. Negative for back pain and neck pain.   Skin: Positive for wound (bruises). Negative for rash.   Allergic/Immunologic: Negative.  Negative for food allergies.   Neurological: Positive for dizziness (at times) and headaches (every morning). Negative for syncope and light-headedness.   Hematological: Bruises/bleeds easily.   Psychiatric/Behavioral: Positive for  "sleep disturbance (cpap, denies waking with soa or cp).       Objective     VITALS: /73 (BP Location: Left arm, Patient Position: Sitting)   Ht 185.4 cm (73\")   Wt 124 kg (273 lb)   SpO2 98%   BMI 36.02 kg/m²     LABS:   Lab Results (most recent)     None          IMAGING:   Xr Chest Pa & Lateral    Result Date: 2/25/2021  Pacemaker generator and lead revision with no evidence of pneumothorax or other new chest disease.   D:  02/25/2021 E:  02/25/2021  This report was finalized on 2/25/2021 9:52 PM by Dr. Albert Schreiber MD.        EXAM:  Physical Exam  Musculoskeletal:         General: Swelling (left upper extremity edema) present.         Procedure   Procedures       Assessment/Plan    Diagnosis Plan   1. Left arm swelling     2. Chronic systolic congestive heart failure (CMS/HCC)     3. Shortness of breath     4. Dizziness     5. Chronic fatigue     6. Essential hypertension     7. Coronary artery disease due to calcified coronary lesion     1.  Patient does have swelling of his left arm that is visible and is reported to occur after BiV ICD upgrade.  We will get an ultrasound of his left upper extremity just to ensure there is no DVT.  His ICD upgrade site is without any signs of infection including erythema or exudate or induration.  Steri-Strips remain in place.  2.  Patient does have chronic systolic heart failure which she is on guideline driven medication therapy with carvedilol and Entresto and is tolerating well.  Patient reports significant improvement of his symptoms after initiation of Entresto and BiV ICD upgrade.  3.  Patient does have chronic shortness of breath that has actually improved and nonprogressive.  We will continue to monitor at this time.  4.  Patient's blood pressure is well controlled on current blood pressure medication regimen.  No medication changes are warranted at this time.  Patient advised to monitor blood pressure on a daily basis and report any persistent highs or lows.  " Set goal blood pressure for patient at 130/80 or below.  5.  Patient does have history of coronary artery disease status post coronary artery bypass grafting that has stable coronary arteries per last heart cath.  He is on antianginal therapy of isosorbide 30 mg daily.  6.  Informed of signs and symptoms of ACS and advised to seek emergent treatment for any new worsening symptoms.  Patient also advised sooner follow-up as needed.  Also advised to follow-up with family doctor as needed  This note is dictated utilizing voice recognition software.  Although this record has been proof read, transcriptional errors may still be present. If questions occur regarding the content of this record please do not hesitate to call our office.  I have reviewed and confirmed the accuracy of the ROS as documented by the MA/LPN/RN DAVID Stone    Return in about 3 months (around 6/1/2021), or if symptoms worsen or fail to improve.    Diagnoses and all orders for this visit:    1. Left arm swelling (Primary)  -     Cancel: Duplex Upper Extremity Art / Grafts - Left CAR; Future    2. Chronic systolic congestive heart failure (CMS/HCC)    3. Shortness of breath    4. Dizziness    5. Chronic fatigue    6. Essential hypertension    7. Coronary artery disease due to calcified coronary lesion        iNck Caraballo  reports that he quit smoking about 23 years ago. He has a 45.00 pack-year smoking history. He has never used smokeless tobacco..         Patient's Body mass index is 36.02 kg/m². BMI is above normal parameters. Recommendations include: educational material.           MEDS ORDERED DURING VISIT:  No orders of the defined types were placed in this encounter.          This document has been electronically signed by DAVID Stone Jr.  March 7, 2021 22:54 EST

## 2021-03-02 ENCOUNTER — HOSPITAL ENCOUNTER (OUTPATIENT)
Dept: CARDIOLOGY | Facility: HOSPITAL | Age: 81
Discharge: HOME | End: 2021-03-02
Admitting: NURSE PRACTITIONER

## 2021-03-02 DIAGNOSIS — M79.89 LEFT ARM SWELLING: ICD-10-CM

## 2021-03-02 PROCEDURE — 93971 EXTREMITY STUDY: CPT

## 2021-03-02 PROCEDURE — 93971 EXTREMITY STUDY: CPT | Performed by: INTERNAL MEDICINE

## 2021-03-03 ENCOUNTER — READMISSION MANAGEMENT (OUTPATIENT)
Dept: CALL CENTER | Facility: HOSPITAL | Age: 81
End: 2021-03-03

## 2021-03-03 NOTE — OUTREACH NOTE
"CHF Week 1 Survey      Responses   [facility] patient discharged from?  [facility]   Does the patient have one of the following disease processes/diagnoses(primary or secondary)?  CHF   CHF Week 1 attempt successful?  Yes   Call start time  1431   Call end time  1435   Discharge diagnosis  CHF   Meds reviewed with patient/caregiver?  Yes   Is the patient having any side effects they believe may be caused by any medication additions or changes?  No   Does the patient have all medications ordered at discharge?  Yes   Is the patient taking all medications as directed (includes completed medication regime)?  Yes   Comments regarding appointments  Appt with cardiology is on 3/1/21,  ultrasound on 3/2/21,  Appt with Dr. Valerio on 3/4/21   Does the patient have a primary care provider?   Yes   Does the patient have an appointment with their PCP within 7 days of discharge?  No   Nursing Interventions  Advised patient to make appointment, Educated patient on importance of making appointment   Has the patient kept scheduled appointments due by today?  Yes   Comments  [facility] approves    Psychosocial issues?  No   Did the patient receive a copy of their discharge instructions?  Yes   Nursing interventions  Reviewed instructions with patient   What is the patient's perception of their health status since discharge?  Improving   Nursing interventions  Nurse provided patient education    CHF Week 1 call completed?  Yes   Revoked  No further contact(revokes)-requires comment   Is the patient interested in additional calls from an ambulatory ?  NOTE:  applies to high risk patients requiring additional follow-up.  No   Graduated/Revoked comments  Pt was rude! He stated, \"I get nervous when people don't know what they are doing\" and he doesn't think I know what I'm doing.  He said I ask a lot of questions and I don't understand that he doesn't need to see Dr. Abbott when the issue he has is with his heart. " "Dr. Lai approves everything for his heart and it's through [facility], which I informed him I know nothing about but it's recommended he f/u with his PCP. He said, \"I'm upset. Thanks for your call.\"           Fior Rinaldi RN  "

## 2021-03-05 ENCOUNTER — PATIENT OUTREACH (OUTPATIENT)
Dept: PHARMACY | Facility: HOSPITAL | Age: 81
End: 2021-03-05

## 2021-03-05 NOTE — OUTREACH NOTE
Pharmacy Outreach Note  Mr. Vernon called [facility] in patient pharmacy to ask for help getting his entresto.  [facility] but [facility] before they could get it to them.  His provider called him in some to a local [employer] in [place] but he could not afford them.  He had only received samples from his cardiologist.  I called [employer] and gave them a free trial card.  I contacted the patient and told him what I had done and he was appreciative.  I told him to call back if he had any other issues.    Thank You;  Rocio Alvarado, PharmD  3/5/2021, 17:49 EST

## 2021-03-20 LAB
BH CV ECHO MEAS - BSA(HAYCOCK): 2.6 M^2
BH CV ECHO MEAS - BSA: 2.5 M^2
BH CV ECHO MEAS - BZI_BMI: 36 KILOGRAMS/M^2
BH CV ECHO MEAS - BZI_METRIC_HEIGHT: 185.4 CM
BH CV ECHO MEAS - BZI_METRIC_WEIGHT: 123.8 KG
BH CV UPPER VENOUS LEFT AXILLARY AUGMENT: NORMAL
BH CV UPPER VENOUS LEFT AXILLARY COMPETENT: NORMAL
BH CV UPPER VENOUS LEFT AXILLARY COMPRESS: NORMAL
BH CV UPPER VENOUS LEFT AXILLARY PHASIC: NORMAL
BH CV UPPER VENOUS LEFT AXILLARY SPONT: NORMAL
BH CV UPPER VENOUS LEFT BASILIC FOREARM COMPRESS: NORMAL
BH CV UPPER VENOUS LEFT BASILIC UPPER COMPRESS: NORMAL
BH CV UPPER VENOUS LEFT BRACHIAL COMPRESS: NORMAL
BH CV UPPER VENOUS LEFT CEPHALIC FOREARM COMPRESS: NORMAL
BH CV UPPER VENOUS LEFT CEPHALIC UPPER COMPRESS: NORMAL
BH CV UPPER VENOUS LEFT INTERNAL JUGULAR AUGMENT: NORMAL
BH CV UPPER VENOUS LEFT INTERNAL JUGULAR COMPETENT: NORMAL
BH CV UPPER VENOUS LEFT INTERNAL JUGULAR COMPRESS: NORMAL
BH CV UPPER VENOUS LEFT INTERNAL JUGULAR PHASIC: NORMAL
BH CV UPPER VENOUS LEFT INTERNAL JUGULAR SPONT: NORMAL
BH CV UPPER VENOUS LEFT RADIAL COMPRESS: NORMAL
BH CV UPPER VENOUS LEFT SUBCLAVIAN AUGMENT: NORMAL
BH CV UPPER VENOUS LEFT SUBCLAVIAN COMPETENT: NORMAL
BH CV UPPER VENOUS LEFT SUBCLAVIAN COMPRESS: NORMAL
BH CV UPPER VENOUS LEFT SUBCLAVIAN PHASIC: NORMAL
BH CV UPPER VENOUS LEFT SUBCLAVIAN SPONT: NORMAL
BH CV UPPER VENOUS LEFT ULNAR COMPRESS: NORMAL

## 2021-03-23 ENCOUNTER — TELEPHONE (OUTPATIENT)
Dept: CARDIOLOGY | Facility: CLINIC | Age: 81
End: 2021-03-23

## 2021-03-23 NOTE — TELEPHONE ENCOUNTER
Patient informed of duplex venous upper extremity results. Patient verbalized understanding. Danitza Lacey LPN      ----- Message from DAVID Stone sent at 3/21/2021 10:04 AM EDT -----  Regarding: FW:  No Dvt keep follow up  ----- Message -----  From: Errol Lai MD  Sent: 3/20/2021   2:22 PM EDT  To: DAVID Stone

## 2021-03-24 ENCOUNTER — TELEPHONE (OUTPATIENT)
Dept: CARDIOLOGY | Facility: CLINIC | Age: 81
End: 2021-03-24

## 2021-03-24 NOTE — TELEPHONE ENCOUNTER
Patient called stating he was considering buying a zero gravity chair, stated he heard it was better to sleep in one for his heart. Patient stated he does have a special mattress and sleeps well. Wanted JOEL HERNANDEZ opinion. Per Akash HERNANDEZ not necessary to sleep in zero gravity chair, for patient to use what makes him more comfortable. Patient verbalized understanding. Danitza Lacey LPN

## 2021-04-21 RX ORDER — LANOLIN ALCOHOL/MO/W.PET/CERES
1000 CREAM (GRAM) TOPICAL NIGHTLY
Qty: 60 TABLET | Refills: 1 | Status: SHIPPED | OUTPATIENT
Start: 2021-04-21 | End: 2021-04-22 | Stop reason: SDUPTHER

## 2021-04-21 NOTE — TELEPHONE ENCOUNTER
----- Message from Lavell Sahni Rep sent at 4/21/2021  2:55 PM EDT -----  Please refill niacin (SLO-NIACIN) 500 MG CR tablet for pt @ [facility]- pt is out

## 2021-04-21 NOTE — TELEPHONE ENCOUNTER
----- Message from Lavell Sahni Rep sent at 4/21/2021  2:55 PM EDT -----  Please refill niacin (SLO-NIACIN) 500 MG CR tablet for pt @ [facility]- pt is out        Pended for approval by provider       AT [facility]

## 2021-04-22 RX ORDER — LANOLIN ALCOHOL/MO/W.PET/CERES
1000 CREAM (GRAM) TOPICAL NIGHTLY
Qty: 180 TABLET | Refills: 1 | Status: SHIPPED | OUTPATIENT
Start: 2021-04-22 | End: 2021-04-22 | Stop reason: SDUPTHER

## 2021-04-22 RX ORDER — LANOLIN ALCOHOL/MO/W.PET/CERES
1000 CREAM (GRAM) TOPICAL NIGHTLY
Qty: 180 TABLET | Refills: 3 | Status: SHIPPED | OUTPATIENT
Start: 2021-04-22 | End: 2021-08-10 | Stop reason: SDUPTHER

## 2021-04-22 NOTE — TELEPHONE ENCOUNTER
Needs sent into to [facility]       Pended to provider       AT [facility]

## 2021-04-22 NOTE — TELEPHONE ENCOUNTER
Patient request refill sent to [facility]. Refill sent as requested. Desirae Arellano MA

## 2021-04-23 NOTE — TELEPHONE ENCOUNTER
[facility] LVM stating they haven't received pt's Niacin. Per chart review, rx was sent in yesterday- but it was printed not sent:  niacin (SLO-NIACIN) 500 MG CR tablet 180 tablet 3 4/22/2021    Sig - Route:  Take 2 tablets by mouth Every Night. - Oral   Class:  Print   ROXI:  No   Authorizing Provider:  DAVID Stone       Tried to call in rx, but recording stated px was closed. LVM for px calling in the rx.

## 2021-04-28 DIAGNOSIS — I50.20 HFREF (HEART FAILURE WITH REDUCED EJECTION FRACTION) (HCC): ICD-10-CM

## 2021-04-28 DIAGNOSIS — R94.30 CARDIAC LV EJECTION FRACTION 30-35%: ICD-10-CM

## 2021-04-28 RX ORDER — SACUBITRIL AND VALSARTAN 24; 26 MG/1; MG/1
1 TABLET, FILM COATED ORAL 2 TIMES DAILY
Qty: 180 TABLET | Refills: 3 | Status: SHIPPED | OUTPATIENT
Start: 2021-04-28 | End: 2021-05-03 | Stop reason: SDUPTHER

## 2021-04-28 NOTE — TELEPHONE ENCOUNTER
Patient requesting entresto  [facility]       Pended to provider    AT [facility]

## 2021-04-30 DIAGNOSIS — R94.30 CARDIAC LV EJECTION FRACTION 30-35%: ICD-10-CM

## 2021-04-30 DIAGNOSIS — I50.20 HFREF (HEART FAILURE WITH REDUCED EJECTION FRACTION) (HCC): ICD-10-CM

## 2021-05-03 DIAGNOSIS — R94.30 CARDIAC LV EJECTION FRACTION 30-35%: ICD-10-CM

## 2021-05-03 DIAGNOSIS — I50.20 HFREF (HEART FAILURE WITH REDUCED EJECTION FRACTION) (HCC): Primary | ICD-10-CM

## 2021-05-03 DIAGNOSIS — I50.20 HFREF (HEART FAILURE WITH REDUCED EJECTION FRACTION) (HCC): ICD-10-CM

## 2021-05-03 DIAGNOSIS — I50.22 CHRONIC SYSTOLIC HEART FAILURE (HCC): ICD-10-CM

## 2021-05-03 DIAGNOSIS — I10 ESSENTIAL HYPERTENSION: ICD-10-CM

## 2021-05-03 DIAGNOSIS — R94.30 CARDIAC LV EJECTION FRACTION 30-35%: Primary | ICD-10-CM

## 2021-05-03 RX ORDER — SACUBITRIL AND VALSARTAN 24; 26 MG/1; MG/1
1 TABLET, FILM COATED ORAL 2 TIMES DAILY
Qty: 180 TABLET | Refills: 3 | Status: SHIPPED | OUTPATIENT
Start: 2021-05-03 | End: 2021-08-10 | Stop reason: SDUPTHER

## 2021-05-04 RX ORDER — SACUBITRIL AND VALSARTAN 24; 26 MG/1; MG/1
1 TABLET, FILM COATED ORAL 2 TIMES DAILY
Qty: 180 TABLET | Refills: 3 | Status: CANCELLED | OUTPATIENT
Start: 2021-05-04

## 2021-05-24 RX ORDER — CLOPIDOGREL BISULFATE 75 MG/1
TABLET ORAL
Qty: 90 TABLET | Refills: 1 | Status: SHIPPED | OUTPATIENT
Start: 2021-05-24 | End: 2021-08-10 | Stop reason: SDUPTHER

## 2021-05-24 RX ORDER — CLOPIDOGREL BISULFATE 75 MG/1
75 TABLET ORAL DAILY
Qty: 90 TABLET | Refills: 3 | Status: SHIPPED | OUTPATIENT
Start: 2021-05-24 | End: 2021-06-24 | Stop reason: SDUPTHER

## 2021-06-01 ENCOUNTER — OFFICE VISIT (OUTPATIENT)
Dept: CARDIOLOGY | Facility: CLINIC | Age: 81
End: 2021-06-01

## 2021-06-01 VITALS
SYSTOLIC BLOOD PRESSURE: 115 MMHG | HEIGHT: 73 IN | DIASTOLIC BLOOD PRESSURE: 74 MMHG | HEART RATE: 70 BPM | OXYGEN SATURATION: 97 % | WEIGHT: 275 LBS | BODY MASS INDEX: 36.45 KG/M2

## 2021-06-01 DIAGNOSIS — I10 ESSENTIAL HYPERTENSION: ICD-10-CM

## 2021-06-01 DIAGNOSIS — I25.10 CORONARY ARTERY DISEASE DUE TO CALCIFIED CORONARY LESION: ICD-10-CM

## 2021-06-01 DIAGNOSIS — I25.84 CORONARY ARTERY DISEASE DUE TO CALCIFIED CORONARY LESION: ICD-10-CM

## 2021-06-01 DIAGNOSIS — R06.02 SHORTNESS OF BREATH: Primary | ICD-10-CM

## 2021-06-01 DIAGNOSIS — I50.22 CHRONIC SYSTOLIC HEART FAILURE (HCC): ICD-10-CM

## 2021-06-01 DIAGNOSIS — R09.89 RESPIRATORY CRACKLES AT RIGHT LUNG BASE: ICD-10-CM

## 2021-06-01 PROCEDURE — 99212 OFFICE O/P EST SF 10 MIN: CPT | Performed by: NURSE PRACTITIONER

## 2021-06-01 RX ORDER — GUAIFENESIN 600 MG/1
1200 TABLET, EXTENDED RELEASE ORAL AS NEEDED
Status: ON HOLD | COMMUNITY
End: 2021-12-09

## 2021-06-01 NOTE — PATIENT INSTRUCTIONS
Acute Coronary Syndrome  Acute coronary syndrome (ACS) is a serious problem in which there is suddenly not enough blood and oxygen reaching the heart. ACS can result in chest pain or a heart attack.  This condition is a medical emergency. If you have any symptoms of this condition, get help right away.  What are the causes?  This condition may be caused by:  · A buildup of fat and cholesterol inside the arteries (atherosclerosis). This is the most common cause. The buildup (plaque) can cause blood vessels in the heart (coronary arteries) to become narrow or blocked, which reduces blood flow to the heart. Plaque can also break off and lead to a clot, which can block an artery and cause a heart attack or stroke.  · Sudden tightening of the muscles around the coronary arteries (coronary spasm).  · Tearing of a coronary artery (spontaneous coronary artery dissection).  · Very low blood pressure (hypotension).  · An abnormal heartbeat (arrhythmia).  · Other medical conditions that cause a decrease of oxygen to the heart, such as anemiaorrespiratory failure.  · Using cocaine or methamphetamine.  What increases the risk?  The following factors may make you more likely to develop this condition:  · Age. The risk for ACS increases as you get older.  · History of chest pain, heart attack, peripheral artery disease, or stroke.  · Having taken chemotherapy or immune-suppressing medicines.  · Being male.  · Family history of chest pain, heart disease, or stroke.  · Smoking.  · Not exercising enough.  · Being overweight.  · High cholesterol.  · High blood pressure (hypertension).  · Diabetes.  · Excessive alcohol use.  What are the signs or symptoms?  Common symptoms of this condition include:  · Chest pain. The pain may last a long time, or it may stop and come back (recur). It may feel like:  ? Crushing or squeezing.  ? Tightness, pressure, fullness, or heaviness.  · Arm, neck, jaw, or back pain.  · Heartburn or  indigestion.  · Shortness of breath.  · Nausea.  · Sudden cold sweats.  · Light-headedness.  · Dizziness or passing out.  · Tiredness (fatigue).  Sometimes there are no symptoms.  How is this diagnosed?  This condition may be diagnosed based on:  · Your medical history and symptoms.  · Imaging tests, such as:  ? An electrocardiogram (ECG). This measures the heart's electrical activity.  ? X-rays.  ? CT scan.  ? A coronary angiogram. For this test, dye is injected into the heart arteries and then X-rays are taken.  ? Myocardial perfusion imaging. This test shows how well blood flows through your heart muscle.  · Blood tests. These may be repeated at certain time intervals.  · Exercise stress testing.  · Echocardiogram. This is a test that uses sound waves to produce detailed images of the heart.  How is this treated?  Treatment for this condition may include:  · Oxygen therapy.  · Medicines, such as:  ? Antiplatelet medicines and blood-thinning medicines, such as aspirin. These help prevent blood clots.  ? Medicine that dissolves any blood clots (fibrinolytic therapy).  ? Blood pressure medicines.  ? Nitroglycerin. This helps widen blood vessels to improve blood flow.  ? Pain medicine.  ? Cholesterol-lowering medicine.  · Surgery, such as:  ? Coronary angioplasty with stent placement. This involves placing a small piece of metal that looks like mesh or a spring into a narrow coronary artery. This widens the artery and keeps it open.  ? Coronary artery bypass surgery. This involves taking a section of a blood vessel from a different part of your body and placing it on the blocked coronary artery to allow blood to flow around the blockage.  · Cardiac rehabilitation. This is a program that includes exercise training, education, and counseling to help you recover.  Follow these instructions at home:  Eating and drinking  · Eat a heart-healthy diet that includes whole grains, fruits and vegetables, lean proteins, and  low-fat or nonfat dairy products.  · Limit how much salt (sodium) you eat as told by your health care provider. Follow instructions from your health care provider about any other eating or drinking restrictions, such as limiting foods that are high in fat and processed sugars.  · Use healthy cooking methods such as roasting, grilling, broiling, baking, poaching, steaming, or stir-frying.  · Work with a dietitian to follow a heart-healthy eating plan.  Medicines  · Take over-the-counter and prescription medicines only as told by your health care provider.  · Do not take these medicines unless your health care provider approves:  ? Vitamin supplements that contain vitamin A or vitamin E.  ? NSAIDs, such as ibuprofen, naproxen, or celecoxib.  ? Hormone replacement therapy that contains estrogen.  · If you are taking blood thinners:  ? Talk with your health care provider before you take any medicines that contain aspirin or NSAIDs. These medicines increase your risk for dangerous bleeding.  ? Take your medicine exactly as told, at the same time every day.  ? Avoid activities that could cause injury or bruising, and follow instructions about how to prevent falls.  ? Wear a medical alert bracelet, and carry a card that lists what medicines you take.  Activity  · Follow your cardiac rehabilitation program. Do exercises as told by your physical therapist.  · Ask your health care provider what activities and exercises are safe for you. Follow his or her instructions about lifting, driving, or climbing stairs.  Lifestyle  · Do not use any products that contain nicotine or tobacco, such as cigarettes, e-cigarettes, and chewing tobacco. If you need help quitting, ask your health care provider.  · Do not drink alcohol if your health care provider tells you not to drink.  · If you drink alcohol:  ? Limit how much you have to 0-1 drink a day.  ? Be aware of how much alcohol is in your drink. In the [place] one drink equals one 12 oz  bottle of beer (355 mL), one 5 oz glass of wine (148 mL), or one 1½ oz glass of hard liquor (44 mL).  · Maintain a healthy weight. If you need to lose weight, work with your health care provider to do so safely.  General instructions  · Tell all the health care providers who provide care for you about your heart condition, including your dentist. This may affect the medicines or treatment you receive.  · Manage any other health conditions you have, such as hypertension or diabetes. These conditions affect your heart.  · Pay attention to your mental health. You may be at higher risk for depression.  ? Find ways to manage stress.  ? Talk to your health care provider about depression screening and treatment.  · Keep your vaccinations up to date.  ? Get the flu shot (influenza vaccine) every year.  ? Get the pneumococcal vaccine if you are age 65 or older.  · If directed, monitor your blood pressure at home.  · Keep all follow-up visits as told by your health care provider. This is important.  Contact a health care provider if you:  · Feel overwhelmed or sad.  · Have trouble doing your daily activities.  Get help right away if you:  · Have pain in your chest, neck, arm, jaw, stomach, or back that recurs, and:  ? It lasts for more than a few minutes.  ? It is not relieved by taking the medicineyour health care provider prescribed.  · Have unexplained:  ? Heavy sweating.  ? Heartburn or indigestion.  ? Nausea or vomiting.  ? Shortness of breath.  ? Difficulty breathing.  ? Fatigue.  ? Nervousness or anxiety.  ? Weakness.  ? Diarrhea.  ? Dark stools or blood in your stool.  · Have sudden light-headedness or dizziness.  · Have blood pressure that is higher than 180/120.  · Faint.  · Have thoughts about hurting yourself.  These symptoms may represent a serious problem that is an emergency. Do not wait to see if the symptoms will go away. Get medical help right away. Call your local emergency services (911 in the [place] Do  not drive yourself to the hospital.   Summary  · Acute coronary syndrome (ACS) is when there is not enough blood and oxygen being supplied to the heart. ACS can result in chest pain or a heart attack.  · Acute coronary syndrome is a medical emergency. If you have any symptoms of this condition, get help right away.  · Treatment includes medicines and procedures to open the blocked arteries and restore blood flow.  This information is not intended to replace advice given to you by your health care provider. Make sure you discuss any questions you have with your health care provider.  Document Revised: 05/20/2020 Document Reviewed: 12/30/2019  [employer] Patient Education © 2021 [employer]      Hypertension, Adult  Hypertension is another name for high blood pressure. High blood pressure forces your heart to work harder to pump blood. This can cause problems over time.  There are two numbers in a blood pressure reading. There is a top number (systolic) over a bottom number (diastolic). It is best to have a blood pressure that is below 120/80. Healthy choices can help lower your blood pressure, or you may need medicine to help lower it.  What are the causes?  The cause of this condition is not known. Some conditions may be related to high blood pressure.  What increases the risk?  · Smoking.  · Having type 2 diabetes mellitus, high cholesterol, or both.  · Not getting enough exercise or physical activity.  · Being overweight.  · Having too much fat, sugar, calories, or salt (sodium) in your diet.  · Drinking too much alcohol.  · Having long-term (chronic) kidney disease.  · Having a family history of high blood pressure.  · Age. Risk increases with age.  · Race. You may be at higher risk if you are .  · Gender. Men are at higher risk than women before age 45. After age 65, women are at higher risk than men.  · Having obstructive sleep apnea.  · Stress.  What are the signs or symptoms?  · High blood  pressure may not cause symptoms. Very high blood pressure (hypertensive crisis) may cause:  ? Headache.  ? Feelings of worry or nervousness (anxiety).  ? Shortness of breath.  ? Nosebleed.  ? A feeling of being sick to your stomach (nausea).  ? Throwing up (vomiting).  ? Changes in how you see.  ? Very bad chest pain.  ? Seizures.  How is this treated?  · This condition is treated by making healthy lifestyle changes, such as:  ? Eating healthy foods.  ? Exercising more.  ? Drinking less alcohol.  · Your health care provider may prescribe medicine if lifestyle changes are not enough to get your blood pressure under control, and if:  ? Your top number is above 130.  ? Your bottom number is above 80.  · Your personal target blood pressure may vary.  Follow these instructions at home:  Eating and drinking    · If told, follow the DASH eating plan. To follow this plan:  ? Fill one half of your plate at each meal with fruits and vegetables.  ? Fill one fourth of your plate at each meal with whole grains. Whole grains include whole-wheat pasta, brown rice, and whole-grain bread.  ? Eat or drink low-fat dairy products, such as skim milk or low-fat yogurt.  ? Fill one fourth of your plate at each meal with low-fat (lean) proteins. Low-fat proteins include fish, chicken without skin, eggs, beans, and tofu.  ? Avoid fatty meat, cured and processed meat, or chicken with skin.  ? Avoid pre-made or processed food.  · Eat less than 1,500 mg of salt each day.  · Do not drink alcohol if:  ? Your doctor tells you not to drink.  ? You are pregnant, may be pregnant, or are planning to become pregnant.  · If you drink alcohol:  ? Limit how much you use to:  § 0-1 drink a day for women.  § 0-2 drinks a day for men.  ? Be aware of how much alcohol is in your drink. In the [place] one drink equals one 12 oz bottle of beer (355 mL), one 5 oz glass of wine (148 mL), or one 1½ oz glass of hard liquor (44 mL).  Lifestyle    · Work with your  doctor to stay at a healthy weight or to lose weight. Ask your doctor what the best weight is for you.  · Get at least 30 minutes of exercise most days of the week. This may include walking, swimming, or biking.  · Get at least 30 minutes of exercise that strengthens your muscles (resistance exercise) at least 3 days a week. This may include lifting weights or doing Pilates.  · Do not use any products that contain nicotine or tobacco, such as cigarettes, e-cigarettes, and chewing tobacco. If you need help quitting, ask your doctor.  · Check your blood pressure at home as told by your doctor.  · Keep all follow-up visits as told by your doctor. This is important.  Medicines  · Take over-the-counter and prescription medicines only as told by your doctor. Follow directions carefully.  · Do not skip doses of blood pressure medicine. The medicine does not work as well if you skip doses. Skipping doses also puts you at risk for problems.  · Ask your doctor about side effects or reactions to medicines that you should watch for.  Contact a doctor if you:  · Think you are having a reaction to the medicine you are taking.  · Have headaches that keep coming back (recurring).  · Feel dizzy.  · Have swelling in your ankles.  · Have trouble with your vision.  Get help right away if you:  · Get a very bad headache.  · Start to feel mixed up (confused).  · Feel weak or numb.  · Feel faint.  · Have very bad pain in your:  ? Chest.  ? Belly (abdomen).  · Throw up more than once.  · Have trouble breathing.  Summary  · Hypertension is another name for high blood pressure.  · High blood pressure forces your heart to work harder to pump blood.  · For most people, a normal blood pressure is less than 120/80.  · Making healthy choices can help lower blood pressure. If your blood pressure does not get lower with healthy choices, you may need to take medicine.  This information is not intended to replace advice given to you by your health  care provider. Make sure you discuss any questions you have with your health care provider.  Document Revised: 08/28/2019 Document Reviewed: 08/28/2019  [employer] Patient Education © 2021 [employer]      Heart Failure, Diagnosis    Heart failure means that your heart is not able to pump blood in the right way. This makes it hard for your body to work well. Heart failure is usually a long-term (chronic) condition. You must take good care of yourself and follow your treatment plan from your doctor.  What are the causes?  This condition may be caused by:  · High blood pressure.  · Build up of cholesterol and fat in the arteries.  · Heart attack. This injures the heart muscle.  · Heart valves that do not open and close properly.  · Damage of the heart muscle. This is also called cardiomyopathy.  · Lung disease.  · Abnormal heart rhythms.  What increases the risk?  The risk of heart failure goes up as a person ages. This condition is also more likely to develop in people who:  · Are overweight.  · Are male.  · Smoke or chew tobacco.  · Abuse alcohol or illegal drugs.  · Have taken medicines that can damage the heart.  · Have diabetes.  · Have abnormal heart rhythms.  · Have thyroid problems.  · Have low blood counts (anemia).  What are the signs or symptoms?  Symptoms of this condition include:  · Shortness of breath.  · Coughing.  · Swelling of the feet, ankles, legs, or belly.  · Losing weight for no reason.  · Trouble breathing.  · Waking from sleep because of the need to sit up and get more air.  · Rapid heartbeat.  · Being very tired.  · Feeling dizzy, or feeling like you may pass out (faint).  · Having no desire to eat.  · Feeling like you may vomit (nauseous).  · Peeing (urinating) more at night.  · Feeling confused.  How is this treated?         This condition may be treated with:  · Medicines. These can be given to treat blood pressure and to make the heart muscles stronger.  · Changes in your daily life.  These may include eating a healthy diet, staying at a healthy body weight, quitting tobacco and illegal drug use, or doing exercises.  · Surgery. Surgery can be done to open blocked valves, or to put devices in the heart, such as pacemakers.  · A donor heart (heart transplant). You will receive a healthy heart from a donor.  Follow these instructions at home:  · Treat other conditions as told by your doctor. These may include high blood pressure, diabetes, thyroid disease, or abnormal heart rhythms.  · Learn as much as you can about heart failure.  · Get support as you need it.  · Keep all follow-up visits as told by your doctor. This is important.  Summary  · Heart failure means that your heart is not able to pump blood in the right way.  · This condition is caused by high blood pressure, heart attack, or damage of the heart muscle.  · Symptoms of this condition include shortness of breath and swelling of the feet, ankles, legs, or belly. You may also feel very tired or feel like you may vomit.  · You may be treated with medicines, surgery, or changes in your daily life.  · Treat other health conditions as told by your doctor.  This information is not intended to replace advice given to you by your health care provider. Make sure you discuss any questions you have with your health care provider.  Document Revised: 03/06/2020 Document Reviewed: 03/06/2020  [employer] Patient Education © 2021 [employer]

## 2021-06-01 NOTE — PROGRESS NOTES
Subjective     Nick Caraballo is a 80 y.o. male who presents to day for Cardiomyopathy (presents for 3 month f/u) and Shortness of Breath.    CHIEF COMPLIANT  Chief Complaint   Patient presents with   • Cardiomyopathy     presents for 3 month f/u   • Shortness of Breath       Active Problems:  Problem List Items Addressed This Visit        Cardiac and Vasculature    Coronary artery disease due to calcified coronary lesion    Essential hypertension    Chronic systolic heart failure (CMS/HCC)    Overview     Added automatically from request for surgery [id number]            Pulmonary and Pneumonias    Shortness of breath - Primary    Relevant Orders    XR Chest PA & Lateral      Other Visit Diagnoses     Respiratory crackles at right lung base        Relevant Orders    XR Chest PA & Lateral      PROBLEM LIST:      1. CAD, MI in 1998  1.1 Stenting x 5 total at [facility] , [facility]. data  1.2 CABG x2 in Dec. 2014 at [facility] by Dr. Álvarez in setting of ACS, LIMA to LAD and reversed vein graft from aorta to diag. With RLE vein graft  1.3 Stress test, 9-19-19, mild anterolateral ischemia on scintigraphy.  1.4 Cath 10/19: Widely patent grafts with no progressive disease in the RCA or circumflex ejection fraction 50 to 55%, LVEDP 8 to 12 mmHg  1.5 left heart catheterization 9/20: Left main normal, LAD  occluded in the mid vessel with a patent LIMA beyond the occluded area, first diagonal has proximal 80% stenosis with a patent SVG to first D1, circumflex normal, RCA normal, EF 30 to 35% with mid anterior and apical wall akinesis and inferior wall hypokinesis  1.6 MUGA scan 12/20: EF 19%  2. A-Fib, s/p CABG per patient report  3. HTN  4. Hyperlipidemia  5. GOPI, uses c-pap  6. Hypothyroidism  7. BPH  8. GERD  9. RA  10. Former smoker  11. 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 upgrade to BI-V ICD  12 dizziness                   12.1 carotid duplex 12/20: Nonobstructive carotid artery     HPI  HPI  Mr. Caraballo is a 80-year-old male  patient who is being followed up today for heart failure with reduced ejection fraction.  He does have a history of EF of 30 to 35% in which she does have a BiV ICD.  He is on guideline driven medication therapy including carvedilol 12.5 mg twice daily and Entresto 24-26 mg twice daily.  He does report increasing shortness of breath over the last couple weeks he says is intermittent so it comes and goes.  He says it sort of feels like someone has a hand on his went or pressing on his chest.  He says this is similar to what it was when he was diagnosed with a heart failure.  He says it sort of comes and goes and is not like that all the time though.  He says it feels like he is hard to get a deep breath at times.  It is worse with activity but also occurs sometimes it rest.  He does have an associated wheeze at times.  He does not lay flat so he is unable to describe any orthopnea.  He does use his CPAP for approximately 10 hours a night.  He does have a productive cough in which he says is a phlegm that is white and clear in nature.  He does have lower extremity edema that is about the same may be a little worse than previous between 1-2+ bilaterally.  [facility] had [facility] his potassium as well as increased his carvedilol to 12.5 mg.  He does report fatigue where he just does not feel like doing a whole lot.  He says he feels a lot of his symptoms are contributed to not being overly active.  He still experiences some level of dizziness especially when he looks up and has generalized weakness.  His major complaint today is left hip/left lower back pain which she has been bothering him.  He does have a history of back issues.  Patient denies any chest pain, palpitations, headaches, lightheadedness, syncope, PND, orthopnea, or other neurological problems.  Patient does have chronic arterial hypertension which is blood pressures well controlled today on his current medication regimen.  His  blood pressure is 115/64.  He will continue the Entresto and carvedilol as recently prescribed.  He is also on antianginal therapy of isosorbide he does have a report of having [facility] recently [facility].  Those are unavailable to us at this time and we will try to get those.  PRIOR MEDS  Current Outpatient Medications on File Prior to Visit   Medication Sig Dispense Refill   • acetaminophen (TYLENOL) 650 MG 8 hr tablet Take 650 mg by mouth Every 8 (Eight) Hours As Needed for Mild Pain .     • albuterol sulfate  (90 Base) MCG/ACT inhaler Inhale 2 puffs Every 4 (Four) Hours As Needed for Wheezing or Shortness of Air. 8 g 1   • allopurinol (ZYLOPRIM) 300 MG tablet Take 300 mg by mouth Daily.     • aspirin 81 MG EC tablet Take 81 mg by mouth Daily.     • atorvastatin (LIPITOR) 80 MG tablet Take 80 mg by mouth Every Night.     • carvedilol (COREG) 6.25 MG tablet Take 1 tablet by mouth 2 (Two) Times a Day. (Patient taking differently: Take 12.5 mg by mouth 2 (Two) Times a Day.) 180 tablet 1   • Chlorphen-Pseudoephed-APAP (CORICIDIN D PO) Take  by mouth.     • Cholecalciferol (VITAMIN D3) 50 MCG (2000 UT) tablet Take 2,000 Units by mouth Daily.     • clopidogrel (PLAVIX) 75 MG tablet TAKE ONE TABLET BY MOUTH DAILY 90 tablet 1   • clopidogrel (PLAVIX) 75 MG tablet Take 1 tablet by mouth Daily. 90 tablet 3   • DULoxetine (Cymbalta) 60 MG capsule Take 1 capsule by mouth Daily. 90 capsule 3   • furosemide (LASIX) 20 MG tablet Take 60 mg by mouth 2 (Two) Times a Day.     • guaiFENesin (MUCINEX) 600 MG 12 hr tablet Take 1,200 mg by mouth As Needed for Cough.     • isosorbide mononitrate (IMDUR) 30 MG 24 hr tablet Half tablet daily (Patient taking differently: Take 15 mg by mouth Daily. Half tablet daily) 45 tablet 3   • levothyroxine (SYNTHROID, LEVOTHROID) 25 MCG tablet Take 25 mcg by mouth Daily.     • Melatonin 3 MG capsule Take 3 mg by mouth Every Night.     • metOLazone (ZAROXOLYN) 2.5 MG tablet Take 2.5 mg by mouth  Daily.     • Miconazole Nitrate 2 % powder Apply 1 application topically to the appropriate area as directed 2 (two) times a day. 100 g 1   • Multiple Vitamins-Minerals (ICAPS AREDS 2 PO) Take  by mouth.     • Multiple Vitamins-Minerals (MULTIVITAMIN ADULT PO) Take  by mouth Daily.     • niacin (SLO-NIACIN) 500 MG CR tablet Take 2 tablets by mouth Every Night. 180 tablet 3   • nitroglycerin (NITROSTAT) 0.4 MG SL tablet 1 under the tongue as needed for angina, may repeat q5mins for up three doses within 15 minutes (Patient taking differently: Place 0.4 mg under the tongue. 1 under the tongue as needed for angina, may repeat q5mins for up three doses within 15 minutes) 25 tablet 3   • pantoprazole (PROTONIX) 40 MG EC tablet Take 40 mg by mouth Daily.     • potassium chloride (K-DUR) 10 MEQ CR tablet Take 20 mEq by mouth 3 (Three) Times a Day. 2 tablets three times daily     • sacubitril-valsartan (Entresto) 24-26 MG tablet Take 1 tablet by mouth 2 (Two) Times a Day. 180 tablet 3   • tamsulosin (FLOMAX) 0.4 MG capsule 24 hr capsule Take 1 capsule by mouth Daily.       No current facility-administered medications on file prior to visit.       ALLERGIES  Patient has no known allergies.    HISTORY  Past Medical History:   Diagnosis Date   • Acid reflux    • Anxiety    • Complete heart block (CMS/HCC)    • Coronary artery disease    • Gout    • Hyperlipidemia    • Hypertension    • Hypothyroid    • Myocardial infarction (CMS/HCC)    • Rheumatoid arthritis (CMS/HCC)    • Sleep apnea        Social History     Socioeconomic History   • Marital status:      Spouse name: Not on file   • Number of children: Not on file   • Years of education: Not on file   • Highest education level: Not on file   Tobacco Use   • Smoking status: Former Smoker     Packs/day: 1.00     Years: 45.00     Pack years: 45.00     Quit date:      Years since quittin.4   • Smokeless tobacco: Never Used   Substance and Sexual Activity   •  "Alcohol use: No   • Drug use: No   • Sexual activity: Defer       Family History   Problem Relation Age of Onset   • No Known Problems Mother    • Colon cancer Father    • Heart disease Brother        Review of Systems   Constitutional: Positive for fatigue. Negative for chills, diaphoresis and fever.   HENT: Negative.    Eyes: Positive for visual disturbance.   Respiratory: Positive for apnea ( bipap ), chest tightness, shortness of breath (with daily activity and labored breathing at rest) and wheezing. Negative for cough.    Cardiovascular: Positive for leg swelling. Negative for chest pain and palpitations.   Gastrointestinal: Negative.  Negative for abdominal pain, blood in stool, constipation, diarrhea, nausea and vomiting.   Endocrine: Negative.    Genitourinary: Positive for frequency. Negative for hematuria.   Musculoskeletal: Positive for arthralgias, back pain and gait problem ( off balance). Negative for myalgias and neck pain.   Skin: Negative.    Allergic/Immunologic: Positive for environmental allergies. Negative for food allergies.   Neurological: Positive for dizziness (when looking up) and weakness. Negative for syncope, light-headedness, numbness and headaches.   Hematological: Bruises/bleeds easily.   Psychiatric/Behavioral: Positive for sleep disturbance (up to bathroom at night). Negative for agitation. The patient is not nervous/anxious.        Objective     VITALS: /74 (BP Location: Right arm, Patient Position: Sitting)   Pulse 70   Ht 185.4 cm (72.99\")   Wt 125 kg (275 lb)   SpO2 97%   BMI 36.29 kg/m²     LABS:   Lab Results (most recent)     None          IMAGING:   XR Chest PA & Lateral    Result Date: 2/25/2021  Pacemaker generator and lead revision with no evidence of pneumothorax or other new chest disease.   D:  02/25/2021 E:  02/25/2021  This report was finalized on 2/25/2021 9:52 PM by Dr. Albert Schreiber MD.        EXAM:  Physical Exam  Vitals and nursing note reviewed. "   Constitutional:       Appearance: He is well-developed.   HENT:      Head: Normocephalic and atraumatic.   Eyes:      Pupils: Pupils are equal, round, and reactive to light.   Neck:      Vascular: No carotid bruit or JVD.   Cardiovascular:      Rate and Rhythm: Normal rate and regular rhythm.      Pulses:           Carotid pulses are 2+ on the right side and 2+ on the left side.       Radial pulses are 2+ on the right side and 2+ on the left side.        Posterior tibial pulses are 2+ on the right side and 2+ on the left side.      Heart sounds: Normal heart sounds. No murmur heard.   No gallop.    Pulmonary:      Effort: Pulmonary effort is normal. No respiratory distress.      Breath sounds: Examination of the right-lower field reveals rales. Decreased breath sounds and rales present.   Abdominal:      General: Bowel sounds are normal. There is no distension.      Palpations: Abdomen is soft.      Tenderness: There is no abdominal tenderness.   Musculoskeletal:         General: Swelling present. Normal range of motion.      Cervical back: Neck supple.      Right lower leg: Right lower leg edema: 2+ BLE.   Skin:     General: Skin is warm and dry.   Neurological:      Mental Status: He is alert and oriented to person, place, and time.      Cranial Nerves: No cranial nerve deficit.      Sensory: No sensory deficit.   Psychiatric:         Speech: Speech normal.         Behavior: Behavior normal.         Thought Content: Thought content normal.         Judgment: Judgment normal.         Procedure   Procedures       Assessment/Plan    Diagnosis Plan   1. Shortness of breath  XR Chest PA & Lateral   2. Respiratory crackles at right lung base  XR Chest PA & Lateral   3. Essential hypertension     4. Coronary artery disease due to calcified coronary lesion     5. Chronic systolic heart failure (CMS/HCC)     1.  Patient does have some shortness of breath and faint crackles in the right lower lung base.  He is also has  increasing lower extremity edema about 1-2+.  Therefore I would like to get a chest x-ray to evaluate for any venous congestion or other potential causes of his shortness of breath.  2.  Patient's blood pressure is well controlled on current blood pressure medication regimen.  No medication changes are warranted at this time.  Patient advised to monitor blood pressure on a daily basis and report any persistent highs or lows.  Set goal blood pressure for patient at 130/80 or below.  3.  Patient does have a history of coronary artery disease with bypass grafting.  He did go under a left heart catheterization in February of this year which showed well revascularized coronary anatomy with no significant blockages.  4.  Patient does have chronic systolic heart failure in which he is on Entresto and carvedilol for guideline driven therapy.  He is having increasing shortness of breath therefore I would like to get a chest x-ray to evaluate for pulmonary edema.  5.  Informed of signs and symptoms of ACS and advised to seek emergent treatment for any new worsening symptoms.  Patient also advised sooner follow-up as needed.  Also advised to follow-up with family doctor as needed  This note is dictated utilizing voice recognition software.  Although this record has been proof read, transcriptional errors may still be present. If questions occur regarding the content of this record please do not hesitate to call our office.  I have reviewed and confirmed the accuracy of the ROS as documented by the MA/LPN/RN DAVID Stone    Return in about 3 months (around 9/1/2021), or if symptoms worsen or fail to improve.    Diagnoses and all orders for this visit:    1. Shortness of breath (Primary)  -     XR Chest PA & Lateral; Future    2. Respiratory crackles at right lung base  -     XR Chest PA & Lateral; Future    3. Essential hypertension    4. Coronary artery disease due to calcified coronary lesion    5. Chronic systolic  heart failure (CMS/[facility])        Advance Care Planning   ACP discussion was held with the patient during this visit. Patient has an advance directive in EMR which is still valid.          MEDS ORDERED DURING VISIT:  No orders of the defined types were placed in this encounter.          This document has been electronically signed by Coleman Harman Jr., DAVID  June 1, 2021 09:49 EDT

## 2021-06-21 ENCOUNTER — TELEPHONE (OUTPATIENT)
Dept: CARDIOLOGY | Facility: CLINIC | Age: 81
End: 2021-06-21

## 2021-06-21 NOTE — TELEPHONE ENCOUNTER
Patient states he has been experiencing more dizziness in the mornings and when looking up. He reports continued chest tightness and shortness of breath. He is having chest xray done tomorrow and follow up with Dr Lai Wednesday. Desirae Arellano MA      ----- Message from Lavell Overton sent at 6/21/2021  4:11 PM EDT -----  PT NOT FEELING WELL AND WOULD LIKE ONE OF YOU TO CALL HIM BACK SO HE CAN EXPLAIN WHAT IS GOING ON.

## 2021-06-22 ENCOUNTER — TELEPHONE (OUTPATIENT)
Dept: CARDIOLOGY | Facility: CLINIC | Age: 81
End: 2021-06-22

## 2021-06-22 NOTE — TELEPHONE ENCOUNTER
Patient seen last night at [facility] ER and being treated for dyspnea and COPD with prednisone. He will keep follow up with Dr Lai tomorrow. Desirae Arellano MA      ----- Message from Lavell Overton sent at 6/22/2021  9:07 AM EDT -----  Pt asked for you to call him when you can. [facility] and would like to tell you about it.

## 2021-06-24 ENCOUNTER — OFFICE VISIT (OUTPATIENT)
Dept: CARDIOLOGY | Facility: CLINIC | Age: 81
End: 2021-06-24

## 2021-06-24 ENCOUNTER — TELEPHONE (OUTPATIENT)
Dept: CARDIOLOGY | Facility: CLINIC | Age: 81
End: 2021-06-24

## 2021-06-24 VITALS
HEIGHT: 73 IN | OXYGEN SATURATION: 98 % | SYSTOLIC BLOOD PRESSURE: 134 MMHG | DIASTOLIC BLOOD PRESSURE: 82 MMHG | HEART RATE: 75 BPM | BODY MASS INDEX: 35.78 KG/M2 | WEIGHT: 270 LBS

## 2021-06-24 DIAGNOSIS — F33.1 DEPRESSION, MAJOR, RECURRENT, MODERATE (HCC): ICD-10-CM

## 2021-06-24 DIAGNOSIS — R06.02 SOB (SHORTNESS OF BREATH): Primary | ICD-10-CM

## 2021-06-24 DIAGNOSIS — R07.89 ATYPICAL CHEST PAIN: ICD-10-CM

## 2021-06-24 DIAGNOSIS — R06.2 WHEEZES: ICD-10-CM

## 2021-06-24 PROCEDURE — 99214 OFFICE O/P EST MOD 30 MIN: CPT | Performed by: NURSE PRACTITIONER

## 2021-06-24 RX ORDER — ALBUTEROL SULFATE 90 UG/1
2 AEROSOL, METERED RESPIRATORY (INHALATION) EVERY 4 HOURS PRN
Qty: 8 G | Refills: 1 | Status: SHIPPED | OUTPATIENT
Start: 2021-06-24 | End: 2021-06-24

## 2021-06-24 RX ORDER — VENLAFAXINE HYDROCHLORIDE 150 MG/1
150 CAPSULE, EXTENDED RELEASE ORAL DAILY
Qty: 90 CAPSULE | Refills: 3 | Status: SHIPPED | OUTPATIENT
Start: 2021-06-24 | End: 2021-06-24

## 2021-06-24 RX ORDER — ALBUTEROL SULFATE 90 UG/1
2 AEROSOL, METERED RESPIRATORY (INHALATION) EVERY 4 HOURS PRN
Qty: 8 G | Refills: 3 | Status: SHIPPED | OUTPATIENT
Start: 2021-06-24

## 2021-06-24 RX ORDER — ALBUTEROL SULFATE 90 UG/1
2 AEROSOL, METERED RESPIRATORY (INHALATION) EVERY 4 HOURS PRN
Qty: 8 G | Refills: 3 | Status: SHIPPED | OUTPATIENT
Start: 2021-06-24 | End: 2021-06-24

## 2021-06-24 RX ORDER — ALBUTEROL SULFATE 2.5 MG/3ML
2.5 SOLUTION RESPIRATORY (INHALATION) EVERY 4 HOURS PRN
Qty: 3 ML | Refills: 12 | Status: SHIPPED | OUTPATIENT
Start: 2021-06-24 | End: 2021-06-24

## 2021-06-24 RX ORDER — VENLAFAXINE HYDROCHLORIDE 150 MG/1
150 CAPSULE, EXTENDED RELEASE ORAL DAILY
Qty: 90 CAPSULE | Refills: 3 | Status: SHIPPED | OUTPATIENT
Start: 2021-06-24

## 2021-06-24 RX ORDER — ALBUTEROL SULFATE 2.5 MG/3ML
2.5 SOLUTION RESPIRATORY (INHALATION) EVERY 4 HOURS PRN
Qty: 3 ML | Refills: 12 | Status: SHIPPED | OUTPATIENT
Start: 2021-06-24 | End: 2021-07-08

## 2021-06-24 NOTE — PROGRESS NOTES
Subjective     Nick Caraballo is a 80 y.o. male who presents to day for Chest Pain (presents as [facility] ([facility]D)), Shortness of Breath, and Dizziness.    CHIEF COMPLIANT  Chief Complaint   Patient presents with   • Chest Pain     presents as [facility] f[facility] ([facility])   • Shortness of Breath   • Dizziness       Active Problems:  Problem List Items Addressed This Visit     None      Visit Diagnoses     SOB (shortness of breath)    -  Primary    Relevant Medications    albuterol sulfate  (90 Base) MCG/ACT inhaler    albuterol (PROVENTIL) (2.5 MG/3ML) 0.083% nebulizer solution    Other Relevant Orders    Home Nebulizer    Wheezes        Relevant Medications    albuterol sulfate  (90 Base) MCG/ACT inhaler    albuterol (PROVENTIL) (2.5 MG/3ML) 0.083% nebulizer solution    Other Relevant Orders    Home Nebulizer    Atypical chest pain        Depression, major, recurrent, moderate (CMS/HCC)        Relevant Medications    venlafaxine XR (EFFEXOR-XR) 150 MG 24 hr capsule      PROBLEM LIST:      1. CAD, MI in 1998  1.1 Stenting x 5 total at [facility]. data  1.2 CABG x2 in Dec. 2014 at [facility] by Dr. Álvarez in setting of ACS, LIMA to LAD and reversed vein graft from aorta to diag. With RLE vein graft  1.3 Stress test, 9-19-19, mild anterolateral ischemia on scintigraphy.  1.4 Cath 10/19: Widely patent grafts with no progressive disease in the RCA or circumflex ejection fraction 50 to 55%, LVEDP 8 to 12 mmHg  1.5 left heart catheterization 9/20: Left main normal, LAD  occluded in the mid vessel with a patent LIMA beyond the occluded area, first diagonal has proximal 80% stenosis with a patent SVG to first D1, circumflex normal, RCA normal, EF 30 to 35% with mid anterior and apical wall akinesis and inferior wall hypokinesis  1.6 MUGA scan 12/20: EF 19%  2. A-Fib, s/p CABG per patient report  3. HTN  4. Hyperlipidemia  5. GOPI, uses c-pap  6. Hypothyroidism  7. BPH  8. GERD  9. RA  10. Former smoker  11. 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 Caraballo is a 80-year-old male patient who is being followed up today for chest pain and shortness of breath.  [facility] which she did undergo an EKG that showed AV pacing.  He did have a chest x-ray that leans more towards COPD if the cough of his shortness of breath other than heart failure.  His CBC was relatively unremarkable.  CMP showed a low potassium in which she was supplemented and his doses was adjusted on his daily medications to 20 mg 3 times a day.  He also had an elevated creatinine of 1.5 with an estimated GFR of 45.  Normal liver enzymes and negative troponin.  Patient does report chest pain that is midsternal to his left arm.  And goes into his shoulder blades at times.  These episodes are intermittent and last for 15 minutes per episode.  He describes him as a squeezing/pressure type sensation.  He did have an episode of left arm pain that went from the back of his arm all the way down to his hand in which she describes as a muscle spasm type sensation.  He said he felt like it was a charley horse.  He did not have any associated symptoms and no obvious aggravating factors.  Patient does report chronic shortness of breath that occurs even with minimal activity.  He does become dyspneic and tachypneic at times.  This occurs at random even at rest.  He does have associated dizziness and cough.  He is unable to lay flat and he sleeps elevated in the chair.  He says even comes dyspneic when walking at times.  He does report fatigue where he gets tired very easily.  He does experience some dizziness with position changes.  Lower extremity edema occurs in his feet and ankles.  He denies any palpitations syncope, PND, or other strokelike symptoms.  We did review his records from [facility].  PRIOR MEDS  Current Outpatient Medications on File Prior to Visit   Medication Sig Dispense Refill   • acetaminophen (TYLENOL) 650 MG 8 hr tablet Take 650 mg by mouth Every 8 (Eight) Hours As Needed for Mild Pain .     • allopurinol (ZYLOPRIM) 300 MG tablet Take 300 mg by mouth Daily.     • aspirin 81 MG EC tablet Take 81 mg by mouth Daily.     • atorvastatin (LIPITOR) 80 MG tablet Take 80 mg by mouth Every Night.     • carvedilol (COREG) 6.25 MG tablet Take 1 tablet by mouth 2 (Two) Times a Day. (Patient taking differently: Take 12.5 mg by mouth 2 (Two) Times a Day.) 180 tablet 1   • Chlorphen-Pseudoephed-APAP (CORICIDIN D PO) Take  by mouth.     • Cholecalciferol (VITAMIN D3) 50 MCG (2000 UT) tablet Take 2,000 Units by mouth Daily.     • clopidogrel (PLAVIX) 75 MG tablet TAKE ONE TABLET BY MOUTH DAILY 90 tablet 1   • furosemide (LASIX) 20 MG tablet Take 60 mg by mouth 2 (Two) Times a Day.     • guaiFENesin (MUCINEX) 600 MG 12 hr tablet Take 1,200 mg by mouth As Needed for Cough.     • isosorbide mononitrate (IMDUR) 30 MG 24 hr tablet Half tablet daily (Patient taking differently: Take 15 mg by mouth Daily. Half tablet daily) 45 tablet 3   • levothyroxine (SYNTHROID, LEVOTHROID) 25 MCG tablet Take 25 mcg by mouth Daily.     • Melatonin 3 MG capsule Take 3 mg by mouth Every Night.     • metOLazone (ZAROXOLYN) 2.5 MG tablet Take 2.5 mg by mouth Daily.     • Miconazole Nitrate 2 % powder Apply 1 application topically to the appropriate area as directed 2 (two) times a day. 100 g 1   • Multiple Vitamins-Minerals (ICAPS AREDS 2 PO) Take  by mouth.     • Multiple Vitamins-Minerals (MULTIVITAMIN ADULT PO) Take  by mouth Daily.     • niacin (SLO-NIACIN) 500 MG CR tablet Take 2 tablets by mouth Every Night. 180 tablet 3   • nitroglycerin (NITROSTAT) 0.4 MG SL tablet 1 under the tongue as needed for angina, may repeat q5mins for up three doses within 15 minutes (Patient taking differently: Place 0.4 mg  under the tongue. 1 under the tongue as needed for angina, may repeat q5mins for up three doses within 15 minutes) 25 tablet 3   • pantoprazole (PROTONIX) 40 MG EC tablet Take 40 mg by mouth Daily.     • potassium chloride (K-DUR) 10 MEQ CR tablet Take 20 mEq by mouth 3 (Three) Times a Day. 2 tablets three times daily     • sacubitril-valsartan (Entresto) 24-26 MG tablet Take 1 tablet by mouth 2 (Two) Times a Day. 180 tablet 3   • tamsulosin (FLOMAX) 0.4 MG capsule 24 hr capsule Take 1 capsule by mouth Daily.       No current facility-administered medications on file prior to visit.       ALLERGIES  Patient has no known allergies.    HISTORY  Past Medical History:   Diagnosis Date   • Acid reflux    • Anxiety    • Complete heart block (CMS/HCC)    • COPD (chronic obstructive pulmonary disease) (CMS/HCC)    • Coronary artery disease    • Gout    • Hyperlipidemia    • Hypertension    • Hypothyroid    • Myocardial infarction (CMS/HCC)    • Pseudophakia    • Rheumatoid arthritis (CMS/HCC)    • Sleep apnea    • Thyroid disease        Social History     Socioeconomic History   • Marital status:      Spouse name: Not on file   • Number of children: Not on file   • Years of education: Not on file   • Highest education level: Not on file   Tobacco Use   • Smoking status: Former Smoker     Packs/day: 1.00     Years: 45.00     Pack years: 45.00     Quit date:      Years since quittin.4   • Smokeless tobacco: Never Used   Substance and Sexual Activity   • Alcohol use: No   • Drug use: No   • Sexual activity: Defer       Family History   Problem Relation Age of Onset   • No Known Problems Mother    • Colon cancer Father    • Heart disease Brother        Review of Systems   Constitutional: Positive for fatigue. Negative for chills, diaphoresis and fever.   Eyes: Negative.    Respiratory: Positive for apnea (cpap), cough, chest tightness, shortness of breath (with any activity and at rest) and wheezing.   "  Cardiovascular: Positive for chest pain (squeezing pain/pressure around chesst) and leg swelling (feet and ankles). Negative for palpitations.   Gastrointestinal: Negative.  Negative for blood in stool.   Endocrine: Negative.    Genitourinary: Negative.  Negative for hematuria.   Musculoskeletal: Positive for arthralgias, back pain, myalgias and neck pain.   Skin: Negative.    Allergic/Immunologic: Negative.    Neurological: Positive for dizziness (with position change and associated with SOA), weakness, numbness (toes) and headaches. Negative for syncope and light-headedness.   Hematological: Bruises/bleeds easily.   Psychiatric/Behavioral: Positive for agitation. Negative for sleep disturbance (sleeps in lift chair to prevent smothering even with cpap). The patient is nervous/anxious (Hyperventilates with anxiety/stress ).        Objective     VITALS: /82 (BP Location: Right arm, Patient Position: Standing)   Pulse 75   Ht 185.4 cm (72.99\")   Wt 122 kg (270 lb)   SpO2 98%   BMI 35.63 kg/m²     LABS:   Lab Results (most recent)     None          IMAGING:   XR Chest PA & Lateral    Result Date: 2/25/2021  Pacemaker generator and lead revision with no evidence of pneumothorax or other new chest disease.   D:  02/25/2021 E:  02/25/2021  This report was finalized on 2/25/2021 9:52 PM by Dr. Albert Schreiber MD.        EXAM:  Physical Exam  Vitals and nursing note reviewed.   Constitutional:       Appearance: He is well-developed.   HENT:      Head: Normocephalic.   Eyes:      Pupils: Pupils are equal, round, and reactive to light.   Neck:      Thyroid: No thyroid mass.      Vascular: No carotid bruit or JVD.      Trachea: Trachea and phonation normal.   Cardiovascular:      Rate and Rhythm: Normal rate and regular rhythm.      Pulses:           Radial pulses are 2+ on the right side and 2+ on the left side.        Posterior tibial pulses are 2+ on the right side and 2+ on the left side.      Heart sounds: Normal " heart sounds. No murmur heard.   No friction rub. No gallop.    Pulmonary:      Effort: Pulmonary effort is normal. No respiratory distress.      Breath sounds: Wheezing present. No rales.   Abdominal:      General: Bowel sounds are normal.      Palpations: Abdomen is soft.   Musculoskeletal:         General: Normal range of motion.      Cervical back: Neck supple.   Skin:     General: Skin is warm and dry.      Capillary Refill: Capillary refill takes less than 2 seconds.      Findings: No rash.   Neurological:      Mental Status: He is alert and oriented to person, place, and time.   Psychiatric:         Speech: Speech normal.         Behavior: Behavior normal.         Thought Content: Thought content normal.         Judgment: Judgment normal.         Procedure   Procedures       Assessment/Plan    Diagnosis Plan   1. SOB (shortness of breath)  albuterol sulfate  (90 Base) MCG/ACT inhaler    Home Nebulizer    albuterol (PROVENTIL) (2.5 MG/3ML) 0.083% nebulizer solution   2. Wheezes  albuterol sulfate  (90 Base) MCG/ACT inhaler    Home Nebulizer    albuterol (PROVENTIL) (2.5 MG/3ML) 0.083% nebulizer solution   3. Atypical chest pain     4. Depression, major, recurrent, moderate (CMS/HCC)  venlafaxine XR (EFFEXOR-XR) 150 MG 24 hr capsule   1.  Patient does have shortness of breath which seems to be his major complaint today.  He reports that he has been using the steroids as ordered as well as the inhaler that was prescribed.  He says that he really received benefit from the breathing treatments while he is in the emergency department.  He requested a home nebulizer to replace his albuterol inhaler.  I do think this is appropriate due to.  The significance of patient's wheezing and dyspnea today.  He did have one episode in the office where he became tachypneic and short of breath.  [facility].  2.  We did discuss repeating a stress test and echocardiogram due to the fact  that the shortness of breath and atypical chest pain.  However patient wants to defer at this time.  He does believe that is more associated with his lungs.  We did discuss potential referral to pulmonology for further evaluation.  3.  Patient reports that the Cymbalta does not seem to work as well as the Effexor and requested switching back.  We previously switched to Cymbalta to see if we can help with the myalgias that he was experiencing.  4.  Patient's blood pressure is well controlled on current blood pressure medication regimen.  No medication changes are warranted at this time.  Patient advised to monitor blood pressure on a daily basis and report any persistent highs or lows.  Set goal blood pressure for patient at 130/80 or below.  5.  Patient does have a history of heart failure with reduced ejection fraction in which she is on Entresto and carvedilol for guideline driven therapy.    6.  Informed of signs and symptoms of ACS and advised to seek emergent treatment for any new worsening symptoms.  Patient also advised sooner follow-up as needed.  Also advised to follow-up with family doctor as needed  This note is dictated utilizing voice recognition software.  Although this record has been proof read, transcriptional errors may still be present. If questions occur regarding the content of this record please do not hesitate to call our office.  I have reviewed and confirmed the accuracy of the ROS as documented by the MA/RASHI/RN DAVID Stone    No follow-ups on file.    Diagnoses and all orders for this visit:    1. SOB (shortness of breath) (Primary)  -     Discontinue: albuterol sulfate  (90 Base) MCG/ACT inhaler; Inhale 2 puffs Every 4 (Four) Hours As Needed for Wheezing or Shortness of Air.  Dispense: 8 g; Refill: 1  -     Discontinue: albuterol sulfate  (90 Base) MCG/ACT inhaler; Inhale 2 puffs Every 4 (Four) Hours As Needed for Wheezing or Shortness of Air.  Dispense: 8 g; Refill:  3  -     albuterol sulfate  (90 Base) MCG/ACT inhaler; Inhale 2 puffs Every 4 (Four) Hours As Needed for Wheezing or Shortness of Air.  Dispense: 8 g; Refill: 3  -     Home Nebulizer  -     Discontinue: albuterol (PROVENTIL) (2.5 MG/3ML) 0.083% nebulizer solution; Take 2.5 mg by nebulization Every 4 (Four) Hours As Needed for Wheezing or Shortness of Air for up to 14 days.  Dispense: 3 mL; Refill: 12  -     albuterol (PROVENTIL) (2.5 MG/3ML) 0.083% nebulizer solution; Take 2.5 mg by nebulization Every 4 (Four) Hours As Needed for Wheezing or Shortness of Air for up to 14 days.  Dispense: 3 mL; Refill: 12    2. Wheezes  -     Discontinue: albuterol sulfate  (90 Base) MCG/ACT inhaler; Inhale 2 puffs Every 4 (Four) Hours As Needed for Wheezing or Shortness of Air.  Dispense: 8 g; Refill: 1  -     Discontinue: albuterol sulfate  (90 Base) MCG/ACT inhaler; Inhale 2 puffs Every 4 (Four) Hours As Needed for Wheezing or Shortness of Air.  Dispense: 8 g; Refill: 3  -     albuterol sulfate  (90 Base) MCG/ACT inhaler; Inhale 2 puffs Every 4 (Four) Hours As Needed for Wheezing or Shortness of Air.  Dispense: 8 g; Refill: 3  -     Home Nebulizer  -     Discontinue: albuterol (PROVENTIL) (2.5 MG/3ML) 0.083% nebulizer solution; Take 2.5 mg by nebulization Every 4 (Four) Hours As Needed for Wheezing or Shortness of Air for up to 14 days.  Dispense: 3 mL; Refill: 12  -     albuterol (PROVENTIL) (2.5 MG/3ML) 0.083% nebulizer solution; Take 2.5 mg by nebulization Every 4 (Four) Hours As Needed for Wheezing or Shortness of Air for up to 14 days.  Dispense: 3 mL; Refill: 12    3. Atypical chest pain    4. Depression, major, recurrent, moderate (CMS/HCC)  -     Discontinue: venlafaxine XR (EFFEXOR-XR) 150 MG 24 hr capsule; Take 1 capsule by mouth Daily.  Dispense: 90 capsule; Refill: 3  -     Discontinue: venlafaxine XR (EFFEXOR-XR) 150 MG 24 hr capsule; Take 1 capsule by mouth Daily.  Dispense: 90 capsule;  Refill: 3  -     venlafaxine XR (EFFEXOR-XR) 150 MG 24 hr capsule; Take 1 capsule by mouth Daily.  Dispense: 90 capsule; Refill: 3               MEDS ORDERED DURING VISIT:  New Medications Ordered This Visit   Medications   • venlafaxine XR (EFFEXOR-XR) 150 MG 24 hr capsule     Sig: Take 1 capsule by mouth Daily.     Dispense:  90 capsule     Refill:  3   • albuterol sulfate  (90 Base) MCG/ACT inhaler     Sig: Inhale 2 puffs Every 4 (Four) Hours As Needed for Wheezing or Shortness of Air.     Dispense:  8 g     Refill:  3   • albuterol (PROVENTIL) (2.5 MG/3ML) 0.083% nebulizer solution     Sig: Take 2.5 mg by nebulization Every 4 (Four) Hours As Needed for Wheezing or Shortness of Air for up to 14 days.     Dispense:  3 mL     Refill:  12           This document has been electronically signed by Coleman Harman Jr., APRN  June 25, 2021 01:19 EDT

## 2021-06-24 NOTE — TELEPHONE ENCOUNTER
[facility] stating [facility] got a [facility] for [facility] and need to know if Cymbalta was D/C'd. I called and spoke to three different people, none of them knew why I was calling. I explained that pt is on Effexor and we stopped Cymbalta. I was transferred to pharmacy and hung up on.

## 2021-06-25 NOTE — PATIENT INSTRUCTIONS
Acute Coronary Syndrome  Acute coronary syndrome (ACS) is a serious problem in which there is suddenly not enough blood and oxygen reaching the heart. ACS can result in chest pain or a heart attack.  This condition is a medical emergency. If you have any symptoms of this condition, get help right away.  What are the causes?  This condition may be caused by:  · A buildup of fat and cholesterol inside the arteries (atherosclerosis). This is the most common cause. The buildup (plaque) can cause blood vessels in the heart (coronary arteries) to become narrow or blocked, which reduces blood flow to the heart. Plaque can also break off and lead to a clot, which can block an artery and cause a heart attack or stroke.  · Sudden tightening of the muscles around the coronary arteries (coronary spasm).  · Tearing of a coronary artery (spontaneous coronary artery dissection).  · Very low blood pressure (hypotension).  · An abnormal heartbeat (arrhythmia).  · Other medical conditions that cause a decrease of oxygen to the heart, such as anemiaorrespiratory failure.  · Using cocaine or methamphetamine.  What increases the risk?  The following factors may make you more likely to develop this condition:  · Age. The risk for ACS increases as you get older.  · History of chest pain, heart attack, peripheral artery disease, or stroke.  · Having taken chemotherapy or immune-suppressing medicines.  · Being male.  · Family history of chest pain, heart disease, or stroke.  · Smoking.  · Not exercising enough.  · Being overweight.  · High cholesterol.  · High blood pressure (hypertension).  · Diabetes.  · Excessive alcohol use.  What are the signs or symptoms?  Common symptoms of this condition include:  · Chest pain. The pain may last a long time, or it may stop and come back (recur). It may feel like:  ? Crushing or squeezing.  ? Tightness, pressure, fullness, or heaviness.  · Arm, neck, jaw, or back pain.  · Heartburn or  indigestion.  · Shortness of breath.  · Nausea.  · Sudden cold sweats.  · Light-headedness.  · Dizziness or passing out.  · Tiredness (fatigue).  Sometimes there are no symptoms.  How is this diagnosed?  This condition may be diagnosed based on:  · Your medical history and symptoms.  · Imaging tests, such as:  ? An electrocardiogram (ECG). This measures the heart's electrical activity.  ? X-rays.  ? CT scan.  ? A coronary angiogram. For this test, dye is injected into the heart arteries and then X-rays are taken.  ? Myocardial perfusion imaging. This test shows how well blood flows through your heart muscle.  · Blood tests. These may be repeated at certain time intervals.  · Exercise stress testing.  · Echocardiogram. This is a test that uses sound waves to produce detailed images of the heart.  How is this treated?  Treatment for this condition may include:  · Oxygen therapy.  · Medicines, such as:  ? Antiplatelet medicines and blood-thinning medicines, such as aspirin. These help prevent blood clots.  ? Medicine that dissolves any blood clots (fibrinolytic therapy).  ? Blood pressure medicines.  ? Nitroglycerin. This helps widen blood vessels to improve blood flow.  ? Pain medicine.  ? Cholesterol-lowering medicine.  · Surgery, such as:  ? Coronary angioplasty with stent placement. This involves placing a small piece of metal that looks like mesh or a spring into a narrow coronary artery. This widens the artery and keeps it open.  ? Coronary artery bypass surgery. This involves taking a section of a blood vessel from a different part of your body and placing it on the blocked coronary artery to allow blood to flow around the blockage.  · Cardiac rehabilitation. This is a program that includes exercise training, education, and counseling to help you recover.  Follow these instructions at home:  Eating and drinking  · Eat a heart-healthy diet that includes whole grains, fruits and vegetables, lean proteins, and  low-fat or nonfat dairy products.  · Limit how much salt (sodium) you eat as told by your health care provider. Follow instructions from your health care provider about any other eating or drinking restrictions, such as limiting foods that are high in fat and processed sugars.  · Use healthy cooking methods such as roasting, grilling, broiling, baking, poaching, steaming, or stir-frying.  · Work with a dietitian to follow a heart-healthy eating plan.  Medicines  · Take over-the-counter and prescription medicines only as told by your health care provider.  · Do not take these medicines unless your health care provider approves:  ? Vitamin supplements that contain vitamin A or vitamin E.  ? NSAIDs, such as ibuprofen, naproxen, or celecoxib.  ? Hormone replacement therapy that contains estrogen.  · If you are taking blood thinners:  ? Talk with your health care provider before you take any medicines that contain aspirin or NSAIDs. These medicines increase your risk for dangerous bleeding.  ? Take your medicine exactly as told, at the same time every day.  ? Avoid activities that could cause injury or bruising, and follow instructions about how to prevent falls.  ? Wear a medical alert bracelet, and carry a card that lists what medicines you take.  Activity  · Follow your cardiac rehabilitation program. Do exercises as told by your physical therapist.  · Ask your health care provider what activities and exercises are safe for you. Follow his or her instructions about lifting, driving, or climbing stairs.  Lifestyle  · Do not use any products that contain nicotine or tobacco, such as cigarettes, e-cigarettes, and chewing tobacco. If you need help quitting, ask your health care provider.  · Do not drink alcohol if your health care provider tells you not to drink.  · If you drink alcohol:  ? Limit how much you have to 0-1 drink a day.  ? Be aware of how much alcohol is in your drink. In the [place] one drink equals one 12 oz  bottle of beer (355 mL), one 5 oz glass of wine (148 mL), or one 1½ oz glass of hard liquor (44 mL).  · Maintain a healthy weight. If you need to lose weight, work with your health care provider to do so safely.  General instructions  · Tell all the health care providers who provide care for you about your heart condition, including your dentist. This may affect the medicines or treatment you receive.  · Manage any other health conditions you have, such as hypertension or diabetes. These conditions affect your heart.  · Pay attention to your mental health. You may be at higher risk for depression.  ? Find ways to manage stress.  ? Talk to your health care provider about depression screening and treatment.  · Keep your vaccinations up to date.  ? Get the flu shot (influenza vaccine) every year.  ? Get the pneumococcal vaccine if you are age 65 or older.  · If directed, monitor your blood pressure at home.  · Keep all follow-up visits as told by your health care provider. This is important.  Contact a health care provider if you:  · Feel overwhelmed or sad.  · Have trouble doing your daily activities.  Get help right away if you:  · Have pain in your chest, neck, arm, jaw, stomach, or back that recurs, and:  ? It lasts for more than a few minutes.  ? It is not relieved by taking the medicineyour health care provider prescribed.  · Have unexplained:  ? Heavy sweating.  ? Heartburn or indigestion.  ? Nausea or vomiting.  ? Shortness of breath.  ? Difficulty breathing.  ? Fatigue.  ? Nervousness or anxiety.  ? Weakness.  ? Diarrhea.  ? Dark stools or blood in your stool.  · Have sudden light-headedness or dizziness.  · Have blood pressure that is higher than 180/120.  · Faint.  · Have thoughts about hurting yourself.  These symptoms may represent a serious problem that is an emergency. Do not wait to see if the symptoms will go away. Get medical help right away. Call your local emergency services (911 in the [place] Do  not drive yourself to the hospital.   Summary  · Acute coronary syndrome (ACS) is when there is not enough blood and oxygen being supplied to the heart. ACS can result in chest pain or a heart attack.  · Acute coronary syndrome is a medical emergency. If you have any symptoms of this condition, get help right away.  · Treatment includes medicines and procedures to open the blocked arteries and restore blood flow.  This information is not intended to replace advice given to you by your health care provider. Make sure you discuss any questions you have with your health care provider.  Document Revised: 05/20/2020 Document Reviewed: 12/30/2019  [employer] Patient Education © 2021 [employer]      Heart Failure, Diagnosis    Heart failure means that your heart is not able to pump blood in the right way. This makes it hard for your body to work well. Heart failure is usually a long-term (chronic) condition. You must take good care of yourself and follow your treatment plan from your doctor.  What are the causes?  This condition may be caused by:  · High blood pressure.  · Build up of cholesterol and fat in the arteries.  · Heart attack. This injures the heart muscle.  · Heart valves that do not open and close properly.  · Damage of the heart muscle. This is also called cardiomyopathy.  · Lung disease.  · Abnormal heart rhythms.  What increases the risk?  The risk of heart failure goes up as a person ages. This condition is also more likely to develop in people who:  · Are overweight.  · Are male.  · Smoke or chew tobacco.  · Abuse alcohol or illegal drugs.  · Have taken medicines that can damage the heart.  · Have diabetes.  · Have abnormal heart rhythms.  · Have thyroid problems.  · Have low blood counts (anemia).  What are the signs or symptoms?  Symptoms of this condition include:  · Shortness of breath.  · Coughing.  · Swelling of the feet, ankles, legs, or belly.  · Losing weight for no reason.  · Trouble  breathing.  · Waking from sleep because of the need to sit up and get more air.  · Rapid heartbeat.  · Being very tired.  · Feeling dizzy, or feeling like you may pass out (faint).  · Having no desire to eat.  · Feeling like you may vomit (nauseous).  · Peeing (urinating) more at night.  · Feeling confused.  How is this treated?         This condition may be treated with:  · Medicines. These can be given to treat blood pressure and to make the heart muscles stronger.  · Changes in your daily life. These may include eating a healthy diet, staying at a healthy body weight, quitting tobacco and illegal drug use, or doing exercises.  · Surgery. Surgery can be done to open blocked valves, or to put devices in the heart, such as pacemakers.  · A donor heart (heart transplant). You will receive a healthy heart from a donor.  Follow these instructions at home:  · Treat other conditions as told by your doctor. These may include high blood pressure, diabetes, thyroid disease, or abnormal heart rhythms.  · Learn as much as you can about heart failure.  · Get support as you need it.  · Keep all follow-up visits as told by your doctor. This is important.  Summary  · Heart failure means that your heart is not able to pump blood in the right way.  · This condition is caused by high blood pressure, heart attack, or damage of the heart muscle.  · Symptoms of this condition include shortness of breath and swelling of the feet, ankles, legs, or belly. You may also feel very tired or feel like you may vomit.  · You may be treated with medicines, surgery, or changes in your daily life.  · Treat other health conditions as told by your doctor.  This information is not intended to replace advice given to you by your health care provider. Make sure you discuss any questions you have with your health care provider.  Document Revised: 03/06/2020 Document Reviewed: 03/06/2020  [employer] Patient Education © 2021 [employer]      Hypertension,  Adult  Hypertension is another name for high blood pressure. High blood pressure forces your heart to work harder to pump blood. This can cause problems over time.  There are two numbers in a blood pressure reading. There is a top number (systolic) over a bottom number (diastolic). It is best to have a blood pressure that is below 120/80. Healthy choices can help lower your blood pressure, or you may need medicine to help lower it.  What are the causes?  The cause of this condition is not known. Some conditions may be related to high blood pressure.  What increases the risk?  · Smoking.  · Having type 2 diabetes mellitus, high cholesterol, or both.  · Not getting enough exercise or physical activity.  · Being overweight.  · Having too much fat, sugar, calories, or salt (sodium) in your diet.  · Drinking too much alcohol.  · Having long-term (chronic) kidney disease.  · Having a family history of high blood pressure.  · Age. Risk increases with age.  · Race. You may be at higher risk if you are .  · Gender. Men are at higher risk than women before age 45. After age 65, women are at higher risk than men.  · Having obstructive sleep apnea.  · Stress.  What are the signs or symptoms?  · High blood pressure may not cause symptoms. Very high blood pressure (hypertensive crisis) may cause:  ? Headache.  ? Feelings of worry or nervousness (anxiety).  ? Shortness of breath.  ? Nosebleed.  ? A feeling of being sick to your stomach (nausea).  ? Throwing up (vomiting).  ? Changes in how you see.  ? Very bad chest pain.  ? Seizures.  How is this treated?  · This condition is treated by making healthy lifestyle changes, such as:  ? Eating healthy foods.  ? Exercising more.  ? Drinking less alcohol.  · Your health care provider may prescribe medicine if lifestyle changes are not enough to get your blood pressure under control, and if:  ? Your top number is above 130.  ? Your bottom number is above 80.  · Your  personal target blood pressure may vary.  Follow these instructions at home:  Eating and drinking    · If told, follow the DASH eating plan. To follow this plan:  ? Fill one half of your plate at each meal with fruits and vegetables.  ? Fill one fourth of your plate at each meal with whole grains. Whole grains include whole-wheat pasta, brown rice, and whole-grain bread.  ? Eat or drink low-fat dairy products, such as skim milk or low-fat yogurt.  ? Fill one fourth of your plate at each meal with low-fat (lean) proteins. Low-fat proteins include fish, chicken without skin, eggs, beans, and tofu.  ? Avoid fatty meat, cured and processed meat, or chicken with skin.  ? Avoid pre-made or processed food.  · Eat less than 1,500 mg of salt each day.  · Do not drink alcohol if:  ? Your doctor tells you not to drink.  ? You are pregnant, may be pregnant, or are planning to become pregnant.  · If you drink alcohol:  ? Limit how much you use to:  § 0-1 drink a day for women.  § 0-2 drinks a day for men.  ? Be aware of how much alcohol is in your drink. In the [place] one drink equals one 12 oz bottle of beer (355 mL), one 5 oz glass of wine (148 mL), or one 1½ oz glass of hard liquor (44 mL).  Lifestyle    · Work with your doctor to stay at a healthy weight or to lose weight. Ask your doctor what the best weight is for you.  · Get at least 30 minutes of exercise most days of the week. This may include walking, swimming, or biking.  · Get at least 30 minutes of exercise that strengthens your muscles (resistance exercise) at least 3 days a week. This may include lifting weights or doing Pilates.  · Do not use any products that contain nicotine or tobacco, such as cigarettes, e-cigarettes, and chewing tobacco. If you need help quitting, ask your doctor.  · Check your blood pressure at home as told by your doctor.  · Keep all follow-up visits as told by your doctor. This is important.  Medicines  · Take over-the-counter and  prescription medicines only as told by your doctor. Follow directions carefully.  · Do not skip doses of blood pressure medicine. The medicine does not work as well if you skip doses. Skipping doses also puts you at risk for problems.  · Ask your doctor about side effects or reactions to medicines that you should watch for.  Contact a doctor if you:  · Think you are having a reaction to the medicine you are taking.  · Have headaches that keep coming back (recurring).  · Feel dizzy.  · Have swelling in your ankles.  · Have trouble with your vision.  Get help right away if you:  · Get a very bad headache.  · Start to feel mixed up (confused).  · Feel weak or numb.  · Feel faint.  · Have very bad pain in your:  ? Chest.  ? Belly (abdomen).  · Throw up more than once.  · Have trouble breathing.  Summary  · Hypertension is another name for high blood pressure.  · High blood pressure forces your heart to work harder to pump blood.  · For most people, a normal blood pressure is less than 120/80.  · Making healthy choices can help lower blood pressure. If your blood pressure does not get lower with healthy choices, you may need to take medicine.  This information is not intended to replace advice given to you by your health care provider. Make sure you discuss any questions you have with your health care provider.  Document Revised: 08/28/2019 Document Reviewed: 08/28/2019  Elsevier Patient Education © 2021 [employer]

## 2021-07-01 DIAGNOSIS — R06.02 SOB (SHORTNESS OF BREATH): Primary | ICD-10-CM

## 2021-07-01 DIAGNOSIS — R06.2 WHEEZES: ICD-10-CM

## 2021-07-01 NOTE — PROGRESS NOTES
PT CALLED OUR OFFICE AND INQUIRED IF IT IS NECESSARY FOR HIM TO PROCEED WITH A PULMONOLOGY CONSULT SINCE THE NEBULIZER IS HELPING HIM.  THE PM EXPLAINED THIS IS A TEMPORARY SOLUTION AND HE NEEDS A FULL WORKUP TO DETERMINE THE ORIGIN OF HIS SYMPTOMS.  PT IS IN AGREEMENT.

## 2021-08-10 ENCOUNTER — TELEPHONE (OUTPATIENT)
Dept: CARDIOLOGY | Facility: CLINIC | Age: 81
End: 2021-08-10

## 2021-08-10 DIAGNOSIS — R07.2 PRECORDIAL PAIN: ICD-10-CM

## 2021-08-10 DIAGNOSIS — R94.30 CARDIAC LV EJECTION FRACTION 30-35%: ICD-10-CM

## 2021-08-10 DIAGNOSIS — I25.5 ISCHEMIC CARDIOMYOPATHY: ICD-10-CM

## 2021-08-10 DIAGNOSIS — I50.22 CHRONIC SYSTOLIC HEART FAILURE (HCC): ICD-10-CM

## 2021-08-10 DIAGNOSIS — I50.20 HFREF (HEART FAILURE WITH REDUCED EJECTION FRACTION) (HCC): ICD-10-CM

## 2021-08-10 RX ORDER — CARVEDILOL 12.5 MG/1
12.5 TABLET ORAL 2 TIMES DAILY
Qty: 180 TABLET | Refills: 3 | Status: SHIPPED | OUTPATIENT
Start: 2021-08-10 | End: 2022-11-11

## 2021-08-10 RX ORDER — ATORVASTATIN CALCIUM 80 MG/1
80 TABLET, FILM COATED ORAL NIGHTLY
Qty: 90 TABLET | Refills: 3 | Status: SHIPPED | OUTPATIENT
Start: 2021-08-10 | End: 2022-11-23 | Stop reason: ALTCHOICE

## 2021-08-10 RX ORDER — LANOLIN ALCOHOL/MO/W.PET/CERES
1000 CREAM (GRAM) TOPICAL NIGHTLY
Qty: 180 TABLET | Refills: 3 | Status: SHIPPED | OUTPATIENT
Start: 2021-08-10 | End: 2021-10-22

## 2021-08-10 RX ORDER — METOLAZONE 2.5 MG/1
2.5 TABLET ORAL DAILY
Qty: 90 TABLET | Refills: 3 | Status: SHIPPED | OUTPATIENT
Start: 2021-08-10 | End: 2021-11-15 | Stop reason: SDUPTHER

## 2021-08-10 RX ORDER — ASPIRIN 81 MG/1
81 TABLET ORAL DAILY
Qty: 90 TABLET | Refills: 3 | Status: SHIPPED | OUTPATIENT
Start: 2021-08-10

## 2021-08-10 RX ORDER — CLOPIDOGREL BISULFATE 75 MG/1
75 TABLET ORAL DAILY
Qty: 90 TABLET | Refills: 3 | Status: SHIPPED | OUTPATIENT
Start: 2021-08-10 | End: 2021-10-22 | Stop reason: ALTCHOICE

## 2021-08-10 RX ORDER — ISOSORBIDE MONONITRATE 30 MG/1
15 TABLET, EXTENDED RELEASE ORAL DAILY
Qty: 45 TABLET | Refills: 3 | Status: SHIPPED | OUTPATIENT
Start: 2021-08-10 | End: 2022-11-11

## 2021-08-10 RX ORDER — ATORVASTATIN CALCIUM 80 MG/1
80 TABLET, FILM COATED ORAL NIGHTLY
Qty: 90 TABLET | Refills: 3 | Status: SHIPPED | OUTPATIENT
Start: 2021-08-10 | End: 2021-08-10 | Stop reason: SDUPTHER

## 2021-08-10 RX ORDER — POTASSIUM CHLORIDE 1500 MG/1
20 TABLET, FILM COATED, EXTENDED RELEASE ORAL 3 TIMES DAILY
Qty: 270 TABLET | Refills: 3 | Status: SHIPPED | OUTPATIENT
Start: 2021-08-10 | End: 2021-08-10

## 2021-08-10 RX ORDER — SACUBITRIL AND VALSARTAN 24; 26 MG/1; MG/1
1 TABLET, FILM COATED ORAL 2 TIMES DAILY
Qty: 180 TABLET | Refills: 3 | Status: SHIPPED | OUTPATIENT
Start: 2021-08-10 | End: 2022-11-11 | Stop reason: SDUPTHER

## 2021-08-10 RX ORDER — POTASSIUM CHLORIDE 1500 MG/1
20 TABLET, FILM COATED, EXTENDED RELEASE ORAL 3 TIMES DAILY
Qty: 270 TABLET | Refills: 3 | Status: SHIPPED | OUTPATIENT
Start: 2021-08-10 | End: 2021-10-22

## 2021-08-10 RX ORDER — NITROGLYCERIN 0.4 MG/1
0.4 TABLET SUBLINGUAL
Qty: 25 TABLET | Refills: 3 | Status: SHIPPED | OUTPATIENT
Start: 2021-08-10

## 2021-08-10 RX ORDER — FUROSEMIDE 20 MG/1
60 TABLET ORAL DAILY
Qty: 270 TABLET | Refills: 3 | Status: SHIPPED | OUTPATIENT
Start: 2021-08-10 | End: 2021-10-18 | Stop reason: SDUPTHER

## 2021-08-10 NOTE — TELEPHONE ENCOUNTER
Nima, Pharmacist @ [facility] in [place] LVM on triage line requesting  clarification on sig for Potassium Chloride 20 MEQ.

## 2021-08-10 NOTE — TELEPHONE ENCOUNTER
Pt LVM on the triage line requesting a return call from our office.  The PM called the pt back.  He requested all his prescriptions to [facility] to [facility].  He will no longer use [facility] for refills.

## 2021-08-30 ENCOUNTER — OFFICE VISIT (OUTPATIENT)
Dept: CARDIOLOGY | Facility: CLINIC | Age: 81
End: 2021-08-30

## 2021-08-30 VITALS
HEART RATE: 71 BPM | BODY MASS INDEX: 36.05 KG/M2 | SYSTOLIC BLOOD PRESSURE: 119 MMHG | WEIGHT: 272 LBS | OXYGEN SATURATION: 96 % | HEIGHT: 73 IN | DIASTOLIC BLOOD PRESSURE: 74 MMHG

## 2021-08-30 DIAGNOSIS — R06.02 SHORTNESS OF BREATH: ICD-10-CM

## 2021-08-30 DIAGNOSIS — R06.02 SOB (SHORTNESS OF BREATH): ICD-10-CM

## 2021-08-30 DIAGNOSIS — I10 ESSENTIAL HYPERTENSION: ICD-10-CM

## 2021-08-30 DIAGNOSIS — I50.20 HEART FAILURE WITH REDUCED EJECTION FRACTION (HCC): Primary | ICD-10-CM

## 2021-08-30 DIAGNOSIS — R06.2 WHEEZES: ICD-10-CM

## 2021-08-30 PROCEDURE — 99214 OFFICE O/P EST MOD 30 MIN: CPT | Performed by: NURSE PRACTITIONER

## 2021-08-30 RX ORDER — BUSPIRONE HYDROCHLORIDE 10 MG/1
10 TABLET ORAL 2 TIMES DAILY
COMMUNITY
End: 2021-11-16

## 2021-08-30 NOTE — PATIENT INSTRUCTIONS
Acute Coronary Syndrome  Acute coronary syndrome (ACS) is a serious problem in which there is suddenly not enough blood and oxygen reaching the heart. ACS can result in chest pain or a heart attack.  This condition is a medical emergency. If you have any symptoms of this condition, get help right away.  What are the causes?  This condition may be caused by:  · A buildup of fat and cholesterol inside the arteries (atherosclerosis). This is the most common cause. The buildup (plaque) can cause blood vessels in the heart (coronary arteries) to become narrow or blocked, which reduces blood flow to the heart. Plaque can also break off and lead to a clot, which can block an artery and cause a heart attack or stroke.  · Sudden tightening of the muscles around the coronary arteries (coronary spasm).  · Tearing of a coronary artery (spontaneous coronary artery dissection).  · Very low blood pressure (hypotension).  · An abnormal heartbeat (arrhythmia).  · Other medical conditions that cause a decrease of oxygen to the heart, such as anemiaorrespiratory failure.  · Using cocaine or methamphetamine.  What increases the risk?  The following factors may make you more likely to develop this condition:  · Age. The risk for ACS increases as you get older.  · History of chest pain, heart attack, peripheral artery disease, or stroke.  · Having taken chemotherapy or immune-suppressing medicines.  · Being male.  · Family history of chest pain, heart disease, or stroke.  · Smoking.  · Not exercising enough.  · Being overweight.  · High cholesterol.  · High blood pressure (hypertension).  · Diabetes.  · Excessive alcohol use.  What are the signs or symptoms?  Common symptoms of this condition include:  · Chest pain. The pain may last a long time, or it may stop and come back (recur). It may feel like:  ? Crushing or squeezing.  ? Tightness, pressure, fullness, or heaviness.  · Arm, neck, jaw, or back pain.  · Heartburn or  indigestion.  · Shortness of breath.  · Nausea.  · Sudden cold sweats.  · Light-headedness.  · Dizziness or passing out.  · Tiredness (fatigue).  Sometimes there are no symptoms.  How is this diagnosed?  This condition may be diagnosed based on:  · Your medical history and symptoms.  · Imaging tests, such as:  ? An electrocardiogram (ECG). This measures the heart's electrical activity.  ? X-rays.  ? CT scan.  ? A coronary angiogram. For this test, dye is injected into the heart arteries and then X-rays are taken.  ? Myocardial perfusion imaging. This test shows how well blood flows through your heart muscle.  · Blood tests. These may be repeated at certain time intervals.  · Exercise stress testing.  · Echocardiogram. This is a test that uses sound waves to produce detailed images of the heart.  How is this treated?  Treatment for this condition may include:  · Oxygen therapy.  · Medicines, such as:  ? Antiplatelet medicines and blood-thinning medicines, such as aspirin. These help prevent blood clots.  ? Medicine that dissolves any blood clots (fibrinolytic therapy).  ? Blood pressure medicines.  ? Nitroglycerin. This helps widen blood vessels to improve blood flow.  ? Pain medicine.  ? Cholesterol-lowering medicine.  · Surgery, such as:  ? Coronary angioplasty with stent placement. This involves placing a small piece of metal that looks like mesh or a spring into a narrow coronary artery. This widens the artery and keeps it open.  ? Coronary artery bypass surgery. This involves taking a section of a blood vessel from a different part of your body and placing it on the blocked coronary artery to allow blood to flow around the blockage.  · Cardiac rehabilitation. This is a program that includes exercise training, education, and counseling to help you recover.  Follow these instructions at home:  Eating and drinking  · Eat a heart-healthy diet that includes whole grains, fruits and vegetables, lean proteins, and  low-fat or nonfat dairy products.  · Limit how much salt (sodium) you eat as told by your health care provider. Follow instructions from your health care provider about any other eating or drinking restrictions, such as limiting foods that are high in fat and processed sugars.  · Use healthy cooking methods such as roasting, grilling, broiling, baking, poaching, steaming, or stir-frying.  · Work with a dietitian to follow a heart-healthy eating plan.  Medicines  · Take over-the-counter and prescription medicines only as told by your health care provider.  · Do not take these medicines unless your health care provider approves:  ? Vitamin supplements that contain vitamin A or vitamin E.  ? NSAIDs, such as ibuprofen, naproxen, or celecoxib.  ? Hormone replacement therapy that contains estrogen.  · If you are taking blood thinners:  ? Talk with your health care provider before you take any medicines that contain aspirin or NSAIDs. These medicines increase your risk for dangerous bleeding.  ? Take your medicine exactly as told, at the same time every day.  ? Avoid activities that could cause injury or bruising, and follow instructions about how to prevent falls.  ? Wear a medical alert bracelet, and carry a card that lists what medicines you take.  Activity  · Follow your cardiac rehabilitation program. Do exercises as told by your physical therapist.  · Ask your health care provider what activities and exercises are safe for you. Follow his or her instructions about lifting, driving, or climbing stairs.  Lifestyle  · Do not use any products that contain nicotine or tobacco, such as cigarettes, e-cigarettes, and chewing tobacco. If you need help quitting, ask your health care provider.  · Do not drink alcohol if your health care provider tells you not to drink.  · If you drink alcohol:  ? Limit how much you have to 0-1 drink a day.  ? Be aware of how much alcohol is in your drink. In the [place] one drink equals one 12 oz  bottle of beer (355 mL), one 5 oz glass of wine (148 mL), or one 1½ oz glass of hard liquor (44 mL).  · Maintain a healthy weight. If you need to lose weight, work with your health care provider to do so safely.  General instructions  · Tell all the health care providers who provide care for you about your heart condition, including your dentist. This may affect the medicines or treatment you receive.  · Manage any other health conditions you have, such as hypertension or diabetes. These conditions affect your heart.  · Pay attention to your mental health. You may be at higher risk for depression.  ? Find ways to manage stress.  ? Talk to your health care provider about depression screening and treatment.  · Keep your vaccinations up to date.  ? Get the flu shot (influenza vaccine) every year.  ? Get the pneumococcal vaccine if you are age 65 or older.  · If directed, monitor your blood pressure at home.  · Keep all follow-up visits as told by your health care provider. This is important.  Contact a health care provider if you:  · Feel overwhelmed or sad.  · Have trouble doing your daily activities.  Get help right away if you:  · Have pain in your chest, neck, arm, jaw, stomach, or back that recurs, and:  ? It lasts for more than a few minutes.  ? It is not relieved by taking the medicineyour health care provider prescribed.  · Have unexplained:  ? Heavy sweating.  ? Heartburn or indigestion.  ? Nausea or vomiting.  ? Shortness of breath.  ? Difficulty breathing.  ? Fatigue.  ? Nervousness or anxiety.  ? Weakness.  ? Diarrhea.  ? Dark stools or blood in your stool.  · Have sudden light-headedness or dizziness.  · Have blood pressure that is higher than 180/120.  · Faint.  · Have thoughts about hurting yourself.  These symptoms may represent a serious problem that is an emergency. Do not wait to see if the symptoms will go away. Get medical help right away. Call your local emergency services (911 in the [place] Do  not drive yourself to the hospital.   Summary  · Acute coronary syndrome (ACS) is when there is not enough blood and oxygen being supplied to the heart. ACS can result in chest pain or a heart attack.  · Acute coronary syndrome is a medical emergency. If you have any symptoms of this condition, get help right away.  · Treatment includes medicines and procedures to open the blocked arteries and restore blood flow.  This information is not intended to replace advice given to you by your health care provider. Make sure you discuss any questions you have with your health care provider.  Document Revised: 05/20/2020 Document Reviewed: 12/30/2019  [employer] Patient Education © 2021 [employer]      Hypertension, Adult  Hypertension is another name for high blood pressure. High blood pressure forces your heart to work harder to pump blood. This can cause problems over time.  There are two numbers in a blood pressure reading. There is a top number (systolic) over a bottom number (diastolic). It is best to have a blood pressure that is below 120/80. Healthy choices can help lower your blood pressure, or you may need medicine to help lower it.  What are the causes?  The cause of this condition is not known. Some conditions may be related to high blood pressure.  What increases the risk?  · Smoking.  · Having type 2 diabetes mellitus, high cholesterol, or both.  · Not getting enough exercise or physical activity.  · Being overweight.  · Having too much fat, sugar, calories, or salt (sodium) in your diet.  · Drinking too much alcohol.  · Having long-term (chronic) kidney disease.  · Having a family history of high blood pressure.  · Age. Risk increases with age.  · Race. You may be at higher risk if you are .  · Gender. Men are at higher risk than women before age 45. After age 65, women are at higher risk than men.  · Having obstructive sleep apnea.  · Stress.  What are the signs or symptoms?  · High blood  pressure may not cause symptoms. Very high blood pressure (hypertensive crisis) may cause:  ? Headache.  ? Feelings of worry or nervousness (anxiety).  ? Shortness of breath.  ? Nosebleed.  ? A feeling of being sick to your stomach (nausea).  ? Throwing up (vomiting).  ? Changes in how you see.  ? Very bad chest pain.  ? Seizures.  How is this treated?  · This condition is treated by making healthy lifestyle changes, such as:  ? Eating healthy foods.  ? Exercising more.  ? Drinking less alcohol.  · Your health care provider may prescribe medicine if lifestyle changes are not enough to get your blood pressure under control, and if:  ? Your top number is above 130.  ? Your bottom number is above 80.  · Your personal target blood pressure may vary.  Follow these instructions at home:  Eating and drinking    · If told, follow the DASH eating plan. To follow this plan:  ? Fill one half of your plate at each meal with fruits and vegetables.  ? Fill one fourth of your plate at each meal with whole grains. Whole grains include whole-wheat pasta, brown rice, and whole-grain bread.  ? Eat or drink low-fat dairy products, such as skim milk or low-fat yogurt.  ? Fill one fourth of your plate at each meal with low-fat (lean) proteins. Low-fat proteins include fish, chicken without skin, eggs, beans, and tofu.  ? Avoid fatty meat, cured and processed meat, or chicken with skin.  ? Avoid pre-made or processed food.  · Eat less than 1,500 mg of salt each day.  · Do not drink alcohol if:  ? Your doctor tells you not to drink.  ? You are pregnant, may be pregnant, or are planning to become pregnant.  · If you drink alcohol:  ? Limit how much you use to:  § 0-1 drink a day for women.  § 0-2 drinks a day for men.  ? Be aware of how much alcohol is in your drink. In the [place] one drink equals one 12 oz bottle of beer (355 mL), one 5 oz glass of wine (148 mL), or one 1½ oz glass of hard liquor (44 mL).  Lifestyle    · Work with your  doctor to stay at a healthy weight or to lose weight. Ask your doctor what the best weight is for you.  · Get at least 30 minutes of exercise most days of the week. This may include walking, swimming, or biking.  · Get at least 30 minutes of exercise that strengthens your muscles (resistance exercise) at least 3 days a week. This may include lifting weights or doing Pilates.  · Do not use any products that contain nicotine or tobacco, such as cigarettes, e-cigarettes, and chewing tobacco. If you need help quitting, ask your doctor.  · Check your blood pressure at home as told by your doctor.  · Keep all follow-up visits as told by your doctor. This is important.  Medicines  · Take over-the-counter and prescription medicines only as told by your doctor. Follow directions carefully.  · Do not skip doses of blood pressure medicine. The medicine does not work as well if you skip doses. Skipping doses also puts you at risk for problems.  · Ask your doctor about side effects or reactions to medicines that you should watch for.  Contact a doctor if you:  · Think you are having a reaction to the medicine you are taking.  · Have headaches that keep coming back (recurring).  · Feel dizzy.  · Have swelling in your ankles.  · Have trouble with your vision.  Get help right away if you:  · Get a very bad headache.  · Start to feel mixed up (confused).  · Feel weak or numb.  · Feel faint.  · Have very bad pain in your:  ? Chest.  ? Belly (abdomen).  · Throw up more than once.  · Have trouble breathing.  Summary  · Hypertension is another name for high blood pressure.  · High blood pressure forces your heart to work harder to pump blood.  · For most people, a normal blood pressure is less than 120/80.  · Making healthy choices can help lower blood pressure. If your blood pressure does not get lower with healthy choices, you may need to take medicine.  This information is not intended to replace advice given to you by your health  care provider. Make sure you discuss any questions you have with your health care provider.  Document Revised: 08/28/2019 Document Reviewed: 08/28/2019  [employer] Patient Education © 2021 [employer]      Heart Failure, Diagnosis    Heart failure means that your heart is not able to pump blood in the right way. This makes it hard for your body to work well. Heart failure is usually a long-term (chronic) condition. You must take good care of yourself and follow your treatment plan from your doctor.  What are the causes?  This condition may be caused by:  · High blood pressure.  · Build up of cholesterol and fat in the arteries.  · Heart attack. This injures the heart muscle.  · Heart valves that do not open and close properly.  · Damage of the heart muscle. This is also called cardiomyopathy.  · Lung disease.  · Abnormal heart rhythms.  What increases the risk?  The risk of heart failure goes up as a person ages. This condition is also more likely to develop in people who:  · Are overweight.  · Are male.  · Smoke or chew tobacco.  · Abuse alcohol or illegal drugs.  · Have taken medicines that can damage the heart.  · Have diabetes.  · Have abnormal heart rhythms.  · Have thyroid problems.  · Have low blood counts (anemia).  What are the signs or symptoms?  Symptoms of this condition include:  · Shortness of breath.  · Coughing.  · Swelling of the feet, ankles, legs, or belly.  · Losing weight for no reason.  · Trouble breathing.  · Waking from sleep because of the need to sit up and get more air.  · Rapid heartbeat.  · Being very tired.  · Feeling dizzy, or feeling like you may pass out (faint).  · Having no desire to eat.  · Feeling like you may vomit (nauseous).  · Peeing (urinating) more at night.  · Feeling confused.  How is this treated?         This condition may be treated with:  · Medicines. These can be given to treat blood pressure and to make the heart muscles stronger.  · Changes in your daily life.  These may include eating a healthy diet, staying at a healthy body weight, quitting tobacco and illegal drug use, or doing exercises.  · Surgery. Surgery can be done to open blocked valves, or to put devices in the heart, such as pacemakers.  · A donor heart (heart transplant). You will receive a healthy heart from a donor.  Follow these instructions at home:  · Treat other conditions as told by your doctor. These may include high blood pressure, diabetes, thyroid disease, or abnormal heart rhythms.  · Learn as much as you can about heart failure.  · Get support as you need it.  · Keep all follow-up visits as told by your doctor. This is important.  Summary  · Heart failure means that your heart is not able to pump blood in the right way.  · This condition is caused by high blood pressure, heart attack, or damage of the heart muscle.  · Symptoms of this condition include shortness of breath and swelling of the feet, ankles, legs, or belly. You may also feel very tired or feel like you may vomit.  · You may be treated with medicines, surgery, or changes in your daily life.  · Treat other health conditions as told by your doctor.  This information is not intended to replace advice given to you by your health care provider. Make sure you discuss any questions you have with your health care provider.  Document Revised: 03/06/2020 Document Reviewed: 03/06/2020  [employer] Patient Education © 2021 [employer]

## 2021-08-30 NOTE — PROGRESS NOTES
Subjective     Nick Caraballo is a 81 y.o. male who presents to day for Cardiomyopathy (presents for 3 month f/u), Shortness of Breath (diagnosed with bronchitis), Chest Pain, and Palpitations.    CHIEF COMPLIANT  Chief Complaint   Patient presents with   • Cardiomyopathy     presents for 3 month f/u   • Shortness of Breath     diagnosed with bronchitis   • Chest Pain   • Palpitations       Active Problems:  Problem List Items Addressed This Visit        Cardiac and Vasculature    Essential hypertension       Pulmonary and Pneumonias    Shortness of breath    Relevant Orders    XR Chest PA & Lateral      Other Visit Diagnoses     Heart failure with reduced ejection fraction (CMS/HCC)    -  Primary    Wheezes        Relevant Orders    XR Chest PA & Lateral    SOB (shortness of breath)               PROBLEM LIST:      1. CAD, MI in 1998  1.1 Stenting x 5 total at [facility]. data  1.2 CABG x2 in Dec. 2014 at [facility] by Dr. Álvarez in setting of ACS, LIMA to LAD and reversed vein graft from aorta to diag. With RLE vein graft  1.3 Stress test, 9-19-19, mild anterolateral ischemia on scintigraphy.  1.4 Cath 10/19: Widely patent grafts with no progressive disease in the RCA or circumflex ejection fraction 50 to 55%, LVEDP 8 to 12 mmHg  1.5 left heart catheterization 9/20: Left main normal, LAD  occluded in the mid vessel with a patent LIMA beyond the occluded area, first diagonal has proximal 80% stenosis with a patent SVG to first D1, circumflex normal, RCA normal, EF 30 to 35% with mid anterior and apical wall akinesis and inferior wall hypokinesis  1.6 MUGA scan 12/20: EF 19%  2. A-Fib, s/p CABG per patient report  3. HTN  4. Hyperlipidemia  5. GOPI, uses c-pap  6. Hypothyroidism  7. BPH  8. GERD  9. RA  10. Former smoker  11. 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 upgrade to BI-V ICD  12 dizziness                   12.1 carotid duplex 12/20: Nonobstructive carotid artery       HPI  HPI  Mr. Caraballo is an  81-year-old male patient who is being followed up today for coronary artery disease with history of CABG x2 and ischemic cardiomyopathy.  Patient reports increasing shortness of breath over the last 2 weeks in which she has been diagnosed of bronchitis and COPD exacerbation.  He is on Augmentin and has been on steroid therapy.  He uses BiPAP approximately 10 hours a day.  He does have some level of orthopnea where he has been sleeping in a chair and he is unable to lie flat.  He becomes dyspneic with minimal activity and is also dyspneic at rest.  He does have associated dry cough with rare creamy phlegm production.  He does have associated wheezing as well.  And palpitations.  He denies any swelling.  In the lower extremities.  He does report increasing dizziness that increases with the shortness of breath.  He says he loses balance easily.  He also complains of a sore throat where his throat is red and irritated he is not had a fever and has been using Coricidin as well as being on the Augmentin.  He does report that he has improved since starting the steroids and antibiotics.  He is also been followed by Dr. Jones.  He describes his palpitations as racing and which he correlates with his shortness of breath.  He does have a history of coronary artery disease in which he is on high intensity statin therapy of Lipitor 80 mg nightly and aspirin 81 mg daily.  In addition that he does have a history of systolic heart failure with most recent ejection fraction being 30 to 35% in which she is on guideline driven therapy including Entresto and carvedilol.  He also has a history of atrial fibrillation in which she is on rate control carvedilol and aspirin for anticoagulation.  His blood pressure is well controlled today at 119/74 heart rate is 71.  He denies any chest pain, lower extremity edema, syncope, PND, or strokelike symptoms.  PRIOR MEDS  Current Outpatient Medications on File Prior to Visit   Medication Sig  Dispense Refill   • acetaminophen (TYLENOL) 650 MG 8 hr tablet Take 650 mg by mouth Every 8 (Eight) Hours As Needed for Mild Pain .     • albuterol sulfate  (90 Base) MCG/ACT inhaler Inhale 2 puffs Every 4 (Four) Hours As Needed for Wheezing or Shortness of Air. 8 g 3   • allopurinol (ZYLOPRIM) 300 MG tablet Take 300 mg by mouth Daily.     • aspirin 81 MG EC tablet Take 1 tablet by mouth Daily. 90 tablet 3   • atorvastatin (LIPITOR) 80 MG tablet Take 1 tablet by mouth Every Night. 90 tablet 3   • busPIRone (BUSPAR) 10 MG tablet Take 10 mg by mouth 2 (Two) Times a Day.     • carvedilol (COREG) 12.5 MG tablet Take 1 tablet by mouth 2 (Two) Times a Day. 180 tablet 3   • Chlorphen-Pseudoephed-APAP (CORICIDIN D PO) Take  by mouth.     • Cholecalciferol (VITAMIN D3) 50 MCG (2000 UT) tablet Take 2,000 Units by mouth Daily.     • clopidogrel (PLAVIX) 75 MG tablet Take 1 tablet by mouth Daily. 90 tablet 3   • furosemide (LASIX) 20 MG tablet Take 3 tablets by mouth Daily. 270 tablet 3   • guaiFENesin (MUCINEX) 600 MG 12 hr tablet Take 1,200 mg by mouth As Needed for Cough.     • isosorbide mononitrate (IMDUR) 30 MG 24 hr tablet Take 0.5 tablets by mouth Daily. Half tablet daily 45 tablet 3   • levothyroxine (SYNTHROID, LEVOTHROID) 25 MCG tablet Take 25 mcg by mouth Daily.     • Melatonin 3 MG capsule Take 3 mg by mouth Every Night.     • metOLazone (ZAROXOLYN) 2.5 MG tablet Take 1 tablet by mouth Daily. 90 tablet 3   • Miconazole Nitrate 2 % powder Apply 1 application topically to the appropriate area as directed 2 (two) times a day. 100 g 1   • Multiple Vitamins-Minerals (ICAPS AREDS 2 PO) Take  by mouth.     • Multiple Vitamins-Minerals (MULTIVITAMIN ADULT PO) Take  by mouth Daily.     • niacin (SLO-NIACIN) 500 MG CR tablet Take 2 tablets by mouth Every Night. 180 tablet 3   • nitroglycerin (NITROSTAT) 0.4 MG SL tablet Place 1 tablet under the tongue Every 5 (Five) Minutes As Needed for Chest Pain. 25 tablet 3   •  pantoprazole (PROTONIX) 40 MG EC tablet Take 40 mg by mouth Daily.     • potassium chloride ER (K-TAB) 20 MEQ tablet controlled-release ER tablet Take 1 tablet by mouth 3 (Three) Times a Day. (Patient taking differently: Take 30 mEq by mouth 3 (Three) Times a Day.) 270 tablet 3   • sacubitril-valsartan (Entresto) 24-26 MG tablet Take 1 tablet by mouth 2 (Two) Times a Day. 180 tablet 3   • tamsulosin (FLOMAX) 0.4 MG capsule 24 hr capsule Take 1 capsule by mouth Daily.     • venlafaxine XR (EFFEXOR-XR) 150 MG 24 hr capsule Take 1 capsule by mouth Daily. 90 capsule 3     No current facility-administered medications on file prior to visit.       ALLERGIES  Patient has no known allergies.    HISTORY  Past Medical History:   Diagnosis Date   • Acid reflux    • Anxiety    • Complete heart block (CMS/HCC)    • COPD (chronic obstructive pulmonary disease) (CMS/HCC)    • Coronary artery disease    • Gout    • Hyperlipidemia    • Hypertension    • Hypothyroid    • Myocardial infarction (CMS/HCC)    • Pseudophakia    • Rheumatoid arthritis (CMS/HCC)    • Sleep apnea    • Thyroid disease        Social History     Socioeconomic History   • Marital status:      Spouse name: Not on file   • Number of children: Not on file   • Years of education: Not on file   • Highest education level: Not on file   Tobacco Use   • Smoking status: Former Smoker     Packs/day: 1.00     Years: 45.00     Pack years: 45.00     Quit date:      Years since quittin.6   • Smokeless tobacco: Never Used   Substance and Sexual Activity   • Alcohol use: No   • Drug use: No   • Sexual activity: Defer       Family History   Problem Relation Age of Onset   • No Known Problems Mother    • Colon cancer Father    • Heart disease Brother        Review of Systems   Constitutional: Positive for fatigue. Negative for chills, diaphoresis and fever.   HENT: Negative.    Eyes: Negative.    Respiratory: Positive for apnea (cpap), cough (bronchitis),  "shortness of breath (with any activity and at rest) and wheezing. Negative for chest tightness.         Bronchitis, sees Dr Jones   Cardiovascular: Positive for palpitations (racing associated with SOA). Negative for chest pain (only with deep cough ) and leg swelling.   Gastrointestinal: Negative.  Negative for blood in stool.   Endocrine: Negative.    Genitourinary: Negative.  Negative for hematuria.   Musculoskeletal: Negative.    Skin: Negative.    Allergic/Immunologic: Negative.    Neurological: Positive for dizziness (with quick movement, off balance) and weakness. Negative for syncope, light-headedness, numbness and headaches.   Hematological: Bruises/bleeds easily.   Psychiatric/Behavioral: Negative.  Negative for sleep disturbance.       Objective     VITALS: /74 (BP Location: Left arm, Patient Position: Sitting)   Pulse 71   Ht 185.4 cm (72.99\")   Wt 123 kg (272 lb)   SpO2 96%   BMI 35.89 kg/m²     LABS:   Lab Results (most recent)     None          IMAGING:   No Images in the past 120 days found..    EXAM:  Physical Exam  Vitals and nursing note reviewed.   Constitutional:       Appearance: He is well-developed.   HENT:      Head: Normocephalic.   Eyes:      Pupils: Pupils are equal, round, and reactive to light.   Neck:      Thyroid: No thyroid mass.      Vascular: No carotid bruit or JVD.      Trachea: Trachea and phonation normal.   Cardiovascular:      Rate and Rhythm: Normal rate and regular rhythm.      Pulses:           Radial pulses are 2+ on the right side and 2+ on the left side.        Posterior tibial pulses are 2+ on the right side and 2+ on the left side.      Heart sounds: Normal heart sounds. No murmur heard.   No friction rub. No gallop.    Pulmonary:      Effort: Pulmonary effort is normal. No respiratory distress.      Breath sounds: Decreased air movement present. Decreased breath sounds present. No wheezing or rales.   Abdominal:      General: Bowel sounds are normal.      " Palpations: Abdomen is soft.   Musculoskeletal:         General: Swelling (trace) present. Normal range of motion.      Cervical back: Neck supple.   Skin:     General: Skin is warm and dry.      Capillary Refill: Capillary refill takes less than 2 seconds.      Findings: No rash.   Neurological:      Mental Status: He is alert and oriented to person, place, and time.   Psychiatric:         Speech: Speech normal.         Behavior: Behavior normal.         Thought Content: Thought content normal.         Judgment: Judgment normal.         Procedure   Procedures       Assessment/Plan    Diagnosis Plan   1. Heart failure with reduced ejection fraction (CMS/HCC)     2. Shortness of breath  XR Chest PA & Lateral   3. Wheezes  XR Chest PA & Lateral   4. SOB (shortness of breath)     5. Essential hypertension     1.  Patient does have heart failure with reduced ejection fraction which he is being treated for COPD exacerbation for approximately 2 weeks.  He does say is improved after the antibiotics and steroid therapy.  He does have wheezing in his lung fields bilaterally.  I did not appreciate any crackles and he has minimal lower extremity edema.  I would like to get a chest x-ray to evaluate for not only pneumonia but also help rule out any heart failure exacerbation that could be contributing to his shortness of breath as well.  2.  Patient's blood pressure is well controlled on current blood pressure medication regimen.  No medication changes are warranted at this time.  Patient advised to monitor blood pressure on a daily basis and report any persistent highs or lows.  Set goal blood pressure for patient at 130/80 or below.  3.  Patient does have heart failure with reduced ejection fraction when she is on guideline driven therapy of Entresto and carvedilol.  We will continue this at current dosing.  4.  Informed of signs and symptoms of ACS and advised to seek emergent treatment for any new worsening symptoms.   Patient also advised sooner follow-up as needed.  Also advised to follow-up with family doctor as needed  This note is dictated utilizing voice recognition software.  Although this record has been proof read, transcriptional errors may still be present. If questions occur regarding the content of this record please do not hesitate to call our office.  I have reviewed and confirmed the accuracy of the ROS as documented by the MA/LPN/RN DAVID Stone    Return in about 3 months (around 11/30/2021), or if symptoms worsen or fail to improve.    Diagnoses and all orders for this visit:    1. Heart failure with reduced ejection fraction (CMS/HCC) (Primary)    2. Shortness of breath  -     XR Chest PA & Lateral; Future    3. Wheezes  -     Cancel: XR Chest PA & Lateral; Future  -     XR Chest PA & Lateral; Future    4. SOB (shortness of breath)  -     Cancel: XR Chest PA & Lateral; Future    5. Essential hypertension        Advance Care Planning   ACP discussion was held with the patient during this visit. Patient has an advance directive in EMR which is still valid.          MEDS ORDERED DURING VISIT:  No orders of the defined types were placed in this encounter.          This document has been electronically signed by DAVID Stone Jr.  August 31, 2021 08:09 EDT

## 2021-09-30 RX ORDER — NAPROXEN 500 MG/1
500 TABLET ORAL 2 TIMES DAILY PRN
Qty: 60 TABLET | Refills: 1 | Status: SHIPPED | OUTPATIENT
Start: 2021-09-30

## 2021-10-13 ENCOUNTER — TELEPHONE (OUTPATIENT)
Dept: CARDIOLOGY | Facility: CLINIC | Age: 81
End: 2021-10-13

## 2021-10-13 NOTE — TELEPHONE ENCOUNTER
Patient called requesting something for weight loss. He states he gained weight while taking prednisone and feels like the shortness of breath is attributed to his weight.  Per JR Harman, [facility]. Desirae Arellano MA      ----- Message from Lucy Willis sent at 10/12/2021 12:45 PM EDT -----  Regarding: FW: Breathing Issues  Did you speak w/ this pt before you left for lunch?  ----- Message -----  From: Nick Caraballo  Sent: 10/12/2021  12:12 PM EDT  To: Celeste Brantley [facility]  Subject: Breathing Issues                                 , I continue to have breathing problems, although the throat problems have cleared up when I stopped using the [place] inhalers.  I need to figure out why I am so short of breath, totally tired and feel completely worn out. I am certain the weight (284) is not helping any.  Please let me know what you feel would be helpful to me and what will not interfere with the medications I am now on  Thank you.

## 2021-10-18 ENCOUNTER — CLINICAL SUPPORT (OUTPATIENT)
Dept: CARDIOLOGY | Facility: CLINIC | Age: 81
End: 2021-10-18

## 2021-10-18 ENCOUNTER — LAB (OUTPATIENT)
Dept: CARDIOLOGY | Facility: CLINIC | Age: 81
End: 2021-10-18

## 2021-10-18 VITALS
SYSTOLIC BLOOD PRESSURE: 138 MMHG | OXYGEN SATURATION: 98 % | DIASTOLIC BLOOD PRESSURE: 68 MMHG | HEART RATE: 69 BPM | WEIGHT: 289.6 LBS | BODY MASS INDEX: 38.38 KG/M2 | HEIGHT: 73 IN

## 2021-10-18 DIAGNOSIS — R06.2 WHEEZES: ICD-10-CM

## 2021-10-18 DIAGNOSIS — R06.02 SHORTNESS OF BREATH: Primary | ICD-10-CM

## 2021-10-18 DIAGNOSIS — I50.20 HEART FAILURE WITH REDUCED EJECTION FRACTION (HCC): ICD-10-CM

## 2021-10-18 DIAGNOSIS — I50.22 CHRONIC SYSTOLIC HEART FAILURE (HCC): ICD-10-CM

## 2021-10-18 DIAGNOSIS — R06.02 SHORTNESS OF BREATH: ICD-10-CM

## 2021-10-18 DIAGNOSIS — R60.0 BILATERAL LEG EDEMA: ICD-10-CM

## 2021-10-18 LAB
ANION GAP SERPL CALCULATED.3IONS-SCNC: 12 MMOL/L (ref 5–15)
BUN SERPL-MCNC: 19 MG/DL (ref 8–23)
BUN/CREAT SERPL: 17.9 (ref 7–25)
CALCIUM SPEC-SCNC: 9.3 MG/DL (ref 8.6–10.5)
CHLORIDE SERPL-SCNC: 101 MMOL/L (ref 98–107)
CO2 SERPL-SCNC: 29 MMOL/L (ref 22–29)
CREAT SERPL-MCNC: 1.06 MG/DL (ref 0.76–1.27)
D DIMER PPP FEU-MCNC: 0.66 MCGFEU/ML (ref 0–0.5)
GFR SERPL CREATININE-BSD FRML MDRD: 67 ML/MIN/1.73
GLUCOSE SERPL-MCNC: 123 MG/DL (ref 65–99)
NT-PROBNP SERPL-MCNC: 563.2 PG/ML (ref 0–1800)
POTASSIUM SERPL-SCNC: 3.9 MMOL/L (ref 3.5–5.2)
SODIUM SERPL-SCNC: 142 MMOL/L (ref 136–145)

## 2021-10-18 PROCEDURE — 83880 ASSAY OF NATRIURETIC PEPTIDE: CPT | Performed by: NURSE PRACTITIONER

## 2021-10-18 PROCEDURE — 85379 FIBRIN DEGRADATION QUANT: CPT | Performed by: NURSE PRACTITIONER

## 2021-10-18 PROCEDURE — 80048 BASIC METABOLIC PNL TOTAL CA: CPT | Performed by: NURSE PRACTITIONER

## 2021-10-18 PROCEDURE — 36415 COLL VENOUS BLD VENIPUNCTURE: CPT

## 2021-10-18 PROCEDURE — 99211 OFF/OP EST MAY X REQ PHY/QHP: CPT | Performed by: NURSE PRACTITIONER

## 2021-10-18 RX ORDER — FUROSEMIDE 20 MG/1
60 TABLET ORAL 2 TIMES DAILY
Qty: 270 TABLET | Refills: 3
Start: 2021-10-18 | End: 2021-11-22 | Stop reason: SDUPTHER

## 2021-10-18 NOTE — PROGRESS NOTES
Nickchristophe Caraballo  1940  10/18/2021   ?   Chief Complaint   Patient presents with   • Shortness of Breath     presents as nurse visit   • Edema      ?   HPI:   ?   ? Patient walked in with complaints of shortness of breath and added as nurse visit for symptom check. He states his anxiety is worse since [facility] X[facility] Buspar and feels it is contributing to his shortness of breath. [facility] is [facility] today for [facility]. He reports weight gain and holding more fluid since being on Prednisone. He denies chest pain and palpitations only associated with anxiety. Desirae Arellano MA    ?     Current Outpatient Medications:   •  acetaminophen (TYLENOL) 650 MG 8 hr tablet, Take 650 mg by mouth Every 8 (Eight) Hours As Needed for Mild Pain ., Disp: , Rfl:   •  albuterol sulfate  (90 Base) MCG/ACT inhaler, Inhale 2 puffs Every 4 (Four) Hours As Needed for Wheezing or Shortness of Air., Disp: 8 g, Rfl: 3  •  allopurinol (ZYLOPRIM) 300 MG tablet, Take 300 mg by mouth Daily., Disp: , Rfl:   •  aspirin 81 MG EC tablet, Take 1 tablet by mouth Daily., Disp: 90 tablet, Rfl: 3  •  atorvastatin (LIPITOR) 80 MG tablet, Take 1 tablet by mouth Every Night., Disp: 90 tablet, Rfl: 3  •  busPIRone (BUSPAR) 10 MG tablet, Take 10 mg by mouth 2 (Two) Times a Day., Disp: , Rfl:   •  carvedilol (COREG) 12.5 MG tablet, Take 1 tablet by mouth 2 (Two) Times a Day., Disp: 180 tablet, Rfl: 3  •  Cholecalciferol (VITAMIN D3) 50 MCG (2000 UT) tablet, Take 2,000 Units by mouth Daily., Disp: , Rfl:   •  clopidogrel (PLAVIX) 75 MG tablet, Take 1 tablet by mouth Daily., Disp: 90 tablet, Rfl: 3  •  furosemide (LASIX) 20 MG tablet, Take 3 tablets by mouth 2 (Two) Times a Day., Disp: 270 tablet, Rfl: 3  •  guaiFENesin (MUCINEX) 600 MG 12 hr tablet, Take 1,200 mg by mouth As Needed for Cough., Disp: , Rfl:   •  isosorbide mononitrate (IMDUR) 30 MG 24 hr tablet, Take 0.5 tablets by mouth Daily. Half tablet daily, Disp: 45 tablet, Rfl: 3  •   levothyroxine (SYNTHROID, LEVOTHROID) 25 MCG tablet, Take 25 mcg by mouth Daily., Disp: , Rfl:   •  Melatonin 3 MG capsule, Take 3 mg by mouth Every Night., Disp: , Rfl:   •  metOLazone (ZAROXOLYN) 2.5 MG tablet, Take 1 tablet by mouth Daily., Disp: 90 tablet, Rfl: 3  •  Miconazole Nitrate 2 % powder, Apply 1 application topically to the appropriate area as directed 2 (two) times a day., Disp: 100 g, Rfl: 1  •  Multiple Vitamins-Minerals (ICAPS AREDS 2 PO), Take  by mouth., Disp: , Rfl:   •  Multiple Vitamins-Minerals (MULTIVITAMIN ADULT PO), Take  by mouth Daily., Disp: , Rfl:   •  naproxen (Naprosyn) 500 MG tablet, Take 1 tablet by mouth 2 (Two) Times a Day As Needed for Mild Pain ., Disp: 60 tablet, Rfl: 1  •  niacin (SLO-NIACIN) 500 MG CR tablet, Take 2 tablets by mouth Every Night., Disp: 180 tablet, Rfl: 3  •  nitroglycerin (NITROSTAT) 0.4 MG SL tablet, Place 1 tablet under the tongue Every 5 (Five) Minutes As Needed for Chest Pain., Disp: 25 tablet, Rfl: 3  •  pantoprazole (PROTONIX) 40 MG EC tablet, Take 40 mg by mouth Daily., Disp: , Rfl:   •  potassium chloride ER (K-TAB) 20 MEQ tablet controlled-release ER tablet, Take 1 tablet by mouth 3 (Three) Times a Day. (Patient taking differently: Take 30 mEq by mouth 3 (Three) Times a Day.), Disp: 270 tablet, Rfl: 3  •  sacubitril-valsartan (Entresto) 24-26 MG tablet, Take 1 tablet by mouth 2 (Two) Times a Day., Disp: 180 tablet, Rfl: 3  •  tamsulosin (FLOMAX) 0.4 MG capsule 24 hr capsule, Take 1 capsule by mouth Daily., Disp: , Rfl:   •  venlafaxine XR (EFFEXOR-XR) 150 MG 24 hr capsule, Take 1 capsule by mouth Daily., Disp: 90 capsule, Rfl: 3   ?   ?   Patient has no known allergies.       Procedures     ?   Assessment/Plan    ?   ?   ?  1. Shortness of Breath   2. Edema    Vitals and symptoms reviewed by DAVID Park. He entered the room to evaluate patient with verbal order for stat echo, BNP, BMP,  d-dimer and increase Lasix to 60 mg BID from once daily. He  will follow up as nurse visit on Friday and call with any concerns. Desirae Arellano MA

## 2021-10-19 DIAGNOSIS — R60.0 BILATERAL LEG EDEMA: Primary | ICD-10-CM

## 2021-10-19 DIAGNOSIS — R06.02 SHORTNESS OF BREATH: Primary | ICD-10-CM

## 2021-10-19 DIAGNOSIS — I73.9 CLAUDICATION (HCC): ICD-10-CM

## 2021-10-19 DIAGNOSIS — R79.89 D-DIMER, ELEVATED: ICD-10-CM

## 2021-10-20 ENCOUNTER — HOSPITAL ENCOUNTER (OUTPATIENT)
Dept: CARDIOLOGY | Facility: HOSPITAL | Age: 81
Discharge: HOME | End: 2021-10-20

## 2021-10-20 DIAGNOSIS — R06.2 WHEEZES: ICD-10-CM

## 2021-10-20 DIAGNOSIS — R60.0 BILATERAL LEG EDEMA: ICD-10-CM

## 2021-10-20 DIAGNOSIS — R06.02 SHORTNESS OF BREATH: ICD-10-CM

## 2021-10-20 DIAGNOSIS — I50.22 CHRONIC SYSTOLIC HEART FAILURE (HCC): ICD-10-CM

## 2021-10-20 DIAGNOSIS — I73.9 CLAUDICATION (HCC): ICD-10-CM

## 2021-10-20 PROCEDURE — 93925 LOWER EXTREMITY STUDY: CPT

## 2021-10-20 PROCEDURE — 93306 TTE W/DOPPLER COMPLETE: CPT | Performed by: INTERNAL MEDICINE

## 2021-10-20 PROCEDURE — 93925 LOWER EXTREMITY STUDY: CPT | Performed by: INTERNAL MEDICINE

## 2021-10-20 PROCEDURE — 93306 TTE W/DOPPLER COMPLETE: CPT

## 2021-10-22 ENCOUNTER — CLINICAL SUPPORT (OUTPATIENT)
Dept: CARDIOLOGY | Facility: CLINIC | Age: 81
End: 2021-10-22

## 2021-10-22 VITALS
HEART RATE: 70 BPM | OXYGEN SATURATION: 98 % | HEIGHT: 73 IN | DIASTOLIC BLOOD PRESSURE: 63 MMHG | BODY MASS INDEX: 38.38 KG/M2 | SYSTOLIC BLOOD PRESSURE: 105 MMHG | WEIGHT: 289.6 LBS

## 2021-10-22 DIAGNOSIS — I10 ESSENTIAL HYPERTENSION: ICD-10-CM

## 2021-10-22 DIAGNOSIS — I50.22 CHRONIC SYSTOLIC HEART FAILURE (HCC): Primary | ICD-10-CM

## 2021-10-22 DIAGNOSIS — R06.02 SOB (SHORTNESS OF BREATH): ICD-10-CM

## 2021-10-22 PROCEDURE — 99211 OFF/OP EST MAY X REQ PHY/QHP: CPT | Performed by: NURSE PRACTITIONER

## 2021-10-22 RX ORDER — ALBUTEROL SULFATE 2.5 MG/3ML
2.5 SOLUTION RESPIRATORY (INHALATION) EVERY 4 HOURS PRN
COMMUNITY

## 2021-10-22 RX ORDER — POTASSIUM CHLORIDE 750 MG/1
20 TABLET, EXTENDED RELEASE ORAL 3 TIMES DAILY
COMMUNITY

## 2021-10-22 NOTE — PROGRESS NOTES
Nick Ilya  1940  10/22/2021   ?   Chief Complaint   Patient presents with   • Shortness of Breath     presents as nurse visit   • Edema      ?   HPI:   ?   ? Patient presents as nurse visit for follow up on SOA and edema. He was seen on 10/18/21 when Lasix was increased to 60mg BID from once daily. He has had labs, arterial LE duplex and STAT echo as requested, pending results of echo and duplex. Edema is better than earlier this week however patient reports by evening his legs are swollen again. He is still short of breath and weak. [facility]. He did follow up with Dr Valerio yesterday. Dr Valerio stopped Plavix and niacin and started him on Xarelto 15 mg daily, Jardiance 10mg daily and increased Metolazone to 5 mg daily. His office to fax recent pacer interrogation.  Desirae Arellano MA  ?     Current Outpatient Medications:   •  acetaminophen (TYLENOL) 650 MG 8 hr tablet, Take 650 mg by mouth Every 8 (Eight) Hours As Needed for Mild Pain ., Disp: , Rfl:   •  albuterol (PROVENTIL) (2.5 MG/3ML) 0.083% nebulizer solution, Take 2.5 mg by nebulization Every 4 (Four) Hours As Needed for Wheezing., Disp: , Rfl:   •  allopurinol (ZYLOPRIM) 300 MG tablet, Take 300 mg by mouth Daily., Disp: , Rfl:   •  aspirin 81 MG EC tablet, Take 1 tablet by mouth Daily., Disp: 90 tablet, Rfl: 3  •  atorvastatin (LIPITOR) 80 MG tablet, Take 1 tablet by mouth Every Night., Disp: 90 tablet, Rfl: 3  •  busPIRone (BUSPAR) 10 MG tablet, Take 10 mg by mouth 2 (Two) Times a Day., Disp: , Rfl:   •  carvedilol (COREG) 12.5 MG tablet, Take 1 tablet by mouth 2 (Two) Times a Day., Disp: 180 tablet, Rfl: 3  •  Cholecalciferol (VITAMIN D3) 50 MCG (2000 UT) tablet, Take 2,000 Units by mouth Daily., Disp: , Rfl:   •  clopidogrel (PLAVIX) 75 MG tablet, Take 1 tablet by mouth Daily., Disp: 90 tablet, Rfl: 3  •  furosemide (LASIX) 20 MG tablet, Take 3 tablets by mouth 2 (Two) Times a Day., Disp: 270 tablet, Rfl: 3  •   guaiFENesin (MUCINEX) 600 MG 12 hr tablet, Take 1,200 mg by mouth As Needed for Cough., Disp: , Rfl:   •  isosorbide mononitrate (IMDUR) 30 MG 24 hr tablet, Take 0.5 tablets by mouth Daily. Half tablet daily, Disp: 45 tablet, Rfl: 3  •  levothyroxine (SYNTHROID, LEVOTHROID) 25 MCG tablet, Take 25 mcg by mouth Daily., Disp: , Rfl:   •  Melatonin 3 MG capsule, Take 3 mg by mouth Every Night., Disp: , Rfl:   •  metOLazone (ZAROXOLYN) 2.5 MG tablet, Take 1 tablet by mouth Daily. (Patient taking differently: Take 5 mg by mouth Daily.), Disp: 90 tablet, Rfl: 3  •  Miconazole Nitrate 2 % powder, Apply 1 application topically to the appropriate area as directed 2 (two) times a day., Disp: 100 g, Rfl: 1  •  Multiple Vitamins-Minerals (ICAPS AREDS 2 PO), Take  by mouth., Disp: , Rfl:   •  Multiple Vitamins-Minerals (MULTIVITAMIN ADULT PO), Take  by mouth Daily., Disp: , Rfl:   •  naproxen (Naprosyn) 500 MG tablet, Take 1 tablet by mouth 2 (Two) Times a Day As Needed for Mild Pain ., Disp: 60 tablet, Rfl: 1  •  nitroglycerin (NITROSTAT) 0.4 MG SL tablet, Place 1 tablet under the tongue Every 5 (Five) Minutes As Needed for Chest Pain., Disp: 25 tablet, Rfl: 3  •  pantoprazole (PROTONIX) 40 MG EC tablet, Take 40 mg by mouth Daily., Disp: , Rfl:   •  potassium chloride (K-DUR,KLOR-CON) 10 MEQ CR tablet, Take 20 mEq by mouth 3 (Three) Times a Day., Disp: , Rfl:   •  sacubitril-valsartan (Entresto) 24-26 MG tablet, Take 1 tablet by mouth 2 (Two) Times a Day., Disp: 180 tablet, Rfl: 3  •  tamsulosin (FLOMAX) 0.4 MG capsule 24 hr capsule, Take 1 capsule by mouth Daily., Disp: , Rfl:   •  venlafaxine XR (EFFEXOR-XR) 150 MG 24 hr capsule, Take 1 capsule by mouth Daily., Disp: 90 capsule, Rfl: 3  •  albuterol sulfate  (90 Base) MCG/ACT inhaler, Inhale 2 puffs Every 4 (Four) Hours As Needed for Wheezing or Shortness of Air., Disp: 8 g, Rfl: 3  •  empagliflozin (JARDIANCE) 10 MG tablet tablet, Take  by mouth Daily., Disp: , Rfl:    •  rivaroxaban (XARELTO) 15 MG tablet, Take 15 mg by mouth Daily., Disp: , Rfl:    ?   ?   Patient has no known allergies.       Procedures     ?   Assessment/Plan    ?   ? 1. Shortness of Breath   2. Edema     JR Kimani in to evaluate patient and ordered nuclear stress test. Patient to follow up in 3 weeks. Pacer interrogation to be reviewed once received. Patient was given 30 day free trial of Xarelto, to be filled at [facility] as requested by [facility], while [facility]. He will call with any concerns. He stated with any worsening symptoms he will proceed to [facility] over the [facility]. Desirae Arellano MA    ?

## 2021-10-22 NOTE — PATIENT INSTRUCTIONS
Acute Coronary Syndrome  Acute coronary syndrome (ACS) is a serious problem in which there is suddenly not enough blood and oxygen reaching the heart. ACS can result in chest pain or a heart attack.  This condition is a medical emergency. If you have any symptoms of this condition, get help right away.  What are the causes?  This condition may be caused by:  · A buildup of fat and cholesterol inside the arteries (atherosclerosis). This is the most common cause. The buildup (plaque) can cause blood vessels in the heart (coronary arteries) to become narrow or blocked, which reduces blood flow to the heart. Plaque can also break off and lead to a clot, which can block an artery and cause a heart attack or stroke.  · Sudden tightening of the muscles around the coronary arteries (coronary spasm).  · Tearing of a coronary artery (spontaneous coronary artery dissection).  · Very low blood pressure (hypotension).  · An abnormal heartbeat (arrhythmia).  · Other medical conditions that cause a decrease of oxygen to the heart, such as anemiaorrespiratory failure.  · Using cocaine or methamphetamine.  What increases the risk?  The following factors may make you more likely to develop this condition:  · Age. The risk for ACS increases as you get older.  · History of chest pain, heart attack, peripheral artery disease, or stroke.  · Having taken chemotherapy or immune-suppressing medicines.  · Being male.  · Family history of chest pain, heart disease, or stroke.  · Smoking.  · Not exercising enough.  · Being overweight.  · High cholesterol.  · High blood pressure (hypertension).  · Diabetes.  · Excessive alcohol use.  What are the signs or symptoms?  Common symptoms of this condition include:  · Chest pain. The pain may last a long time, or it may stop and come back (recur). It may feel like:  ? Crushing or squeezing.  ? Tightness, pressure, fullness, or heaviness.  · Arm, neck, jaw, or back pain.  · Heartburn or  indigestion.  · Shortness of breath.  · Nausea.  · Sudden cold sweats.  · Light-headedness.  · Dizziness or passing out.  · Tiredness (fatigue).  Sometimes there are no symptoms.  How is this diagnosed?  This condition may be diagnosed based on:  · Your medical history and symptoms.  · Imaging tests, such as:  ? An electrocardiogram (ECG). This measures the heart's electrical activity.  ? X-rays.  ? CT scan.  ? A coronary angiogram. For this test, dye is injected into the heart arteries and then X-rays are taken.  ? Myocardial perfusion imaging. This test shows how well blood flows through your heart muscle.  · Blood tests. These may be repeated at certain time intervals.  · Exercise stress testing.  · Echocardiogram. This is a test that uses sound waves to produce detailed images of the heart.  How is this treated?  Treatment for this condition may include:  · Oxygen therapy.  · Medicines, such as:  ? Antiplatelet medicines and blood-thinning medicines, such as aspirin. These help prevent blood clots.  ? Medicine that dissolves any blood clots (fibrinolytic therapy).  ? Blood pressure medicines.  ? Nitroglycerin. This helps widen blood vessels to improve blood flow.  ? Pain medicine.  ? Cholesterol-lowering medicine.  · Surgery, such as:  ? Coronary angioplasty with stent placement. This involves placing a small piece of metal that looks like mesh or a spring into a narrow coronary artery. This widens the artery and keeps it open.  ? Coronary artery bypass surgery. This involves taking a section of a blood vessel from a different part of your body and placing it on the blocked coronary artery to allow blood to flow around the blockage.  · Cardiac rehabilitation. This is a program that includes exercise training, education, and counseling to help you recover.  Follow these instructions at home:  Eating and drinking  · Eat a heart-healthy diet that includes whole grains, fruits and vegetables, lean proteins, and  low-fat or nonfat dairy products.  · Limit how much salt (sodium) you eat as told by your health care provider. Follow instructions from your health care provider about any other eating or drinking restrictions, such as limiting foods that are high in fat and processed sugars.  · Use healthy cooking methods such as roasting, grilling, broiling, baking, poaching, steaming, or stir-frying.  · Work with a dietitian to follow a heart-healthy eating plan.  Medicines  · Take over-the-counter and prescription medicines only as told by your health care provider.  · Do not take these medicines unless your health care provider approves:  ? Vitamin supplements that contain vitamin A or vitamin E.  ? NSAIDs, such as ibuprofen, naproxen, or celecoxib.  ? Hormone replacement therapy that contains estrogen.  · If you are taking blood thinners:  ? Talk with your health care provider before you take any medicines that contain aspirin or NSAIDs. These medicines increase your risk for dangerous bleeding.  ? Take your medicine exactly as told, at the same time every day.  ? Avoid activities that could cause injury or bruising, and follow instructions about how to prevent falls.  ? Wear a medical alert bracelet, and carry a card that lists what medicines you take.  Activity  · Follow your cardiac rehabilitation program. Do exercises as told by your physical therapist.  · Ask your health care provider what activities and exercises are safe for you. Follow his or her instructions about lifting, driving, or climbing stairs.  Lifestyle  · Do not use any products that contain nicotine or tobacco, such as cigarettes, e-cigarettes, and chewing tobacco. If you need help quitting, ask your health care provider.  · Do not drink alcohol if your health care provider tells you not to drink.  · If you drink alcohol:  ? Limit how much you have to 0-1 drink a day.  ? Be aware of how much alcohol is in your drink. In the [place] one drink equals one 12 oz  bottle of beer (355 mL), one 5 oz glass of wine (148 mL), or one 1½ oz glass of hard liquor (44 mL).  · Maintain a healthy weight. If you need to lose weight, work with your health care provider to do so safely.  General instructions  · Tell all the health care providers who provide care for you about your heart condition, including your dentist. This may affect the medicines or treatment you receive.  · Manage any other health conditions you have, such as hypertension or diabetes. These conditions affect your heart.  · Pay attention to your mental health. You may be at higher risk for depression.  ? Find ways to manage stress.  ? Talk to your health care provider about depression screening and treatment.  · Keep your vaccinations up to date.  ? Get the flu shot (influenza vaccine) every year.  ? Get the pneumococcal vaccine if you are age 65 or older.  · If directed, monitor your blood pressure at home.  · Keep all follow-up visits as told by your health care provider. This is important.  Contact a health care provider if you:  · Feel overwhelmed or sad.  · Have trouble doing your daily activities.  Get help right away if you:  · Have pain in your chest, neck, arm, jaw, stomach, or back that recurs, and:  ? It lasts for more than a few minutes.  ? It is not relieved by taking the medicineyour health care provider prescribed.  · Have unexplained:  ? Heavy sweating.  ? Heartburn or indigestion.  ? Nausea or vomiting.  ? Shortness of breath.  ? Difficulty breathing.  ? Fatigue.  ? Nervousness or anxiety.  ? Weakness.  ? Diarrhea.  ? Dark stools or blood in your stool.  · Have sudden light-headedness or dizziness.  · Have blood pressure that is higher than 180/120.  · Faint.  · Have thoughts about hurting yourself.  These symptoms may represent a serious problem that is an emergency. Do not wait to see if the symptoms will go away. Get medical help right away. Call your local emergency services (911 in the [place] Do  not drive yourself to the hospital.   Summary  · Acute coronary syndrome (ACS) is when there is not enough blood and oxygen being supplied to the heart. ACS can result in chest pain or a heart attack.  · Acute coronary syndrome is a medical emergency. If you have any symptoms of this condition, get help right away.  · Treatment includes medicines and procedures to open the blocked arteries and restore blood flow.  This information is not intended to replace advice given to you by your health care provider. Make sure you discuss any questions you have with your health care provider.  Document Revised: 05/20/2020 Document Reviewed: 12/30/2019  [employer] Patient Education © 2021 [employer]      Hypertension, Adult  Hypertension is another name for high blood pressure. High blood pressure forces your heart to work harder to pump blood. This can cause problems over time.  There are two numbers in a blood pressure reading. There is a top number (systolic) over a bottom number (diastolic). It is best to have a blood pressure that is below 120/80. Healthy choices can help lower your blood pressure, or you may need medicine to help lower it.  What are the causes?  The cause of this condition is not known. Some conditions may be related to high blood pressure.  What increases the risk?  · Smoking.  · Having type 2 diabetes mellitus, high cholesterol, or both.  · Not getting enough exercise or physical activity.  · Being overweight.  · Having too much fat, sugar, calories, or salt (sodium) in your diet.  · Drinking too much alcohol.  · Having long-term (chronic) kidney disease.  · Having a family history of high blood pressure.  · Age. Risk increases with age.  · Race. You may be at higher risk if you are .  · Gender. Men are at higher risk than women before age 45. After age 65, women are at higher risk than men.  · Having obstructive sleep apnea.  · Stress.  What are the signs or symptoms?  · High blood  pressure may not cause symptoms. Very high blood pressure (hypertensive crisis) may cause:  ? Headache.  ? Feelings of worry or nervousness (anxiety).  ? Shortness of breath.  ? Nosebleed.  ? A feeling of being sick to your stomach (nausea).  ? Throwing up (vomiting).  ? Changes in how you see.  ? Very bad chest pain.  ? Seizures.  How is this treated?  · This condition is treated by making healthy lifestyle changes, such as:  ? Eating healthy foods.  ? Exercising more.  ? Drinking less alcohol.  · Your health care provider may prescribe medicine if lifestyle changes are not enough to get your blood pressure under control, and if:  ? Your top number is above 130.  ? Your bottom number is above 80.  · Your personal target blood pressure may vary.  Follow these instructions at home:  Eating and drinking    · If told, follow the DASH eating plan. To follow this plan:  ? Fill one half of your plate at each meal with fruits and vegetables.  ? Fill one fourth of your plate at each meal with whole grains. Whole grains include whole-wheat pasta, brown rice, and whole-grain bread.  ? Eat or drink low-fat dairy products, such as skim milk or low-fat yogurt.  ? Fill one fourth of your plate at each meal with low-fat (lean) proteins. Low-fat proteins include fish, chicken without skin, eggs, beans, and tofu.  ? Avoid fatty meat, cured and processed meat, or chicken with skin.  ? Avoid pre-made or processed food.  · Eat less than 1,500 mg of salt each day.  · Do not drink alcohol if:  ? Your doctor tells you not to drink.  ? You are pregnant, may be pregnant, or are planning to become pregnant.  · If you drink alcohol:  ? Limit how much you use to:  § 0-1 drink a day for women.  § 0-2 drinks a day for men.  ? Be aware of how much alcohol is in your drink. In the [place] one drink equals one 12 oz bottle of beer (355 mL), one 5 oz glass of wine (148 mL), or one 1½ oz glass of hard liquor (44 mL).    Lifestyle    · Work with your  doctor to stay at a healthy weight or to lose weight. Ask your doctor what the best weight is for you.  · Get at least 30 minutes of exercise most days of the week. This may include walking, swimming, or biking.  · Get at least 30 minutes of exercise that strengthens your muscles (resistance exercise) at least 3 days a week. This may include lifting weights or doing Pilates.  · Do not use any products that contain nicotine or tobacco, such as cigarettes, e-cigarettes, and chewing tobacco. If you need help quitting, ask your doctor.  · Check your blood pressure at home as told by your doctor.  · Keep all follow-up visits as told by your doctor. This is important.    Medicines  · Take over-the-counter and prescription medicines only as told by your doctor. Follow directions carefully.  · Do not skip doses of blood pressure medicine. The medicine does not work as well if you skip doses. Skipping doses also puts you at risk for problems.  · Ask your doctor about side effects or reactions to medicines that you should watch for.  Contact a doctor if you:  · Think you are having a reaction to the medicine you are taking.  · Have headaches that keep coming back (recurring).  · Feel dizzy.  · Have swelling in your ankles.  · Have trouble with your vision.  Get help right away if you:  · Get a very bad headache.  · Start to feel mixed up (confused).  · Feel weak or numb.  · Feel faint.  · Have very bad pain in your:  ? Chest.  ? Belly (abdomen).  · Throw up more than once.  · Have trouble breathing.  Summary  · Hypertension is another name for high blood pressure.  · High blood pressure forces your heart to work harder to pump blood.  · For most people, a normal blood pressure is less than 120/80.  · Making healthy choices can help lower blood pressure. If your blood pressure does not get lower with healthy choices, you may need to take medicine.  This information is not intended to replace advice given to you by your health  care provider. Make sure you discuss any questions you have with your health care provider.  Document Revised: 08/28/2019 Document Reviewed: 08/28/2019  [employer] Patient Education © 2021 for[employer]      Palpitations  Palpitations are feelings that your heartbeat is not normal. Your heartbeat may feel like it is:  · Uneven.  · Faster than normal.  · Fluttering.  · Skipping a beat.  This is usually not a serious problem. In some cases, you may need tests to rule out any serious problems.  Follow these instructions at home:  Pay attention to any changes in your condition. Take these actions to help manage your symptoms:  Eating and drinking  · Avoid:  ? Coffee, tea, soft drinks, and energy drinks.  ? Chocolate.  ? Alcohol.  ? Diet pills.  Lifestyle    · Try to lower your stress. These things can help you relax:  ? Yoga.  ? Deep breathing and meditation.  ? Exercise.  ? Using words and images to create positive thoughts (guided imagery).  ? Using your mind to control things in your body (biofeedback).  · Do not use drugs.  · Get plenty of rest and sleep. Keep a regular bed time.    General instructions    · Take over-the-counter and prescription medicines only as told by your doctor.  · Do not use any products that contain nicotine or tobacco, such as cigarettes and e-cigarettes. If you need help quitting, ask your doctor.  · Keep all follow-up visits as told by your doctor. This is important. You may need more tests if palpitations do not go away or get worse.    Contact a doctor if:  · Your symptoms last more than 24 hours.  · Your symptoms occur more often.  Get help right away if you:  · Have chest pain.  · Feel short of breath.  · Have a very bad headache.  · Feel dizzy.  · Pass out (faint).  Summary  · Palpitations are feelings that your heartbeat is uneven or faster than normal. It may feel like your heart is fluttering or skipping a beat.  · Avoid food and drinks that may cause palpitations. These include  caffeine, chocolate, and alcohol.  · Try to lower your stress. Do not smoke or use drugs.  · Get help right away if you faint or have chest pain, shortness of breath, a severe headache, or dizziness.  This information is not intended to replace advice given to you by your health care provider. Make sure you discuss any questions you have with your health care provider.  Document Revised: 01/30/2019 Document Reviewed: 01/30/2019  [employer] Patient Education © 2021 [employer]      Heart Failure, Diagnosis    Heart failure means that your heart is not able to pump blood in the right way. This makes it hard for your body to work well. Heart failure is usually a long-term (chronic) condition. You must take good care of yourself and follow your treatment plan from your doctor.  What are the causes?  This condition may be caused by:  · High blood pressure.  · Build up of cholesterol and fat in the arteries.  · Heart attack. This injures the heart muscle.  · Heart valves that do not open and close properly.  · Damage of the heart muscle. This is also called cardiomyopathy.  · Lung disease.  · Abnormal heart rhythms.  What increases the risk?  The risk of heart failure goes up as a person ages. This condition is also more likely to develop in people who:  · Are overweight.  · Are male.  · Smoke or chew tobacco.  · Abuse alcohol or illegal drugs.  · Have taken medicines that can damage the heart.  · Have diabetes.  · Have abnormal heart rhythms.  · Have thyroid problems.  · Have low blood counts (anemia).  What are the signs or symptoms?  Symptoms of this condition include:  · Shortness of breath.  · Coughing.  · Swelling of the feet, ankles, legs, or belly.  · Losing weight for no reason.  · Trouble breathing.  · Waking from sleep because of the need to sit up and get more air.  · Rapid heartbeat.  · Being very tired.  · Feeling dizzy, or feeling like you may pass out (faint).  · Having no desire to eat.  · Feeling like  you may vomit (nauseous).  · Peeing (urinating) more at night.  · Feeling confused.  How is this treated?         This condition may be treated with:  · Medicines. These can be given to treat blood pressure and to make the heart muscles stronger.  · Changes in your daily life. These may include eating a healthy diet, staying at a healthy body weight, quitting tobacco and illegal drug use, or doing exercises.  · Surgery. Surgery can be done to open blocked valves, or to put devices in the heart, such as pacemakers.  · A donor heart (heart transplant). You will receive a healthy heart from a donor.  Follow these instructions at home:  · Treat other conditions as told by your doctor. These may include high blood pressure, diabetes, thyroid disease, or abnormal heart rhythms.  · Learn as much as you can about heart failure.  · Get support as you need it.  · Keep all follow-up visits as told by your doctor. This is important.  Summary  · Heart failure means that your heart is not able to pump blood in the right way.  · This condition is caused by high blood pressure, heart attack, or damage of the heart muscle.  · Symptoms of this condition include shortness of breath and swelling of the feet, ankles, legs, or belly. You may also feel very tired or feel like you may vomit.  · You may be treated with medicines, surgery, or changes in your daily life.  · Treat other health conditions as told by your doctor.  This information is not intended to replace advice given to you by your health care provider. Make sure you discuss any questions you have with your health care provider.  Document Revised: 03/06/2020 Document Reviewed: 03/06/2020  [employer] Patient Education © 2021 [employer]

## 2021-10-25 ENCOUNTER — TELEPHONE (OUTPATIENT)
Dept: CARDIOLOGY | Facility: CLINIC | Age: 81
End: 2021-10-25

## 2021-10-25 NOTE — TELEPHONE ENCOUNTER
Advised patient Iman prescribed the Xarelto. We have requested pacer interrogation to see if it is due to afib. Patient to call his office for any specifics. Desirae Arellano MA      ----- Message from Lavell Zepeda sent at 10/25/2021  9:07 AM EDT -----  Could you please call patient, he said he had [facility] call him asking him questions regarding xarelto and why he was taking it. Thank you.

## 2021-10-31 LAB
BH CV ECHO MEAS - ACS: 1.8 CM
BH CV ECHO MEAS - AO MAX PG: 4.8 MMHG
BH CV ECHO MEAS - AO MEAN PG: 3 MMHG
BH CV ECHO MEAS - AO ROOT AREA (BSA CORRECTED): 1.4
BH CV ECHO MEAS - AO ROOT AREA: 9.6 CM^2
BH CV ECHO MEAS - AO ROOT DIAM: 3.5 CM
BH CV ECHO MEAS - AO V2 MAX: 110 CM/SEC
BH CV ECHO MEAS - AO V2 MEAN: 84.5 CM/SEC
BH CV ECHO MEAS - AO V2 VTI: 21.4 CM
BH CV ECHO MEAS - BSA(HAYCOCK): 2.6 M^2
BH CV ECHO MEAS - BSA(HAYCOCK): 2.6 M^2
BH CV ECHO MEAS - BSA: 2.5 M^2
BH CV ECHO MEAS - BSA: 2.5 M^2
BH CV ECHO MEAS - BZI_BMI: 38.1 KILOGRAMS/M^2
BH CV ECHO MEAS - BZI_BMI: 39.2 KILOGRAMS/M^2
BH CV ECHO MEAS - BZI_METRIC_HEIGHT: 182.9 CM
BH CV ECHO MEAS - BZI_METRIC_HEIGHT: 185.4 CM
BH CV ECHO MEAS - BZI_METRIC_WEIGHT: 131.1 KG
BH CV ECHO MEAS - BZI_METRIC_WEIGHT: 131.1 KG
BH CV ECHO MEAS - EDV(CUBED): 125.8 ML
BH CV ECHO MEAS - EDV(MOD-SP4): 152 ML
BH CV ECHO MEAS - EDV(TEICH): 118.8 ML
BH CV ECHO MEAS - EF(CUBED): 73.9 %
BH CV ECHO MEAS - EF(MOD-SP4): 47.6 %
BH CV ECHO MEAS - EF(TEICH): 65.5 %
BH CV ECHO MEAS - ESV(CUBED): 32.8 ML
BH CV ECHO MEAS - ESV(MOD-SP4): 79.6 ML
BH CV ECHO MEAS - ESV(TEICH): 41 ML
BH CV ECHO MEAS - FS: 36.1 %
BH CV ECHO MEAS - IVS/LVPW: 1.1
BH CV ECHO MEAS - IVSD: 1.5 CM
BH CV ECHO MEAS - LA DIMENSION: 4.8 CM
BH CV ECHO MEAS - LA/AO: 1.4
BH CV ECHO MEAS - LV DIASTOLIC VOL/BSA (35-75): 61 ML/M^2
BH CV ECHO MEAS - LV IVRT: 0.1 SEC
BH CV ECHO MEAS - LV MASS(C)D: 306.2 GRAMS
BH CV ECHO MEAS - LV MASS(C)DI: 122.9 GRAMS/M^2
BH CV ECHO MEAS - LV SYSTOLIC VOL/BSA (12-30): 32 ML/M^2
BH CV ECHO MEAS - LVIDD: 5 CM
BH CV ECHO MEAS - LVIDS: 3.2 CM
BH CV ECHO MEAS - LVLD AP4: 8.6 CM
BH CV ECHO MEAS - LVLS AP4: 7.6 CM
BH CV ECHO MEAS - LVOT AREA (M): 3.8 CM^2
BH CV ECHO MEAS - LVOT AREA: 3.8 CM^2
BH CV ECHO MEAS - LVOT DIAM: 2.2 CM
BH CV ECHO MEAS - LVPWD: 1.4 CM
BH CV ECHO MEAS - MV DEC SLOPE: 259 CM/SEC^2
BH CV ECHO MEAS - MV E MAX VEL: 72.8 CM/SEC
BH CV ECHO MEAS - RAP SYSTOLE: 10 MMHG
BH CV ECHO MEAS - RVDD: 4.3 CM
BH CV ECHO MEAS - RVSP: 32.1 MMHG
BH CV ECHO MEAS - SI(AO): 82.6 ML/M^2
BH CV ECHO MEAS - SI(CUBED): 37.3 ML/M^2
BH CV ECHO MEAS - SI(MOD-SP4): 29.1 ML/M^2
BH CV ECHO MEAS - SI(TEICH): 31.2 ML/M^2
BH CV ECHO MEAS - SV(AO): 205.9 ML
BH CV ECHO MEAS - SV(CUBED): 93 ML
BH CV ECHO MEAS - SV(MOD-SP4): 72.4 ML
BH CV ECHO MEAS - SV(TEICH): 77.8 ML
BH CV ECHO MEAS - TR MAX VEL: 235 CM/SEC
BH CV LEA LEFT ANT TIBIAL A DISTAL PSV: 57 CM/S
BH CV LEA LEFT CFA PROX PSV: 111 CM/S
BH CV LEA LEFT DFA PROX PSV: 45 CM/S
BH CV LEA LEFT POPITEAL A  PROX PSV: 87 CM/S
BH CV LEA LEFT PTA DISTAL PSV: 54 CM/S
BH CV LEA LEFT SFA DISTAL PSV: 76 CM/S
BH CV LEA LEFT SFA MID PSV: 81 CM/S
BH CV LEA LEFT SFA PROX PSV: 82 CM/S
BH CV LEA RIGHT ANT TIBIAL A DISTAL PSV: 45 CM/S
BH CV LEA RIGHT CFA PROX PSV: 90 CM/S
BH CV LEA RIGHT DFA PROX PSV: 58 CM/S
BH CV LEA RIGHT POPITEAL A  PROX PSV: 72 CM/S
BH CV LEA RIGHT PTA DISTAL PSV: 54 CM/S
BH CV LEA RIGHT SFA DISTAL PSV: 72 CM/S
BH CV LEA RIGHT SFA MID PSV: 69 CM/S
BH CV LEA RIGHT SFA PROX PSV: 75 CM/S
DIST ATA PSV LEFT: 57.4 CM/SEC
DIST ATA PSV RIGHT: 45.2 CM/SEC
DIST PTA PSV RIGHT: 54 CM/SEC
DIST SFA PSV LEFT: -76.6 CM/SEC
DIST SFA PSV RIGHT: -72.2 CM/SEC
LEFT CFA PROX SYS PSV: 111.8 CM/SEC
MAXIMAL PREDICTED HEART RATE: 140 BPM
MAXIMAL PREDICTED HEART RATE: 140 BPM
MID SFA PSV LEFT: -81.6 CM/SEC
MID SFA PSV RIGHT: -69.2 CM/SEC
PROX PFA PSV LEFT: -45.6 CM/SEC
PROX PFA PSV RIGHT: -58.4 CM/SEC
PROX SFA PSV LEFT: -82.2 CM/SEC
PROX SFA PSV RIGHT: -75.4 CM/SEC
RIGHT CFA PROX SYS PSV: 91.1 CM/SEC
STRESS TARGET HR: 119 BPM
STRESS TARGET HR: 119 BPM

## 2021-11-01 ENCOUNTER — TELEPHONE (OUTPATIENT)
Dept: CARDIOLOGY | Facility: CLINIC | Age: 81
End: 2021-11-01

## 2021-11-01 NOTE — TELEPHONE ENCOUNTER
----- Message from Lavell Partida sent at 11/1/2021  8:34 AM EDT -----    ----- Message -----  From: Coleman Harman APRN  Sent: 11/1/2021   8:16 AM EDT  To: Desirae Arellano MA    No significant PVD per ultrasound.  Keep follow-up.  ----- Message -----  From: Errol Lai MD  Sent: 10/31/2021   3:06 PM EDT  To: DAVID Stone

## 2021-11-01 NOTE — TELEPHONE ENCOUNTER
----- Message from Lavell Partida sent at 11/1/2021  8:31 AM EDT -----    ----- Message -----  From: Coleman Harman APRN  Sent: 11/1/2021   8:14 AM EDT  To: Desirae Arellano MA    EF previously 19% by MUGA scan now 40 to 45%.  Marked improvement noted.  Keep follow-up.  ----- Message -----  From: Errol Lai MD  Sent: 10/31/2021   3:04 PM EDT  To: DAVID Stone

## 2021-11-03 ENCOUNTER — HOSPITAL ENCOUNTER (OUTPATIENT)
Dept: CARDIOLOGY | Facility: HOSPITAL | Age: 81
Discharge: HOME | End: 2021-11-03

## 2021-11-03 DIAGNOSIS — I50.22 CHRONIC SYSTOLIC HEART FAILURE (HCC): ICD-10-CM

## 2021-11-03 DIAGNOSIS — I10 ESSENTIAL HYPERTENSION: ICD-10-CM

## 2021-11-03 DIAGNOSIS — R06.02 SOB (SHORTNESS OF BREATH): ICD-10-CM

## 2021-11-03 PROCEDURE — A9500 TC99M SESTAMIBI: HCPCS | Performed by: INTERNAL MEDICINE

## 2021-11-03 PROCEDURE — 0 TECHNETIUM SESTAMIBI: Performed by: INTERNAL MEDICINE

## 2021-11-03 PROCEDURE — 78452 HT MUSCLE IMAGE SPECT MULT: CPT

## 2021-11-03 PROCEDURE — 78452 HT MUSCLE IMAGE SPECT MULT: CPT | Performed by: INTERNAL MEDICINE

## 2021-11-03 PROCEDURE — 25010000002 REGADENOSON 0.4 MG/5ML SOLUTION: Performed by: INTERNAL MEDICINE

## 2021-11-03 PROCEDURE — 93017 CV STRESS TEST TRACING ONLY: CPT

## 2021-11-03 PROCEDURE — 93018 CV STRESS TEST I&R ONLY: CPT | Performed by: INTERNAL MEDICINE

## 2021-11-03 RX ADMIN — REGADENOSON 0.4 MG: 0.08 INJECTION, SOLUTION INTRAVENOUS at 11:58

## 2021-11-03 RX ADMIN — TECHNETIUM TC 99M SESTAMIBI 1 DOSE: 1 INJECTION INTRAVENOUS at 09:01

## 2021-11-03 RX ADMIN — TECHNETIUM TC 99M SESTAMIBI 1 DOSE: 1 INJECTION INTRAVENOUS at 11:59

## 2021-11-04 ENCOUNTER — TRANSCRIBE ORDERS (OUTPATIENT)
Dept: LAB | Facility: HOSPITAL | Age: 81
End: 2021-11-04

## 2021-11-04 ENCOUNTER — LAB (OUTPATIENT)
Dept: LAB | Facility: HOSPITAL | Age: 81
End: 2021-11-04

## 2021-11-04 DIAGNOSIS — I50.32 CHRONIC DIASTOLIC HEART FAILURE (HCC): ICD-10-CM

## 2021-11-04 DIAGNOSIS — I50.22 CHRONIC SYSTOLIC HEART FAILURE (HCC): ICD-10-CM

## 2021-11-04 DIAGNOSIS — I48.21 PERMANENT ATRIAL FIBRILLATION (HCC): Primary | ICD-10-CM

## 2021-11-04 DIAGNOSIS — I48.21 PERMANENT ATRIAL FIBRILLATION (HCC): ICD-10-CM

## 2021-11-04 LAB
ANION GAP SERPL CALCULATED.3IONS-SCNC: 9.7 MMOL/L (ref 5–15)
BH CV REST NUCLEAR ISOTOPE DOSE: 10 MCI
BH CV STRESS COMMENTS STAGE 1: NORMAL
BH CV STRESS DOSE REGADENOSON STAGE 1: 0.4
BH CV STRESS DURATION MIN STAGE 1: 0
BH CV STRESS DURATION SEC STAGE 1: 10
BH CV STRESS NUCLEAR ISOTOPE DOSE: 30 MCI
BH CV STRESS PROTOCOL 1: NORMAL
BH CV STRESS RECOVERY BP: NORMAL MMHG
BH CV STRESS RECOVERY HR: 70 BPM
BH CV STRESS STAGE 1: 1
BUN SERPL-MCNC: 41 MG/DL (ref 8–23)
BUN/CREAT SERPL: 27.9 (ref 7–25)
CALCIUM SPEC-SCNC: 9.8 MG/DL (ref 8.6–10.5)
CHLORIDE SERPL-SCNC: 95 MMOL/L (ref 98–107)
CO2 SERPL-SCNC: 34.3 MMOL/L (ref 22–29)
CREAT SERPL-MCNC: 1.47 MG/DL (ref 0.76–1.27)
DEPRECATED RDW RBC AUTO: 48.4 FL (ref 37–54)
ERYTHROCYTE [DISTWIDTH] IN BLOOD BY AUTOMATED COUNT: 14.1 % (ref 12.3–15.4)
GFR SERPL CREATININE-BSD FRML MDRD: 46 ML/MIN/1.73
GLUCOSE SERPL-MCNC: 139 MG/DL (ref 65–99)
HCT VFR BLD AUTO: 44.2 % (ref 37.5–51)
HGB BLD-MCNC: 15 G/DL (ref 13–17.7)
MAXIMAL PREDICTED HEART RATE: 140 BPM
MCH RBC QN AUTO: 31.9 PG (ref 26.6–33)
MCHC RBC AUTO-ENTMCNC: 33.9 G/DL (ref 31.5–35.7)
MCV RBC AUTO: 94 FL (ref 79–97)
PERCENT MAX PREDICTED HR: 53.57 %
PLATELET # BLD AUTO: 276 10*3/MM3 (ref 140–450)
PMV BLD AUTO: 10.1 FL (ref 6–12)
POTASSIUM SERPL-SCNC: 3.6 MMOL/L (ref 3.5–5.2)
RBC # BLD AUTO: 4.7 10*6/MM3 (ref 4.14–5.8)
SODIUM SERPL-SCNC: 139 MMOL/L (ref 136–145)
STRESS BASELINE BP: NORMAL MMHG
STRESS BASELINE HR: 70 BPM
STRESS PERCENT HR: 63 %
STRESS POST PEAK BP: NORMAL MMHG
STRESS POST PEAK HR: 75 BPM
STRESS TARGET HR: 119 BPM
WBC # BLD AUTO: 8.81 10*3/MM3 (ref 3.4–10.8)

## 2021-11-04 PROCEDURE — 80048 BASIC METABOLIC PNL TOTAL CA: CPT

## 2021-11-04 PROCEDURE — 85027 COMPLETE CBC AUTOMATED: CPT

## 2021-11-04 PROCEDURE — 36415 COLL VENOUS BLD VENIPUNCTURE: CPT

## 2021-11-05 ENCOUNTER — TELEPHONE (OUTPATIENT)
Dept: CARDIOLOGY | Facility: CLINIC | Age: 81
End: 2021-11-05

## 2021-11-05 NOTE — TELEPHONE ENCOUNTER
Patient to follow up 11/16/21. Desirae Arellano MA      ----- Message from DAVID Stone sent at 11/5/2021  1:04 AM EDT -----  Positive stress test.  1 to 2-week follow-up.  ----- Message -----  From: Errol Lai MD  Sent: 11/4/2021   9:43 PM EDT  To: DAVID Stone      Result Text  1.  Scintigraphy is compatible of apical, inferoapical, and inferior wall ischemia.     #2.  Moderately depressed post-rest ejection fraction of 44% with apical, inferoapical, and inferoseptal severe hypokinesis to akinesis.     3.  No evidence of pharmacologically induced transient ischemic dilation or of increased lung uptake of radiopharmaceutical.

## 2021-11-11 ENCOUNTER — TELEPHONE (OUTPATIENT)
Dept: CARDIOLOGY | Facility: CLINIC | Age: 81
End: 2021-11-11

## 2021-11-15 RX ORDER — METOLAZONE 5 MG/1
5 TABLET ORAL DAILY
Qty: 90 TABLET | Refills: 3 | Status: SHIPPED | OUTPATIENT
Start: 2021-11-15 | End: 2022-11-23

## 2021-11-16 ENCOUNTER — OFFICE VISIT (OUTPATIENT)
Dept: CARDIOLOGY | Facility: CLINIC | Age: 81
End: 2021-11-16

## 2021-11-16 VITALS
WEIGHT: 286 LBS | BODY MASS INDEX: 37.91 KG/M2 | HEART RATE: 70 BPM | OXYGEN SATURATION: 98 % | SYSTOLIC BLOOD PRESSURE: 126 MMHG | DIASTOLIC BLOOD PRESSURE: 75 MMHG | HEIGHT: 73 IN

## 2021-11-16 DIAGNOSIS — I25.84 CORONARY ARTERY DISEASE DUE TO CALCIFIED CORONARY LESION: Primary | ICD-10-CM

## 2021-11-16 DIAGNOSIS — R94.39 POSITIVE CARDIAC STRESS TEST: ICD-10-CM

## 2021-11-16 DIAGNOSIS — I10 ESSENTIAL HYPERTENSION: ICD-10-CM

## 2021-11-16 DIAGNOSIS — I25.10 CORONARY ARTERY DISEASE DUE TO CALCIFIED CORONARY LESION: Primary | ICD-10-CM

## 2021-11-16 DIAGNOSIS — R07.2 PRECORDIAL PAIN: ICD-10-CM

## 2021-11-16 DIAGNOSIS — Z95.1 S/P CABG (CORONARY ARTERY BYPASS GRAFT): ICD-10-CM

## 2021-11-16 DIAGNOSIS — I50.22 CHRONIC SYSTOLIC HEART FAILURE (HCC): ICD-10-CM

## 2021-11-16 PROCEDURE — 99214 OFFICE O/P EST MOD 30 MIN: CPT | Performed by: NURSE PRACTITIONER

## 2021-11-16 RX ORDER — ALPRAZOLAM 0.5 MG/1
0.5 TABLET ORAL 2 TIMES DAILY PRN
COMMUNITY

## 2021-11-16 NOTE — PATIENT INSTRUCTIONS
Acute Coronary Syndrome  Acute coronary syndrome (ACS) is a serious problem in which there is suddenly not enough blood and oxygen reaching the heart. ACS can result in chest pain or a heart attack.  This condition is a medical emergency. If you have any symptoms of this condition, get help right away.  What are the causes?  This condition may be caused by:  · A buildup of fat and cholesterol inside the arteries (atherosclerosis). This is the most common cause. The buildup (plaque) can cause blood vessels in the heart (coronary arteries) to become narrow or blocked, which reduces blood flow to the heart. Plaque can also break off and lead to a clot, which can block an artery and cause a heart attack or stroke.  · Sudden tightening of the muscles around the coronary arteries (coronary spasm).  · Tearing of a coronary artery (spontaneous coronary artery dissection).  · Very low blood pressure (hypotension).  · An abnormal heartbeat (arrhythmia).  · Other medical conditions that cause a decrease of oxygen to the heart, such as anemiaorrespiratory failure.  · Using cocaine or methamphetamine.  What increases the risk?  The following factors may make you more likely to develop this condition:  · Age. The risk for ACS increases as you get older.  · History of chest pain, heart attack, peripheral artery disease, or stroke.  · Having taken chemotherapy or immune-suppressing medicines.  · Being male.  · Family history of chest pain, heart disease, or stroke.  · Smoking.  · Not exercising enough.  · Being overweight.  · High cholesterol.  · High blood pressure (hypertension).  · Diabetes.  · Excessive alcohol use.  What are the signs or symptoms?  Common symptoms of this condition include:  · Chest pain. The pain may last a long time, or it may stop and come back (recur). It may feel like:  ? Crushing or squeezing.  ? Tightness, pressure, fullness, or heaviness.  · Arm, neck, jaw, or back pain.  · Heartburn or  indigestion.  · Shortness of breath.  · Nausea.  · Sudden cold sweats.  · Light-headedness.  · Dizziness or passing out.  · Tiredness (fatigue).  Sometimes there are no symptoms.  How is this diagnosed?  This condition may be diagnosed based on:  · Your medical history and symptoms.  · Imaging tests, such as:  ? An electrocardiogram (ECG). This measures the heart's electrical activity.  ? X-rays.  ? CT scan.  ? A coronary angiogram. For this test, dye is injected into the heart arteries and then X-rays are taken.  ? Myocardial perfusion imaging. This test shows how well blood flows through your heart muscle.  · Blood tests. These may be repeated at certain time intervals.  · Exercise stress testing.  · Echocardiogram. This is a test that uses sound waves to produce detailed images of the heart.  How is this treated?  Treatment for this condition may include:  · Oxygen therapy.  · Medicines, such as:  ? Antiplatelet medicines and blood-thinning medicines, such as aspirin. These help prevent blood clots.  ? Medicine that dissolves any blood clots (fibrinolytic therapy).  ? Blood pressure medicines.  ? Nitroglycerin. This helps widen blood vessels to improve blood flow.  ? Pain medicine.  ? Cholesterol-lowering medicine.  · Surgery, such as:  ? Coronary angioplasty with stent placement. This involves placing a small piece of metal that looks like mesh or a spring into a narrow coronary artery. This widens the artery and keeps it open.  ? Coronary artery bypass surgery. This involves taking a section of a blood vessel from a different part of your body and placing it on the blocked coronary artery to allow blood to flow around the blockage.  · Cardiac rehabilitation. This is a program that includes exercise training, education, and counseling to help you recover.  Follow these instructions at home:  Eating and drinking  · Eat a heart-healthy diet that includes whole grains, fruits and vegetables, lean proteins, and  low-fat or nonfat dairy products.  · Limit how much salt (sodium) you eat as told by your health care provider. Follow instructions from your health care provider about any other eating or drinking restrictions, such as limiting foods that are high in fat and processed sugars.  · Use healthy cooking methods such as roasting, grilling, broiling, baking, poaching, steaming, or stir-frying.  · Work with a dietitian to follow a heart-healthy eating plan.  Medicines  · Take over-the-counter and prescription medicines only as told by your health care provider.  · Do not take these medicines unless your health care provider approves:  ? Vitamin supplements that contain vitamin A or vitamin E.  ? NSAIDs, such as ibuprofen, naproxen, or celecoxib.  ? Hormone replacement therapy that contains estrogen.  · If you are taking blood thinners:  ? Talk with your health care provider before you take any medicines that contain aspirin or NSAIDs. These medicines increase your risk for dangerous bleeding.  ? Take your medicine exactly as told, at the same time every day.  ? Avoid activities that could cause injury or bruising, and follow instructions about how to prevent falls.  ? Wear a medical alert bracelet, and carry a card that lists what medicines you take.  Activity  · Follow your cardiac rehabilitation program. Do exercises as told by your physical therapist.  · Ask your health care provider what activities and exercises are safe for you. Follow his or her instructions about lifting, driving, or climbing stairs.  Lifestyle  · Do not use any products that contain nicotine or tobacco, such as cigarettes, e-cigarettes, and chewing tobacco. If you need help quitting, ask your health care provider.  · Do not drink alcohol if your health care provider tells you not to drink.  · If you drink alcohol:  ? Limit how much you have to 0-1 drink a day.  ? Be aware of how much alcohol is in your drink. In the [place] one drink equals one 12 oz  bottle of beer (355 mL), one 5 oz glass of wine (148 mL), or one 1½ oz glass of hard liquor (44 mL).  · Maintain a healthy weight. If you need to lose weight, work with your health care provider to do so safely.  General instructions  · Tell all the health care providers who provide care for you about your heart condition, including your dentist. This may affect the medicines or treatment you receive.  · Manage any other health conditions you have, such as hypertension or diabetes. These conditions affect your heart.  · Pay attention to your mental health. You may be at higher risk for depression.  ? Find ways to manage stress.  ? Talk to your health care provider about depression screening and treatment.  · Keep your vaccinations up to date.  ? Get the flu shot (influenza vaccine) every year.  ? Get the pneumococcal vaccine if you are age 65 or older.  · If directed, monitor your blood pressure at home.  · Keep all follow-up visits as told by your health care provider. This is important.  Contact a health care provider if you:  · Feel overwhelmed or sad.  · Have trouble doing your daily activities.  Get help right away if you:  · Have pain in your chest, neck, arm, jaw, stomach, or back that recurs, and:  ? It lasts for more than a few minutes.  ? It is not relieved by taking the medicineyour health care provider prescribed.  · Have unexplained:  ? Heavy sweating.  ? Heartburn or indigestion.  ? Nausea or vomiting.  ? Shortness of breath.  ? Difficulty breathing.  ? Fatigue.  ? Nervousness or anxiety.  ? Weakness.  ? Diarrhea.  ? Dark stools or blood in your stool.  · Have sudden light-headedness or dizziness.  · Have blood pressure that is higher than 180/120.  · Faint.  · Have thoughts about hurting yourself.  These symptoms may represent a serious problem that is an emergency. Do not wait to see if the symptoms will go away. Get medical help right away. Call your local emergency services (911 in the [place] Do  not drive yourself to the hospital.   Summary  · Acute coronary syndrome (ACS) is when there is not enough blood and oxygen being supplied to the heart. ACS can result in chest pain or a heart attack.  · Acute coronary syndrome is a medical emergency. If you have any symptoms of this condition, get help right away.  · Treatment includes medicines and procedures to open the blocked arteries and restore blood flow.  This information is not intended to replace advice given to you by your health care provider. Make sure you discuss any questions you have with your health care provider.  Document Revised: 05/20/2020 Document Reviewed: 12/30/2019  [employer] Patient Education © 2021 [employer]      Heart Failure, Diagnosis    Heart failure means that your heart is not able to pump blood in the right way. This makes it hard for your body to work well. Heart failure is usually a long-term (chronic) condition. You must take good care of yourself and follow your treatment plan from your doctor.  What are the causes?  This condition may be caused by:  · High blood pressure.  · Build up of cholesterol and fat in the arteries.  · Heart attack. This injures the heart muscle.  · Heart valves that do not open and close properly.  · Damage of the heart muscle. This is also called cardiomyopathy.  · Lung disease.  · Abnormal heart rhythms.  What increases the risk?  The risk of heart failure goes up as a person ages. This condition is also more likely to develop in people who:  · Are overweight.  · Are male.  · Smoke or chew tobacco.  · Abuse alcohol or illegal drugs.  · Have taken medicines that can damage the heart.  · Have diabetes.  · Have abnormal heart rhythms.  · Have thyroid problems.  · Have low blood counts (anemia).  What are the signs or symptoms?  Symptoms of this condition include:  · Shortness of breath.  · Coughing.  · Swelling of the feet, ankles, legs, or belly.  · Losing weight for no reason.  · Trouble  breathing.  · Waking from sleep because of the need to sit up and get more air.  · Rapid heartbeat.  · Being very tired.  · Feeling dizzy, or feeling like you may pass out (faint).  · Having no desire to eat.  · Feeling like you may vomit (nauseous).  · Peeing (urinating) more at night.  · Feeling confused.  How is this treated?         This condition may be treated with:  · Medicines. These can be given to treat blood pressure and to make the heart muscles stronger.  · Changes in your daily life. These may include eating a healthy diet, staying at a healthy body weight, quitting tobacco and illegal drug use, or doing exercises.  · Surgery. Surgery can be done to open blocked valves, or to put devices in the heart, such as pacemakers.  · A donor heart (heart transplant). You will receive a healthy heart from a donor.  Follow these instructions at home:  · Treat other conditions as told by your doctor. These may include high blood pressure, diabetes, thyroid disease, or abnormal heart rhythms.  · Learn as much as you can about heart failure.  · Get support as you need it.  · Keep all follow-up visits as told by your doctor. This is important.  Summary  · Heart failure means that your heart is not able to pump blood in the right way.  · This condition is caused by high blood pressure, heart attack, or damage of the heart muscle.  · Symptoms of this condition include shortness of breath and swelling of the feet, ankles, legs, or belly. You may also feel very tired or feel like you may vomit.  · You may be treated with medicines, surgery, or changes in your daily life.  · Treat other health conditions as told by your doctor.  This information is not intended to replace advice given to you by your health care provider. Make sure you discuss any questions you have with your health care provider.  Document Revised: 03/06/2020 Document Reviewed: 03/06/2020  [employer] Patient Education © 2021 [employer]      Hypertension,  Adult  Hypertension is another name for high blood pressure. High blood pressure forces your heart to work harder to pump blood. This can cause problems over time.  There are two numbers in a blood pressure reading. There is a top number (systolic) over a bottom number (diastolic). It is best to have a blood pressure that is below 120/80. Healthy choices can help lower your blood pressure, or you may need medicine to help lower it.  What are the causes?  The cause of this condition is not known. Some conditions may be related to high blood pressure.  What increases the risk?  · Smoking.  · Having type 2 diabetes mellitus, high cholesterol, or both.  · Not getting enough exercise or physical activity.  · Being overweight.  · Having too much fat, sugar, calories, or salt (sodium) in your diet.  · Drinking too much alcohol.  · Having long-term (chronic) kidney disease.  · Having a family history of high blood pressure.  · Age. Risk increases with age.  · Race. You may be at higher risk if you are .  · Gender. Men are at higher risk than women before age 45. After age 65, women are at higher risk than men.  · Having obstructive sleep apnea.  · Stress.  What are the signs or symptoms?  · High blood pressure may not cause symptoms. Very high blood pressure (hypertensive crisis) may cause:  ? Headache.  ? Feelings of worry or nervousness (anxiety).  ? Shortness of breath.  ? Nosebleed.  ? A feeling of being sick to your stomach (nausea).  ? Throwing up (vomiting).  ? Changes in how you see.  ? Very bad chest pain.  ? Seizures.  How is this treated?  · This condition is treated by making healthy lifestyle changes, such as:  ? Eating healthy foods.  ? Exercising more.  ? Drinking less alcohol.  · Your health care provider may prescribe medicine if lifestyle changes are not enough to get your blood pressure under control, and if:  ? Your top number is above 130.  ? Your bottom number is above 80.  · Your  personal target blood pressure may vary.  Follow these instructions at home:  Eating and drinking    · If told, follow the DASH eating plan. To follow this plan:  ? Fill one half of your plate at each meal with fruits and vegetables.  ? Fill one fourth of your plate at each meal with whole grains. Whole grains include whole-wheat pasta, brown rice, and whole-grain bread.  ? Eat or drink low-fat dairy products, such as skim milk or low-fat yogurt.  ? Fill one fourth of your plate at each meal with low-fat (lean) proteins. Low-fat proteins include fish, chicken without skin, eggs, beans, and tofu.  ? Avoid fatty meat, cured and processed meat, or chicken with skin.  ? Avoid pre-made or processed food.  · Eat less than 1,500 mg of salt each day.  · Do not drink alcohol if:  ? Your doctor tells you not to drink.  ? You are pregnant, may be pregnant, or are planning to become pregnant.  · If you drink alcohol:  ? Limit how much you use to:  § 0-1 drink a day for women.  § 0-2 drinks a day for men.  ? Be aware of how much alcohol is in your drink. In the [place] one drink equals one 12 oz bottle of beer (355 mL), one 5 oz glass of wine (148 mL), or one 1½ oz glass of hard liquor (44 mL).    Lifestyle    · Work with your doctor to stay at a healthy weight or to lose weight. Ask your doctor what the best weight is for you.  · Get at least 30 minutes of exercise most days of the week. This may include walking, swimming, or biking.  · Get at least 30 minutes of exercise that strengthens your muscles (resistance exercise) at least 3 days a week. This may include lifting weights or doing Pilates.  · Do not use any products that contain nicotine or tobacco, such as cigarettes, e-cigarettes, and chewing tobacco. If you need help quitting, ask your doctor.  · Check your blood pressure at home as told by your doctor.  · Keep all follow-up visits as told by your doctor. This is important.    Medicines  · Take over-the-counter and  prescription medicines only as told by your doctor. Follow directions carefully.  · Do not skip doses of blood pressure medicine. The medicine does not work as well if you skip doses. Skipping doses also puts you at risk for problems.  · Ask your doctor about side effects or reactions to medicines that you should watch for.  Contact a doctor if you:  · Think you are having a reaction to the medicine you are taking.  · Have headaches that keep coming back (recurring).  · Feel dizzy.  · Have swelling in your ankles.  · Have trouble with your vision.  Get help right away if you:  · Get a very bad headache.  · Start to feel mixed up (confused).  · Feel weak or numb.  · Feel faint.  · Have very bad pain in your:  ? Chest.  ? Belly (abdomen).  · Throw up more than once.  · Have trouble breathing.  Summary  · Hypertension is another name for high blood pressure.  · High blood pressure forces your heart to work harder to pump blood.  · For most people, a normal blood pressure is less than 120/80.  · Making healthy choices can help lower blood pressure. If your blood pressure does not get lower with healthy choices, you may need to take medicine.  This information is not intended to replace advice given to you by your health care provider. Make sure you discuss any questions you have with your health care provider.  Document Revised: 08/28/2019 Document Reviewed: 08/28/2019  [employer] Patient Education © 2021 [employer]      Atrial Fibrillation    Atrial fibrillation is a type of heartbeat that is irregular or fast. If you have this condition, your heart beats without any order. This makes it hard for your heart to pump blood in a normal way.  Atrial fibrillation may come and go, or it may become a long-lasting problem. If this condition is not treated, it can put you at higher risk for stroke, heart failure, and other heart problems.  What are the causes?  This condition may be caused by diseases that damage the heart.  They include:  · High blood pressure.  · Heart failure.  · Heart valve disease.  · Heart surgery.  Other causes include:  · Diabetes.  · Thyroid disease.  · Being overweight.  · Kidney disease.  Sometimes the cause is not known.  What increases the risk?  You are more likely to develop this condition if:  · You are older.  · You smoke.  · You exercise often and very hard.  · You have a family history of this condition.  · You are a man.  · You use drugs.  · You drink a lot of alcohol.  · You have lung conditions, such as emphysema, pneumonia, or COPD.  · You have sleep apnea.  What are the signs or symptoms?  Common symptoms of this condition include:  · A feeling that your heart is beating very fast.  · Chest pain or discomfort.  · Feeling short of breath.  · Suddenly feeling light-headed or weak.  · Getting tired easily during activity.  · Fainting.  · Sweating.  In some cases, there are no symptoms.  How is this treated?  Treatment for this condition depends on underlying conditions and how you feel when you have atrial fibrillation. They include:  · Medicines to:  ? Prevent blood clots.  ? Treat heart rate or heart rhythm problems.  · Using devices, such as a pacemaker, to correct heart rhythm problems.  · Doing surgery to remove the part of the heart that sends bad signals.  · Closing an area where clots can form in the heart (left atrial appendage).  In some cases, your doctor will treat other underlying conditions.  Follow these instructions at home:  Medicines  · Take over-the-counter and prescription medicines only as told by your doctor.  · Do not take any new medicines without first talking to your doctor.  · If you are taking blood thinners:  ? Talk with your doctor before you take any medicines that have aspirin or NSAIDs, such as ibuprofen, in them.  ? Take your medicine exactly as told by your doctor. Take it at the same time each day.  ? Avoid activities that could hurt or bruise you. Follow  "instructions about how to prevent falls.  ? Wear a bracelet that says you are taking blood thinners. Or, carry a card that lists what medicines you take.  Lifestyle         · Do not use any products that have nicotine or tobacco in them. These include cigarettes, e-cigarettes, and chewing tobacco. If you need help quitting, ask your doctor.  · Eat heart-healthy foods. Talk with your doctor about the right eating plan for you.  · Exercise regularly as told by your doctor.  · Do not drink alcohol.  · Lose weight if you are overweight.  · Do not use drugs, including cannabis.  General instructions  · If you have a condition that causes breathing to stop for a short period of time (apnea), treat it as told by your doctor.  · Keep a healthy weight. Do not use diet pills unless your doctor says they are safe for you. Diet pills may make heart problems worse.  · Keep all follow-up visits as told by your doctor. This is important.  Contact a doctor if:  · You notice a change in the speed, rhythm, or strength of your heartbeat.  · You are taking a blood-thinning medicine and you get more bruising.  · You get tired more easily when you move or exercise.  · You have a sudden change in weight.  Get help right away if:    · You have pain in your chest or your belly (abdomen).  · You have trouble breathing.  · You have side effects of blood thinners, such as blood in your vomit, poop (stool), or pee (urine), or bleeding that cannot stop.  · You have any signs of a stroke. \"BE FAST\" is an easy way to remember the main warning signs:  ? B - Balance. Signs are dizziness, sudden trouble walking, or loss of balance.  ? E - Eyes. Signs are trouble seeing or a change in how you see.  ? F - Face. Signs are sudden weakness or loss of feeling in the face, or the face or eyelid drooping on one side.  ? A - Arms. Signs are weakness or loss of feeling in an arm. This happens suddenly and usually on one side of the body.  ? S - Speech. Signs " are sudden trouble speaking, slurred speech, or trouble understanding what people say.  ? T - Time. Time to call emergency services. Write down what time symptoms started.  · You have other signs of a stroke, such as:  ? A sudden, very bad headache with no known cause.  ? Feeling like you may vomit (nausea).  ? Vomiting.  ? A seizure.  These symptoms may be an emergency. Do not wait to see if the symptoms will go away. Get medical help right away. Call your local emergency services (911 in the [place] Do not drive yourself to the hospital.  Summary  · Atrial fibrillation is a type of heartbeat that is irregular or fast.  · You are at higher risk of this condition if you smoke, are older, have diabetes, or are overweight.  · Follow your doctor's instructions about medicines, diet, exercise, and follow-up visits.  · Get help right away if you have signs or symptoms of a stroke.  · Get help right away if you cannot catch your breath, or you have chest pain or discomfort.  This information is not intended to replace advice given to you by your health care provider. Make sure you discuss any questions you have with your health care provider.  Document Revised: 06/10/2020 Document Reviewed: 06/10/2020  [employer] Patient Education © 2021 [employer]

## 2021-11-16 NOTE — PROGRESS NOTES
Subjective     Nick Caraballo is a 81 y.o. male who presents to day for Congestive Heart Failure (presents for abn stress, echo and LE duplex) and Shortness of Breath.    CHIEF COMPLIANT  Chief Complaint   Patient presents with   • Congestive Heart Failure     presents for abn stress, echo and LE duplex   • Shortness of Breath       Active Problems:  Problem List Items Addressed This Visit        Cardiac and Vasculature    Precordial pain    Relevant Orders    [facility]    CBC & Differential    Basic Metabolic Panel    Coronary artery disease due to calcified coronary lesion - Primary    Relevant Orders    [facility]    CBC & Differential    Basic Metabolic Panel    Essential hypertension    Relevant Orders    [facility]    CBC & Differential    Basic Metabolic Panel    S/P CABG (coronary artery bypass graft)    Relevant Orders    [facility]    CBC & Differential    Basic Metabolic Panel    Chronic systolic heart failure (HCC)    Overview     Added automatically from request for surgery [id number]         Relevant Orders    [facility]    CBC & Differential    Basic Metabolic Panel      Other Visit Diagnoses     Positive cardiac stress test        Relevant Orders    [facility]    CBC & Differential    Basic Metabolic Panel      PROBLEM LIST:   1. CAD, MI in 1998  1.1 Stenting x 5 total at [facility]. Data  1.2 CABG x2 in Dec. 2014 at [facility] by Dr. Álvarez in setting of ACS, LIMA to LAD and reversed vein graft from aorta to diag. With RLE vein graft  1.4 Cath 10/19: Widely patent grafts with no progressive disease in the RCA or circumflex ejection fraction 50 to 55%, LVEDP 8 to 12 mmHg  1.5 left heart catheterization 9/20: Left main normal, LAD  occluded in the mid vessel with a patent LIMA beyond the occluded area, first diagonal has proximal 80% stenosis with a patent SVG to first D1, circumflex normal, RCA normal, EF 30 to 35% with mid anterior  and apical wall akinesis and inferior wall hypokinesis  1.6 MUGA scan 12/20: EF 19%  1.7 apical, anterior apical and inferior wall ischemia, moderately depressed post-rest EF of 44% with apical and inferior apical and inferior septal severe hypokinesis to akinesis  2. A-Fib, s/p CABG per patient report  3. HTN  3.1 echocardiogram 10/21: EF 40 to 45% mild concentric left ventricular hypertrophy grade 2 diastolic dysfunction mild to moderate left atrial enlargement, right atrium mildly dilated, trivial MR, trivial to mild TR and AI pulmonary artery systolic pressures low 30s  4. Hyperlipidemia  5. GOPI, uses c-pap, sees Dr Jones  6. SSS  7. 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 carotid duplex 12/20: Nonobstructive carotid artery     HPI  HPI  Mr. Caraballo is an 81-year-old female patient who is being followed up today for chest pain, shortness of breath, A. fib and positive stress test..  Patient did have a stress test due to worsening chest pain, significant increase in shortness of breath and smothering, and worsening failure-like symptoms in which the stress test came back positive for apical, anterior apical and inferior wall ischemia with a moderately depressed post-rest EF of 44% with apical and inferior apical and inferior apical severe hypokinesis to akinesis.  He does have a significant history of coronary artery disease which has had coronary artery graft bypass grafting in the past.  He is on antianginal therapy of Ranexa.  He is on Xarelto and aspirin.  He also has heart failure with reduced ejection fraction and which she is on carvedilol and Entresto as well as Jardiance.  He is on diuretic therapy of Lasix and metolazone.  He reports that he is continuing to have chest pain that occurs in his chest that is intermittent in which he describes as a pressure-like sensation.  His months most severe complaint is shortness of breath that does seem to be a little better but he still has episodes of  significant smothering that occurs with any activity but also occurs at rest.  He reports that his lifestyle has been significantly impacted where he gets tired and short of breath with activities that he was able to do in the past such as even bringing in the groceries.  He says he gets real tired and weak even doing the dishes.  He also reports worsening of lower extremity edema despite aggressive diuretic therapy with Lasix and metolazone.  He does have a history of atrial fibrillation or he does have an rare occasional palpitation is anticoagulated with Xarelto and rate controlled with carvedilol.  He denies any signs or symptoms of bleeding.  His echocardiogram did show improvement in his ejection fraction up to 40 to 45% with grade 2 diastolic dysfunction and no dynamically significant valve disease.  He denies syncope, or strokelike symptoms.  We did review his stress test and echocardiogram in detail and he denied any questions.  PRIOR MEDS  Current Outpatient Medications on File Prior to Visit   Medication Sig Dispense Refill   • acetaminophen (TYLENOL) 650 MG 8 hr tablet Take 650 mg by mouth Every 8 (Eight) Hours As Needed for Mild Pain .     • albuterol (PROVENTIL) (2.5 MG/3ML) 0.083% nebulizer solution Take 2.5 mg by nebulization Every 4 (Four) Hours As Needed for Wheezing.     • albuterol sulfate  (90 Base) MCG/ACT inhaler Inhale 2 puffs Every 4 (Four) Hours As Needed for Wheezing or Shortness of Air. 8 g 3   • allopurinol (ZYLOPRIM) 300 MG tablet Take 300 mg by mouth Daily.     • ALPRAZolam (XANAX) 0.5 MG tablet Take 0.5 mg by mouth 2 (Two) Times a Day As Needed for Anxiety.     • aspirin 81 MG EC tablet Take 1 tablet by mouth Daily. 90 tablet 3   • atorvastatin (LIPITOR) 80 MG tablet Take 1 tablet by mouth Every Night. 90 tablet 3   • carvedilol (COREG) 12.5 MG tablet Take 1 tablet by mouth 2 (Two) Times a Day. 180 tablet 3   • Cholecalciferol (VITAMIN D3) 50 MCG (2000 UT) tablet Take 2,000  Units by mouth Daily.     • empagliflozin (JARDIANCE) 25 MG tablet tablet Take 0.5 tablets by mouth Daily. 45 tablet 3   • furosemide (LASIX) 20 MG tablet Take 3 tablets by mouth 2 (Two) Times a Day. 270 tablet 3   • guaiFENesin (MUCINEX) 600 MG 12 hr tablet Take 1,200 mg by mouth As Needed for Cough.     • isosorbide mononitrate (IMDUR) 30 MG 24 hr tablet Take 0.5 tablets by mouth Daily. Half tablet daily 45 tablet 3   • levothyroxine (SYNTHROID, LEVOTHROID) 25 MCG tablet Take 25 mcg by mouth Daily.     • Melatonin 3 MG capsule Take 3 mg by mouth Every Night.     • metOLazone (ZAROXOLYN) 5 MG tablet Take 1 tablet by mouth Daily. 90 tablet 3   • Miconazole Nitrate 2 % powder Apply 1 application topically to the appropriate area as directed 2 (two) times a day. 100 g 1   • Multiple Vitamins-Minerals (ICAPS AREDS 2 PO) Take  by mouth.     • Multiple Vitamins-Minerals (MULTIVITAMIN ADULT PO) Take  by mouth Daily.     • naproxen (Naprosyn) 500 MG tablet Take 1 tablet by mouth 2 (Two) Times a Day As Needed for Mild Pain . 60 tablet 1   • nitroglycerin (NITROSTAT) 0.4 MG SL tablet Place 1 tablet under the tongue Every 5 (Five) Minutes As Needed for Chest Pain. 25 tablet 3   • pantoprazole (PROTONIX) 40 MG EC tablet Take 40 mg by mouth Daily.     • potassium chloride (K-DUR,KLOR-CON) 10 MEQ CR tablet Take 20 mEq by mouth 3 (Three) Times a Day.     • rivaroxaban (XARELTO) 20 MG tablet Take 1 tablet by mouth Daily. 90 tablet 3   • sacubitril-valsartan (Entresto) 24-26 MG tablet Take 1 tablet by mouth 2 (Two) Times a Day. 180 tablet 3   • tamsulosin (FLOMAX) 0.4 MG capsule 24 hr capsule Take 1 capsule by mouth Daily.     • venlafaxine XR (EFFEXOR-XR) 150 MG 24 hr capsule Take 1 capsule by mouth Daily. 90 capsule 3     No current facility-administered medications on file prior to visit.       ALLERGIES  Patient has no known allergies.    HISTORY  Past Medical History:   Diagnosis Date   • Acid reflux    • Anxiety    • Complete  heart block (HCC)    • COPD (chronic obstructive pulmonary disease) (HCC)    • Coronary artery disease    • Gout    • Hyperlipidemia    • Hypertension    • Hypothyroid    • Myocardial infarction (HCC)    • Pseudophakia    • Rheumatoid arthritis (HCC)    • Sleep apnea    • Thyroid disease        Social History     Socioeconomic History   • Marital status:    Tobacco Use   • Smoking status: Former Smoker     Packs/day: 1.00     Years: 45.00     Pack years: 45.00     Quit date:      Years since quittin.8   • Smokeless tobacco: Never Used   Substance and Sexual Activity   • Alcohol use: No   • Drug use: No   • Sexual activity: Defer       Family History   Problem Relation Age of Onset   • No Known Problems Mother    • Colon cancer Father    • Heart disease Brother        Review of Systems   Constitutional: Positive for fatigue. Negative for chills, diaphoresis and fever.   HENT: Negative.    Eyes: Negative.  Negative for visual disturbance.   Respiratory: Positive for apnea (cpap) and shortness of breath (with any activity and times at rest). Negative for cough, chest tightness and wheezing.    Cardiovascular: Positive for chest pain (occas pressure) and leg swelling (BLE, weeping edema ). Negative for palpitations.   Gastrointestinal: Negative.  Negative for abdominal pain, blood in stool, constipation, diarrhea, nausea and vomiting.   Endocrine: Negative.    Genitourinary: Negative.  Negative for hematuria.   Musculoskeletal: Positive for arthralgias, back pain, myalgias and neck pain.   Skin: Positive for color change (eurythema BLE) and wound (LLE weeping edema).   Allergic/Immunologic: Negative.    Neurological: Positive for dizziness (with quick movement, better than before) and weakness. Negative for syncope, light-headedness, numbness and headaches.   Hematological: Bruises/bleeds easily.   Psychiatric/Behavioral: Negative for sleep disturbance. The patient is nervous/anxious.   "      Objective     VITALS: /75 (BP Location: Right arm, Patient Position: Sitting)   Pulse 70   Ht 185.4 cm (72.99\")   Wt 130 kg (286 lb)   SpO2 98%   BMI 37.74 kg/m²     LABS:   Lab Results (most recent)     None          IMAGING:   XR Chest PA & Lateral    Result Date: 8/30/2021    No acute findings in the chest.  This report was finalized on 8/30/2021 3:38 PM by Dr. Fernie Snyder MD.        EXAM:  Physical Exam  Vitals and nursing note reviewed.   Constitutional:       Appearance: He is well-developed.   HENT:      Head: Normocephalic.   Eyes:      Pupils: Pupils are equal, round, and reactive to light.   Neck:      Thyroid: No thyroid mass.      Vascular: No carotid bruit or JVD.      Trachea: Trachea and phonation normal.   Cardiovascular:      Rate and Rhythm: Normal rate and regular rhythm.      Pulses:           Radial pulses are 2+ on the right side and 2+ on the left side.        Posterior tibial pulses are 2+ on the right side and 2+ on the left side.      Heart sounds: Normal heart sounds. No murmur heard.  No friction rub. No gallop.    Pulmonary:      Effort: Pulmonary effort is normal. No respiratory distress.      Breath sounds: Normal breath sounds. No wheezing or rales.   Abdominal:      General: Bowel sounds are normal.      Palpations: Abdomen is soft.   Musculoskeletal:         General: Swelling (2+ BLE) present. Normal range of motion.      Cervical back: Neck supple.   Skin:     General: Skin is warm and dry.      Capillary Refill: Capillary refill takes less than 2 seconds.      Findings: No rash.   Neurological:      Mental Status: He is alert and oriented to person, place, and time.   Psychiatric:         Speech: Speech normal.         Behavior: Behavior normal.         Thought Content: Thought content normal.         Judgment: Judgment normal.         Procedure   Procedures       Assessment/Plan    Diagnosis Plan   1. Coronary artery disease due to calcified coronary lesion  " [facility] Cath    CBC & Differential    Basic Metabolic Panel   2. Essential hypertension  [facility] Cath    CBC & Differential    Basic Metabolic Panel   3. S/P CABG (coronary artery bypass graft)  [facility] Cath    CBC & Differential    Basic Metabolic Panel   4. Chronic systolic heart failure (HCC)  [facility] Cath    CBC & Differential    Basic Metabolic Panel   5. Precordial pain  [facility] Cath    CBC & Differential    Basic Metabolic Panel   6. Positive cardiac stress test  [facility] Cath    CBC & Differential    Basic Metabolic Panel   1.  Due to patient's significant history of coronary artery disease positive stress test with apical, anterior apical and inferior wall ischemia, moderately depressed post-rest EF of 44% with apical and inferior apical and inferior septal severe hypokinesis to akinesis, chest pain, worsening shortness of breath and failure-like symptoms I would like for him to undergo repeat left heart catheterization.  We did discuss benefits and risk of the procedure and he wishes to proceed.  We also reviewed his previous left heart catheterization in correlation to the areas that were positive on the stress test.  2.  Patient's blood pressure is well controlled on current blood pressure medication regimen.  No medication changes are warranted at this time.  Patient advised to monitor blood pressure on a daily basis and report any persistent highs or lows.  Set goal blood pressure for patient at 130/80 or below.  3.  Patient does have heart failure with reduced EF which she is on guideline driven medication therapy including Coreg and Entresto.  He is tolerating the medication well however symptoms have worsened.  He is on metolazone and Lasix.  In addition he is on Jardiance for his heart failure as well.  Due to already being on 2 diuretics spironolactone will be held at this time.  4.  Patient does have atrial fibrillation when she is on Xarelto for  anticoagulation and carvedilol for rate control.  He does not seem to be have any worsening of his atrial fibrillation.  He is in a regular rhythm today.  5.  Informed of signs and symptoms of ACS and advised to seek emergent treatment for any new worsening symptoms.  Patient also advised sooner follow-up as needed.  Also advised to follow-up with family doctor as needed  This note is dictated utilizing voice recognition software.  Although this record has been proof read, transcriptional errors may still be present. If questions occur regarding the content of this record please do not hesitate to call our office.  I have reviewed and confirmed the accuracy of the ROS as documented by the MA/LPN/RN DAVID Stone    Return if symptoms worsen or fail to improve, for cath follow up 1-2 weeks.    Diagnoses and all orders for this visit:    1. Coronary artery disease due to calcified coronary lesion (Primary)  -     [facility]; Future  -     CBC & Differential; Future  -     Basic Metabolic Panel; Future    2. Essential hypertension  -     [facility] Cath; Future  -     CBC & Differential; Future  -     Basic Metabolic Panel; Future    3. S/P CABG (coronary artery bypass graft)  -     [facility]; Future  -     CBC & Differential; Future  -     Basic Metabolic Panel; Future    4. Chronic systolic heart failure (HCC)  -     [facility] Cath; Future  -     CBC & Differential; Future  -     Basic Metabolic Panel; Future    5. Precordial pain  -     [facility] Cath; Future  -     CBC & Differential; Future  -     Basic Metabolic Panel; Future    6. Positive cardiac stress test  -     [facility]; Future  -     CBC & Differential; Future  -     Basic Metabolic Panel; Future        Advance Care Planning   ACP discussion was held with the patient during this visit. Patient has an advance directive in EMR which is still valid.        MEDS ORDERED DURING VISIT:  No orders of the  defined types were placed in this encounter.          This document has been electronically signed by Coleman Harman Jr., APRN  November 17, 2021 08:18 EST

## 2021-11-19 ENCOUNTER — TELEPHONE (OUTPATIENT)
Dept: CARDIOLOGY | Facility: CLINIC | Age: 81
End: 2021-11-19

## 2021-11-19 NOTE — TELEPHONE ENCOUNTER
Pt called and states taking Lasix 20 mg - 3 tabs bid. Has spoken with JORGE Abbott and he is going to adjust it, he also states he thinks this is the cause of his fatigue and muscle aches. He wanted to call and notify us that there will be a possible change in the future.

## 2021-11-22 RX ORDER — FUROSEMIDE 20 MG/1
60 TABLET ORAL 2 TIMES DAILY
Qty: 540 TABLET | Refills: 3 | Status: SHIPPED | OUTPATIENT
Start: 2021-11-22 | End: 2022-09-19 | Stop reason: SDUPTHER

## 2021-12-02 ENCOUNTER — TRANSCRIBE ORDERS (OUTPATIENT)
Dept: ADMINISTRATIVE | Facility: HOSPITAL | Age: 81
End: 2021-12-02

## 2021-12-02 DIAGNOSIS — Z95.1 S/P SINGLE VESSEL CORONARY ARTERY BYPASS: ICD-10-CM

## 2021-12-02 DIAGNOSIS — R07.2 PRECORDIAL PAIN: Primary | ICD-10-CM

## 2021-12-09 ENCOUNTER — DOCUMENTATION (OUTPATIENT)
Dept: CARDIAC REHAB | Facility: HOSPITAL | Age: 81
End: 2021-12-09

## 2021-12-09 ENCOUNTER — HOSPITAL ENCOUNTER (OUTPATIENT)
Facility: HOSPITAL | Age: 81
Setting detail: HOSPITAL OUTPATIENT SURGERY
Discharge: HOME | End: 2021-12-09
Attending: INTERNAL MEDICINE | Admitting: INTERNAL MEDICINE

## 2021-12-09 VITALS
BODY MASS INDEX: 38.01 KG/M2 | TEMPERATURE: 96.4 F | HEIGHT: 73 IN | HEART RATE: 70 BPM | WEIGHT: 286.82 LBS | SYSTOLIC BLOOD PRESSURE: 126 MMHG | OXYGEN SATURATION: 95 % | RESPIRATION RATE: 18 BRPM | DIASTOLIC BLOOD PRESSURE: 82 MMHG

## 2021-12-09 DIAGNOSIS — Z95.1 S/P SINGLE VESSEL CORONARY ARTERY BYPASS: ICD-10-CM

## 2021-12-09 DIAGNOSIS — I50.22 CHRONIC SYSTOLIC CONGESTIVE HEART FAILURE (HCC): Primary | ICD-10-CM

## 2021-12-09 DIAGNOSIS — R07.2 PRECORDIAL PAIN: ICD-10-CM

## 2021-12-09 LAB
ANION GAP SERPL CALCULATED.3IONS-SCNC: 13 MMOL/L (ref 5–15)
BUN SERPL-MCNC: 46 MG/DL (ref 8–23)
BUN/CREAT SERPL: 33.3 (ref 7–25)
CALCIUM SPEC-SCNC: 9.5 MG/DL (ref 8.6–10.5)
CHLORIDE SERPL-SCNC: 95 MMOL/L (ref 98–107)
CO2 SERPL-SCNC: 30 MMOL/L (ref 22–29)
CREAT BLDA-MCNC: 1.5 MG/DL (ref 0.6–1.3)
CREAT SERPL-MCNC: 1.38 MG/DL (ref 0.76–1.27)
DEPRECATED RDW RBC AUTO: 48 FL (ref 37–54)
ERYTHROCYTE [DISTWIDTH] IN BLOOD BY AUTOMATED COUNT: 13.5 % (ref 12.3–15.4)
GFR SERPL CREATININE-BSD FRML MDRD: 50 ML/MIN/1.73
GLUCOSE SERPL-MCNC: 135 MG/DL (ref 65–99)
HCT VFR BLD AUTO: 42.1 % (ref 37.5–51)
HGB BLD-MCNC: 14.1 G/DL (ref 13–17.7)
MCH RBC QN AUTO: 32.2 PG (ref 26.6–33)
MCHC RBC AUTO-ENTMCNC: 33.5 G/DL (ref 31.5–35.7)
MCV RBC AUTO: 96.1 FL (ref 79–97)
PLATELET # BLD AUTO: 250 10*3/MM3 (ref 140–450)
PMV BLD AUTO: 9.6 FL (ref 6–12)
POTASSIUM SERPL-SCNC: 3 MMOL/L (ref 3.5–5.2)
RBC # BLD AUTO: 4.38 10*6/MM3 (ref 4.14–5.8)
SODIUM SERPL-SCNC: 138 MMOL/L (ref 136–145)
WBC NRBC COR # BLD: 7.54 10*3/MM3 (ref 3.4–10.8)

## 2021-12-09 PROCEDURE — 80048 BASIC METABOLIC PNL TOTAL CA: CPT | Performed by: INTERNAL MEDICINE

## 2021-12-09 PROCEDURE — 85027 COMPLETE CBC AUTOMATED: CPT | Performed by: INTERNAL MEDICINE

## 2021-12-09 PROCEDURE — 93459 L HRT ART/GRFT ANGIO: CPT | Performed by: INTERNAL MEDICINE

## 2021-12-09 PROCEDURE — 99153 MOD SED SAME PHYS/QHP EA: CPT | Performed by: INTERNAL MEDICINE

## 2021-12-09 PROCEDURE — 0 IOPAMIDOL PER 1 ML: Performed by: INTERNAL MEDICINE

## 2021-12-09 PROCEDURE — 82565 ASSAY OF CREATININE: CPT

## 2021-12-09 PROCEDURE — 25010000002 MIDAZOLAM PER 1 MG: Performed by: INTERNAL MEDICINE

## 2021-12-09 PROCEDURE — 25010000002 FENTANYL CITRATE (PF) 50 MCG/ML SOLUTION: Performed by: INTERNAL MEDICINE

## 2021-12-09 PROCEDURE — C1894 INTRO/SHEATH, NON-LASER: HCPCS | Performed by: INTERNAL MEDICINE

## 2021-12-09 PROCEDURE — 93567 NJX CAR CTH SPRVLV AORTGRPHY: CPT | Performed by: INTERNAL MEDICINE

## 2021-12-09 PROCEDURE — C1760 CLOSURE DEV, VASC: HCPCS | Performed by: INTERNAL MEDICINE

## 2021-12-09 PROCEDURE — 99152 MOD SED SAME PHYS/QHP 5/>YRS: CPT | Performed by: INTERNAL MEDICINE

## 2021-12-09 PROCEDURE — C1769 GUIDE WIRE: HCPCS | Performed by: INTERNAL MEDICINE

## 2021-12-09 RX ORDER — DEXTROSE MONOHYDRATE 25 G/50ML
25 INJECTION, SOLUTION INTRAVENOUS
Status: DISCONTINUED | OUTPATIENT
Start: 2021-12-09 | End: 2021-12-09 | Stop reason: HOSPADM

## 2021-12-09 RX ORDER — NICOTINE POLACRILEX 4 MG
15 LOZENGE BUCCAL
Status: DISCONTINUED | OUTPATIENT
Start: 2021-12-09 | End: 2021-12-09 | Stop reason: HOSPADM

## 2021-12-09 RX ORDER — MIDAZOLAM HYDROCHLORIDE 1 MG/ML
INJECTION INTRAMUSCULAR; INTRAVENOUS AS NEEDED
Status: DISCONTINUED | OUTPATIENT
Start: 2021-12-09 | End: 2021-12-09 | Stop reason: HOSPADM

## 2021-12-09 RX ORDER — ACETAMINOPHEN 325 MG/1
650 TABLET ORAL EVERY 4 HOURS PRN
Status: DISCONTINUED | OUTPATIENT
Start: 2021-12-09 | End: 2021-12-09 | Stop reason: HOSPADM

## 2021-12-09 RX ORDER — FENTANYL CITRATE 50 UG/ML
INJECTION, SOLUTION INTRAMUSCULAR; INTRAVENOUS AS NEEDED
Status: DISCONTINUED | OUTPATIENT
Start: 2021-12-09 | End: 2021-12-09 | Stop reason: HOSPADM

## 2021-12-09 RX ORDER — LIDOCAINE HYDROCHLORIDE 10 MG/ML
INJECTION, SOLUTION EPIDURAL; INFILTRATION; INTRACAUDAL; PERINEURAL AS NEEDED
Status: DISCONTINUED | OUTPATIENT
Start: 2021-12-09 | End: 2021-12-09 | Stop reason: HOSPADM

## 2021-12-09 RX ORDER — ASPIRIN 325 MG
325 TABLET, DELAYED RELEASE (ENTERIC COATED) ORAL DAILY
Status: DISCONTINUED | OUTPATIENT
Start: 2021-12-09 | End: 2021-12-09 | Stop reason: HOSPADM

## 2021-12-09 RX ORDER — SODIUM CHLORIDE 9 MG/ML
250 INJECTION, SOLUTION INTRAVENOUS CONTINUOUS
Status: ACTIVE | OUTPATIENT
Start: 2021-12-09 | End: 2021-12-09

## 2021-12-09 RX ORDER — ALPRAZOLAM 0.25 MG/1
0.25 TABLET ORAL 3 TIMES DAILY PRN
Status: DISCONTINUED | OUTPATIENT
Start: 2021-12-09 | End: 2021-12-09 | Stop reason: HOSPADM

## 2021-12-09 RX ORDER — OXYCODONE AND ACETAMINOPHEN 10; 325 MG/1; MG/1
1 TABLET ORAL EVERY 4 HOURS PRN
Status: DISCONTINUED | OUTPATIENT
Start: 2021-12-09 | End: 2021-12-09 | Stop reason: HOSPADM

## 2021-12-09 RX ORDER — TEMAZEPAM 7.5 MG/1
7.5 CAPSULE ORAL NIGHTLY PRN
Status: DISCONTINUED | OUTPATIENT
Start: 2021-12-09 | End: 2021-12-09 | Stop reason: HOSPADM

## 2021-12-09 RX ORDER — HYDROCODONE BITARTRATE AND ACETAMINOPHEN 5; 325 MG/1; MG/1
1 TABLET ORAL EVERY 4 HOURS PRN
Status: DISCONTINUED | OUTPATIENT
Start: 2021-12-09 | End: 2021-12-09 | Stop reason: HOSPADM

## 2021-12-09 RX ADMIN — ASPIRIN 325 MG: 325 TABLET, COATED ORAL at 07:22

## 2021-12-09 NOTE — PROGRESS NOTES
Referral received for Phase II Cardiac Rehab.  Staff has reviewed chart and patient does not have a qualifying diagnosis for Phase II Cardiac Rehab at this time. Patient must have a documented diagnosis of Chronic Systolic Heart Failure with an ejection fraction < 35%. Staff available if further consultation is needed.

## 2021-12-09 NOTE — H&P
Pre-Cardiac Catheterization Report  Cardiovascular Laboratory  [facility]      Patient:  Nick Caraballo  :  1940  PCP:  Selvin Abbott MD  PHONE:  [phone]    DATE: 2021    BRIEF HPI:  Nick Caraballo is a 81 y.o. male with hypertension, permanent atrial fibrillation, systolic CHF, obstructive sleep apnea, and known coronary artery disease.  He is post CABG from .  He is complaining of a 6-month history of chest pain which he describes as pressure.  He states it is mild to moderate in severity lasting minutes with associated shortness of breath, dyspnea on exertion, and palpitations.  He recently underwent an abnormal stress test and now presents for left heart catheterization with possible intervention.    Cardiac Risk Factors:  advanced age (older than 55 for men, 65 for women), diabetes mellitus, dyslipidemia, family history of premature cardiovascular disease, hypertension, male gender, obesity (BMI >= 30 kg/m2), sedentary lifestyle, smoking/ tobacco exposure    Anginal class in last 2 weeks:  CCS class II    CHF Class in last 2 weeks:  NYHA Class II    Cardiogenic shock:  no    Cardiac arrest <24 hours:  no    Stress test within last 6 months:   yes   Details:    Previous cardiac catheterization:  yes  Details:     Previous CABG:  yes  Details:      Allergies:     IV contrast allergy:  no  No Known Allergies    MEDICATIONS:  Prior to Admission medications    Medication Sig Start Date End Date Taking? Authorizing Provider   acetaminophen (TYLENOL) 650 MG 8 hr tablet Take 650 mg by mouth Every 8 (Eight) Hours As Needed for Mild Pain .    ProviderFrancisco MD   albuterol (PROVENTIL) (2.5 MG/3ML) 0.083% nebulizer solution Take 2.5 mg by nebulization Every 4 (Four) Hours As Needed for Wheezing.    Provider, MD Francisco   albuterol sulfate  (90 Base) MCG/ACT inhaler Inhale 2 puffs Every 4 (Four) Hours As Needed for Wheezing or Shortness of Air. 21   Kimani  DAVID Cee   allopurinol (ZYLOPRIM) 300 MG tablet Take 300 mg by mouth Daily.    Francisco Garcia MD   ALPRAZolam (XANAX) 0.5 MG tablet Take 0.5 mg by mouth 2 (Two) Times a Day As Needed for Anxiety.    Francisco Garcia MD   aspirin 81 MG EC tablet Take 1 tablet by mouth Daily. 8/10/21   Coleman Harman APRN   atorvastatin (LIPITOR) 80 MG tablet Take 1 tablet by mouth Every Night. 8/10/21   Coleman Harman APRN   carvedilol (COREG) 12.5 MG tablet Take 1 tablet by mouth 2 (Two) Times a Day. 8/10/21   Coleman Harman APRN   Cholecalciferol (VITAMIN D3) 50 MCG (2000 UT) tablet Take 2,000 Units by mouth Daily.    Francisco Garcia MD   empagliflozin (JARDIANCE) 25 MG tablet tablet Take 0.5 tablets by mouth Daily. 11/15/21   Coleman Harman APRN   furosemide (LASIX) 20 MG tablet Take 3 tablets by mouth 2 (Two) Times a Day. 11/22/21   Colmean Harman APRN   guaiFENesin (MUCINEX) 600 MG 12 hr tablet Take 1,200 mg by mouth As Needed for Cough.    Francisco Garcia MD   isosorbide mononitrate (IMDUR) 30 MG 24 hr tablet Take 0.5 tablets by mouth Daily. Half tablet daily 8/10/21   Coleman Harman APRN   levothyroxine (SYNTHROID, LEVOTHROID) 25 MCG tablet Take 25 mcg by mouth Daily.    Francisco Garcia MD   Melatonin 3 MG capsule Take 3 mg by mouth Every Night.    Francisco Garcia MD   metOLazone (ZAROXOLYN) 5 MG tablet Take 1 tablet by mouth Daily. 11/15/21   Coleman Harman APRN   Miconazole Nitrate 2 % powder Apply 1 application topically to the appropriate area as directed 2 (two) times a day. 10/20/20   Coleman Harman APRN   Multiple Vitamins-Minerals (ICAPS AREDS 2 PO) Take  by mouth.    Francisco Garcia MD   Multiple Vitamins-Minerals (MULTIVITAMIN ADULT PO) Take  by mouth Daily.    Francisco Garcia MD   naproxen (Naprosyn) 500 MG tablet Take 1 tablet by mouth 2 (Two) Times a Day As Needed for Mild Pain . 9/30/21   Coleman Harman APRN   nitroglycerin (NITROSTAT) 0.4 MG  SL tablet Place 1 tablet under the tongue Every 5 (Five) Minutes As Needed for Chest Pain. 8/10/21   Coleman Harman APRN   pantoprazole (PROTONIX) 40 MG EC tablet Take 40 mg by mouth Daily.    Francisco Garcia MD   potassium chloride (K-DUR,KLOR-CON) 10 MEQ CR tablet Take 20 mEq by mouth 3 (Three) Times a Day.    Francisco Garcia MD   rivaroxaban (XARELTO) 20 MG tablet Take 1 tablet by mouth Daily. 11/15/21   Coleman Harman APRN   sacubitril-valsartan (Entresto) 24-26 MG tablet Take 1 tablet by mouth 2 (Two) Times a Day. 8/10/21   Coleman Harman APRN   tamsulosin (FLOMAX) 0.4 MG capsule 24 hr capsule Take 1 capsule by mouth Daily.    Francisco Garcia MD   venlafaxine XR (EFFEXOR-XR) 150 MG 24 hr capsule Take 1 capsule by mouth Daily. 6/24/21   Coleman Harman APRN       Past medical & surgical history, social and family history reviewed in the electronic medical record.    ROS:  Cardiovascular ROS: positive for - chest pain, dyspnea on exertion, edema, palpitations and shortness of breath    Physical Exam:    Vitals: There were no vitals filed for this visit. There were no vitals filed for this visit.    General Appearance:    Alert, cooperative, in no acute distress   Head:    Normocephalic, without obvious abnormality, atraumatic   Eyes:            Lids and lashes normal, conjunctivae and sclerae normal, no   icterus, no pallor, corneas clear, PERRLA   Ears:    Ears appear intact with no abnormalities noted   Neck:   No adenopathy, supple, trachea midline, no thyromegaly, no   carotid bruit, no JVD   Back:     No kyphosis present, no scoliosis present, range of motion normal   Lungs:     Clear to auscultation,respirations regular, even and                unlabored    Heart:    Regular rhythm and normal rate, normal S1 and S2, no         murmur, no gallop, no rub, no click   Chest Wall:    No abnormalities observed   Abdomen:     Normal bowel sounds, no masses, no organomegaly, soft      nontender, nondistended, no guarding, no rebound                tenderness   Rectal:     Deferred   Extremities:   Moves all extremities well, 1+ edema, no cyanosis, no           redness   Pulses:   Pulses palpable and equal bilaterally   Skin:   No bleeding, bruising or rash   Neurologic:   Cranial nerves 2 - 12 grossly intact, sensation intact     Barbaeu Test:  Left: Abnormal: Weak  (oxymetric Allens) Right: Not Assessed             Lab Results   Component Value Date    TRIG 190 (H) 12/22/2020    HDL 45 12/22/2020    AST 24 12/22/2020    ALT 24 12/22/2020           Impression      · Abnormal stress test    Plan     · Procedure to perform: [facility]  · Planned access: Per KATIE Samson   12/09/21  07:00 EST

## 2021-12-17 ENCOUNTER — OFFICE VISIT (OUTPATIENT)
Dept: CARDIOLOGY | Facility: CLINIC | Age: 81
End: 2021-12-17

## 2021-12-17 VITALS
DIASTOLIC BLOOD PRESSURE: 65 MMHG | WEIGHT: 283 LBS | HEIGHT: 73 IN | SYSTOLIC BLOOD PRESSURE: 91 MMHG | OXYGEN SATURATION: 99 % | HEART RATE: 87 BPM | BODY MASS INDEX: 37.51 KG/M2

## 2021-12-17 DIAGNOSIS — R09.89 CARDIAC LV EJECTION FRACTION 10-20%: ICD-10-CM

## 2021-12-17 DIAGNOSIS — R00.2 PALPITATIONS: ICD-10-CM

## 2021-12-17 DIAGNOSIS — R53.82 CHRONIC FATIGUE: ICD-10-CM

## 2021-12-17 DIAGNOSIS — I10 ESSENTIAL HYPERTENSION: ICD-10-CM

## 2021-12-17 DIAGNOSIS — I48.0 PAROXYSMAL ATRIAL FIBRILLATION (HCC): ICD-10-CM

## 2021-12-17 DIAGNOSIS — Z95.1 S/P CABG (CORONARY ARTERY BYPASS GRAFT): Primary | ICD-10-CM

## 2021-12-17 DIAGNOSIS — I50.22 CHRONIC SYSTOLIC HEART FAILURE (HCC): ICD-10-CM

## 2021-12-17 DIAGNOSIS — I25.84 CORONARY ARTERY DISEASE DUE TO CALCIFIED CORONARY LESION: ICD-10-CM

## 2021-12-17 DIAGNOSIS — R06.02 SHORTNESS OF BREATH: ICD-10-CM

## 2021-12-17 DIAGNOSIS — R07.2 PRECORDIAL PAIN: ICD-10-CM

## 2021-12-17 DIAGNOSIS — I25.10 CORONARY ARTERY DISEASE DUE TO CALCIFIED CORONARY LESION: ICD-10-CM

## 2021-12-17 PROCEDURE — 99212 OFFICE O/P EST SF 10 MIN: CPT | Performed by: NURSE PRACTITIONER

## 2021-12-17 NOTE — PROGRESS NOTES
Subjective     Nick Caraballo is a 81 y.o. male who presents to day for Follow-up (Here for [facility] f/u), Coronary Artery Disease, Palpitations, Atrial Fibrillation, Hyperlipidemia, Hypertension, and Sleep Apnea.    CHIEF COMPLIANT  Chief Complaint   Patient presents with   • Follow-up     Here for [facility] f/u   • Coronary Artery Disease   • Palpitations   • Atrial Fibrillation   • Hyperlipidemia   • Hypertension   • Sleep Apnea     PROBLEM LIST:   1. CAD, MI in 1998  1.1 Stenting x 5 total at [facility]. Data  1.2 CABG x2 in Dec. 2014 at [facility] by Dr. Álvarez in setting of ACS, LIMA to LAD and reversed vein graft from aorta to diag. With RLE vein graft  1.4 Cath 10/19: Widely patent grafts with no progressive disease in the RCA or circumflex ejection fraction 50 to 55%, LVEDP 8 to 12 mmHg  1.5 left heart catheterization 9/20: Left main normal, LAD  occluded in the mid vessel with a patent LIMA beyond the occluded area, first diagonal has proximal 80% stenosis with a patent SVG to first D1, circumflex normal, RCA normal, EF 30 to 35% with mid anterior and apical wall akinesis and inferior wall hypokinesis  1.6 MUGA scan 12/20: EF 19%  1.7 left heart catheterization 12/21: Left anterior descending 100% occluded after the takeoff of the major diagonal, circumflex moderate size vessel with no obstruction, RCA anatomically dominant no significant obstruction, LIMA to LAD patent, SVG to diagonal patent  2. A-Fib, s/p CABG per patient report  3. HTN  3.1 echocardiogram 10/21: EF 40 to 45% mild concentric left ventricular hypertrophy grade 2 diastolic dysfunction mild to moderate left atrial enlargement, right atrium mildly dilated, trivial MR, trivial to mild TR and AI pulmonary artery systolic pressures low 30s  4. Hyperlipidemia  5. GOPI, uses c-pap, sees Dr Jones  6. SSS  7. 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 carotid duplex 12/20: Nonobstructive carotid artery     Problem List Items Addressed This  Visit        Cardiac and Vasculature    Precordial pain    Palpitations    Coronary artery disease due to calcified coronary lesion    Essential hypertension    Atrial fibrillation (HCC)    S/P CABG (coronary artery bypass graft) - Primary    Cardiac LV ejection fraction 10-20%    RESOLVED: Chronic systolic heart failure (HCC)    Overview     Added automatically from request for surgery [id number]            Pulmonary and Pneumonias    Shortness of breath       Symptoms and Signs    Fatigue          HPI  Mr. Caraballo is an 81-year-old male patient who is being followed up today for coronary artery disease in which she is underwent coronary artery bypass grafting and is currently status post left heart catheterization.  Patient's left heart catheterization identifiedLeft anterior descending 100% occluded after the takeoff of the major diagonal, circumflex moderate size vessel with no obstruction, RCA anatomically dominant no significant obstruction, LIMA to LAD patent, SVG to diagonal patent.  He is on antianginal therapy of isosorbide for antianginal therapy.  He is also on aspirin and high intensity statin therapy of atorvastatin 80 mg daily.  Patient also has a history of atrial fibrillation in which she is on Xarelto 20 mg for anticoagulation which she denies any signs or symptoms of bleeding.  He is also on rate control with carvedilol.  He also has heart failure with reduced ejection fraction in which she is on guideline driven therapy with carvedilol and Entresto.  He is also on Jardiance.  Patient continues to have shortness of breath that mainly occurs with activity such as carrying in groceries.  He has to rest before he has to put them up.  He also has significant fatigue where he is tired all the time and as mentioned above when he tears in the groceries he has to rest due to fatigue and shortness of breath.  He says he absolutely has no energy.  He does also have dizziness with position change.  He also has a  history of chronic arterial hypertension which he has been treated with Entresto, carvedilol.  Today his blood pressure is low at 96/65.  He says is normally within normal range at 112-115 with over 68-78.  He denies any complications at the femoral access site.  He does have pulses distal to the insertion site.  He says the majority of his symptoms have significantly improved since they started on the Xanax.  He denies any syncope, or strokelike symptoms.  We did discuss his catheterization in detail and he denied any questions.    PRIOR MEDS  Current Outpatient Medications on File Prior to Visit   Medication Sig Dispense Refill   • acetaminophen (TYLENOL) 650 MG 8 hr tablet Take 650 mg by mouth Every 8 (Eight) Hours As Needed for Mild Pain .     • albuterol (PROVENTIL) (2.5 MG/3ML) 0.083% nebulizer solution Take 2.5 mg by nebulization Every 4 (Four) Hours As Needed for Wheezing.     • albuterol sulfate  (90 Base) MCG/ACT inhaler Inhale 2 puffs Every 4 (Four) Hours As Needed for Wheezing or Shortness of Air. 8 g 3   • allopurinol (ZYLOPRIM) 300 MG tablet Take 300 mg by mouth Daily.     • ALPRAZolam (XANAX) 0.5 MG tablet Take 0.5 mg by mouth 2 (Two) Times a Day As Needed for Anxiety.     • aspirin 81 MG EC tablet Take 1 tablet by mouth Daily. 90 tablet 3   • atorvastatin (LIPITOR) 80 MG tablet Take 1 tablet by mouth Every Night. 90 tablet 3   • carvedilol (COREG) 12.5 MG tablet Take 1 tablet by mouth 2 (Two) Times a Day. 180 tablet 3   • Cholecalciferol (VITAMIN D3) 50 MCG (2000 UT) tablet Take 2,000 Units by mouth Daily.     • empagliflozin (JARDIANCE) 25 MG tablet tablet Take 0.5 tablets by mouth Daily. 45 tablet 3   • furosemide (LASIX) 20 MG tablet Take 3 tablets by mouth 2 (Two) Times a Day. 540 tablet 3   • isosorbide mononitrate (IMDUR) 30 MG 24 hr tablet Take 0.5 tablets by mouth Daily. Half tablet daily 45 tablet 3   • levothyroxine (SYNTHROID, LEVOTHROID) 25 MCG tablet Take 25 mcg by mouth Daily.      • Melatonin 3 MG capsule Take 3 mg by mouth Every Night.     • metOLazone (ZAROXOLYN) 5 MG tablet Take 1 tablet by mouth Daily. 90 tablet 3   • Multiple Vitamins-Minerals (ICAPS AREDS 2 PO) Take 2 tablets by mouth Daily.     • naproxen (Naprosyn) 500 MG tablet Take 1 tablet by mouth 2 (Two) Times a Day As Needed for Mild Pain . 60 tablet 1   • pantoprazole (PROTONIX) 40 MG EC tablet Take 40 mg by mouth Daily.     • potassium chloride (K-DUR,KLOR-CON) 10 MEQ CR tablet Take 20 mEq by mouth 3 (Three) Times a Day.     • rivaroxaban (XARELTO) 20 MG tablet Take 1 tablet by mouth Daily. 90 tablet 3   • sacubitril-valsartan (Entresto) 24-26 MG tablet Take 1 tablet by mouth 2 (Two) Times a Day. 180 tablet 3   • tamsulosin (FLOMAX) 0.4 MG capsule 24 hr capsule Take 1 capsule by mouth Daily.     • venlafaxine XR (EFFEXOR-XR) 150 MG 24 hr capsule Take 1 capsule by mouth Daily. 90 capsule 3   • nitroglycerin (NITROSTAT) 0.4 MG SL tablet Place 1 tablet under the tongue Every 5 (Five) Minutes As Needed for Chest Pain. 25 tablet 3     No current facility-administered medications on file prior to visit.       ALLERGIES  Patient has no known allergies.    HISTORY  Past Medical History:   Diagnosis Date   • Acid reflux    • Anxiety    • Complete heart block (HCC)    • COPD (chronic obstructive pulmonary disease) (HCC)    • Coronary artery disease    • Diabetes mellitus (HCC)    • Gout    • Hyperlipidemia    • Hypertension    • Hypothyroid    • Myocardial infarction (HCC)    • Pseudophakia    • Rheumatoid arthritis (HCC)    • Sleep apnea    • Thyroid disease        Social History     Socioeconomic History   • Marital status:    Tobacco Use   • Smoking status: Former Smoker     Packs/day: 1.00     Years: 45.00     Pack years: 45.00     Quit date:      Years since quittin.9   • Smokeless tobacco: Never Used   Substance and Sexual Activity   • Alcohol use: No   • Drug use: No   • Sexual activity: Defer       Family  "History   Problem Relation Age of Onset   • No Known Problems Mother    • Colon cancer Father    • Heart disease Brother        Review of Systems   Constitutional: Positive for fatigue.   HENT: Negative.    Eyes: Positive for visual disturbance (reading glasses).   Respiratory: Positive for shortness of breath (with activity).    Cardiovascular: Positive for chest pain (\"some but not much\"), palpitations (\"very little any more\") and leg swelling.   Gastrointestinal: Negative.    Endocrine: Negative.    Genitourinary: Negative.    Musculoskeletal: Positive for arthralgias, back pain and myalgias.   Skin: Negative.    Allergic/Immunologic: Negative.    Neurological: Positive for light-headedness. Negative for syncope.        Fallen x2  Since been here. R/t imbalance   Hematological: Bruises/bleeds easily.   Psychiatric/Behavioral: Negative.        Objective     VITALS: BP 91/65 (BP Location: Left arm, Patient Position: Sitting)   Pulse 87   Ht 185.4 cm (72.99\")   Wt 128 kg (283 lb)   SpO2 99%   BMI 37.35 kg/m²     LABS:   Lab Results (most recent)     None          IMAGING:   XR Chest PA & Lateral    Result Date: 8/30/2021    No acute findings in the chest.  This report was finalized on 8/30/2021 3:38 PM by Dr. Fernie Snyder MD.        EXAM:  Physical Exam  Vitals and nursing note reviewed.   Constitutional:       Appearance: He is well-developed.   HENT:      Head: Normocephalic.   Eyes:      Pupils: Pupils are equal, round, and reactive to light.   Neck:      Thyroid: No thyroid mass.      Vascular: No carotid bruit or JVD.      Trachea: Trachea and phonation normal.   Cardiovascular:      Rate and Rhythm: Normal rate and regular rhythm.      Pulses:           Radial pulses are 2+ on the right side and 2+ on the left side.        Dorsalis pedis pulses are 2+ on the right side and 2+ on the left side.        Posterior tibial pulses are 2+ on the right side and 2+ on the left side.      Heart sounds: Murmur " heard.   No friction rub. No gallop.    Pulmonary:      Effort: Pulmonary effort is normal. No respiratory distress.      Breath sounds: Normal breath sounds. No wheezing or rales.   Abdominal:      General: Bowel sounds are normal.      Palpations: Abdomen is soft.   Musculoskeletal:         General: Swelling (1-2 + BLE) present. Normal range of motion.      Cervical back: Neck supple.   Skin:     General: Skin is warm and dry.      Capillary Refill: Capillary refill takes less than 2 seconds.      Findings: No rash.   Neurological:      Mental Status: He is alert and oriented to person, place, and time.   Psychiatric:         Speech: Speech normal.         Behavior: Behavior normal.         Thought Content: Thought content normal.         Judgment: Judgment normal.         Procedure   Procedures       Assessment/Plan    Diagnosis Plan   1. S/P CABG (coronary artery bypass graft)     2. Coronary artery disease due to calcified coronary lesion     3. Precordial pain     4. Shortness of breath     5. Chronic systolic heart failure (HCC)     6. Cardiac LV ejection fraction 10-20%     7. Essential hypertension     8. Paroxysmal atrial fibrillation (HCC)     9. Palpitations     10. Chronic fatigue     1.  Patient did undergo left heart catheterization via right groin access site in which she was identified to have patent grafts and nonobstructive coronary artery disease and nongrafted vessels.  Patient denies any problems with the right groin access site has pulses distal to the insertion site.  He did not require percutaneous coronary artery mention and maximal medication management will be continued.  2.  Patient's symptoms of chest pain and shortness of breath have dramatically decreased since he was started on Xanax.  I do believe there is a significant anxiety component to his symptoms given the fact of a normal left heart catheterization with no hemodynamically significant stenosis that would explain his  symptoms.  3.  Patient does have systolic heart failure which she is on Entresto and carvedilol.  He is tolerating this therapy well and will continue it at this time.  4.  Patient does have paroxysmal atrial fibrillation which he is anticoagulated with Xarelto.  He denies any signs or symptoms of bleeding.  He is on rate control therapy with carvedilol.  We will continue these at current dosing.  5.  Informed of signs and symptoms of ACS and advised to seek emergent treatment for any new worsening symptoms.  Patient also advised sooner follow-up as needed.  Also advised to follow-up with family doctor as needed  This note is dictated utilizing voice recognition software.  Although this record has been proof read, transcriptional errors may still be present. If questions occur regarding the content of this record please do not hesitate to call our office.  I have reviewed and confirmed the accuracy of the ROS as documented by the MA/LPN/RN DAVID Stone    Return in about 3 months (around 3/17/2022), or if symptoms worsen or fail to improve.    Diagnoses and all orders for this visit:    1. S/P CABG (coronary artery bypass graft) (Primary)    2. Coronary artery disease due to calcified coronary lesion    3. Precordial pain    4. Shortness of breath    5. Chronic systolic heart failure (HCC)    6. Cardiac LV ejection fraction 10-20%    7. Essential hypertension    8. Paroxysmal atrial fibrillation (HCC)    9. Palpitations    10. Chronic fatigue        Nick Caraballo  reports that he quit smoking about 23 years ago. He has a 45.00 pack-year smoking history. He has never used smokeless tobacco.. I have educated him on the risk of diseases from using tobacco products such as cancer, COPD and heart disease.       ACP discussion was held with the patient during this visit. Patient has an advance directive in EMR which is still valid.          Patient's Body mass index is 37.35 kg/m². indicating that he is morbidly  obese (BMI > 40 or > 35 with obesity - related health condition). Obesity-related health conditions include the following: obstructive sleep apnea, hypertension, coronary heart disease, dyslipidemias and SSS, a-fib. Obesity is to be assessed at today's visit. BMI is pcp addressing. We discussed portion control and increasing exercise..           MEDS ORDERED DURING VISIT:  No orders of the defined types were placed in this encounter.          This document has been electronically signed by Coleman Harman Jr., APRN  December 20, 2021 23:23 EST

## 2021-12-17 NOTE — PATIENT INSTRUCTIONS
Obesity, Adult  Obesity is the condition of having too much total body fat. Being overweight or obese means that your weight is greater than what is considered healthy for your body size. Obesity is determined by a measurement called BMI. BMI is an estimate of body fat and is calculated from height and weight. For adults, a BMI of 30 or higher is considered obese.  Obesity can lead to other health concerns and major illnesses, including:  · Stroke.  · Coronary artery disease (CAD).  · Type 2 diabetes.  · Some types of cancer, including cancers of the colon, breast, uterus, and gallbladder.  · Osteoarthritis.  · High blood pressure (hypertension).  · High cholesterol.  · Sleep apnea.  · Gallbladder stones.  · Infertility problems.  What are the causes?  Common causes of this condition include:  · Eating daily meals that are high in calories, sugar, and fat.  · Being born with genes that may make you more likely to become obese.  · Having a medical condition that causes obesity, including:  ? Hypothyroidism.  ? Polycystic ovarian syndrome (PCOS).  ? Binge-eating disorder.  ? Cushing syndrome.  · Taking certain medicines, such as steroids, antidepressants, and seizure medicines.  · Not being physically active (sedentary lifestyle).  · Not getting enough sleep.  · Drinking high amounts of sugar-sweetened beverages, such as soft drinks.  What increases the risk?  The following factors may make you more likely to develop this condition:  · Having a family history of obesity.  · Being a woman of  descent.  · Being a man of  descent.  · Living in an area with limited access to:  ? Ca, recreation centers, or sidewalks.  ? Healthy food choices, such as grocery stores and farmers' markets.  What are the signs or symptoms?  The main sign of this condition is having too much body fat.  How is this diagnosed?  This condition is diagnosed based on:  · Your BMI. If you are an adult with a BMI of 30 or  higher, you are considered obese.  · Your waist circumference. This measures the distance around your waistline.  · Your skinfold thickness. Your health care provider may gently pinch a fold of your skin and measure it.  You may have other tests to check for underlying conditions.  How is this treated?  Treatment for this condition often includes changing your lifestyle. Treatment may include some or all of the following:  · Dietary changes. This may include developing a healthy meal plan.  · Regular physical activity. This may include activity that causes your heart to beat faster (aerobic exercise) and strength training. Work with your health care provider to design an exercise program that works for you.  · Medicine to help you lose weight if you are unable to lose 1 pound a week after 6 weeks of healthy eating and more physical activity.  · Treating conditions that cause the obesity (underlying conditions).  · Surgery. Surgical options may include gastric banding and gastric bypass. Surgery may be done if:  ? Other treatments have not helped to improve your condition.  ? You have a BMI of 40 or higher.  ? You have life-threatening health problems related to obesity.  Follow these instructions at home:  Eating and drinking    · Follow recommendations from your health care provider about what you eat and drink. Your health care provider may advise you to:  ? Limit fast food, sweets, and processed snack foods.  ? Choose low-fat options, such as low-fat milk instead of whole milk.  ? Eat 5 or more servings of fruits or vegetables every day.  ? Eat at home more often. This gives you more control over what you eat.  ? Choose healthy foods when you eat out.  ? Learn to read food labels. This will help you understand how much food is considered 1 serving.  ? Learn what a healthy serving size is.  ? Keep low-fat snacks available.  ? Limit sugary drinks, such as soda, fruit juice, sweetened iced tea, and flavored  milk.  · Drink enough water to keep your urine pale yellow.  · Do not follow a fad diet. Fad diets can be unhealthy and even dangerous.    Physical activity  · Exercise regularly, as told by your health care provider.  ? Most adults should get up to 150 minutes of moderate-intensity exercise every week.  ? Ask your health care provider what types of exercise are safe for you and how often you should exercise.  · Warm up and stretch before being active.  · Cool down and stretch after being active.  · Rest between periods of activity.  Lifestyle  · Work with your health care provider and a dietitian to set a weight-loss goal that is healthy and reasonable for you.  · Limit your screen time.  · Find ways to reward yourself that do not involve food.  · Do not drink alcohol if:  ? Your health care provider tells you not to drink.  ? You are pregnant, may be pregnant, or are planning to become pregnant.  · If you drink alcohol:  ? Limit how much you use to:  § 0-1 drink a day for women.  § 0-2 drinks a day for men.  ? Be aware of how much alcohol is in your drink. In the [place] one drink equals one 12 oz bottle of beer (355 mL), one 5 oz glass of wine (148 mL), or one 1½ oz glass of hard liquor (44 mL).  General instructions  · Keep a weight-loss journal to keep track of the food you eat and how much exercise you get.  · Take over-the-counter and prescription medicines only as told by your health care provider.  · Take vitamins and supplements only as told by your health care provider.  · Consider joining a support group. Your health care provider may be able to recommend a support group.  · Keep all follow-up visits as told by your health care provider. This is important.  Contact a health care provider if:  · You are unable to meet your weight loss goal after 6 weeks of dietary and lifestyle changes.  Get help right away if you are having:  · Trouble breathing.  · Suicidal thoughts or behaviors.  Summary  · Obesity is  the condition of having too much total body fat.  · Being overweight or obese means that your weight is greater than what is considered healthy for your body size.  · Work with your health care provider and a dietitian to set a weight-loss goal that is healthy and reasonable for you.  · Exercise regularly, as told by your health care provider. Ask your health care provider what types of exercise are safe for you and how often you should exercise.  This information is not intended to replace advice given to you by your health care provider. Make sure you discuss any questions you have with your health care provider.  Document Revised: 08/22/2019 Document Reviewed: 08/22/2019  [email or web address] Patient Education © 2021 [email or web address]  BMI for Adults  What is BMI?  Body mass index (BMI) is a number that is calculated from a person's weight and height. BMI can help estimate how much of a person's weight is composed of fat. BMI does not measure body fat directly. Rather, it is an alternative to procedures that directly measure body fat, which can be difficult and expensive.  BMI can help identify people who may be at higher risk for certain medical problems.  What are BMI measurements used for?  BMI is used as a screening tool to identify possible weight problems. It helps determine whether a person is obese, overweight, a healthy weight, or underweight.  BMI is useful for:  · Identifying a weight problem that may be related to a medical condition or may increase the risk for medical problems.  · Promoting changes, such as changes in diet and exercise, to help reach a healthy weight. BMI screening can be repeated to see if these changes are working.  How is BMI calculated?  BMI involves measuring your weight in relation to your height. Both height and weight are measured, and the BMI is calculated from those numbers. This can be done either in English ([place] or metric measurements. Note that charts and online BMI calculators are  "available to help you find your BMI quickly and easily without having to do these calculations yourself.  To calculate your BMI in English ([place] measurements:    1. Measure your weight in pounds (lb).  2. Multiply the number of pounds by 703.  ? For example, for a person who weighs 180 lb, multiply that number by 703, which equals 126,540.  3. Measure your height in inches. Then multiply that number by itself to get a measurement called \"inches squared.\"  ? For example, for a person who is 70 inches tall, the \"inches squared\" measurement is 70 inches x 70 inches, which equals 4,900 inches squared.  4. Divide the total from step 2 (number of lb x 703) by the total from step 3 (inches squared): 126,540 ÷ 4,900 = 25.8. This is your BMI.    To calculate your BMI in metric measurements:  1. Measure your weight in kilograms (kg).  2. Measure your height in meters (m). Then multiply that number by itself to get a measurement called \"meters squared.\"  ? For example, for a person who is 1.75 m tall, the \"meters squared\" measurement is 1.75 m x 1.75 m, which is equal to 3.1 meters squared.  3. Divide the number of kilograms (your weight) by the meters squared number. In this example: 70 ÷ 3.1 = 22.6. This is your BMI.  What do the results mean?  BMI charts are used to identify whether you are underweight, normal weight, overweight, or obese. The following guidelines will be used:  · Underweight: BMI less than 18.5.  · Normal weight: BMI between 18.5 and 24.9.  · Overweight: BMI between 25 and 29.9.  · Obese: BMI of 30 or above.  Keep these notes in mind:  · Weight includes both fat and muscle, so someone with a muscular build, such as an athlete, may have a BMI that is higher than 24.9. In cases like these, BMI is not an accurate measure of body fat.  · To determine if excess body fat is the cause of a BMI of 25 or higher, further assessments may need to be done by a health care provider.  · BMI is usually interpreted in the " same way for men and women.  Where to find more information  For more information about BMI, including tools to quickly calculate your BMI, go to these websites:  · Centers for Disease Control and Prevention: www.cdc.gov  · American Heart Association: www.heart.org  · National Heart, Lung, and Blood [place]: www.nhlbi.nih.gov  Summary  · Body mass index (BMI) is a number that is calculated from a person's weight and height.  · BMI may help estimate how much of a person's weight is composed of fat. BMI can help identify those who may be at higher risk for certain medical problems.  · BMI can be measured using English measurements or metric measurements.  · BMI charts are used to identify whether you are underweight, normal weight, overweight, or obese.  This information is not intended to replace advice given to you by your health care provider. Make sure you discuss any questions you have with your health care provider.  Document Revised: 09/09/2020 Document Reviewed: 07/17/2020  [employer] Patient Education © 2021 [employer]    Acute Coronary Syndrome  Acute coronary syndrome (ACS) is a serious problem in which there is suddenly not enough blood and oxygen reaching the heart. ACS can result in chest pain or a heart attack.  This condition is a medical emergency. If you have any symptoms of this condition, get help right away.  What are the causes?  This condition may be caused by:  · A buildup of fat and cholesterol inside the arteries (atherosclerosis). This is the most common cause. The buildup (plaque) can cause blood vessels in the heart (coronary arteries) to become narrow or blocked, which reduces blood flow to the heart. Plaque can also break off and lead to a clot, which can block an artery and cause a heart attack or stroke.  · Sudden tightening of the muscles around the coronary arteries (coronary spasm).  · Tearing of a coronary artery (spontaneous coronary artery dissection).  · Very low blood  pressure (hypotension).  · An abnormal heartbeat (arrhythmia).  · Other medical conditions that cause a decrease of oxygen to the heart, such as anemiaorrespiratory failure.  · Using cocaine or methamphetamine.  What increases the risk?  The following factors may make you more likely to develop this condition:  · Age. The risk for ACS increases as you get older.  · History of chest pain, heart attack, peripheral artery disease, or stroke.  · Having taken chemotherapy or immune-suppressing medicines.  · Being male.  · Family history of chest pain, heart disease, or stroke.  · Smoking.  · Not exercising enough.  · Being overweight.  · High cholesterol.  · High blood pressure (hypertension).  · Diabetes.  · Excessive alcohol use.  What are the signs or symptoms?  Common symptoms of this condition include:  · Chest pain. The pain may last a long time, or it may stop and come back (recur). It may feel like:  ? Crushing or squeezing.  ? Tightness, pressure, fullness, or heaviness.  · Arm, neck, jaw, or back pain.  · Heartburn or indigestion.  · Shortness of breath.  · Nausea.  · Sudden cold sweats.  · Light-headedness.  · Dizziness or passing out.  · Tiredness (fatigue).  Sometimes there are no symptoms.  How is this diagnosed?  This condition may be diagnosed based on:  · Your medical history and symptoms.  · Imaging tests, such as:  ? An electrocardiogram (ECG). This measures the heart's electrical activity.  ? X-rays.  ? CT scan.  ? A coronary angiogram. For this test, dye is injected into the heart arteries and then X-rays are taken.  ? Myocardial perfusion imaging. This test shows how well blood flows through your heart muscle.  · Blood tests. These may be repeated at certain time intervals.  · Exercise stress testing.  · Echocardiogram. This is a test that uses sound waves to produce detailed images of the heart.  How is this treated?  Treatment for this condition may include:  · Oxygen therapy.  · Medicines, such  as:  ? Antiplatelet medicines and blood-thinning medicines, such as aspirin. These help prevent blood clots.  ? Medicine that dissolves any blood clots (fibrinolytic therapy).  ? Blood pressure medicines.  ? Nitroglycerin. This helps widen blood vessels to improve blood flow.  ? Pain medicine.  ? Cholesterol-lowering medicine.  · Surgery, such as:  ? Coronary angioplasty with stent placement. This involves placing a small piece of metal that looks like mesh or a spring into a narrow coronary artery. This widens the artery and keeps it open.  ? Coronary artery bypass surgery. This involves taking a section of a blood vessel from a different part of your body and placing it on the blocked coronary artery to allow blood to flow around the blockage.  · Cardiac rehabilitation. This is a program that includes exercise training, education, and counseling to help you recover.  Follow these instructions at home:  Eating and drinking  · Eat a heart-healthy diet that includes whole grains, fruits and vegetables, lean proteins, and low-fat or nonfat dairy products.  · Limit how much salt (sodium) you eat as told by your health care provider. Follow instructions from your health care provider about any other eating or drinking restrictions, such as limiting foods that are high in fat and processed sugars.  · Use healthy cooking methods such as roasting, grilling, broiling, baking, poaching, steaming, or stir-frying.  · Work with a dietitian to follow a heart-healthy eating plan.  Medicines  · Take over-the-counter and prescription medicines only as told by your health care provider.  · Do not take these medicines unless your health care provider approves:  ? Vitamin supplements that contain vitamin A or vitamin E.  ? NSAIDs, such as ibuprofen, naproxen, or celecoxib.  ? Hormone replacement therapy that contains estrogen.  · If you are taking blood thinners:  ? Talk with your health care provider before you take any medicines  that contain aspirin or NSAIDs. These medicines increase your risk for dangerous bleeding.  ? Take your medicine exactly as told, at the same time every day.  ? Avoid activities that could cause injury or bruising, and follow instructions about how to prevent falls.  ? Wear a medical alert bracelet, and carry a card that lists what medicines you take.  Activity  · Follow your cardiac rehabilitation program. Do exercises as told by your physical therapist.  · Ask your health care provider what activities and exercises are safe for you. Follow his or her instructions about lifting, driving, or climbing stairs.  Lifestyle  · Do not use any products that contain nicotine or tobacco, such as cigarettes, e-cigarettes, and chewing tobacco. If you need help quitting, ask your health care provider.  · Do not drink alcohol if your health care provider tells you not to drink.  · If you drink alcohol:  ? Limit how much you have to 0-1 drink a day.  ? Be aware of how much alcohol is in your drink. In the [place] one drink equals one 12 oz bottle of beer (355 mL), one 5 oz glass of wine (148 mL), or one 1½ oz glass of hard liquor (44 mL).  · Maintain a healthy weight. If you need to lose weight, work with your health care provider to do so safely.  General instructions  · Tell all the health care providers who provide care for you about your heart condition, including your dentist. This may affect the medicines or treatment you receive.  · Manage any other health conditions you have, such as hypertension or diabetes. These conditions affect your heart.  · Pay attention to your mental health. You may be at higher risk for depression.  ? Find ways to manage stress.  ? Talk to your health care provider about depression screening and treatment.  · Keep your vaccinations up to date.  ? Get the flu shot (influenza vaccine) every year.  ? Get the pneumococcal vaccine if you are age 65 or older.  · If directed, monitor your blood pressure  at home.  · Keep all follow-up visits as told by your health care provider. This is important.  Contact a health care provider if you:  · Feel overwhelmed or sad.  · Have trouble doing your daily activities.  Get help right away if you:  · Have pain in your chest, neck, arm, jaw, stomach, or back that recurs, and:  ? It lasts for more than a few minutes.  ? It is not relieved by taking the medicineyour health care provider prescribed.  · Have unexplained:  ? Heavy sweating.  ? Heartburn or indigestion.  ? Nausea or vomiting.  ? Shortness of breath.  ? Difficulty breathing.  ? Fatigue.  ? Nervousness or anxiety.  ? Weakness.  ? Diarrhea.  ? Dark stools or blood in your stool.  · Have sudden light-headedness or dizziness.  · Have blood pressure that is higher than 180/120.  · Faint.  · Have thoughts about hurting yourself.  These symptoms may represent a serious problem that is an emergency. Do not wait to see if the symptoms will go away. Get medical help right away. Call your local emergency services (911 in the [place] Do not drive yourself to the hospital.   Summary  · Acute coronary syndrome (ACS) is when there is not enough blood and oxygen being supplied to the heart. ACS can result in chest pain or a heart attack.  · Acute coronary syndrome is a medical emergency. If you have any symptoms of this condition, get help right away.  · Treatment includes medicines and procedures to open the blocked arteries and restore blood flow.  This information is not intended to replace advice given to you by your health care provider. Make sure you discuss any questions you have with your health care provider.  Document Revised: 05/20/2020 Document Reviewed: 12/30/2019  [employer] Patient Education © 2021 [employer]      Hypertension, Adult  Hypertension is another name for high blood pressure. High blood pressure forces your heart to work harder to pump blood. This can cause problems over time.  There are two numbers in a  blood pressure reading. There is a top number (systolic) over a bottom number (diastolic). It is best to have a blood pressure that is below 120/80. Healthy choices can help lower your blood pressure, or you may need medicine to help lower it.  What are the causes?  The cause of this condition is not known. Some conditions may be related to high blood pressure.  What increases the risk?  · Smoking.  · Having type 2 diabetes mellitus, high cholesterol, or both.  · Not getting enough exercise or physical activity.  · Being overweight.  · Having too much fat, sugar, calories, or salt (sodium) in your diet.  · Drinking too much alcohol.  · Having long-term (chronic) kidney disease.  · Having a family history of high blood pressure.  · Age. Risk increases with age.  · Race. You may be at higher risk if you are .  · Gender. Men are at higher risk than women before age 45. After age 65, women are at higher risk than men.  · Having obstructive sleep apnea.  · Stress.  What are the signs or symptoms?  · High blood pressure may not cause symptoms. Very high blood pressure (hypertensive crisis) may cause:  ? Headache.  ? Feelings of worry or nervousness (anxiety).  ? Shortness of breath.  ? Nosebleed.  ? A feeling of being sick to your stomach (nausea).  ? Throwing up (vomiting).  ? Changes in how you see.  ? Very bad chest pain.  ? Seizures.  How is this treated?  · This condition is treated by making healthy lifestyle changes, such as:  ? Eating healthy foods.  ? Exercising more.  ? Drinking less alcohol.  · Your health care provider may prescribe medicine if lifestyle changes are not enough to get your blood pressure under control, and if:  ? Your top number is above 130.  ? Your bottom number is above 80.  · Your personal target blood pressure may vary.  Follow these instructions at home:  Eating and drinking    · If told, follow the DASH eating plan. To follow this plan:  ? Fill one half of your plate at  each meal with fruits and vegetables.  ? Fill one fourth of your plate at each meal with whole grains. Whole grains include whole-wheat pasta, brown rice, and whole-grain bread.  ? Eat or drink low-fat dairy products, such as skim milk or low-fat yogurt.  ? Fill one fourth of your plate at each meal with low-fat (lean) proteins. Low-fat proteins include fish, chicken without skin, eggs, beans, and tofu.  ? Avoid fatty meat, cured and processed meat, or chicken with skin.  ? Avoid pre-made or processed food.  · Eat less than 1,500 mg of salt each day.  · Do not drink alcohol if:  ? Your doctor tells you not to drink.  ? You are pregnant, may be pregnant, or are planning to become pregnant.  · If you drink alcohol:  ? Limit how much you use to:  § 0-1 drink a day for women.  § 0-2 drinks a day for men.  ? Be aware of how much alcohol is in your drink. In the [place] one drink equals one 12 oz bottle of beer (355 mL), one 5 oz glass of wine (148 mL), or one 1½ oz glass of hard liquor (44 mL).    Lifestyle    · Work with your doctor to stay at a healthy weight or to lose weight. Ask your doctor what the best weight is for you.  · Get at least 30 minutes of exercise most days of the week. This may include walking, swimming, or biking.  · Get at least 30 minutes of exercise that strengthens your muscles (resistance exercise) at least 3 days a week. This may include lifting weights or doing Pilates.  · Do not use any products that contain nicotine or tobacco, such as cigarettes, e-cigarettes, and chewing tobacco. If you need help quitting, ask your doctor.  · Check your blood pressure at home as told by your doctor.  · Keep all follow-up visits as told by your doctor. This is important.    Medicines  · Take over-the-counter and prescription medicines only as told by your doctor. Follow directions carefully.  · Do not skip doses of blood pressure medicine. The medicine does not work as well if you skip doses. Skipping doses  also puts you at risk for problems.  · Ask your doctor about side effects or reactions to medicines that you should watch for.  Contact a doctor if you:  · Think you are having a reaction to the medicine you are taking.  · Have headaches that keep coming back (recurring).  · Feel dizzy.  · Have swelling in your ankles.  · Have trouble with your vision.  Get help right away if you:  · Get a very bad headache.  · Start to feel mixed up (confused).  · Feel weak or numb.  · Feel faint.  · Have very bad pain in your:  ? Chest.  ? Belly (abdomen).  · Throw up more than once.  · Have trouble breathing.  Summary  · Hypertension is another name for high blood pressure.  · High blood pressure forces your heart to work harder to pump blood.  · For most people, a normal blood pressure is less than 120/80.  · Making healthy choices can help lower blood pressure. If your blood pressure does not get lower with healthy choices, you may need to take medicine.  This information is not intended to replace advice given to you by your health care provider. Make sure you discuss any questions you have with your health care provider.  Document Revised: 08/28/2019 Document Reviewed: 08/28/2019  [employer] Patient Education © 2021 [employer]      Heart Failure, Diagnosis    Heart failure means that your heart is not able to pump blood in the right way. This makes it hard for your body to work well. Heart failure is usually a long-term (chronic) condition. You must take good care of yourself and follow your treatment plan from your doctor.  What are the causes?  This condition may be caused by:  · High blood pressure.  · Build up of cholesterol and fat in the arteries.  · Heart attack. This injures the heart muscle.  · Heart valves that do not open and close properly.  · Damage of the heart muscle. This is also called cardiomyopathy.  · Lung disease.  · Abnormal heart rhythms.  What increases the risk?  The risk of heart failure goes up as  a person ages. This condition is also more likely to develop in people who:  · Are overweight.  · Are male.  · Smoke or chew tobacco.  · Abuse alcohol or illegal drugs.  · Have taken medicines that can damage the heart.  · Have diabetes.  · Have abnormal heart rhythms.  · Have thyroid problems.  · Have low blood counts (anemia).  What are the signs or symptoms?  Symptoms of this condition include:  · Shortness of breath.  · Coughing.  · Swelling of the feet, ankles, legs, or belly.  · Losing weight for no reason.  · Trouble breathing.  · Waking from sleep because of the need to sit up and get more air.  · Rapid heartbeat.  · Being very tired.  · Feeling dizzy, or feeling like you may pass out (faint).  · Having no desire to eat.  · Feeling like you may vomit (nauseous).  · Peeing (urinating) more at night.  · Feeling confused.  How is this treated?         This condition may be treated with:  · Medicines. These can be given to treat blood pressure and to make the heart muscles stronger.  · Changes in your daily life. These may include eating a healthy diet, staying at a healthy body weight, quitting tobacco and illegal drug use, or doing exercises.  · Surgery. Surgery can be done to open blocked valves, or to put devices in the heart, such as pacemakers.  · A donor heart (heart transplant). You will receive a healthy heart from a donor.  Follow these instructions at home:  · Treat other conditions as told by your doctor. These may include high blood pressure, diabetes, thyroid disease, or abnormal heart rhythms.  · Learn as much as you can about heart failure.  · Get support as you need it.  · Keep all follow-up visits as told by your doctor. This is important.  Summary  · Heart failure means that your heart is not able to pump blood in the right way.  · This condition is caused by high blood pressure, heart attack, or damage of the heart muscle.  · Symptoms of this condition include shortness of breath and swelling  of the feet, ankles, legs, or belly. You may also feel very tired or feel like you may vomit.  · You may be treated with medicines, surgery, or changes in your daily life.  · Treat other health conditions as told by your doctor.  This information is not intended to replace advice given to you by your health care provider. Make sure you discuss any questions you have with your health care provider.  Document Revised: 03/06/2020 Document Reviewed: 03/06/2020  [employer] Patient Education © 2021 [employer]

## 2021-12-22 ENCOUNTER — PATIENT MESSAGE (OUTPATIENT)
Dept: CARDIOLOGY | Facility: CLINIC | Age: 81
End: 2021-12-22

## 2022-02-17 ENCOUNTER — TELEPHONE (OUTPATIENT)
Dept: CARDIOLOGY | Facility: CLINIC | Age: 82
End: 2022-02-17

## 2022-02-17 NOTE — TELEPHONE ENCOUNTER
Pt called and was discussing glucose, he says he notices its higher than normal. I advised him to monitor and to see PCP in the meantime due to them monitoring it for him.

## 2022-03-17 ENCOUNTER — OFFICE VISIT (OUTPATIENT)
Dept: CARDIOLOGY | Facility: CLINIC | Age: 82
End: 2022-03-17

## 2022-03-17 VITALS
HEIGHT: 73 IN | OXYGEN SATURATION: 96 % | TEMPERATURE: 97.2 F | DIASTOLIC BLOOD PRESSURE: 64 MMHG | SYSTOLIC BLOOD PRESSURE: 91 MMHG | BODY MASS INDEX: 35.65 KG/M2 | HEART RATE: 70 BPM | WEIGHT: 269 LBS

## 2022-03-17 DIAGNOSIS — I25.10 CORONARY ARTERY DISEASE DUE TO CALCIFIED CORONARY LESION: ICD-10-CM

## 2022-03-17 DIAGNOSIS — I25.84 CORONARY ARTERY DISEASE DUE TO CALCIFIED CORONARY LESION: ICD-10-CM

## 2022-03-17 DIAGNOSIS — I48.0 PAROXYSMAL ATRIAL FIBRILLATION: Primary | ICD-10-CM

## 2022-03-17 DIAGNOSIS — I50.20 HEART FAILURE WITH REDUCED EJECTION FRACTION: ICD-10-CM

## 2022-03-17 PROCEDURE — 99212 OFFICE O/P EST SF 10 MIN: CPT | Performed by: NURSE PRACTITIONER

## 2022-03-17 NOTE — PROGRESS NOTES
Subjective     Nick Caraballo is a 81 y.o. male who presents to day for Follow-up.    CHIEF COMPLIANT  Chief Complaint   Patient presents with   • Follow-up       Active Problems:  Problem List Items Addressed This Visit        Cardiac and Vasculature    Coronary artery disease due to calcified coronary lesion    Atrial fibrillation (HCC) - Primary      Other Visit Diagnoses     Heart failure with reduced ejection fraction (HCC)          PROBLEM LIST:   1. CAD, MI in 1998  1.1 Stenting x 5 total at [facility]. Data  1.2 CABG x2 in Dec. 2014 at [facility] by Dr. Álvarez in setting of ACS, LIMA to LAD and reversed vein graft from aorta to diag. With RLE vein graft  1.4 Cath 10/19: Widely patent grafts with no progressive disease in the RCA or circumflex ejection fraction 50 to 55%, LVEDP 8 to 12 mmHg  1.5 left heart catheterization 9/20: Left main normal, LAD  occluded in the mid vessel with a patent LIMA beyond the occluded area, first diagonal has proximal 80% stenosis with a patent SVG to first D1, circumflex normal, RCA normal, EF 30 to 35% with mid anterior and apical wall akinesis and inferior wall hypokinesis  1.6 MUGA scan 12/20: EF 19%  1.7 left heart catheterization 12/21: Left anterior descending 100% occluded after the takeoff of the major diagonal, circumflex moderate size vessel with no obstruction, RCA anatomically dominant no significant obstruction, LIMA to LAD patent, SVG to diagonal patent  2. A-Fib, s/p CABG per patient report  3. HTN  3.1 echocardiogram 10/21: EF 40 to 45% mild concentric left ventricular hypertrophy grade 2 diastolic dysfunction mild to moderate left atrial enlargement, right atrium mildly dilated, trivial MR, trivial to mild TR and AI pulmonary artery systolic pressures low 30s  4. Hyperlipidemia  5. GOPI, uses c-pap, sees Dr Jones  6. SSS  7. 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 carotid duplex 12/20: Nonobstructive carotid artery     HPI  HPI  Mr. Caraballo is  an 81-year-old male patient who is being followed up today for coronary artery disease, atrial fibrillation, and heart failure with reduced ejection fraction.  Patient does have a history of coronary artery disease in which she does have a history of coronary artery bypass grafting.  He is on aspirin 81 mg daily as well as high intensity statin therapy of atorvastatin 80 mg daily.  He is on antianginal therapy of carvedilol and isosorbide.  He reports not having been bothered with any chest tightness or pain recently.  Patient also has a history of heart failure with reduced ejection fraction.  He is on guideline driven medication therapy of carvedilol and Entresto.  He also has a history of atrial fibrillation in which she is on Xarelto for anticoagulation as well as carvedilol for rate control.  He seems to be done relatively well and denies any palpitations.  He says that his shortness of breath has dramatically improved and he only gets short of breath on increased levels of exertion.  He also reports improvement and his dizziness.  He denies any chest pain, lower extremity edema, palpitations, fatigue, dizziness, lightheadedness, syncope, PND, orthopnea, or strokelike symptoms.  Patient did provide a chart with his blood sugars in which we will scanned into the chart for further reference.  He is also lost a significant amount of weight and is doing well.  He was congratulated on his lifestyle modifications.  PRIOR MEDS  Current Outpatient Medications on File Prior to Visit   Medication Sig Dispense Refill   • acetaminophen (TYLENOL) 650 MG 8 hr tablet Take 650 mg by mouth Every 8 (Eight) Hours As Needed for Mild Pain .     • albuterol (PROVENTIL) (2.5 MG/3ML) 0.083% nebulizer solution Take 2.5 mg by nebulization Every 4 (Four) Hours As Needed for Wheezing.     • albuterol sulfate  (90 Base) MCG/ACT inhaler Inhale 2 puffs Every 4 (Four) Hours As Needed for Wheezing or Shortness of Air. 8 g 3   •  allopurinol (ZYLOPRIM) 300 MG tablet Take 300 mg by mouth Daily.     • ALPRAZolam (XANAX) 0.5 MG tablet Take 0.5 mg by mouth 2 (Two) Times a Day As Needed for Anxiety.     • aspirin 81 MG EC tablet Take 1 tablet by mouth Daily. 90 tablet 3   • atorvastatin (LIPITOR) 80 MG tablet Take 1 tablet by mouth Every Night. 90 tablet 3   • carvedilol (COREG) 12.5 MG tablet Take 1 tablet by mouth 2 (Two) Times a Day. 180 tablet 3   • Cholecalciferol (VITAMIN D3) 50 MCG (2000 UT) tablet Take 2,000 Units by mouth Daily.     • empagliflozin (JARDIANCE) 25 MG tablet tablet Take 0.5 tablets by mouth Daily. 45 tablet 3   • furosemide (LASIX) 20 MG tablet Take 3 tablets by mouth 2 (Two) Times a Day. 540 tablet 3   • isosorbide mononitrate (IMDUR) 30 MG 24 hr tablet Take 0.5 tablets by mouth Daily. Half tablet daily 45 tablet 3   • levothyroxine (SYNTHROID, LEVOTHROID) 25 MCG tablet Take 25 mcg by mouth Daily.     • Melatonin 3 MG capsule Take 3 mg by mouth Every Night.     • metOLazone (ZAROXOLYN) 5 MG tablet Take 1 tablet by mouth Daily. 90 tablet 3   • Multiple Vitamins-Minerals (ICAPS AREDS 2 PO) Take 2 tablets by mouth Daily.     • naproxen (Naprosyn) 500 MG tablet Take 1 tablet by mouth 2 (Two) Times a Day As Needed for Mild Pain . 60 tablet 1   • nitroglycerin (NITROSTAT) 0.4 MG SL tablet Place 1 tablet under the tongue Every 5 (Five) Minutes As Needed for Chest Pain. 25 tablet 3   • pantoprazole (PROTONIX) 40 MG EC tablet Take 40 mg by mouth Daily.     • potassium chloride (K-DUR,KLOR-CON) 10 MEQ CR tablet Take 20 mEq by mouth 3 (Three) Times a Day.     • rivaroxaban (XARELTO) 20 MG tablet Take 1 tablet by mouth Daily. 90 tablet 3   • sacubitril-valsartan (Entresto) 24-26 MG tablet Take 1 tablet by mouth 2 (Two) Times a Day. 180 tablet 3   • tamsulosin (FLOMAX) 0.4 MG capsule 24 hr capsule Take 1 capsule by mouth Daily.     • venlafaxine XR (EFFEXOR-XR) 150 MG 24 hr capsule Take 1 capsule by mouth Daily. 90 capsule 3     No  current facility-administered medications on file prior to visit.       ALLERGIES  Patient has no known allergies.    HISTORY  Past Medical History:   Diagnosis Date   • Acid reflux    • Anxiety    • Complete heart block (HCC)    • COPD (chronic obstructive pulmonary disease) ([facility])    • Coronary artery disease    • COVID-19 vaccine administered 2021 - Booster 10/13/2021 - Pfizer   • Diabetes mellitus (HCC)    • Gout    • Hyperlipidemia    • Hypertension    • Hypothyroid    • Myocardial infarction ([facility])    • Pseudophakia    • Rheumatoid arthritis (HCC)    • Sleep apnea    • Thyroid disease        Social History     Socioeconomic History   • Marital status:    Tobacco Use   • Smoking status: Former Smoker     Packs/day: 1.00     Years: 45.00     Pack years: 45.00     Quit date:      Years since quittin.2   • Smokeless tobacco: Never Used   Substance and Sexual Activity   • Alcohol use: No   • Drug use: No   • Sexual activity: Defer       Family History   Problem Relation Age of Onset   • No Known Problems Mother    • Colon cancer Father    • Heart disease Brother        Review of Systems   Constitutional: Negative for appetite change, chills, fatigue and fever.   HENT: Negative for congestion, rhinorrhea and sore throat.    Eyes: Negative for visual disturbance.   Respiratory: Positive for chest tightness (center of the chest ( pressure ) it will last for a few mins he does not take a Nitro ) and shortness of breath (only with Activity , he has neb machine and inhalers if needed ). Negative for cough and wheezing.    Cardiovascular: Negative for chest pain, palpitations and leg swelling.   Gastrointestinal: Negative for abdominal pain, blood in stool, constipation, diarrhea, nausea and vomiting.   Endocrine: Negative for cold intolerance and heat intolerance.   Genitourinary: Positive for difficulty urinating (slow stream ) and frequency (several times a day and night due to taking  "water pills and he does drink alot of water ( gallon ) a day ). Negative for dysuria, hematuria and urgency.   Musculoskeletal: Positive for arthralgias (through out the body ( RA) ), back pain (lower back ( due to hips ) when he carries things ) and joint swelling (when he has flare up with RA Z( he will get knots on his fingers and wrist ) ). Negative for neck pain and neck stiffness.   Skin: Positive for wound (both legs, toes ). Negative for color change, pallor and rash.   Allergic/Immunologic: Negative for environmental allergies and food allergies.   Neurological: Positive for weakness (legs and hips ) and numbness (feet and toes ). Negative for dizziness, light-headedness and headaches.   Hematological: Bruises/bleeds easily (Brusies and bleeds ).   Psychiatric/Behavioral: Positive for sleep disturbance (cpac machine he uses a average of 9 hrs a night ).       Objective     VITALS: BP 91/64 (BP Location: Left arm, Patient Position: Sitting)   Pulse 70   Temp 97.2 °F (36.2 °C)   Ht 185.4 cm (73\")   Wt 122 kg (269 lb)   SpO2 96%   BMI 35.49 kg/m²     LABS:   Lab Results (most recent)     None          IMAGING:   No Images in the past 120 days found..    EXAM:  Physical Exam  Vitals and nursing note reviewed.   Constitutional:       Appearance: He is well-developed.   HENT:      Head: Normocephalic.   Eyes:      Pupils: Pupils are equal, round, and reactive to light.   Neck:      Thyroid: No thyroid mass.      Vascular: No carotid bruit or JVD.      Trachea: Trachea and phonation normal.   Cardiovascular:      Rate and Rhythm: Normal rate and regular rhythm.      Pulses:           Radial pulses are 2+ on the right side and 2+ on the left side.        Dorsalis pedis pulses are 2+ on the right side and 2+ on the left side.        Posterior tibial pulses are 2+ on the right side and 2+ on the left side.      Heart sounds: Murmur heard.     No friction rub. No gallop.   Pulmonary:      Effort: Pulmonary " effort is normal. No respiratory distress.      Breath sounds: Normal breath sounds. No wheezing or rales.   Abdominal:      General: Bowel sounds are normal.      Palpations: Abdomen is soft.   Musculoskeletal:         General: Swelling (trace) present. Normal range of motion.      Cervical back: Neck supple.   Skin:     General: Skin is warm and dry.      Capillary Refill: Capillary refill takes less than 2 seconds.      Findings: No rash.          Neurological:      Mental Status: He is alert and oriented to person, place, and time.   Psychiatric:         Speech: Speech normal.         Behavior: Behavior normal.         Thought Content: Thought content normal.         Judgment: Judgment normal.         Procedure   Procedures       Assessment/Plan    Diagnosis Plan   1. Paroxysmal atrial fibrillation (HCC)     2. Coronary artery disease due to calcified coronary lesion     3. Heart failure with reduced ejection fraction (HCC)     1.  Patient does have an atrial fibrillation in which she is on anticoagulation with Xarelto he denies any signs or symptoms of bleeding.  He is also rate controlled with carvedilol.  He denies any failure or palpitations.  2.  Patient does have a history of coronary artery disease with a history of coronary artery bypass grafting.  He is doing well and denies any angina anginal equivalent symptoms.  We will continue to monitor.  3.  Patient does have heart failure with reduced ejection fraction.  He is on guideline driven medication therapy of carvedilol, Entresto, and Jardiance.  We will continue to monitor.  He is on diuretic therapy as well.  4.  Informed of signs and symptoms of ACS and advised to seek emergent treatment for any new worsening symptoms.  Patient also advised sooner follow-up as needed.  Also advised to follow-up with family doctor as needed  This note is dictated utilizing voice recognition software.  Although this record has been proof read, transcriptional errors  may still be present. If questions occur regarding the content of this record please do not hesitate to call our office.  I have reviewed and confirmed the accuracy of the ROS as documented by the MA/LPN/RN DAVID Stone    Return in about 6 months (around 9/17/2022), or if symptoms worsen or fail to improve.    Diagnoses and all orders for this visit:    1. Paroxysmal atrial fibrillation (HCC) (Primary)    2. Coronary artery disease due to calcified coronary lesion    3. Heart failure with reduced ejection fraction (HCC)        Nick Caraballo  reports that he quit smoking about 24 years ago. He has a 45.00 pack-year smoking history. He has never used smokeless tobacco..Advance Care Planning   ACP discussion was declined by the patient. Patient has an advance directive (not in EMR), copy requested. Patient did not bring med list or medicine bottles to appointment, med list has been reviewed and updated based on patient's knowledge of their meds.                  MEDS ORDERED DURING VISIT:  No orders of the defined types were placed in this encounter.          This document has been electronically signed by DAVID Stone Jr.  March 27, 2022 21:12 EDT

## 2022-04-18 ENCOUNTER — TELEPHONE (OUTPATIENT)
Dept: CARDIOLOGY | Facility: CLINIC | Age: 82
End: 2022-04-18

## 2022-04-18 NOTE — TELEPHONE ENCOUNTER
Patient called with an update he is down 34lbs. A1C is 6.7 / Blood Sugar has been staying 135-146. He is feeling great.    Lavell Sahni Rep

## 2022-05-25 ENCOUNTER — TELEPHONE (OUTPATIENT)
Dept: CARDIOLOGY | Facility: CLINIC | Age: 82
End: 2022-05-25

## 2022-05-25 NOTE — TELEPHONE ENCOUNTER
Pt called and said that the Jardiance with the lasix's was making him dizzy/ High BP/ uneasy on feet. In the hospital they switched it to insulin and it made him feel much better. They did not discharge him with insulin so [facility] and [facility]. He just wanted JR to know and it to be documented in the chart.    Gali Garcia, Lavell Rep

## 2022-09-06 ENCOUNTER — TELEPHONE (OUTPATIENT)
Dept: CARDIOLOGY | Facility: CLINIC | Age: 82
End: 2022-09-06

## 2022-09-06 DIAGNOSIS — R06.02 SHORTNESS OF BREATH: Primary | ICD-10-CM

## 2022-09-06 NOTE — TELEPHONE ENCOUNTER
Patient called in requesting referral to [facility]    Patient is on C PAP is getting 8-9 hrs of sleep. May the second he broke his neck in fall. He is no longer driving out of town.

## 2022-09-07 ENCOUNTER — TELEPHONE (OUTPATIENT)
Dept: CARDIOLOGY | Facility: CLINIC | Age: 82
End: 2022-09-07

## 2022-09-07 NOTE — TELEPHONE ENCOUNTER
Pt left v/m in triage he said the [facility] wanted [facility] to touch base with us and see if he should be taking Lasix or a water pill.

## 2022-09-12 ENCOUNTER — TELEPHONE (OUTPATIENT)
Dept: CARDIOLOGY | Facility: CLINIC | Age: 82
End: 2022-09-12

## 2022-09-12 NOTE — TELEPHONE ENCOUNTER
----- Message from Nick Caraballo sent at 9/8/2022  9:38 AM EDT -----  Regarding: Possible increase in Fluid meds  I spoke with my nurse for Dr. Abbott and she has indicated that you take care of a small problem for me.  Mr. Harman, this was the first line of my message to you, and I wanted you to know that it was not worded too well on my part. It should have read as follows.  I spoke with the nurse for Dr. Abbott and she has indicated that it would better if you take care of a small problem for me as it is a heart related issue.   I am sorry that I worded that wrong..       I spoke with patient to verify if he is having swelling or weight he can not get off. Patient stated that he has a sock line and that he can not loose any more weight . He said he would rather JR just go over everything with him in office that it is not emergent and swelling is not severe. He has apt on 9/19/22.           no

## 2022-09-15 ENCOUNTER — OFFICE VISIT (OUTPATIENT)
Dept: PULMONOLOGY | Facility: CLINIC | Age: 82
End: 2022-09-15

## 2022-09-15 VITALS
OXYGEN SATURATION: 100 % | HEART RATE: 74 BPM | WEIGHT: 247 LBS | DIASTOLIC BLOOD PRESSURE: 72 MMHG | HEIGHT: 73 IN | SYSTOLIC BLOOD PRESSURE: 107 MMHG | BODY MASS INDEX: 32.74 KG/M2

## 2022-09-15 DIAGNOSIS — R06.02 SHORTNESS OF BREATH: Primary | ICD-10-CM

## 2022-09-15 DIAGNOSIS — Z87.891 HISTORY OF CIGARETTE SMOKING: ICD-10-CM

## 2022-09-15 DIAGNOSIS — J44.9 CHRONIC OBSTRUCTIVE PULMONARY DISEASE, UNSPECIFIED COPD TYPE: ICD-10-CM

## 2022-09-15 DIAGNOSIS — G47.33 OSA TREATED WITH BIPAP: ICD-10-CM

## 2022-09-15 DIAGNOSIS — J30.9 ALLERGIC RHINITIS, UNSPECIFIED SEASONALITY, UNSPECIFIED TRIGGER: ICD-10-CM

## 2022-09-15 PROCEDURE — 99214 OFFICE O/P EST MOD 30 MIN: CPT | Performed by: NURSE PRACTITIONER

## 2022-09-15 PROCEDURE — 94664 DEMO&/EVAL PT USE INHALER: CPT | Performed by: NURSE PRACTITIONER

## 2022-09-15 PROCEDURE — 94618 PULMONARY STRESS TESTING: CPT | Performed by: NURSE PRACTITIONER

## 2022-09-15 RX ORDER — CLOPIDOGREL BISULFATE 75 MG/1
75 TABLET ORAL DAILY
COMMUNITY
End: 2022-11-11

## 2022-09-15 RX ORDER — METHOCARBAMOL 500 MG/1
500 TABLET, FILM COATED ORAL 2 TIMES DAILY
COMMUNITY
Start: 2022-06-10 | End: 2022-11-11

## 2022-09-19 ENCOUNTER — OFFICE VISIT (OUTPATIENT)
Dept: CARDIOLOGY | Facility: CLINIC | Age: 82
End: 2022-09-19

## 2022-09-19 VITALS
DIASTOLIC BLOOD PRESSURE: 80 MMHG | SYSTOLIC BLOOD PRESSURE: 113 MMHG | WEIGHT: 243 LBS | BODY MASS INDEX: 32.2 KG/M2 | HEIGHT: 73 IN | HEART RATE: 82 BPM | OXYGEN SATURATION: 96 %

## 2022-09-19 DIAGNOSIS — Z95.1 S/P CABG (CORONARY ARTERY BYPASS GRAFT): ICD-10-CM

## 2022-09-19 DIAGNOSIS — I50.22 CHRONIC SYSTOLIC CONGESTIVE HEART FAILURE: Primary | ICD-10-CM

## 2022-09-19 DIAGNOSIS — I48.0 PAROXYSMAL ATRIAL FIBRILLATION: ICD-10-CM

## 2022-09-19 DIAGNOSIS — E66.9 OBESITY (BMI 30-39.9): ICD-10-CM

## 2022-09-19 DIAGNOSIS — I10 ESSENTIAL HYPERTENSION: ICD-10-CM

## 2022-09-19 DIAGNOSIS — M79.89 LEG SWELLING: ICD-10-CM

## 2022-09-19 PROCEDURE — 99214 OFFICE O/P EST MOD 30 MIN: CPT | Performed by: NURSE PRACTITIONER

## 2022-09-19 RX ORDER — SEMAGLUTIDE 1.34 MG/ML
0.25 INJECTION, SOLUTION SUBCUTANEOUS WEEKLY
Qty: 3 ML | Refills: 2 | Status: SHIPPED | OUTPATIENT
Start: 2022-09-19 | End: 2022-09-21 | Stop reason: SDUPTHER

## 2022-09-19 RX ORDER — BUMETANIDE 2 MG/1
2 TABLET ORAL 2 TIMES DAILY
Qty: 180 TABLET | Refills: 2 | Status: SHIPPED | OUTPATIENT
Start: 2022-09-19 | End: 2022-11-11

## 2022-09-19 NOTE — PROGRESS NOTES
Subjective     Nick Caraballo is a 82 y.o. male who presents to day for 6 month follow up  and Atrial Fibrillation (Wants to see about increase or change in fluid pill can not loose ant more weight.).    CHIEF COMPLIANT  Chief Complaint   Patient presents with   • 6 month follow up    • Atrial Fibrillation     Wants to see about increase or change in fluid pill can not loose ant more weight.       Active Problems:  Problem List Items Addressed This Visit        Cardiac and Vasculature    Essential hypertension    Relevant Medications    bumetanide (BUMEX) 2 MG tablet    Other Relevant Orders    Basic Metabolic Panel    Atrial fibrillation (HCC)    Relevant Orders    Basic Metabolic Panel    S/P CABG (coronary artery bypass graft)    Relevant Orders    Basic Metabolic Panel    Chronic systolic congestive heart failure (HCC) - Primary    Overview     Added automatically from request for surgery [id number]         Relevant Medications    bumetanide (BUMEX) 2 MG tablet    Other Relevant Orders    Basic Metabolic Panel      Other Visit Diagnoses     Leg swelling        Relevant Medications    bumetanide (BUMEX) 2 MG tablet    Other Relevant Orders    Basic Metabolic Panel    Obesity (BMI 30-39.9)        Relevant Medications    Semaglutide,0.25 or 0.5MG/DOS, (Ozempic, 0.25 or 0.5 MG/DOSE,) 2 MG/1.5ML solution pen-injector            PROBLEM LIST:   1. CAD, MI in 1998  1.1 Stenting x 5 total at [facility] , [facility]. Data  1.2 CABG x2 in Dec. 2014 at [facility] by Dr. Álvarez in setting of ACS, LIMA to LAD and reversed vein graft from aorta to diag. With RLE vein graft  1.4 Cath 10/19: Widely patent grafts with no progressive disease in the RCA or circumflex ejection fraction 50 to 55%, LVEDP 8 to 12 mmHg  1.5 left heart catheterization 9/20: Left main normal, LAD  occluded in the mid vessel with a patent LIMA beyond the occluded area, first diagonal has proximal 80% stenosis with a patent SVG to first D1, circumflex normal,  RCA normal, EF 30 to 35% with mid anterior and apical wall akinesis and inferior wall hypokinesis  1.6 MUGA scan 12/20: EF 19%  1.7 left heart catheterization 12/21: Left anterior descending 100% occluded after the takeoff of the major diagonal, circumflex moderate size vessel with no obstruction, RCA anatomically dominant no significant obstruction, LIMA to LAD patent, SVG to diagonal patent  2. A-Fib, s/p CABG per patient report  3. HTN  3.1 echocardiogram 10/21: EF 40 to 45% mild concentric left ventricular hypertrophy grade 2 diastolic dysfunction mild to moderate left atrial enlargement, right atrium mildly dilated, trivial MR, trivial to mild TR and AI pulmonary artery systolic pressures low 30s  4. Hyperlipidemia  5. GOPI, uses c-pap, sees Dr Jones  6. SSS  7. 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 carotid duplex 12/20: Nonobstructive carotid artery       HPI  Mr. Caraballo is present today for follow-up regarding atrial fibrillation, coronary artery disease, and heart failure with reduced ejection fraction.  He is being treated for his atrial fibrillation with Xarelto 20 mg for anticoagulation and carvedilol for rate control. The patient is being treated for his heart failure with carvedilol and Entresto.  The patient is being treated with Jardiance for his heart failure and diabetes. He is on a high intensity statin therapy of atorvastatin 80 mg daily, antianginal therapy of isosorbide and antiplatelet therapy of aspirin.      The patient notes he has weakness and swelling in his legs. He reports having issues with his balance. The patient states he broke his neck on 05/02/2022. He sates he had a hangman's fracture of C1 and C2. The patient mentions having screws placed.   He states reports having issues moving his neck still.  Additionally, he broke his little finger.  The patient states he does not have any function in the finger.  He reports the injuries occurred from a fall.  He follows up with  Dr. Rowe.  The patient adds he was in the hospital for 18 days. He reports doing physical therapy for 10 days in the hospital.He states he is not able to drive due to his neck injury and his medications.     The patient inquires about increasing his furosemide.  He is currently taking furosemide 60 mg twice daily.        He states he has lost 46 pounds since 01/2022. The patient reports he was 286 pounds on 01/01/2022.   Today he weighs 243 pounds. He adds that he cut sodium out of his diet.       The patient has shortness of breath, but states he is doing well. He was recently prescribed Anoro Ellipta, but he was experiencing loss of voice and sore throat.  He noticed improvements since he has been in the hospital bed.  The patient reports he is utilizing his BiPAP machine, but has noticed there are times the humidity gets too high.  He notes he still utilizes his nebulizer also.         The patient's blood pressure today is 113/80 mmHg.  Which seems to be relatively well controlled on the Entresto and carvedilol.      He mentions his glucose has been good and it was 123 mg/dL this morning. The patient checks his glucose 2 to 3 times weekly.  He had one episode of his glucose being 198 mg/dL, but is usually between 100-140's mg/dL. The patient reports his A1c was 6.2 percent recently. The patient is currently taking Xanax 0.5 mg twice daily if he has issues breathing.  He inquires about medication to help sleep.   PRIOR MEDS  Current Outpatient Medications on File Prior to Visit   Medication Sig Dispense Refill   • acetaminophen (TYLENOL) 650 MG 8 hr tablet Take 650 mg by mouth Every 8 (Eight) Hours As Needed for Mild Pain .     • albuterol (PROVENTIL) (2.5 MG/3ML) 0.083% nebulizer solution Take 2.5 mg by nebulization Every 4 (Four) Hours As Needed for Wheezing.     • albuterol sulfate  (90 Base) MCG/ACT inhaler Inhale 2 puffs Every 4 (Four) Hours As Needed for Wheezing or Shortness of Air. 8 g 3   •  allopurinol (ZYLOPRIM) 300 MG tablet Take 300 mg by mouth Daily.     • ALPRAZolam (XANAX) 0.5 MG tablet Take 0.5 mg by mouth 2 (Two) Times a Day As Needed for Anxiety.     • aspirin 81 MG EC tablet Take 1 tablet by mouth Daily. 90 tablet 3   • atorvastatin (LIPITOR) 80 MG tablet Take 1 tablet by mouth Every Night. 90 tablet 3   • carvedilol (COREG) 12.5 MG tablet Take 1 tablet by mouth 2 (Two) Times a Day. 180 tablet 3   • Cholecalciferol (VITAMIN D3) 50 MCG (2000 UT) tablet Take 2,000 Units by mouth Daily.     • clopidogrel (PLAVIX) 75 MG tablet Take 75 mg by mouth Daily.     • empagliflozin (JARDIANCE) 25 MG tablet tablet Take 0.5 tablets by mouth Daily. 45 tablet 3   • isosorbide mononitrate (IMDUR) 30 MG 24 hr tablet Take 0.5 tablets by mouth Daily. Half tablet daily 45 tablet 3   • levothyroxine (SYNTHROID, LEVOTHROID) 25 MCG tablet Take 25 mcg by mouth Daily.     • Melatonin 3 MG capsule Take 3 mg by mouth Every Night.     • methocarbamol (ROBAXIN) 500 MG tablet Take 500 mg by mouth 2 (Two) Times a Day.     • metOLazone (ZAROXOLYN) 5 MG tablet Take 1 tablet by mouth Daily. 90 tablet 3   • Multiple Vitamins-Minerals (ICAPS AREDS 2 PO) Take 2 tablets by mouth Daily.     • naproxen (Naprosyn) 500 MG tablet Take 1 tablet by mouth 2 (Two) Times a Day As Needed for Mild Pain . 60 tablet 1   • nitroglycerin (NITROSTAT) 0.4 MG SL tablet Place 1 tablet under the tongue Every 5 (Five) Minutes As Needed for Chest Pain. 25 tablet 3   • pantoprazole (PROTONIX) 40 MG EC tablet Take 40 mg by mouth Daily.     • potassium chloride (K-DUR,KLOR-CON) 10 MEQ CR tablet Take 20 mEq by mouth 3 (Three) Times a Day.     • rivaroxaban (XARELTO) 20 MG tablet Take 1 tablet by mouth Daily. 90 tablet 3   • sacubitril-valsartan (Entresto) 24-26 MG tablet Take 1 tablet by mouth 2 (Two) Times a Day. 180 tablet 3   • tamsulosin (FLOMAX) 0.4 MG capsule 24 hr capsule Take 1 capsule by mouth Daily.     • umeclidinium-vilanterol (ANORO ELLIPTA)  62.5-25 MCG/INH aerosol powder  inhaler 1 inhalation daily 3 each 3   • venlafaxine XR (EFFEXOR-XR) 150 MG 24 hr capsule Take 1 capsule by mouth Daily. 90 capsule 3   • [DISCONTINUED] furosemide (LASIX) 20 MG tablet Take 3 tablets by mouth 2 (Two) Times a Day. 540 tablet 3     No current facility-administered medications on file prior to visit.       ALLERGIES  Patient has no known allergies.    HISTORY  Past Medical History:   Diagnosis Date   • Acid reflux    • Anxiety    • Complete heart block (HCC)    • COPD (chronic obstructive pulmonary disease) ([facility])    • Coronary artery disease    • COVID-19 vaccine administered 2021 - Booster 10/13/2021 - Pfizer   • Diabetes mellitus ([facility])    • Gout    • Hyperlipidemia    • Hypertension    • Hypothyroid    • Myocardial infarction ([facility])    • Pseudophakia    • Rheumatoid arthritis ([facility])    • Sleep apnea    • Thyroid disease        Social History     Socioeconomic History   • Marital status:    Tobacco Use   • Smoking status: Former Smoker     Packs/day: 1.00     Years: 45.00     Pack years: 45.00     Quit date:      Years since quittin.7   • Smokeless tobacco: Never Used   Substance and Sexual Activity   • Alcohol use: No   • Drug use: No   • Sexual activity: Defer       Family History   Problem Relation Age of Onset   • No Known Problems Mother    • Colon cancer Father    • Heart disease Brother        Review of Systems   Constitutional: Positive for fatigue. Negative for chills and fever.   HENT: Negative for congestion, rhinorrhea and sore throat.    Respiratory: Positive for apnea (bypap sleep apnea) and shortness of breath (with exertion). Negative for chest tightness.    Cardiovascular: Positive for leg swelling. Negative for chest pain and palpitations.   Gastrointestinal: Negative for constipation, diarrhea and nausea.   Musculoskeletal: Positive for arthralgias, back pain and neck pain.   Allergic/Immunologic: Positive for  "environmental allergies. Negative for food allergies.   Neurological: Positive for dizziness (looses balance easily ), weakness and light-headedness. Negative for syncope.   Hematological: Bruises/bleeds easily.   Psychiatric/Behavioral: Positive for sleep disturbance (takes melatonin).       Objective     VITALS: /80 (BP Location: Left arm, Patient Position: Sitting)   Pulse 82   Ht 185.4 cm (72.99\")   Wt 110 kg (243 lb)   SpO2 96%   BMI 32.07 kg/m²     LABS:   Lab Results (most recent)     None          IMAGING:   No Images in the past 120 days found..    EXAM:  Physical Exam  Vitals and nursing note reviewed.   Constitutional:       Appearance: He is well-developed.   HENT:      Head: Normocephalic and atraumatic.   Eyes:      Pupils: Pupils are equal, round, and reactive to light.   Neck:      Vascular: No carotid bruit or JVD.   Cardiovascular:      Rate and Rhythm: Normal rate and regular rhythm.      Pulses:           Carotid pulses are 2+ on the right side and 2+ on the left side.       Radial pulses are 2+ on the right side and 2+ on the left side.        Posterior tibial pulses are 2+ on the right side and 2+ on the left side.      Heart sounds: Murmur heard.     No gallop.   Pulmonary:      Effort: Pulmonary effort is normal. No respiratory distress.      Breath sounds: Normal breath sounds.   Abdominal:      General: Bowel sounds are normal. There is no distension.      Palpations: Abdomen is soft.      Tenderness: There is no abdominal tenderness.   Musculoskeletal:         General: Swelling present. Normal range of motion.      Cervical back: Neck supple.   Skin:     General: Skin is warm and dry.   Neurological:      Mental Status: He is alert and oriented to person, place, and time.      Cranial Nerves: No cranial nerve deficit.      Sensory: No sensory deficit.   Psychiatric:         Speech: Speech normal.         Behavior: Behavior normal.         Thought Content: Thought content normal. "         Judgment: Judgment normal.         Procedure   Procedures       Assessment & Plan    Diagnosis Plan   1. Chronic systolic congestive heart failure (HCC)  bumetanide (BUMEX) 2 MG tablet    Basic Metabolic Panel   2. S/P CABG (coronary artery bypass graft)  Basic Metabolic Panel   3. Paroxysmal atrial fibrillation (HCC)  Basic Metabolic Panel   4. Essential hypertension  Basic Metabolic Panel   5. Leg swelling  bumetanide (BUMEX) 2 MG tablet    Basic Metabolic Panel   6. Obesity (BMI 30-39.9)  Semaglutide,0.25 or 0.5MG/DOS, (Ozempic, 0.25 or 0.5 MG/DOSE,) 2 MG/1.5ML solution pen-injector   Plan:  1. The patient has lower extremity edema and weakness. He will discontinue furosimide 60 mg twice daily and start taking Bumex 2 mg twice daily.   2. His glucose and A1c has been stable. The patient will start Ozempic weekly this will help with his diabetes mellitus as well as weight loss.  3.  Patient's blood pressure is controlled on current blood pressure medication regimen.  No medication changes are warranted at this time.  Patient advised to monitor blood pressure on a daily basis and report any persistent highs or lows.  Set goal blood pressure for patient at 130/80 or below.  4.  Patient's atrial fibrillation seems stable on his current medication regimen.  He denies any signs or symptoms of bleeding at this time.  5.  Patient also seems stable from the coronary artery standpoint.  Denies any angina.  His shortness of breath is stabilized with antianxiolytic medications.  He is also being followed by Palma HERNANDEZ for pulmonology.  6.  Informed of signs and symptoms of ACS and advised to seek emergent treatment for any new worsening symptoms.  Patient also advised sooner follow-up as needed.  Also advised to follow-up with family doctor as needed  This note is dictated utilizing voice recognition software.  Although this record has been proof read, transcriptional errors may still be present. If  questions occur regarding the content of this record please do not hesitate to call our office.  I have reviewed and confirmed the accuracy of the ROS as documented by the MA/LPN/RN DAVID Stone    No follow-ups on file.    Diagnoses and all orders for this visit:    1. Chronic systolic congestive heart failure (HCC) (Primary)  -     bumetanide (BUMEX) 2 MG tablet; Take 1 tablet by mouth 2 (Two) Times a Day.  Dispense: 180 tablet; Refill: 2  -     Basic Metabolic Panel; Future    2. S/P CABG (coronary artery bypass graft)  -     Basic Metabolic Panel; Future    3. Paroxysmal atrial fibrillation (HCC)  -     Basic Metabolic Panel; Future    4. Essential hypertension  -     Basic Metabolic Panel; Future    5. Leg swelling  -     bumetanide (BUMEX) 2 MG tablet; Take 1 tablet by mouth 2 (Two) Times a Day.  Dispense: 180 tablet; Refill: 2  -     Basic Metabolic Panel; Future    6. Obesity (BMI 30-39.9)  -     Semaglutide,0.25 or 0.5MG/DOS, (Ozempic, 0.25 or 0.5 MG/DOSE,) 2 MG/1.5ML solution pen-injector; Inject 0.25 mg under the skin into the appropriate area as directed 1 (One) Time Per Week.  Dispense: 3 mL; Refill: 2          Assessment:  1. Heart failure with reduced ejection fraction  2. Atrial fibrillation  3. Coronary artery disease    Nick Caraballo  reports that he quit smoking about 24 years ago. He has a 45.00 pack-year smoking history. He has never used smokeless tobacco.. I have educated him on the risk of diseases from using tobacco products.     Advance Care Planning   ACP discussion was held with the patient during this visit. Patient has an advance directive in EMR which is still valid.             MEDS ORDERED DURING VISIT:  New Medications Ordered This Visit   Medications   • bumetanide (BUMEX) 2 MG tablet     Sig: Take 1 tablet by mouth 2 (Two) Times a Day.     Dispense:  180 tablet     Refill:  2   • Semaglutide,0.25 or 0.5MG/DOS, (Ozempic, 0.25 or 0.5 MG/DOSE,) 2 MG/1.5ML solution  pen-injector     Sig: Inject 0.25 mg under the skin into the appropriate area as directed 1 (One) Time Per Week.     Dispense:  3 mL     Refill:  2     Transcribed from ambient dictation for DAVID Stone by Areli Holland.  09/19/22   18:17 EDT    Patient verbalized consent to the visit recording.  I have personally performed the services described in this document as transcribed by the above individual, and it is both accurate and complete.  DAVID Stone  9/22/2022  10:30 EDT        This document has been electronically signed by DAVID Stone Jr.  September 19, 2022 16:46 EDT

## 2022-09-21 DIAGNOSIS — E66.9 OBESITY (BMI 30-39.9): ICD-10-CM

## 2022-09-21 RX ORDER — SEMAGLUTIDE 1.34 MG/ML
0.25 INJECTION, SOLUTION SUBCUTANEOUS WEEKLY
Qty: 3 ML | Refills: 2 | Status: SHIPPED | OUTPATIENT
Start: 2022-09-21 | End: 2022-11-11 | Stop reason: SDUPTHER

## 2022-09-22 RX ORDER — TIOTROPIUM BROMIDE AND OLODATEROL 3.124; 2.736 UG/1; UG/1
1 SPRAY, METERED RESPIRATORY (INHALATION)
Qty: 1 EACH | Refills: 3 | Status: SHIPPED | OUTPATIENT
Start: 2022-09-22

## 2022-10-06 ENCOUNTER — TELEPHONE (OUTPATIENT)
Dept: CARDIOLOGY | Facility: CLINIC | Age: 82
End: 2022-10-06

## 2022-10-06 NOTE — TELEPHONE ENCOUNTER
Received cardiac clearance request from  stating pt has C5-6, C6-7 ACDF to be scheduled and is requiring a cardiac clearance. Placed cardiac clearance request in Jeniffer's inbox to review and address with provider.

## 2022-10-11 ENCOUNTER — TELEPHONE (OUTPATIENT)
Dept: CARDIOLOGY | Facility: CLINIC | Age: 82
End: 2022-10-11

## 2022-11-11 ENCOUNTER — OFFICE VISIT (OUTPATIENT)
Dept: CARDIOLOGY | Facility: CLINIC | Age: 82
End: 2022-11-11

## 2022-11-11 VITALS
WEIGHT: 232 LBS | SYSTOLIC BLOOD PRESSURE: 90 MMHG | BODY MASS INDEX: 30.75 KG/M2 | OXYGEN SATURATION: 100 % | HEIGHT: 73 IN | HEART RATE: 70 BPM | DIASTOLIC BLOOD PRESSURE: 64 MMHG

## 2022-11-11 DIAGNOSIS — E66.9 OBESITY (BMI 30-39.9): ICD-10-CM

## 2022-11-11 DIAGNOSIS — Z95.1 S/P CABG (CORONARY ARTERY BYPASS GRAFT): Primary | ICD-10-CM

## 2022-11-11 DIAGNOSIS — I48.0 PAROXYSMAL ATRIAL FIBRILLATION: ICD-10-CM

## 2022-11-11 DIAGNOSIS — K21.9 GASTROESOPHAGEAL REFLUX DISEASE WITHOUT ESOPHAGITIS: ICD-10-CM

## 2022-11-11 DIAGNOSIS — I10 ESSENTIAL HYPERTENSION: ICD-10-CM

## 2022-11-11 DIAGNOSIS — I50.20 HEART FAILURE WITH REDUCED EJECTION FRACTION: ICD-10-CM

## 2022-11-11 DIAGNOSIS — R06.02 SHORTNESS OF BREATH: ICD-10-CM

## 2022-11-11 PROCEDURE — 99214 OFFICE O/P EST MOD 30 MIN: CPT | Performed by: NURSE PRACTITIONER

## 2022-11-11 RX ORDER — FUROSEMIDE 20 MG/1
20 TABLET ORAL 2 TIMES DAILY
COMMUNITY
End: 2022-11-23 | Stop reason: SDUPTHER

## 2022-11-11 RX ORDER — SEMAGLUTIDE 1.34 MG/ML
0.5 INJECTION, SOLUTION SUBCUTANEOUS WEEKLY
Qty: 3 ML | Refills: 5 | Status: SHIPPED | OUTPATIENT
Start: 2022-11-11

## 2022-11-11 RX ORDER — VALSARTAN 40 MG/1
40 TABLET ORAL DAILY
Qty: 90 TABLET | Refills: 3 | Status: SHIPPED | OUTPATIENT
Start: 2022-11-11 | End: 2022-11-23

## 2022-11-11 NOTE — PROGRESS NOTES
Subjective     Nick Caraballo is a 82 y.o. male who presents to day for [facility] follow up  ( Visits to ER since last visit  records are in chart .).    CHIEF COMPLIANT  Chief Complaint   Patient presents with   • [facility] follow up       Visits to ER since last visit  records are in chart .       Active Problems:  Problem List Items Addressed This Visit        Cardiac and Vasculature    Essential hypertension    Relevant Medications    furosemide (LASIX) 20 MG tablet    valsartan (DIOVAN) 40 MG tablet    Atrial fibrillation (HCC)    S/P CABG (coronary artery bypass graft) - Primary       Gastrointestinal Abdominal     Gastroesophageal reflux disease without esophagitis    Relevant Medications    esomeprazole (nexIUM) 20 MG capsule    Other Relevant Orders    Ambulatory Referral to Gastroenterology       Pulmonary and Pneumonias    Shortness of breath   Other Visit Diagnoses     Obesity (BMI 30-39.9)        Relevant Medications    Semaglutide,0.25 or 0.5MG/DOS, (Ozempic, 0.25 or 0.5 MG/DOSE,) 2 MG/1.5ML solution pen-injector    Heart failure with reduced ejection fraction (HCC)        Relevant Medications    valsartan (DIOVAN) 40 MG tablet      PROBLEM LIST:   1. CAD, MI in 1998  1.1 Stenting x 5 total at [facility]. Data  1.2 CABG x2 in Dec. 2014 at [facility] by Dr. Álvarez in setting of ACS, LIMA to LAD and reversed vein graft from aorta to diag. With RLE vein graft  1.4 Cath 10/19: Widely patent grafts with no progressive disease in the RCA or circumflex ejection fraction 50 to 55%, LVEDP 8 to 12 mmHg  1.5 left heart catheterization 9/20: Left main normal, LAD  occluded in the mid vessel with a patent LIMA beyond the occluded area, first diagonal has proximal 80% stenosis with a patent SVG to first D1, circumflex normal, RCA normal, EF 30 to 35% with mid anterior and apical wall akinesis and inferior wall hypokinesis  1.6 MUGA scan 12/20: EF 19%  1.7 left heart catheterization 12/21: Left anterior descending  "100% occluded after the takeoff of the major diagonal, circumflex moderate size vessel with no obstruction, RCA anatomically dominant no significant obstruction, LIMA to LAD patent, SVG to diagonal patent  2. A-Fib, s/p CABG per patient report  3. HTN  3.1 echocardiogram 10/21: EF 40 to 45% mild concentric left ventricular hypertrophy grade 2 diastolic dysfunction mild to moderate left atrial enlargement, right atrium mildly dilated, trivial MR, trivial to mild TR and AI pulmonary artery systolic pressures low 30s  4. Hyperlipidemia  5. GOPI, uses c-pap, sees Dr Jones  6. SSS  7. 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 carotid duplex 12/20: Nonobstructive carotid artery    HPI  HPI    Mr. Nick Caraballo is an 82-year-old male, who present today for followup status post recent hospitalization. During his hospitalization, several of his medications were discontinued, including isosorbide and carvedilol.Mr. Caraballo reports that at 8:30 his blood pressure was was 91/68,\" and multiple of his medications were stopped including isosorbide and carvedilol.The patient states that Dr. Gay is the physician who recommended discontinuing his anti-hypertensive medications. Today, the patient's blood pressure is 90/64 mmHg. He is taking Entresto. The patient reports that in that past, his systolic blood pressure readings were approximately 119 mmHg or 121 mmHg. He recalls one hospitalization where his systolic blood pressure was noted to be 68 mmHg. The patient confirms that he is drinking plenty of fluids. He notes that he has been drinking Gatorade with electrolytes frequently and asks if he should continue to do so. The patient states that he does not drink alcohol and has not done so for a significant period of time.    He has a a history of 2-vessel coronary artery bypass grafting in 2014 and most recently underwent left cardiac catheterization in 12/2021, in which he did not require any coronary artery " "intervention.    He has a history of heart failure with reduced ejection fraction with most recent ejection fraction of 40 to 45 percent per echocardiogram in 10/2021.  Unfortunately due to episodes of hypotension his guideline treatment has been stopped.    The patient has grade 2 diastolic dysfunction.    The patient states that he discontinued Protonix and Bumex and restarted Lasix because he suspects Protonix and Bumex have been causing diarrhea. He reports that he did not take Protonix this morning, 11/11/2022, and he currently notes slight gastroesophageal reflux. He states that he took Protonix for approximately 6 to 7 years. He reports that he has taken Tums in the past. The patient notes that while he was taking Bumex, he was \"getting very, very dry\" and \"couldn't hardly talk.\" The patient states that his diarrhea was previously severe and watery, and he noted fecal incontinence. He reports that his diarrhea is currently improving. When his diarrhea began, he took \"a whole box\" of Imodium, and he states that Imodium was ineffective for treating his diarrhea. The patient wonders if he contracted a virus. He asks how he should treat his diarrhea if this worsens again. The patient is interested in a referral to gastroenterology for a possible esophagogastroduodenoscopy.    The patient reports that he increased Ozempic to 0.5 mg weekly after 6 weeks of taking the 0.25 mg weekly dose and notes that this is \"working beautifully\" for him. He requests an Ozempic refill. He states that while taking Ozempic, he is able to control what he eats and does not become overly hungry. The patient currently weighs 232 pounds, and he states that he has lost 54 pounds. The patient asks about the healthfulness of eating lean roast beef and smoked turkey. He believes his diarrhea may have started around the time he increased his Ozempic to 0.5 mg weekly and wonders if he should reduce his dose back to 0.25 mg weekly.    The " "patient [facility]; however, these are not available for review.    [facility] reports [facility] recommended [facility] crush his potassium; however, doing so caused \"burning\" with swallowing the potassium. He states that he continues to take potassium without crushing the tablets.    The patient intends to undergo a procedure and asks if he may move forward with this. He asks about his anticoagulant dose.    He reports urinary hesitancy and asks if he should continue tamsulosin.    The patient notes shortness of breath with exertion and denies shortness of breath at rest. He states that he tries to avoid exerting himself.    His most recent laboratory work noted adequate CBC and compromised renal function. His BUN was 44 mg/dL. His creatinine was 1.83 mg/dL.        PRIOR MEDS  Current Outpatient Medications on File Prior to Visit   Medication Sig Dispense Refill   • acetaminophen (TYLENOL) 650 MG 8 hr tablet Take 650 mg by mouth Every 8 (Eight) Hours As Needed for Mild Pain .     • albuterol (PROVENTIL) (2.5 MG/3ML) 0.083% nebulizer solution Take 2.5 mg by nebulization Every 4 (Four) Hours As Needed for Wheezing.     • albuterol sulfate  (90 Base) MCG/ACT inhaler Inhale 2 puffs Every 4 (Four) Hours As Needed for Wheezing or Shortness of Air. 8 g 3   • allopurinol (ZYLOPRIM) 300 MG tablet Take 300 mg by mouth Daily.     • ALPRAZolam (XANAX) 0.5 MG tablet Take 0.5 mg by mouth 2 (Two) Times a Day As Needed for Anxiety.     • aspirin 81 MG EC tablet Take 1 tablet by mouth Daily. 90 tablet 3   • atorvastatin (LIPITOR) 80 MG tablet Take 1 tablet by mouth Every Night. 90 tablet 3   • Cholecalciferol (VITAMIN D3) 50 MCG (2000 UT) tablet Take 2,000 Units by mouth Daily.     • empagliflozin (JARDIANCE) 25 MG tablet tablet Take 0.5 tablets by mouth Daily. 45 tablet 3   • furosemide (LASIX) 20 MG tablet Take 1 tablet by mouth 2 (Two) Times a Day. 3 tabs po BID     • levothyroxine (SYNTHROID, " LEVOTHROID) 25 MCG tablet Take 25 mcg by mouth Daily.     • Melatonin 3 MG capsule Take 3 mg by mouth Every Night.     • metOLazone (ZAROXOLYN) 5 MG tablet Take 1 tablet by mouth Daily. 90 tablet 3   • Multiple Vitamins-Minerals (ICAPS AREDS 2 PO) Take 2 tablets by mouth Daily.     • naproxen (Naprosyn) 500 MG tablet Take 1 tablet by mouth 2 (Two) Times a Day As Needed for Mild Pain . 60 tablet 1   • nitroglycerin (NITROSTAT) 0.4 MG SL tablet Place 1 tablet under the tongue Every 5 (Five) Minutes As Needed for Chest Pain. 25 tablet 3   • potassium chloride (K-DUR,KLOR-CON) 10 MEQ CR tablet Take 20 mEq by mouth 3 (Three) Times a Day.     • rivaroxaban (XARELTO) 20 MG tablet Take 1 tablet by mouth Daily. 90 tablet 3   • tamsulosin (FLOMAX) 0.4 MG capsule 24 hr capsule Take 1 capsule by mouth Daily.     • tiotropium bromide-olodaterol (Stiolto Respimat) 2.5-2.5 MCG/ACT aerosol solution inhaler Inhale 1 puff Daily. 1 each 3   • venlafaxine XR (EFFEXOR-XR) 150 MG 24 hr capsule Take 1 capsule by mouth Daily. 90 capsule 3   • [DISCONTINUED] carvedilol (COREG) 12.5 MG tablet Take 1 tablet by mouth 2 (Two) Times a Day. 180 tablet 3   • [DISCONTINUED] clopidogrel (PLAVIX) 75 MG tablet Take 75 mg by mouth Daily.     • [DISCONTINUED] isosorbide mononitrate (IMDUR) 30 MG 24 hr tablet Take 0.5 tablets by mouth Daily. Half tablet daily 45 tablet 3   • [DISCONTINUED] sacubitril-valsartan (Entresto) 24-26 MG tablet Take 1 tablet by mouth 2 (Two) Times a Day. 180 tablet 3   • [DISCONTINUED] Semaglutide,0.25 or 0.5MG/DOS, (Ozempic, 0.25 or 0.5 MG/DOSE,) 2 MG/1.5ML solution pen-injector Inject 0.25 mg under the skin into the appropriate area as directed 1 (One) Time Per Week. 3 mL 2   • [DISCONTINUED] bumetanide (BUMEX) 2 MG tablet Take 1 tablet by mouth 2 (Two) Times a Day. 180 tablet 2   • [DISCONTINUED] methocarbamol (ROBAXIN) 500 MG tablet Take 500 mg by mouth 2 (Two) Times a Day.     • [DISCONTINUED] pantoprazole (PROTONIX) 40 MG  EC tablet Take 40 mg by mouth Daily.       No current facility-administered medications on file prior to visit.       ALLERGIES  Patient has no known allergies.    HISTORY  Past Medical History:   Diagnosis Date   • Acid reflux    • Anxiety    • Complete heart block (HCC)    • COPD (chronic obstructive pulmonary disease) (HCC)    • Coronary artery disease    • COVID-19 vaccine administered 2021 - Booster 10/13/2021 - Pfizer   • Dehydration    • Diabetes mellitus (HCC)    • Gout    • Hyperlipidemia    • Hypertension    • Hypothyroid    • Myocardial infarction (HCC)    • Pseudophakia    • Rheumatoid arthritis (HCC)    • Sleep apnea    • Thyroid disease        Social History     Socioeconomic History   • Marital status:    Tobacco Use   • Smoking status: Former     Packs/day: 1.00     Years: 45.00     Pack years: 45.00     Types: Cigarettes     Quit date:      Years since quittin.8   • Smokeless tobacco: Never   Substance and Sexual Activity   • Alcohol use: No   • Drug use: No   • Sexual activity: Defer       Family History   Problem Relation Age of Onset   • No Known Problems Mother    • Colon cancer Father    • Heart disease Brother        Review of Systems   Constitutional: Negative for chills, fatigue and fever.   HENT: Negative for congestion, rhinorrhea and sore throat.    Respiratory: Positive for shortness of breath. Negative for chest tightness.    Cardiovascular: Negative for chest pain, palpitations and leg swelling.   Gastrointestinal: Positive for diarrhea. Negative for constipation and nausea.   Musculoskeletal: Positive for arthralgias (hands), back pain and neck pain.   Allergic/Immunologic: Positive for environmental allergies. Negative for food allergies.   Neurological: Positive for dizziness (if he gets up to quick or if bp drops), weakness and light-headedness. Negative for syncope.   Hematological: Bruises/bleeds easily.   Psychiatric/Behavioral: Negative for  "sleep disturbance.       Objective     VITALS: BP 90/64 (BP Location: Left arm, Patient Position: Sitting)   Pulse 70   Ht 185.4 cm (72.99\")   Wt 105 kg (232 lb)   SpO2 100%   BMI 30.62 kg/m²     LABS:   Lab Results (most recent)     None          IMAGING:   No Images in the past 120 days found..    EXAM:  Physical Exam  Vitals and nursing note reviewed.   Constitutional:       Appearance: He is well-developed.   HENT:      Head: Normocephalic and atraumatic.   Eyes:      Pupils: Pupils are equal, round, and reactive to light.   Neck:      Vascular: No carotid bruit or JVD.   Cardiovascular:      Rate and Rhythm: Normal rate and regular rhythm.      Pulses:           Carotid pulses are 2+ on the right side and 2+ on the left side.       Radial pulses are 2+ on the right side and 2+ on the left side.        Posterior tibial pulses are 2+ on the right side and 2+ on the left side.      Heart sounds: Murmur heard.     No gallop.   Pulmonary:      Effort: Pulmonary effort is normal. No respiratory distress.      Breath sounds: Normal breath sounds.   Abdominal:      General: Bowel sounds are normal. There is no distension.      Palpations: Abdomen is soft.      Tenderness: There is no abdominal tenderness.   Musculoskeletal:         General: Swelling present. Normal range of motion.      Cervical back: Neck supple.   Skin:     General: Skin is warm and dry.   Neurological:      Mental Status: He is alert and oriented to person, place, and time.      Cranial Nerves: No cranial nerve deficit.      Sensory: No sensory deficit.   Psychiatric:         Speech: Speech normal.         Behavior: Behavior normal.         Thought Content: Thought content normal.         Judgment: Judgment normal.          Procedure   Procedures       Assessment & Plan    Diagnosis Plan   1. S/P CABG (coronary artery bypass graft)        2. Obesity (BMI 30-39.9)  Semaglutide,0.25 or 0.5MG/DOS, (Ozempic, 0.25 or 0.5 MG/DOSE,) 2 MG/1.5ML " solution pen-injector      3. Essential hypertension        4. Paroxysmal atrial fibrillation (HCC)        5. Shortness of breath        6. Heart failure with reduced ejection fraction (HCC)  valsartan (DIOVAN) 40 MG tablet      7. Gastroesophageal reflux disease without esophagitis  Ambulatory Referral to Gastroenterology    esomeprazole (nexIUM) 20 MG capsule      Plan  1. The patient was advised that he may continue to drink Gatorade if he needs the electrolytes, though he was warned that Gatorade should not completely replace his water intake, as that could lead to renal calculi. He was recommended to drink approximately 2 liters of water per day and to reduce his water intake and take Lasix if he notes increased shortness of breath.  2. He was recommended to monitor his sodium intake.  3. The patient's current ejection fraction is adequate for him to undergo surgery and he would be further evaluated upon request.  4.  Previously patient's blood pressure medication and heart failure medication of Entresto was switched to valsartan.  However patient continued the Entresto and seems to appear as if he is tolerating this and has not had any further episodes of hypotension.  At this time we will continue the Entresto.  5. The patient will be scheduled for followup in 2 weeks and will follow up sooner as needed.  This will be for symptom check in regards to his blood pressure and toleration of the Entresto.  6. His Ozempic was refilled. He was warned that Ozempic may cause diarrhea or other gastrointestinal symptoms. The patient intends to reduce his Ozempic back to 0.25 mg weekly. If he experiences further diarrhea, he was advised to eat high-fiber foods and try taking Lomotil.  7.  We will continue the Xarelto at the current dosing.  8. A gastroenterology referral will be placed. Nexium is being prescribed in the meantime. He was recommended to try Rolaids chews for faster relief. Potential Dexilant treatment was  discussed.  9.  Informed of signs and symptoms of ACS and advised to seek emergent treatment for any new worsening symptoms.  Patient also advised sooner follow-up as needed.  Also advised to follow-up with family doctor as needed  This note is dictated utilizing voice recognition software.  Although this record has been proof read, transcriptional errors may still be present. If questions occur regarding the content of this record please do not hesitate to call our office.  I have reviewed and confirmed the accuracy of the ROS as documented by the MA/LPN/RN DAVID Stone    Return if symptoms worsen or fail to improve, for Recheck bp 2 weeks.    Diagnoses and all orders for this visit:    1. S/P CABG (coronary artery bypass graft) (Primary)    2. Obesity (BMI 30-39.9)  -     Semaglutide,0.25 or 0.5MG/DOS, (Ozempic, 0.25 or 0.5 MG/DOSE,) 2 MG/1.5ML solution pen-injector; Inject 0.5 mg under the skin into the appropriate area as directed 1 (One) Time Per Week.  Dispense: 3 mL; Refill: 5    3. Essential hypertension    4. Paroxysmal atrial fibrillation (HCC)    5. Shortness of breath    6. Heart failure with reduced ejection fraction (HCC)  -     valsartan (DIOVAN) 40 MG tablet; Take 1 tablet by mouth Daily.  Dispense: 90 tablet; Refill: 3    7. Gastroesophageal reflux disease without esophagitis  -     Ambulatory Referral to Gastroenterology  -     esomeprazole (nexIUM) 20 MG capsule; Take 1 capsule by mouth Every Morning Before Breakfast.  Dispense: 90 capsule; Refill: 0        Assessment  1. History of 2-vessel coronary artery bypass grafting and left cardiac catheterization   2. Heart failure with reduced ejection fraction   3. Grade 2 diastolic dysfunction  4. Shortness of breath  5. Gastroesophageal reflux disease      Nick Caraballo  reports that he quit smoking about 24 years ago. His smoking use included cigarettes. He has a 45.00 pack-year smoking history. He has never used smokeless tobacco.. I have  educated him on the risk of diseases from using tobacco products.     Advance Care Planning   ACP discussion was held with the patient during this visit. Patient has an advance directive in EMR which is still valid.             MEDS ORDERED DURING VISIT:  New Medications Ordered This Visit   Medications   • Semaglutide,0.25 or 0.5MG/DOS, (Ozempic, 0.25 or 0.5 MG/DOSE,) 2 MG/1.5ML solution pen-injector     Sig: Inject 0.5 mg under the skin into the appropriate area as directed 1 (One) Time Per Week.     Dispense:  3 mL     Refill:  5   • valsartan (DIOVAN) 40 MG tablet     Sig: Take 1 tablet by mouth Daily.     Dispense:  90 tablet     Refill:  3   • esomeprazole (nexIUM) 20 MG capsule     Sig: Take 1 capsule by mouth Every Morning Before Breakfast.     Dispense:  90 capsule     Refill:  0           This document has been electronically signed by DAVID Stone Jr.  November 11, 2022 12:47 EST    Transcribed from ambient dictation for DAVID Stone by Anjelica Hess.  11/11/22   15:45 EST    Patient or patient representative verbalized consent to the visit recording.  I have personally performed the services described in this document as transcribed by the above individual, and it is both accurate and complete.

## 2022-11-14 ENCOUNTER — TELEPHONE (OUTPATIENT)
Dept: CARDIOLOGY | Facility: CLINIC | Age: 82
End: 2022-11-14

## 2022-11-14 DIAGNOSIS — K21.9 GASTROESOPHAGEAL REFLUX DISEASE WITHOUT ESOPHAGITIS: Primary | ICD-10-CM

## 2022-11-14 RX ORDER — OMEPRAZOLE 20 MG/1
20 CAPSULE, DELAYED RELEASE ORAL DAILY
Qty: 90 CAPSULE | Refills: 3 | Status: SHIPPED | OUTPATIENT
Start: 2022-11-14

## 2022-11-14 RX ORDER — OMEPRAZOLE 20 MG/1
20 CAPSULE, DELAYED RELEASE ORAL DAILY
COMMUNITY
End: 2022-11-14 | Stop reason: SDUPTHER

## 2022-11-14 NOTE — TELEPHONE ENCOUNTER
Yes we stop the Entresto due to hypotension.  We can try the omeprazole he was still having quite a bit of reflux on the Propanazole

## 2022-11-14 NOTE — TELEPHONE ENCOUNTER
Nima at [facility] called to see about having Nexium switched to Omeprazole as Nexium is restricted. He also wanted to make sure Entresto was discontinued since Valsartan was prescribed.

## 2022-11-23 ENCOUNTER — CLINICAL SUPPORT (OUTPATIENT)
Dept: CARDIOLOGY | Facility: CLINIC | Age: 82
End: 2022-11-23

## 2022-11-23 VITALS
SYSTOLIC BLOOD PRESSURE: 107 MMHG | HEART RATE: 87 BPM | HEIGHT: 73 IN | WEIGHT: 235 LBS | DIASTOLIC BLOOD PRESSURE: 68 MMHG | BODY MASS INDEX: 31.14 KG/M2

## 2022-11-23 DIAGNOSIS — I50.22 CHRONIC SYSTOLIC CONGESTIVE HEART FAILURE: Primary | ICD-10-CM

## 2022-11-23 PROCEDURE — 99211 OFF/OP EST MAY X REQ PHY/QHP: CPT | Performed by: NURSE PRACTITIONER

## 2022-11-23 RX ORDER — SACUBITRIL AND VALSARTAN 24; 26 MG/1; MG/1
1 TABLET, FILM COATED ORAL 2 TIMES DAILY
Qty: 180 TABLET | Refills: 3 | Status: SHIPPED | OUTPATIENT
Start: 2022-11-23

## 2022-11-23 RX ORDER — FUROSEMIDE 80 MG
80 TABLET ORAL 2 TIMES DAILY
Qty: 180 TABLET | Refills: 1 | Status: SHIPPED | OUTPATIENT
Start: 2022-11-23 | End: 2022-11-30 | Stop reason: SDUPTHER

## 2022-11-23 NOTE — PROGRESS NOTES
Nick Caraballo  1940 11/23/2022   ?   No chief complaint on file.     ?   HPI: PATIENT PRESENTS TODAY FOR A SX CHECK, SINCE STARTING NEW MEDICATIONS.     PATIENTS STATES, HE IS HAVING A LOT OF GAS/BELCHING, FEELING AS IF SOMETHING IS BLOCKING DIGESTIVE/HEART BURN. STATES HE HAS NO IDEA IF THESE SX COULD BE HEART RELATED, AS HE DENIED ANY CHEST PAIN. DOES HAVE SOB, THAT HAS BEEN ONGOING NOTHING NEW OR WORSENING.DOES HAVE TO WATCH BALANCE AS HE IS HAVING WEAKNESS IN LEGS.  ?   ?   ?     Current Outpatient Medications:   •  acetaminophen (TYLENOL) 650 MG 8 hr tablet, Take 650 mg by mouth Every 8 (Eight) Hours As Needed for Mild Pain ., Disp: , Rfl:   •  albuterol (PROVENTIL) (2.5 MG/3ML) 0.083% nebulizer solution, Take 2.5 mg by nebulization Every 4 (Four) Hours As Needed for Wheezing., Disp: , Rfl:   •  albuterol sulfate  (90 Base) MCG/ACT inhaler, Inhale 2 puffs Every 4 (Four) Hours As Needed for Wheezing or Shortness of Air., Disp: 8 g, Rfl: 3  •  allopurinol (ZYLOPRIM) 300 MG tablet, Take 300 mg by mouth Daily., Disp: , Rfl:   •  ALPRAZolam (XANAX) 0.5 MG tablet, Take 0.5 mg by mouth 2 (Two) Times a Day As Needed for Anxiety., Disp: , Rfl:   •  aspirin 81 MG EC tablet, Take 1 tablet by mouth Daily., Disp: 90 tablet, Rfl: 3  •  atorvastatin (LIPITOR) 80 MG tablet, Take 1 tablet by mouth Every Night., Disp: 90 tablet, Rfl: 3  •  Cholecalciferol (VITAMIN D3) 50 MCG (2000 UT) tablet, Take 2,000 Units by mouth Daily., Disp: , Rfl:   •  empagliflozin (JARDIANCE) 25 MG tablet tablet, Take 0.5 tablets by mouth Daily., Disp: 45 tablet, Rfl: 3  •  furosemide (LASIX) 20 MG tablet, Take 1 tablet by mouth 2 (Two) Times a Day. 3 tabs po BID, Disp: , Rfl:   •  levothyroxine (SYNTHROID, LEVOTHROID) 25 MCG tablet, Take 25 mcg by mouth Daily., Disp: , Rfl:   •  Melatonin 3 MG capsule, Take 3 mg by mouth Every Night., Disp: , Rfl:   •  metOLazone (ZAROXOLYN) 5 MG tablet, Take 1 tablet by mouth Daily., Disp: 90 tablet, Rfl:  3  •  Multiple Vitamins-Minerals (ICAPS AREDS 2 PO), Take 2 tablets by mouth Daily., Disp: , Rfl:   •  naproxen (Naprosyn) 500 MG tablet, Take 1 tablet by mouth 2 (Two) Times a Day As Needed for Mild Pain ., Disp: 60 tablet, Rfl: 1  •  nitroglycerin (NITROSTAT) 0.4 MG SL tablet, Place 1 tablet under the tongue Every 5 (Five) Minutes As Needed for Chest Pain., Disp: 25 tablet, Rfl: 3  •  omeprazole (priLOSEC) 20 MG capsule, Take 1 capsule by mouth Daily., Disp: 90 capsule, Rfl: 3  •  potassium chloride (K-DUR,KLOR-CON) 10 MEQ CR tablet, Take 20 mEq by mouth 3 (Three) Times a Day., Disp: , Rfl:   •  rivaroxaban (XARELTO) 20 MG tablet, Take 1 tablet by mouth Daily., Disp: 90 tablet, Rfl: 3  •  Semaglutide,0.25 or 0.5MG/DOS, (Ozempic, 0.25 or 0.5 MG/DOSE,) 2 MG/1.5ML solution pen-injector, Inject 0.5 mg under the skin into the appropriate area as directed 1 (One) Time Per Week., Disp: 3 mL, Rfl: 5  •  tamsulosin (FLOMAX) 0.4 MG capsule 24 hr capsule, Take 1 capsule by mouth Daily., Disp: , Rfl:   •  tiotropium bromide-olodaterol (Stiolto Respimat) 2.5-2.5 MCG/ACT aerosol solution inhaler, Inhale 1 puff Daily., Disp: 1 each, Rfl: 3  •  valsartan (DIOVAN) 40 MG tablet, Take 1 tablet by mouth Daily., Disp: 90 tablet, Rfl: 3  •  venlafaxine XR (EFFEXOR-XR) 150 MG 24 hr capsule, Take 1 capsule by mouth Daily., Disp: 90 capsule, Rfl: 3   ?   ?   Patient has no known allergies.       Procedures     ?   Assessment & Plan  1)Gastroesophageal reflux disease without esophagitis     PER DAVID LONGORIA VERBAL  BP HAS BEEN STAYING GREATER THAN 100  INCREASE LASIX 80MG BID.  RESTART ENTRESTO  KEEP ROUTINE FOLLOW UP, CALL WITH ANY QUESTIONS OR CONCERNS.    PATIENT VERBALLY UNDERSTANDS.    Drea Ly MA        ?   ?   ?

## 2022-11-30 ENCOUNTER — TELEPHONE (OUTPATIENT)
Dept: CARDIOLOGY | Facility: CLINIC | Age: 82
End: 2022-11-30

## 2022-11-30 DIAGNOSIS — I50.22 CHRONIC SYSTOLIC CONGESTIVE HEART FAILURE: ICD-10-CM

## 2022-11-30 RX ORDER — FUROSEMIDE 80 MG
80 TABLET ORAL 2 TIMES DAILY
Qty: 180 TABLET | Refills: 1 | Status: SHIPPED | OUTPATIENT
Start: 2022-11-30 | End: 2022-11-30 | Stop reason: SDUPTHER

## 2022-11-30 RX ORDER — FUROSEMIDE 20 MG/1
60 TABLET ORAL 2 TIMES DAILY
Qty: 180 TABLET | Refills: 0 | Status: SHIPPED | OUTPATIENT
Start: 2022-11-30

## 2022-12-01 ENCOUNTER — TELEPHONE (OUTPATIENT)
Dept: CARDIOLOGY | Facility: CLINIC | Age: 82
End: 2022-12-01

## 2022-12-01 NOTE — TELEPHONE ENCOUNTER
Message  Received: Yesterday  Lucy Coffman William, APRN; Bee Merrill MA  Caller: Unspecified (Yesterday, 10:04 AM)  [facility] called stating that they need clarification on pt's Lasix 80mg.   Sig: Take 1 tablet by mouth 2 (Two) Times a Day. 3 tabs po BID      It is showing in chart 3 tabs bid . What is the correct dose you want patient to take?    Jeniffer VILLALOBOSA

## 2022-12-06 ENCOUNTER — TELEPHONE (OUTPATIENT)
Dept: CARDIOLOGY | Facility: CLINIC | Age: 82
End: 2022-12-06

## 2022-12-06 NOTE — TELEPHONE ENCOUNTER
He has only had a couple high readings. He will monitor BP and call with readings.     ----- Message from DAVID Stone sent at 12/6/2022  4:44 PM EST -----  As lab only has a staying in that range I am fine with it being there I feel if I restart the medications on a drop his blood pressure again.  If it keeps going up greater than 140/90 and staying there.  We can reintroduce his medications.  Have him continue monitor his blood pressure.  ----- Message -----  From: Desirae Arellano MA  Sent: 12/5/2022  11:12 AM EST  To: DAVID Stone    Patient left message that BP is coming back up since his medications were stopped due to hypotension. Readings 139/89 and 138/86. He is fine with a MyChart message with recommendations.

## 2022-12-09 ENCOUNTER — TELEPHONE (OUTPATIENT)
Dept: CARDIOLOGY | Facility: CLINIC | Age: 82
End: 2022-12-09

## 2022-12-19 ENCOUNTER — TELEPHONE (OUTPATIENT)
Dept: CARDIOLOGY | Facility: CLINIC | Age: 82
End: 2022-12-19

## 2022-12-19 NOTE — TELEPHONE ENCOUNTER
Mr Caraballo said that you changed Lasix to 80mg 3 tablets  BID? That's 160mg a day.... is that correct per Gali JARRELL.    I checked chart did not see this in chart what would you like patient to take?      Jeniffer VILLALOBOSA

## 2022-12-20 NOTE — TELEPHONE ENCOUNTER
Patient stated that [facility] . He will only take twice daily. We was warned of dangers of high doses of Lasix for prolonged time and effects on  Kidneys.    Patient wanted us to check on his referral to GI . He states that he is needing a EGD as well as colonoscopy. He states that he has Colonoscopy scheduled for August . He would like to see if this can be done sooner.  I spoke with JR we did not order testing due to it being noncardiac.     I will call and let the appropriate people know and see if they can help him.    Jeniffer Merrill A

## 2022-12-21 ENCOUNTER — TELEPHONE (OUTPATIENT)
Dept: CARDIOLOGY | Facility: CLINIC | Age: 82
End: 2022-12-21

## 2023-01-09 ENCOUNTER — TELEPHONE (OUTPATIENT)
Dept: CARDIOLOGY | Facility: CLINIC | Age: 83
End: 2023-01-09
Payer: OTHER GOVERNMENT

## 2023-01-09 NOTE — TELEPHONE ENCOUNTER
Patient called to report blood pressure has been running high. Yesterday /112, later that day 151/102. Ask patient to take BP while I was on the phone since it has been a couple hours sine medications, /80. He was advised to monitor BP/HR 1-2 hours after taking Entresto and call with readings or send Sarbarit message. Message routed to  for review.

## 2023-01-16 ENCOUNTER — TELEPHONE (OUTPATIENT)
Dept: CARDIOLOGY | Facility: CLINIC | Age: 83
End: 2023-01-16
Payer: OTHER GOVERNMENT

## 2023-01-16 DIAGNOSIS — Z95.1 S/P CABG (CORONARY ARTERY BYPASS GRAFT): ICD-10-CM

## 2023-01-16 DIAGNOSIS — R07.89 CHEST PAIN, ATYPICAL: ICD-10-CM

## 2023-01-16 DIAGNOSIS — I48.0 PAROXYSMAL ATRIAL FIBRILLATION: ICD-10-CM

## 2023-01-16 DIAGNOSIS — I50.22 CHRONIC SYSTOLIC CONGESTIVE HEART FAILURE: Primary | ICD-10-CM

## 2023-01-16 NOTE — TELEPHONE ENCOUNTER
Pt reports he cannot go out until January 19 after his f/u appt with Dr. Jamal House.  He states he will come to [facility] to have his labs drawn then.

## 2023-01-16 NOTE — TELEPHONE ENCOUNTER
Pt LVM on the triage line reporting that he experiences pressure and a dull pain between his shoulder blades and is concerned that it's due to taking too much K+.  The PM called the pt and verified that the pt isn't experiencing any additional symptoms.  He said this started after having his disc surgery.  He is currently taking Potassium Chloride 10 meq, 5 tabs BID.  He started this dosage ~1.5 months ago per his PCP (the MD that took over for Dr. Abbott).  Pt reports his weight is down to 222 lbs now and the dizzy spells has improved.

## 2023-01-18 ENCOUNTER — LAB (OUTPATIENT)
Dept: LAB | Facility: HOSPITAL | Age: 83
End: 2023-01-18
Payer: OTHER GOVERNMENT

## 2023-01-18 DIAGNOSIS — Z95.1 S/P CABG (CORONARY ARTERY BYPASS GRAFT): ICD-10-CM

## 2023-01-18 DIAGNOSIS — I48.0 PAROXYSMAL ATRIAL FIBRILLATION: ICD-10-CM

## 2023-01-18 DIAGNOSIS — R07.89 CHEST PAIN, ATYPICAL: ICD-10-CM

## 2023-01-18 DIAGNOSIS — I50.22 CHRONIC SYSTOLIC CONGESTIVE HEART FAILURE: ICD-10-CM

## 2023-01-18 LAB
ALBUMIN SERPL-MCNC: 3.8 G/DL (ref 3.5–5.2)
ALBUMIN/GLOB SERPL: 1.3 G/DL
ALP SERPL-CCNC: 109 U/L (ref 39–117)
ALT SERPL W P-5'-P-CCNC: 22 U/L (ref 1–41)
ANION GAP SERPL CALCULATED.3IONS-SCNC: 8.8 MMOL/L (ref 5–15)
AST SERPL-CCNC: 22 U/L (ref 1–40)
BILIRUB SERPL-MCNC: 0.2 MG/DL (ref 0–1.2)
BUN SERPL-MCNC: 16 MG/DL (ref 8–23)
BUN/CREAT SERPL: 13.4 (ref 7–25)
CALCIUM SPEC-SCNC: 9.5 MG/DL (ref 8.6–10.5)
CHLORIDE SERPL-SCNC: 97 MMOL/L (ref 98–107)
CO2 SERPL-SCNC: 30.2 MMOL/L (ref 22–29)
CREAT SERPL-MCNC: 1.19 MG/DL (ref 0.76–1.27)
EGFRCR SERPLBLD CKD-EPI 2021: 61.4 ML/MIN/1.73
GLOBULIN UR ELPH-MCNC: 2.9 GM/DL
GLUCOSE SERPL-MCNC: 85 MG/DL (ref 65–99)
POTASSIUM SERPL-SCNC: 4.5 MMOL/L (ref 3.5–5.2)
PROT SERPL-MCNC: 6.7 G/DL (ref 6–8.5)
SODIUM SERPL-SCNC: 136 MMOL/L (ref 136–145)
TROPONIN T SERPL-MCNC: <0.01 NG/ML (ref 0–0.03)

## 2023-01-18 PROCEDURE — 80053 COMPREHEN METABOLIC PANEL: CPT | Performed by: NURSE PRACTITIONER

## 2023-01-18 PROCEDURE — 84484 ASSAY OF TROPONIN QUANT: CPT | Performed by: NURSE PRACTITIONER

## 2023-01-19 ENCOUNTER — TELEPHONE (OUTPATIENT)
Dept: CARDIOLOGY | Facility: CLINIC | Age: 83
End: 2023-01-19
Payer: OTHER GOVERNMENT

## 2023-01-19 NOTE — TELEPHONE ENCOUNTER
I called patient and advised him kidney function has dramatically improved chloride was just one-point low but improved from previous.  His troponin was negative.  No significant findings. I also advised that we are faxing his labs to PCP. He will call with worsening symptoms or if need be go to nearest ER.      Jeniffer ADAIR

## 2023-01-19 NOTE — PROGRESS NOTES
Chloride one-point low at 97.  Creatinine has dramatically improved and is now normal with a GFR of 61.4.  His troponin was negative.  Keep follow-up.

## 2023-01-19 NOTE — TELEPHONE ENCOUNTER
PT came into the office to check on labs results, notified pt they were still pending that we would call pt as soon as the results came in. Thank you!

## 2023-01-19 NOTE — TELEPHONE ENCOUNTER
Kidney function has dramatically improved chloride was just one-point low but improved from previous.  His troponin was negative.  No significant findings.  Please forward these results to patient's PCP

## 2023-02-10 ENCOUNTER — OFFICE VISIT (OUTPATIENT)
Dept: CARDIOLOGY | Facility: CLINIC | Age: 83
End: 2023-02-10
Payer: OTHER GOVERNMENT

## 2023-02-10 ENCOUNTER — TELEPHONE (OUTPATIENT)
Dept: CARDIOLOGY | Facility: CLINIC | Age: 83
End: 2023-02-10
Payer: OTHER GOVERNMENT

## 2023-02-10 VITALS
OXYGEN SATURATION: 100 % | HEART RATE: 68 BPM | SYSTOLIC BLOOD PRESSURE: 97 MMHG | DIASTOLIC BLOOD PRESSURE: 66 MMHG | BODY MASS INDEX: 29.16 KG/M2 | WEIGHT: 220 LBS | HEIGHT: 73 IN

## 2023-02-10 DIAGNOSIS — K21.9 GASTROESOPHAGEAL REFLUX DISEASE WITHOUT ESOPHAGITIS: ICD-10-CM

## 2023-02-10 DIAGNOSIS — I25.10 CORONARY ARTERY DISEASE DUE TO CALCIFIED CORONARY LESION: ICD-10-CM

## 2023-02-10 DIAGNOSIS — K31.84 GASTROPARESIS: ICD-10-CM

## 2023-02-10 DIAGNOSIS — I50.22 CHRONIC SYSTOLIC CONGESTIVE HEART FAILURE: Primary | ICD-10-CM

## 2023-02-10 DIAGNOSIS — I25.84 CORONARY ARTERY DISEASE DUE TO CALCIFIED CORONARY LESION: ICD-10-CM

## 2023-02-10 DIAGNOSIS — Z95.1 S/P CABG (CORONARY ARTERY BYPASS GRAFT): ICD-10-CM

## 2023-02-10 DIAGNOSIS — I10 ESSENTIAL HYPERTENSION: ICD-10-CM

## 2023-02-10 PROCEDURE — 99214 OFFICE O/P EST MOD 30 MIN: CPT | Performed by: NURSE PRACTITIONER

## 2023-02-10 PROCEDURE — 93000 ELECTROCARDIOGRAM COMPLETE: CPT | Performed by: NURSE PRACTITIONER

## 2023-02-10 NOTE — TELEPHONE ENCOUNTER
For the HUB to read to pt:         Gali spoke with patient and he would like to go to [place] to be seen sooner within [facility] instead of waiting until August for Dr. Melendez's office in [place].     I LVM with Dr. Melendez's office to cancel the patient's appointment on 08/01/2023 @ 9:30. If Al's office calls back please make them aware of his appointment change. Thank you.

## 2023-02-10 NOTE — PROGRESS NOTES
Subjective     Nick Caraballo is a 82 y.o. male who presents to day for 3 month follow up  and Atrial Fibrillation (Patient is having stomach trouble he is supposed to have colonoscopy has been waiting for months to be scheduled. He would like to see Dr Kavon Kiran if at all possible ).    CHIEF COMPLIANT  Chief Complaint   Patient presents with   • 3 month follow up    • Atrial Fibrillation     Patient is having stomach trouble he is supposed to have colonoscopy has been waiting for months to be scheduled. He would like to see Dr Kavon Kiran if at all possible        Active Problems:  Problem List Items Addressed This Visit        Cardiac and Vasculature    Coronary artery disease due to calcified coronary lesion    Essential hypertension    S/P CABG (coronary artery bypass graft)    Chronic systolic congestive heart failure (HCC) - Primary    Overview     Added automatically from request for surgery [id number]            Gastrointestinal Abdominal     Gastroesophageal reflux disease without esophagitis    Relevant Orders    Ambulatory Referral to Gastroenterology   Other Visit Diagnoses     Gastroparesis        Relevant Orders    Ambulatory Referral to Gastroenterology      PROBLEM LIST:   1. CAD, MI in 1998  1.1 Stenting x 5 total at [facility]. Data  1.2 CABG x2 in Dec. 2014 at [facility] by Dr. Álvarez in setting of ACS, LIMA to LAD and reversed vein graft from aorta to diag. With RLE vein graft  1.4 Cath 10/19: Widely patent grafts with no progressive disease in the RCA or circumflex ejection fraction 50 to 55%, LVEDP 8 to 12 mmHg  1.5 left heart catheterization 9/20: Left main normal, LAD  occluded in the mid vessel with a patent LIMA beyond the occluded area, first diagonal has proximal 80% stenosis with a patent SVG to first D1, circumflex normal, RCA normal, EF 30 to 35% with mid anterior and apical wall akinesis and inferior wall hypokinesis  1.6 MUGA scan 12/20: EF 19%  1.7 left heart  catheterization 12/21: Left anterior descending 100% occluded after the takeoff of the major diagonal, circumflex moderate size vessel with no obstruction, RCA anatomically dominant no significant obstruction, LIMA to LAD patent, SVG to diagonal patent  2. A-Fib, s/p CABG per patient report  3. HTN  3.1 echocardiogram 10/21: EF 40 to 45% mild concentric left ventricular hypertrophy grade 2 diastolic dysfunction mild to moderate left atrial enlargement, right atrium mildly dilated, trivial MR, trivial to mild TR and AI pulmonary artery systolic pressures low 30s  4. Hyperlipidemia  5. GOPI, uses c-pap, sees Dr Jones  6. SSS  7. 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 carotid duplex 12/20: Nonobstructive carotid artery       HPI  HPI     RANJAN BUSCH is an 82-year-old male patient being followed up today for atrial fibrillation and coronary artery disease. Patient does have a history of coronary artery disease including coronary artery bypass grafting. As of 12/2021, patient had 100 percent occluded LAD circumflex with no obstructions, RCA anatomically dominant, no significant obstruction. He did have a patent LIMA to LAD and SVG to diagonal. He does have atrial fibrillation in which he is on antiplatelet therapy of aspirin and Xarelto for anticoagulation. He does have a history of heart failure with reduced ejection fraction, which he is on Jardiance and Entresto. He also has a history of chronic arterial hypertension in which his blood pressure is actually 97/66 mmHg, heart rate is 68 beats per minute today.    The patient states he has been feeling terrible. He states he cannot get his stomach regulated. He reports he is very weak while lifting things. He states he gets so tired that he cannot do anything since pulling a muscle in his back.    He notes it bothers him that they want to keep changing medications around. He reports he started back on his Ozempic and he has been doing okay with his medication.  He states he has found that managing portions has improved. He reports he was in the hospital twice with diarrhea. He states he takes Imodium. He notes he has not had a bowel movement this week. He reports he has been taking stool softeners. He states he has not been taking any pain medication unless it is Naprosyn. He reports he has not checked his blood sugar that much. He states it was running around 119 mg/dL.    He reports he is losing his balance while trying to move things. He states he does not have any strength in his hips. He reports he is trying to get a consultation with Dr. Miryam Shaver He states Dr. Miryam Shaver cannot prescribe anything because he has not treated him yet. He reports he has been prescribed hydrocodone in the past, and it seemed to help. He states he has not taken any of those for some time. He reports he is going to see Dr. Ramos next week.    He states he will start burping in the early morning hours. He notes if he lays on his left side, it does not bother him. He reports if he lays on his right side, he starts burping. He states sometimes when he goes to use the bathroom, he feels like he is going to vomit. He notes he will burp and he has food particles coming up in his neck. He states it is like hot water gathering in his mouth. He reports he has come to the conclusion it is either GERD or something else that is causing the upper stomach pain. He states he does not want anything that will act like a laxative. He reports he has tried Mylanta.    He states he only has minimal edema around his sock line. He reports [facility] His potassium was good, previously it was 3. His creatinine and kidney function actually improved.    PRIOR MEDS  Current Outpatient Medications on File Prior to Visit   Medication Sig Dispense Refill   • acetaminophen (TYLENOL) 650 MG 8 hr tablet Take 650 mg by mouth Every 8 (Eight) Hours As Needed for Mild  Pain .     • albuterol (PROVENTIL) (2.5 MG/3ML) 0.083% nebulizer solution Take 2.5 mg by nebulization Every 4 (Four) Hours As Needed for Wheezing.     • albuterol sulfate  (90 Base) MCG/ACT inhaler Inhale 2 puffs Every 4 (Four) Hours As Needed for Wheezing or Shortness of Air. 8 g 3   • allopurinol (ZYLOPRIM) 300 MG tablet Take 300 mg by mouth Daily.     • ALPRAZolam (XANAX) 0.5 MG tablet Take 0.5 mg by mouth 2 (Two) Times a Day As Needed for Anxiety.     • aspirin 81 MG EC tablet Take 1 tablet by mouth Daily. 90 tablet 3   • Cholecalciferol (VITAMIN D3) 50 MCG (2000 UT) tablet Take 2,000 Units by mouth Daily.     • empagliflozin (JARDIANCE) 25 MG tablet tablet Take 0.5 tablets by mouth Daily. 45 tablet 3   • furosemide (LASIX) 20 MG tablet Take 3 tablets by mouth 2 (Two) Times a Day. 180 tablet 0   • levothyroxine (SYNTHROID, LEVOTHROID) 25 MCG tablet Take 25 mcg by mouth Daily.     • Melatonin 3 MG capsule Take 3 mg by mouth Every Night.     • Multiple Vitamins-Minerals (ICAPS AREDS 2 PO) Take 2 tablets by mouth Daily.     • naproxen (Naprosyn) 500 MG tablet Take 1 tablet by mouth 2 (Two) Times a Day As Needed for Mild Pain . 60 tablet 1   • nitroglycerin (NITROSTAT) 0.4 MG SL tablet Place 1 tablet under the tongue Every 5 (Five) Minutes As Needed for Chest Pain. 25 tablet 3   • omeprazole (priLOSEC) 20 MG capsule Take 1 capsule by mouth Daily. 90 capsule 3   • potassium chloride (K-DUR,KLOR-CON) 10 MEQ CR tablet Take 20 mEq by mouth 3 (Three) Times a Day.     • rivaroxaban (XARELTO) 20 MG tablet Take 1 tablet by mouth Daily. 90 tablet 3   • sacubitril-valsartan (Entresto) 24-26 MG tablet Take 1 tablet by mouth 2 (Two) Times a Day. 180 tablet 3   • Semaglutide,0.25 or 0.5MG/DOS, (Ozempic, 0.25 or 0.5 MG/DOSE,) 2 MG/1.5ML solution pen-injector Inject 0.5 mg under the skin into the appropriate area as directed 1 (One) Time Per Week. 3 mL 5   • tamsulosin (FLOMAX) 0.4 MG capsule 24 hr capsule Take 1 capsule by  mouth Daily.     • tiotropium bromide-olodaterol (Stiolto Respimat) 2.5-2.5 MCG/ACT aerosol solution inhaler Inhale 1 puff Daily. 1 each 3   • venlafaxine XR (EFFEXOR-XR) 150 MG 24 hr capsule Take 1 capsule by mouth Daily. 90 capsule 3     No current facility-administered medications on file prior to visit.       ALLERGIES  Patient has no known allergies.    HISTORY  Past Medical History:   Diagnosis Date   • Acid reflux    • Anxiety    • Complete heart block (HCC)    • COPD (chronic obstructive pulmonary disease) (HCC)    • Coronary artery disease    • COVID-19 vaccine administered 2021 - Booster 10/13/2021 - Pfizer   • Dehydration    • Diabetes mellitus (HCC)    • Gout    • Hyperlipidemia    • Hypertension    • Hypothyroid    • Myocardial infarction (HCC)    • Pseudophakia    • Rheumatoid arthritis (HCC)    • Sleep apnea    • Thyroid disease        Social History     Socioeconomic History   • Marital status:    Tobacco Use   • Smoking status: Former     Packs/day: 1.00     Years: 45.00     Pack years: 45.00     Types: Cigarettes     Quit date:      Years since quittin.1   • Smokeless tobacco: Never   Substance and Sexual Activity   • Alcohol use: No   • Drug use: No   • Sexual activity: Defer       Family History   Problem Relation Age of Onset   • No Known Problems Mother    • Colon cancer Father    • Heart disease Brother        Review of Systems   Constitutional: Positive for fatigue (patient has been very tired). Negative for chills and fever.   HENT: Negative for congestion, rhinorrhea and sore throat.    Respiratory: Positive for shortness of breath (in am is gone by afternoon). Negative for chest tightness.    Cardiovascular: Positive for chest pain (just pressure ache) and leg swelling (mild sock line). Negative for palpitations.   Gastrointestinal: Positive for constipation. Negative for diarrhea and nausea.   Musculoskeletal: Positive for arthralgias and back pain.  "Negative for neck pain.   Allergic/Immunologic: Positive for environmental allergies. Negative for food allergies.   Neurological: Positive for dizziness (is after sitting and getting up ), weakness and light-headedness. Negative for syncope.   Hematological: Bruises/bleeds easily.   Psychiatric/Behavioral: Positive for sleep disturbance (sleeping to much per pt).       Objective     VITALS: BP 97/66 (BP Location: Left arm, Patient Position: Sitting, Cuff Size: Adult)   Pulse 68   Ht 185 cm (72.84\")   Wt 99.8 kg (220 lb)   SpO2 100%   BMI 29.16 kg/m²     LABS:   Lab Results (most recent)     None          IMAGING:   No Images in the past 120 days found..    EXAM:  Physical Exam  Vitals and nursing note reviewed.   Constitutional:       Appearance: He is well-developed.   HENT:      Head: Normocephalic.   Neck:      Thyroid: No thyroid mass.      Vascular: No carotid bruit or JVD.      Trachea: Trachea and phonation normal.   Cardiovascular:      Rate and Rhythm: Normal rate and regular rhythm.      Pulses:           Radial pulses are 2+ on the right side and 2+ on the left side.        Posterior tibial pulses are 2+ on the right side and 2+ on the left side.      Heart sounds: Murmur heard.     No friction rub. No gallop.   Pulmonary:      Effort: Pulmonary effort is normal. No respiratory distress.      Breath sounds: Normal breath sounds. No wheezing or rales.   Musculoskeletal:         General: Swelling (2+ ble) present. Normal range of motion.      Cervical back: Neck supple.   Skin:     General: Skin is warm and dry.      Capillary Refill: Capillary refill takes less than 2 seconds.      Findings: No rash.   Neurological:      Mental Status: He is alert and oriented to person, place, and time.   Psychiatric:         Speech: Speech normal.         Behavior: Behavior normal.         Thought Content: Thought content normal.         Judgment: Judgment normal.         Procedure     ECG 12 Lead    Date/Time: " 2/10/2023 9:38 AM  Performed by: Coleman Harman APRN  Authorized by: Coleman Harman APRN   Comparison: compared with previous ECG from 6/21/2021  Rhythm: paced  BPM: 74  QRS axis: left  Pacing: ventricular pacingComments: QTc 468 ms  No acute changes               Assessment & Plan    Diagnosis Plan   1. Chronic systolic congestive heart failure (HCC)        2. Coronary artery disease due to calcified coronary lesion        3. S/P CABG (coronary artery bypass graft)        4. Essential hypertension        5. Gastroesophageal reflux disease without esophagitis  Ambulatory Referral to Gastroenterology      6. Gastroparesis  Ambulatory Referral to Gastroenterology        1. The patient was encouraged to discontinue Ozempic for 2 to 3 weeks to see if this improves his gastric symptoms. He was also encouraged to monitor his blood glucose levels.  2.  He has been experiencing pain in his lower back, he has an upcoming appointment with Dr. Miryam Shaver. The patient was encouraged to take Extra Strength Tylenol 650 MG 2 tablets every 8 hours. He was also advised to not take more than 4,000 MG of Tylenol daily.  3. The patient was encouraged to try Milk of Magnesium or polyethylene glycol to help his symptoms of constipation until he can be seen by gastroenterology.  A referral to gastroenterology was placed today.  4. The patient was advised to discontinue Entresto and start taking valsartan 40 MG daily to see if his blood pressure increases.  He is currently hypotensive at 97/66 with a heart rate of 68.   He was advised that his dizziness and weakness could be from his blood pressure being lowered. He was advised to monitor his blood pressure.   5.  Patient does have heart failure with reduced ejection fraction in which she was previously on Entresto but due to hypotension we will have to switch him back to valsartan 40 mg daily.  He is also on Jardiance.  And Lasix for diuretic therapy.  6.  Patient does have  coronary artery disease in which she is on aspirin and Xarelto for antiplatelet and anticoagulation therapy.  He will continue these treatments without change at this time.  7.  Patient does have a history of atrial fibrillation which she is anticoagulated with Xarelto.  He denies any signs or symptoms of bleeding.  8.  Informed of signs and symptoms of ACS and advised to seek emergent treatment for any new worsening symptoms.  Patient also advised sooner follow-up as needed.  Also advised to follow-up with family doctor as needed  This note is dictated utilizing voice recognition software.  Although this record has been proof read, transcriptional errors may still be present. If questions occur regarding the content of this record please do not hesitate to call our office.  I have reviewed and confirmed the accuracy of the ROS as documented by the MA/LPN/RN DAVID Stone    Return in about 3 months (around 5/10/2023), or if symptoms worsen or fail to improve.    Diagnoses and all orders for this visit:    1. Chronic systolic congestive heart failure (HCC) (Primary)    2. Coronary artery disease due to calcified coronary lesion    3. S/P CABG (coronary artery bypass graft)    4. Essential hypertension    5. Gastroesophageal reflux disease without esophagitis  -     Ambulatory Referral to Gastroenterology    6. Gastroparesis  -     Ambulatory Referral to Gastroenterology    Other orders  -     ECG 12 Lead        Nick Caraballo  reports that he quit smoking about 25 years ago. His smoking use included cigarettes. He has a 45.00 pack-year smoking history. He has never used smokeless tobacco.. I have educated him on the risk of diseases from using tobacco products.     Advance Care Planning   ACP discussion was held with the patient during this visit. Patient has an advance directive in EMR which is still valid.             MEDS ORDERED DURING VISIT:  No orders of the defined types were placed in this  encounter.          This document has been electronically signed by DAVID Stone Jr.  February 13, 2023 00:49 EST    Transcribed from ambient dictation for DAVID Stone by Claudia Rivas, Quality .  02/10/23   11:06 EST    Patient or patient representative verbalized consent to the visit recording.  I have personally performed the services described in this document as transcribed by the above individual, and it is both accurate and complete.

## 2023-03-09 ENCOUNTER — TRANSCRIBE ORDERS (OUTPATIENT)
Dept: ADMINISTRATIVE | Facility: HOSPITAL | Age: 83
End: 2023-03-09
Payer: OTHER GOVERNMENT

## 2023-03-09 DIAGNOSIS — M51.16 INTERVERTEBRAL DISC DISORDER WITH RADICULOPATHY OF LUMBAR REGION: Primary | ICD-10-CM

## 2023-05-08 ENCOUNTER — CLINICAL SUPPORT (OUTPATIENT)
Dept: CARDIOLOGY | Facility: CLINIC | Age: 83
End: 2023-05-08
Payer: OTHER GOVERNMENT

## 2023-05-08 DIAGNOSIS — I48.0 PAROXYSMAL ATRIAL FIBRILLATION: Primary | ICD-10-CM

## 2023-05-08 DIAGNOSIS — I25.10 CORONARY ARTERY DISEASE DUE TO CALCIFIED CORONARY LESION: ICD-10-CM

## 2023-05-08 DIAGNOSIS — I25.84 CORONARY ARTERY DISEASE DUE TO CALCIFIED CORONARY LESION: ICD-10-CM

## 2023-05-08 DIAGNOSIS — R09.89 CARDIAC LV EJECTION FRACTION 10-20%: ICD-10-CM

## 2023-05-08 RX ORDER — SACUBITRIL AND VALSARTAN 24; 26 MG/1; MG/1
1 TABLET, FILM COATED ORAL 2 TIMES DAILY
Qty: 180 TABLET | Refills: 3 | Status: SHIPPED | OUTPATIENT
Start: 2023-05-08

## 2023-05-09 ENCOUNTER — TELEPHONE (OUTPATIENT)
Dept: CARDIOLOGY | Facility: CLINIC | Age: 83
End: 2023-05-09
Payer: OTHER GOVERNMENT

## 2023-05-09 NOTE — TELEPHONE ENCOUNTER
Caller: Nick Caraballo    Relationship: Self    Best call back number: [phone]    What is the best time to reach you: ANYTIME    Who are you requesting to speak with (clinical staff, provider,  specific staff member): ANYONE    What was the call regarding: PT IS WANTING TO KNOW WHAT TYPE OF CAMERA GERTRUDIS MUNOZ HAD IN HIS OFFICE. PT SAID IT WAS ALSO AT THE NURSES STATION.    Do you require a callback: YES

## 2023-05-09 NOTE — PROGRESS NOTES
Subjective   Nick Caraballo is a 82 y.o. male.   PROBLEM LIST:   1. CAD, MI in 1998   1.1 Stenting x 5 total at [facility]. Data   1.2 CABG x2 in Dec. 2014 at [facility] by Dr. Álvarez in setting of ACS, LIMA to LAD and reversed vein graft from aorta to diag. With RLE vein graft   1.4 Cath 10/19: Widely patent grafts with no progressive disease in the RCA or circumflex ejection fraction 50 to 55%, LVEDP 8 to 12 mmHg   1.5 left heart catheterization 9/20: Left main normal, LAD occluded in the mid vessel with a patent LIMA beyond the occluded area, first diagonal has proximal 80% stenosis with a patent SVG to first D1, circumflex normal, RCA normal, EF 30 to 35% with mid anterior and apical wall akinesis and inferior wall hypokinesis   1.6 MUGA scan 12/20: EF 19%   1.7 left heart catheterization 12/21: Left anterior descending 100% occluded after the takeoff of the major diagonal, circumflex moderate size vessel with no obstruction, RCA anatomically dominant no significant obstruction, LIMA to LAD patent, SVG to diagonal patent   2. A-Fib, s/p CABG per patient report   3. HTN   3.1 echocardiogram 10/21: EF 40 to 45% mild concentric left ventricular hypertrophy grade 2 diastolic dysfunction mild to moderate left atrial enlargement, right atrium mildly dilated, trivial MR, trivial to mild TR and AI pulmonary artery systolic pressures low 30s  4. Hyperlipidemia   5. GOPI, uses c-pap, sees Dr Jones   6. SSS   7. 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 carotid duplex 12/20: Nonobstructive carotid artery    History of Present Illness   Patient walked into clinic today to speak with me in regards to several medication changes which taken place.  He is switched from Entresto to valsartan.  He says that he does not feel the valsartan is done as well and would like to switch back to Entresto.  We will switch patient back to Entresto due to his heart failure with reduced ejection fraction.    He also reports  that he has been having quite a bit more fluttering.  We did do an EKG in the office today.  He is ventricularly paced which is likely indicative that he may be in atrial fibrillation underlying.    He denies any chest pain or shortness of breath and other than the recent increase in fluttering he states that he is done relatively well.  He has stopped the Ozempic due to persistent diarrhea.    EKG was performed today 5/8/2023 at 1226.  Patient was ventricularly paced at a rate of 82.  No obvious acute findings were noted.      Assessment & Plan   1.  Due to patient's request to switch back to Entresto from valsartan we will reorder his Entresto.  He will monitor his blood pressure and heart rate report any significant highs or lows.    2.  Due to patient's fluttering which has increased as of late.  And being V paced on his EKG I do think patient may be an underlying atrial fibrillation.  He is anticoagulated for this in which she denies any signs or symptoms of bleeding.  We will have him undergo ICD interrogation on the next Abbott device today.    3.  Informed of signs and symptoms of ACS and advised to seek emergent treatment for any new worsening symptoms.  Patient also advised sooner follow-up as needed.  Also advised to follow-up with family doctor as needed  This note is dictated utilizing voice recognition software.  Although this record has been proof read, transcriptional errors may still be present. If questions occur regarding the content of this record please do not hesitate to call our office.  I have reviewed and confirmed the accuracy of the ROS as documented by the MA/RASHI/RN DAVID Stone

## 2023-05-09 NOTE — TELEPHONE ENCOUNTER
[facility] needing patient to stop ASA and Xarelto for his Myelogram that is scheduled on 05/18/2023- He will need to stop them both on 05/15.    Lavell Sahni Rep

## 2023-05-10 NOTE — TELEPHONE ENCOUNTER
We will need a surgical clearance request.  He would be at a high risk but not preventable risk.  He would need to hold the aspirin for 7 days and the Xarelto for 3 days prior and restart ASAP.  I think this was for a myelogram.

## 2023-05-11 ENCOUNTER — TELEPHONE (OUTPATIENT)
Dept: CARDIOLOGY | Facility: CLINIC | Age: 83
End: 2023-05-11
Payer: OTHER GOVERNMENT

## 2023-05-11 NOTE — TELEPHONE ENCOUNTER
Received cardiac clearance request from  stating pt has myelogram scheduled for 05/18/2023 and is requiring a cardiac clearance. Placed cardiac clearance request in Jeniffer's inbox to review and address with provider.

## 2023-05-16 DIAGNOSIS — I48.0 PAROXYSMAL ATRIAL FIBRILLATION: ICD-10-CM

## 2023-05-19 ENCOUNTER — OFFICE VISIT (OUTPATIENT)
Dept: CARDIOLOGY | Facility: CLINIC | Age: 83
End: 2023-05-19
Payer: OTHER GOVERNMENT

## 2023-05-19 DIAGNOSIS — I49.5 SSS (SICK SINUS SYNDROME): ICD-10-CM

## 2023-05-31 ENCOUNTER — TELEPHONE (OUTPATIENT)
Dept: CARDIOLOGY | Facility: CLINIC | Age: 83
End: 2023-05-31

## 2023-05-31 DIAGNOSIS — E66.9 OBESITY (BMI 30-39.9): ICD-10-CM

## 2023-05-31 RX ORDER — SEMAGLUTIDE 1.34 MG/ML
0.5 INJECTION, SOLUTION SUBCUTANEOUS WEEKLY
Qty: 3 ML | Refills: 5 | Status: CANCELLED | OUTPATIENT
Start: 2023-05-31

## 2023-05-31 RX ORDER — NAPROXEN 500 MG/1
500 TABLET ORAL 2 TIMES DAILY PRN
Qty: 60 TABLET | Refills: 1 | Status: CANCELLED | OUTPATIENT
Start: 2023-05-31

## 2023-05-31 NOTE — TELEPHONE ENCOUNTER
Pt said [facility] d/c'd his Ozempic and he needs [facility] since [facility] is the one who prescribed it originally.  Pt inquired about his Naproxen as well.  The PM instructed him to call his PCP to request a script for this.

## 2023-06-01 NOTE — TELEPHONE ENCOUNTER
Per JR Harman he will not refill Ozempic due to causing the pt consistent diarrhea.  Pt will need to follow up with PCP regarding this and his request for an alternant pain medication as well.  Pt notified and verbalized an understanding.

## 2023-06-05 ENCOUNTER — OFFICE VISIT (OUTPATIENT)
Dept: CARDIOLOGY | Facility: CLINIC | Age: 83
End: 2023-06-05
Payer: OTHER GOVERNMENT

## 2023-06-05 VITALS
DIASTOLIC BLOOD PRESSURE: 77 MMHG | SYSTOLIC BLOOD PRESSURE: 127 MMHG | HEART RATE: 87 BPM | HEIGHT: 73 IN | BODY MASS INDEX: 31.54 KG/M2 | OXYGEN SATURATION: 97 % | WEIGHT: 238 LBS

## 2023-06-05 DIAGNOSIS — I48.0 PAROXYSMAL ATRIAL FIBRILLATION: ICD-10-CM

## 2023-06-05 DIAGNOSIS — I10 ESSENTIAL HYPERTENSION: ICD-10-CM

## 2023-06-05 DIAGNOSIS — I25.10 CORONARY ARTERY DISEASE DUE TO CALCIFIED CORONARY LESION: ICD-10-CM

## 2023-06-05 DIAGNOSIS — I25.84 CORONARY ARTERY DISEASE DUE TO CALCIFIED CORONARY LESION: ICD-10-CM

## 2023-06-05 DIAGNOSIS — Z95.1 S/P CABG (CORONARY ARTERY BYPASS GRAFT): Primary | ICD-10-CM

## 2023-06-05 NOTE — PROGRESS NOTES
Subjective     Nick Caraballo is a 82 y.o. male who presents to day for Follow-up (/3 MTH F/U ).    CHIEF COMPLIANT  Chief Complaint   Patient presents with    Follow-up       3 MTH F/U        Active Problems:  1. CAD, MI in 1998  1.1 Stenting x 5 total at [facility]. Data  1.2 CABG x2 in Dec. 2014 at [facility] by Dr. Álvarez in setting of ACS, LIMA to LAD and reversed vein graft from aorta to diag. With RLE vein graft  1.4 Cath 10/19: Widely patent grafts with no progressive disease in the RCA or circumflex ejection fraction 50 to 55%, LVEDP 8 to 12 mmHg  1.5 left heart catheterization 9/20: Left main normal, LAD  occluded in the mid vessel with a patent LIMA beyond the occluded area, first diagonal has proximal 80% stenosis with a patent SVG to first D1, circumflex normal, RCA normal, EF 30 to 35% with mid anterior and apical wall akinesis and inferior wall hypokinesis  1.6 MUGA scan 12/20: EF 19%  1.7 left heart catheterization 12/21: Left anterior descending 100% occluded after the takeoff of the major diagonal, circumflex moderate size vessel with no obstruction, RCA anatomically dominant no significant obstruction, LIMA to LAD patent, SVG to diagonal patent  2. A-Fib, s/p CABG per patient report  3. HTN  3.1 echocardiogram 10/21: EF 40 to 45% mild concentric left ventricular hypertrophy grade 2 diastolic dysfunction mild to moderate left atrial enlargement, right atrium mildly dilated, trivial MR, trivial to mild TR and AI pulmonary artery systolic pressures low 30s  4. Hyperlipidemia  5. GOPI, uses c-pap, sees Dr Jones  6. SSS  7. 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 carotid duplex 12/20: Nonobstructive carotid artery        Problem List Items Addressed This Visit          Cardiac and Vasculature    Coronary artery disease due to calcified coronary lesion    Essential hypertension    Atrial fibrillation    S/P CABG (coronary artery bypass graft) - Primary       HPI  HPI  Nick Caraballo is  an 82-year-old male patient being followed up today for coronary artery disease and atrial fibrillation. Patient does have coronary artery disease, which he is status post coronary artery bypass grafting in 12/2014. He has a LIMA to LAD and reverse vein graft from aorta to diagonal. Most recently, he went under left heart catheterization in 2021 that showed 100 percent LAD occluded after the takeoff of the major diagonal branch. Circumflex is moderate size with no obstruction. RCA anatomically dominant, no significant obstruction. LIMA to LAD patent. SVG to diagonal patent. Patient does have a history of atrial fibrillation, which he is anticoagulated with Xarelto. He is also on antiplatelet therapy of aspirin. He does have a history of heart failure with reduced ejection fraction in which his most recent ejection fraction identified an EF of 40 to 45 percent. He is on Jardiance and Entresto. He also has a history of chronic arterial hypertension, which he is on Entresto. Today, his blood pressure is controlled at 127/77 mmHg with a heart rate of 87 beats per minute. As of note, patient does have a St. Audi BIV ICD.    The patient states he is doing well. He states he did have labs performed recently. He states that he is getting to where he can bend over now. He reports that he has a fracture at L3. He states when he fell, he broke his neck. He notes it is getting progressively worse. He states he has no strength in his legs, and he is weak. He reports he has an appointment with Dr. Shaver on 06/08/2023. He states he has not been eating like he should. He reports Dr. Shaver had taken him off many medications. He notes that he is not sure if the Ozempic is causing him diarrhea. He states he does not think he needs Ozempic for his blood sugar. He reports he just has to learn to eat better. He states he did well until he got on the Ozempic. He reports he lost over 50 pounds without it. He notes he has  discontinued taking Naprosyn. He states his blood pressure has been running well. He states his blood sugar has been running around 100 mg/dL to 119 mg/dL. He reports he has not had any more heart fluttering since he started back taking Entresto. He states that he was prescribed methocarbamol 750 mg, but he has not received it yet. He notes he has been taking 500 mg of methocarbamol. He is a non-smoker. He reports he would like to get back to his exercise, but he hurt his leg. He states they had to cut it open and drain it, but it is healed now. He reports he has been going to wound care. He states he has fallen 4 times due to weakness in his legs. He notes that he cannot carry his groceries in the house hardly. He states he has a fear of falling. He states he was resting in his lazy boy chair about one month ago, got up out of the chair, started to take one step and fell. He reports he also fell getting up out of the chair one night. He states that he has back pain that radiates down to bilateral hips. He reports his leg swelling is not that bad right now, but he does have some red spots. He also has a wound on his right lower shin. He states he was told to keep it covered for 2 weeks and clean it with peroxide. The patient reports that he has trouble straightening up, so he stays bent down a little. He states it catches when he tries to stand up straight. He reports Dr. Shaver had mentioned going into physical therapy and acupuncture. He states he has been taking muscle relaxers along with extra strength Tylenol that is not helping. He notes he is doing well on Xarelto he just bruises easily. He denies any rectal bleeding. He reports he has not had an appointment with pulmonologist, DAVID Cruz in a while. He states that he had an appointment with her in 12/2022 but he started having episodes of diarrhea and had to have back surgery. He notes that he has gained 6 to 7 pounds.    PRIOR MEDS  Current  Outpatient Medications on File Prior to Visit   Medication Sig Dispense Refill    acetaminophen (TYLENOL) 650 MG 8 hr tablet Take 1 tablet by mouth Every 8 (Eight) Hours As Needed for Mild Pain.      albuterol (PROVENTIL) (2.5 MG/3ML) 0.083% nebulizer solution Take 2.5 mg by nebulization Every 4 (Four) Hours As Needed for Wheezing.      albuterol sulfate  (90 Base) MCG/ACT inhaler Inhale 2 puffs Every 4 (Four) Hours As Needed for Wheezing or Shortness of Air. 8 g 3    allopurinol (ZYLOPRIM) 300 MG tablet Take 1 tablet by mouth Daily.      ALPRAZolam (XANAX) 0.5 MG tablet Take 1 tablet by mouth 2 (Two) Times a Day As Needed for Anxiety.      aspirin 81 MG EC tablet Take 1 tablet by mouth Daily. 90 tablet 3    Cholecalciferol (VITAMIN D3) 50 MCG (2000 UT) tablet Take 1 tablet by mouth Daily.      empagliflozin (JARDIANCE) 25 MG tablet tablet Take 0.5 tablets by mouth Daily. 45 tablet 3    furosemide (LASIX) 20 MG tablet Take 3 tablets by mouth 2 (Two) Times a Day. 180 tablet 0    levothyroxine (SYNTHROID, LEVOTHROID) 25 MCG tablet Take 1 tablet by mouth Daily.      Melatonin 3 MG capsule Take 1 capsule by mouth Every Night.      Multiple Vitamins-Minerals (ICAPS AREDS 2 PO) Take 2 tablets by mouth Daily.      naproxen (Naprosyn) 500 MG tablet Take 1 tablet by mouth 2 (Two) Times a Day As Needed for Mild Pain . 60 tablet 1    nitroglycerin (NITROSTAT) 0.4 MG SL tablet Place 1 tablet under the tongue Every 5 (Five) Minutes As Needed for Chest Pain. 25 tablet 3    omeprazole (priLOSEC) 20 MG capsule Take 1 capsule by mouth Daily. (Patient taking differently: Take 2 capsules by mouth Daily. 2 capsules daily) 90 capsule 3    potassium chloride (K-DUR,KLOR-CON) 10 MEQ CR tablet Take 2 tablets by mouth 3 (Three) Times a Day.      rivaroxaban (XARELTO) 20 MG tablet Take 1 tablet by mouth Daily. 90 tablet 3    sacubitril-valsartan (Entresto) 24-26 MG tablet Take 1 tablet by mouth 2 (Two) Times a Day. 180 tablet 3     tamsulosin (FLOMAX) 0.4 MG capsule 24 hr capsule Take 1 capsule by mouth Daily.      tiotropium bromide-olodaterol (Stiolto Respimat) 2.5-2.5 MCG/ACT aerosol solution inhaler Inhale 1 puff Daily. 1 each 3    venlafaxine XR (EFFEXOR-XR) 150 MG 24 hr capsule Take 1 capsule by mouth Daily. 90 capsule 3     No current facility-administered medications on file prior to visit.       ALLERGIES  Patient has no known allergies.    HISTORY  Past Medical History:   Diagnosis Date    Acid reflux     Anxiety     Complete heart block     COPD (chronic obstructive pulmonary disease)     Coronary artery disease     COVID-19 vaccine administered 2021 - Booster 10/13/2021 - Pfizer    Dehydration     Diabetes mellitus     Gout     Hyperlipidemia     Hypertension     Hypothyroid     Myocardial infarction     Pseudophakia     Rheumatoid arthritis     Sleep apnea     Thyroid disease        Social History     Socioeconomic History    Marital status:    Tobacco Use    Smoking status: Former     Packs/day: 1.00     Years: 45.00     Pack years: 45.00     Types: Cigarettes     Quit date:      Years since quittin.4    Smokeless tobacco: Never   Substance and Sexual Activity    Alcohol use: No    Drug use: No    Sexual activity: Defer       Family History   Problem Relation Age of Onset    No Known Problems Mother     Colon cancer Father     Heart disease Brother        Review of Systems   Constitutional:  Positive for appetite change. Negative for chills and fever.   HENT: Negative.  Negative for drooling, facial swelling, postnasal drip, sinus pressure and trouble swallowing.    Eyes:  Negative for pain, redness, itching and visual disturbance.   Respiratory:  Negative for cough, choking, shortness of breath and wheezing.    Cardiovascular:  Positive for leg swelling. Negative for chest pain and palpitations.   Gastrointestinal:  Negative for blood in stool, constipation, diarrhea, nausea and rectal pain.  "  Endocrine: Negative for cold intolerance, heat intolerance, polydipsia and polyuria.   Musculoskeletal:  Positive for arthralgias, back pain and neck pain.   Skin:  Negative for rash and wound.   Neurological:  Positive for dizziness and weakness. Negative for numbness.   Psychiatric/Behavioral:  Negative for sleep disturbance.      Objective     VITALS: /77   Pulse 87   Ht 185 cm (72.84\")   Wt 108 kg (238 lb)   SpO2 97%   BMI 31.54 kg/m²     LABS:   Lab Results (most recent)       None            IMAGING:   No Images in the past 120 days found..    EXAM:  Physical Exam  Vitals and nursing note reviewed.   Constitutional:       Appearance: He is well-developed.   HENT:      Head: Normocephalic.   Eyes:      Pupils: Pupils are equal, round, and reactive to light.   Neck:      Thyroid: No thyroid mass.      Vascular: No carotid bruit or JVD.      Trachea: Trachea and phonation normal.   Cardiovascular:      Rate and Rhythm: Normal rate and regular rhythm.      Pulses:           Radial pulses are 2+ on the right side and 2+ on the left side.        Posterior tibial pulses are 2+ on the right side and 2+ on the left side.      Heart sounds: Murmur heard.     No friction rub. No gallop.   Pulmonary:      Effort: Pulmonary effort is normal. No respiratory distress.      Breath sounds: Normal breath sounds. No wheezing or rales.   Abdominal:      General: Bowel sounds are normal.      Palpations: Abdomen is soft.   Musculoskeletal:         General: Swelling (trace) present. Normal range of motion.      Cervical back: Neck supple.   Skin:     General: Skin is warm and dry.      Capillary Refill: Capillary refill takes less than 2 seconds.      Findings: No rash.   Neurological:      Mental Status: He is alert and oriented to person, place, and time.   Psychiatric:         Speech: Speech normal.         Behavior: Behavior normal.         Thought Content: Thought content normal.         Judgment: Judgment normal. "       Procedure   Procedures       Assessment & Plan    Diagnosis Plan   1. S/P CABG (coronary artery bypass graft)        2. Essential hypertension        3. Coronary artery disease due to calcified coronary lesion        4. Paroxysmal atrial fibrillation          PLAN  1. The patient presents today with no complaints related to his coronary artery disease, atrial fibrillation, or hypertension. He does have weakness in bilateral legs and can hardly  the standing position. He has an upcoming appointment with Dr. Shaver to discuss further treatment options.  2.  He does have a history of atrial fibrillation and in which she is on anticoagulation of Xarelto.  He denies any signs or symptoms of bleeding.  3.  Patient does have heart failure with reduced ejection fraction which she is on Jardiance and Entresto.  Due to previous hypotension beta-blocker therapy was stopped.  We will try to reintroduce in the near future.  4.  Patient does have coronary artery disease in which she is on aspirin and Xarelto.  He denies any angina anginal equivalent symptoms at this time.  5.  Informed of signs and symptoms of ACS and advised to seek emergent treatment for any new worsening symptoms.  Patient also advised sooner follow-up as needed.  Also advised to follow-up with family doctor as needed  This note is dictated utilizing voice recognition software.  Although this record has been proof read, transcriptional errors may still be present. If questions occur regarding the content of this record please do not hesitate to call our office.  I have reviewed and confirmed the accuracy of the ROS as documented by the MA/LPN/DAVID Gordon    Assessment  1. Coronary artery disease  2. Atrial fibrillation  3. Chronic arterial hypertension    Return if symptoms worsen or fail to improve, for Next scheduled follow up.    Diagnoses and all orders for this visit:    1. S/P CABG (coronary artery bypass graft)  (Primary)    2. Essential hypertension    3. Coronary artery disease due to calcified coronary lesion    4. Paroxysmal atrial fibrillation        Nick Caraballo  reports that he quit smoking about 25 years ago. His smoking use included cigarettes. He has a 45.00 pack-year smoking history. He has never used smokeless tobacco..       Advance Care Planning   ACP discussion was declined by the patient. Patient has an advance directive in EMR which is still valid.          MEDS ORDERED DURING VISIT:  No orders of the defined types were placed in this encounter.          This document has been electronically signed by DAVID Stone Jr.  June 5, 2023 13:56 EDT    Transcribed from ambient dictation for DAVID Stone by Belinda Mora.  06/05/23   15:05 EDT    Patient or patient representative verbalized consent to the visit recording.  I have personally performed the services described in this document as transcribed by the above individual, and it is both accurate and complete.

## 2023-06-26 ENCOUNTER — TELEPHONE (OUTPATIENT)
Dept: CARDIOLOGY | Facility: CLINIC | Age: 83
End: 2023-06-26

## 2023-06-26 NOTE — TELEPHONE ENCOUNTER
"Per chart review, pt saw JR on 6/5/23. Appt note stated that pt had no complaints about his HTN at that time. No medicines were changed at appt.       Called pt, he stated he was put on Vit C and another supplement, stated his BP has been elevated in the am since then. 150's/80's in the am. I asked who put him on the supplements, he stated it was [facility]. Stated that a lot of people are having issues w/ Dr. Raoms. Pt stated that he has to stay on the supplements due to Vit C being really low.   Pt stated that his only sx is \"low strength\" but thinks it's back related and having a flushed face in the am.   Px: [facility]   "

## 2023-06-26 NOTE — TELEPHONE ENCOUNTER
Per Cesar, increase pt's Entresto to 49/51 and top make sure he gets regular labs w/ [facility].       Called pt, informed him of the above, he verbalized understanding.

## 2023-06-26 NOTE — TELEPHONE ENCOUNTER
Caller: Ilya Nick    Relationship: Self    Best call back number: [phone]    What is the best time to reach you: ANY    Who are you requesting to speak with (clinical staff, provider,  specific staff member): CLINICAL    What was the call regarding: PT STATES HE IS HAVING ISSUES WITH HIGH BLOOD PRESSURE AND WHEN HE WAKES UP HIS FACE LOOKS AND FEELS LIKE HE'S BEEN IN THE SUN SLIGHTLY. PLEASE REACH OUT TO FURTHER ADVISE.      Is it okay if the provider responds through restorgenex corphart: YES

## 2023-07-19 ENCOUNTER — TELEPHONE (OUTPATIENT)
Dept: CARDIOLOGY | Facility: CLINIC | Age: 83
End: 2023-07-19

## 2023-07-19 NOTE — TELEPHONE ENCOUNTER
Caller: Nick Caraballo    Relationship: Self    Best call back number: [phone]    What is the best time to reach you: ANYTIME OR PUT ON [employer]ART    Who are you requesting to speak with (clinical staff, provider,  specific staff member): CLINICAL  Do you know the name of the person who called:     What was the call regarding: PATIENT IS CALLING WITH BLOOD PRESSURE READINGS AS DISCUSSION HAS BEEN ABOUT PUTTING HIM BACK ON THE BLOOD PRESSURE MEDICINE.  7-19-23  7:45 BEFORE MEDS 150/96 PULSE 73  10:08 AFTER MEDS 139/100 PULSE 73        Is it okay if the provider responds through MyChart: MY CHART

## 2023-09-05 ENCOUNTER — TELEPHONE (OUTPATIENT)
Dept: CARDIOLOGY | Facility: CLINIC | Age: 83
End: 2023-09-05
Payer: OTHER GOVERNMENT

## 2023-09-05 NOTE — TELEPHONE ENCOUNTER
Caller: Nick Caraballo     Relationship: SELF    Best call back number: [phone]    What is your medical concern? BP READINGS HAVE BEEN RUNNING HIGH    How long has this issue been going on? A FEW WEEKS NOW    Is your provider already aware of this issue? YES    Have you been treated for this issue? YES    BP READINGS   08.28.23 121/91  08.30.23 138/97  09.03.23 138/97  09.04.23 151/101  09.05.23 130/95

## 2023-09-06 RX ORDER — METOPROLOL SUCCINATE 25 MG/1
12.5 TABLET, EXTENDED RELEASE ORAL DAILY
Qty: 45 TABLET | Refills: 3 | Status: SHIPPED | OUTPATIENT
Start: 2023-09-06 | End: 2023-09-06 | Stop reason: SDUPTHER

## 2023-09-06 RX ORDER — METOPROLOL SUCCINATE 25 MG/1
12.5 TABLET, EXTENDED RELEASE ORAL DAILY
Qty: 5 TABLET | Refills: 0 | Status: SHIPPED | OUTPATIENT
Start: 2023-09-06

## 2023-09-06 NOTE — TELEPHONE ENCOUNTER
Patient states he is willing to start either one.  Please send me recommendations and ill call patient.

## 2023-09-11 DIAGNOSIS — I50.22 CHRONIC SYSTOLIC CONGESTIVE HEART FAILURE: ICD-10-CM

## 2023-09-11 RX ORDER — FUROSEMIDE 20 MG/1
60 TABLET ORAL 2 TIMES DAILY
Qty: 180 TABLET | Refills: 0 | Status: SHIPPED | OUTPATIENT
Start: 2023-09-11

## 2023-09-11 NOTE — TELEPHONE ENCOUNTER
Caller: Nick Caraballo    Relationship: Self    Best call back number: [phone]     Requested Prescriptions:   Requested Prescriptions     Pending Prescriptions Disp Refills    furosemide (LASIX) 20 MG tablet 180 tablet 0     Sig: Take 3 tablets by mouth 2 (Two) Times a Day.        Pharmacy where request should be sent: [facility] - [place], [place] - [address] - [phone]  - [phone]     Last office visit with prescribing clinician: 6/5/2023   Last telemedicine visit with prescribing clinician: Visit date not found   Next office visit with prescribing clinician: 11/17/2023       Does the patient have less than a 3 day supply:  [] Yes  [x] No    Would you like a call back once the refill request has been completed: [] Yes [x] No    If the office needs to give you a call back, can they leave a voicemail: [x] Yes [] No    Lavell Dillard Rep   09/11/23 09:19 EDT

## 2023-09-19 ENCOUNTER — TELEPHONE (OUTPATIENT)
Dept: CARDIOLOGY | Facility: CLINIC | Age: 83
End: 2023-09-19
Payer: OTHER GOVERNMENT

## 2023-09-19 NOTE — TELEPHONE ENCOUNTER
We can increase his Entresto to 97/103 mg.    Would really prefer him to follow-up with his PCP in regards to medications to help him sleep.  Where he is already on the Xanax I would not be able to do anything any stronger than that.

## 2023-09-19 NOTE — TELEPHONE ENCOUNTER
Caller: Nick Caraballo    Relationship: Self    Best call back number: [phone]    What is the best time to reach you: ANY    Who are you requesting to speak with (clinical staff, provider,  specific staff member): ANY      What was the call regarding: PT CALLED IN WITH TODAYS BP READINGS   09.19.23   8AM - 144/97 85 HR BEFORE MEDS  8:30 - 150/91 74 HR BEFORE MEDS  9:00 - 154/93 84 HR AFTER MEDS  9:10 128/101 79 HR AFTER MEDS  TEMP 98.7   BLOOD SUGAR 118    PT IS ALSO HAVING ISSUES SLEEPING AT NIGHT - HAS TRIED A FEW DIFFERENT MEDS BUT THEY HAVEN'T WORKED FOR HIM - HE SAID ONLY VODKA HELPS HIM SLEEP.       Is it okay if the provider responds through MyChart: NO

## 2023-09-19 NOTE — TELEPHONE ENCOUNTER
Received cardiac clearance request from  stating pt has lumbar 3 kyphoplasty to be scheduled and is requiring a cardiac clearance. Placed cardiac clearance request in Jeniffer's inbox to review and address with provider.

## 2023-09-20 DIAGNOSIS — I50.22 CHRONIC SYSTOLIC CONGESTIVE HEART FAILURE: ICD-10-CM

## 2023-09-20 RX ORDER — FUROSEMIDE 20 MG/1
60 TABLET ORAL 2 TIMES DAILY
Qty: 180 TABLET | Refills: 0 | Status: SHIPPED | OUTPATIENT
Start: 2023-09-20

## 2023-09-20 NOTE — TELEPHONE ENCOUNTER
Caller: Nick Caraballo    Relationship: Self    Best call back number: [phone]    Requested Prescriptions:   Requested Prescriptions     Pending Prescriptions Disp Refills    furosemide (LASIX) 20 MG tablet 180 tablet 0     Sig: Take 3 tablets by mouth 2 (Two) Times a Day.        Pharmacy where request should be sent: [facility] - [place] [address] - [phone]  - [phone] FX     Last office visit with prescribing clinician: 6/5/2023   Last telemedicine visit with prescribing clinician: Visit date not found   Next office visit with prescribing clinician: 11/17/2023       Does the patient have less than a 3 day supply:  [] Yes  [x] No    Would you like a call back once the refill request has been completed: [] Yes [x] No    If the office needs to give you a call back, can they leave a voicemail: [] Yes [x] No    Lavell Kang Rep   09/20/23 10:00 EDT

## 2023-09-26 ENCOUNTER — TELEPHONE (OUTPATIENT)
Dept: CARDIOLOGY | Facility: CLINIC | Age: 83
End: 2023-09-26
Payer: OTHER GOVERNMENT

## 2023-09-26 DIAGNOSIS — Z95.1 S/P CABG (CORONARY ARTERY BYPASS GRAFT): Primary | ICD-10-CM

## 2023-09-26 RX ORDER — ROSUVASTATIN CALCIUM 20 MG/1
20 TABLET, COATED ORAL DAILY
Qty: 90 TABLET | Refills: 3 | Status: SHIPPED | OUTPATIENT
Start: 2023-09-26

## 2023-09-26 RX ORDER — ROSUVASTATIN CALCIUM 20 MG/1
20 TABLET, COATED ORAL DAILY
Qty: 90 TABLET | Refills: 3 | Status: SHIPPED | OUTPATIENT
Start: 2023-09-26 | End: 2023-09-26 | Stop reason: SDUPTHER

## 2023-09-26 NOTE — TELEPHONE ENCOUNTER
Caller: Nick Caraballo    Relationship: Self    Best call back number: [phone]    What is the best time to reach you: ANYTIME    What was the call regarding: PATIENT HAD LABS DONE AT [facility] THIS MORNING AND HIS HEMOGLOBIN WAS VERY HIGH. PATIENT IS SCHEDULED TO HAVE BACK SURGERY ON FRIDAY. PATIENT IS CONCERNED THAT HIS HEMOGLOBIN IS HIGH BECAUSE OF HIS OXYGEN. PATIENT WOULD LIKE TO KNOW WHAT JR THINKS.

## 2023-09-27 NOTE — TELEPHONE ENCOUNTER
A few things can cause an elevated hemoglobin.  Low oxygen levels can cause him to have an increase in hemoglobin.  Other things that can cause the increased hemoglobin would be his heart failure.  Not only the disease process itself but however the amount of diuretics in which she is on can decrease his intravascular fluid volume making the blood more concentrated indicating a higher hemoglobin.

## 2023-09-28 ENCOUNTER — TELEPHONE (OUTPATIENT)
Dept: CARDIOLOGY | Facility: CLINIC | Age: 83
End: 2023-09-28
Payer: OTHER GOVERNMENT

## 2023-09-28 DIAGNOSIS — I50.22 CHRONIC SYSTOLIC CONGESTIVE HEART FAILURE: ICD-10-CM

## 2023-09-28 RX ORDER — FUROSEMIDE 20 MG/1
60 TABLET ORAL 2 TIMES DAILY
Qty: 180 TABLET | Refills: 0 | Status: SHIPPED | OUTPATIENT
Start: 2023-09-28 | End: 2023-10-02 | Stop reason: DRUGHIGH

## 2023-09-28 NOTE — TELEPHONE ENCOUNTER
"Patient called with questions regarding surgery that he was cleared for. He was given recommendations per clearance letter.     'Nick Caraballo is currently on the following medications that will need to be held prior to surgery: Patient may hold Xarelto 3 days prior to procedure as well as holding Aspirin 7 days . Patient will need to restart post procedure ASAP.\"    He had questions regarding surgery date and time as he has been holding his blood thinner. He was advised to call Dr Shaver's office for surgery details.   "

## 2023-09-28 NOTE — TELEPHONE ENCOUNTER
Caller: Nick Caraballo    Relationship: Self    Best call back number: [phone]    Requested Prescriptions:   Requested Prescriptions     Pending Prescriptions Disp Refills    furosemide (LASIX) 20 MG tablet 180 tablet 0     Sig: Take 3 tablets by mouth 2 (Two) Times a Day.        Pharmacy where request should be sent: [facility] - [place], [place] - [address] - [phone] [facility] [phone] FX     Last office visit with prescribing clinician: 6/5/2023   Last telemedicine visit with prescribing clinician: Visit date not found   Next office visit with prescribing clinician: 11/17/2023     Additional details provided by patient: PT NEEDS A 90 DAY SUPPLY OF MEDICATION. WRITTEN FOR THAT WITH REFILLS.     PT HAS LEFT OVER MEDICATION OF (BUMETANIDE) 2MG. HE WOULD LIKE TO KNOW IF HE CAN TAKE THIS AS A SUBSTITUTE IF HIS LASIX ISNT FILLED IN TIME.     Does the patient have less than a 3 day supply:  [x] Yes  [] No    Would you like a call back once the refill request has been completed: [] Yes [x] No    If the office needs to give you a call back, can they leave a voicemail: [] Yes [x] No    Lavell Gonzales Rep   09/28/23 11:13 EDT

## 2023-09-29 ENCOUNTER — TELEPHONE (OUTPATIENT)
Dept: CARDIOLOGY | Facility: CLINIC | Age: 83
End: 2023-09-29
Payer: OTHER GOVERNMENT

## 2023-09-29 NOTE — TELEPHONE ENCOUNTER
Caller: Ilya Nick    Relationship: Self    Best call back number: [phone]    What is the best time to reach you: ANY    Who are you requesting to speak with (clinical staff, provider,  specific staff member): CLINICAL    What was the call regarding: PT HAS BEEN TRYING TO GET IN TOUCH WITH DR. CORRALES OFFICE IN REGARDS TO GETTING HIS MOST RECENT BLOOD WORK FOR HIMSELF AND FOR US. ON 09.26.23 HE DID BLOOD WORK AND THEY MADE HIM TAKE A URINE TEST. HE IS VERY UPSET THAT HE HAD TO TAKE A URINE TEST AND WOULD LIKE TO SPEAK TO ANYONE HERE IF THAT IS A COMMON PRACTICE TO HAVE PATIENTS DO.     IF AT ALL POSSIBLE, CAN THE OFFICE REACH OUT TO PT TO DISCUSS THIS WITH HIM AND TO SEE IF WE CAN ACQUIRE THOSE LABS FROM [facility]'S OFFICE.     PT STATES THAT INFORMATION CAN BE LEFT IN HIS MYCHART THAT HE CHECKS REGULA RY

## 2023-10-02 ENCOUNTER — TELEPHONE (OUTPATIENT)
Dept: CARDIOLOGY | Facility: CLINIC | Age: 83
End: 2023-10-02
Payer: OTHER GOVERNMENT

## 2023-10-02 DIAGNOSIS — I50.20 HEART FAILURE WITH REDUCED EJECTION FRACTION: ICD-10-CM

## 2023-10-02 DIAGNOSIS — M79.89 LEG SWELLING: ICD-10-CM

## 2023-10-02 DIAGNOSIS — I50.22 CHRONIC SYSTOLIC CONGESTIVE HEART FAILURE: Primary | ICD-10-CM

## 2023-10-02 DIAGNOSIS — R06.02 SHORTNESS OF BREATH: ICD-10-CM

## 2023-10-02 RX ORDER — FUROSEMIDE 40 MG/1
TABLET ORAL
Qty: 180 TABLET | Refills: 3 | Status: SHIPPED | OUTPATIENT
Start: 2023-10-02 | End: 2023-10-02 | Stop reason: SDUPTHER

## 2023-10-02 RX ORDER — FUROSEMIDE 40 MG/1
TABLET ORAL
Qty: 360 TABLET | Refills: 3 | Status: SHIPPED | OUTPATIENT
Start: 2023-10-02

## 2023-10-02 RX ORDER — FUROSEMIDE 40 MG/1
TABLET ORAL
Qty: 360 TABLET | Refills: 3 | Status: SHIPPED | OUTPATIENT
Start: 2023-10-02 | End: 2023-10-02 | Stop reason: SDUPTHER

## 2023-10-02 NOTE — TELEPHONE ENCOUNTER
A user error has taken place: encounter opened in error, closed for administrative reasons, orders placed in error, not carried out on this patient, medication ordered in error, not dispensed to this patient, charting done on wrong patient and has been corrected.

## 2023-10-02 NOTE — TELEPHONE ENCOUNTER
Caller: Nick Caraballo    Relationship: Self    Best call back number: [phone]     What medications are you currently taking:   Current Outpatient Medications on File Prior to Visit   Medication Sig Dispense Refill    acetaminophen (TYLENOL) 650 MG 8 hr tablet Take 1 tablet by mouth Every 8 (Eight) Hours As Needed for Mild Pain.      albuterol (PROVENTIL) (2.5 MG/3ML) 0.083% nebulizer solution Take 2.5 mg by nebulization Every 4 (Four) Hours As Needed for Wheezing.      albuterol sulfate  (90 Base) MCG/ACT inhaler Inhale 2 puffs Every 4 (Four) Hours As Needed for Wheezing or Shortness of Air. 8 g 3    allopurinol (ZYLOPRIM) 300 MG tablet Take 1 tablet by mouth Daily.      ALPRAZolam (XANAX) 0.5 MG tablet Take 1 tablet by mouth 2 (Two) Times a Day As Needed for Anxiety.      aspirin 81 MG EC tablet Take 1 tablet by mouth Daily. 90 tablet 3    Cholecalciferol (VITAMIN D3) 50 MCG (2000 UT) tablet Take 1 tablet by mouth Daily.      empagliflozin (JARDIANCE) 25 MG tablet tablet Take 0.5 tablets by mouth Daily. 45 tablet 3    furosemide (LASIX) 20 MG tablet Take 3 tablets by mouth 2 (Two) Times a Day. 180 tablet 0    levothyroxine (SYNTHROID, LEVOTHROID) 25 MCG tablet Take 1 tablet by mouth Daily.      Melatonin 3 MG capsule Take 1 capsule by mouth Every Night.      metoprolol succinate XL (TOPROL-XL) 25 MG 24 hr tablet Take 0.5 tablets by mouth Daily. 5 tablet 0    Multiple Vitamins-Minerals (ICAPS AREDS 2 PO) Take 2 tablets by mouth Daily.      naproxen (Naprosyn) 500 MG tablet Take 1 tablet by mouth 2 (Two) Times a Day As Needed for Mild Pain . 60 tablet 1    nitroglycerin (NITROSTAT) 0.4 MG SL tablet Place 1 tablet under the tongue Every 5 (Five) Minutes As Needed for Chest Pain. 25 tablet 3    omeprazole (priLOSEC) 20 MG capsule Take 1 capsule by mouth Daily. (Patient taking differently: Take 2 capsules by mouth Daily. 2 capsules daily) 90 capsule 3    potassium chloride (K-DUR,KLOR-CON) 10 MEQ CR tablet  Take 2 tablets by mouth 3 (Three) Times a Day.      rivaroxaban (XARELTO) 20 MG tablet Take 1 tablet by mouth Daily. 90 tablet 3    rosuvastatin (CRESTOR) 20 MG tablet Take 1 tablet by mouth Daily. 90 tablet 3    sacubitril-valsartan (ENTRESTO)  MG tablet Take 1 tablet by mouth 2 (Two) Times a Day. 60 tablet 11    tamsulosin (FLOMAX) 0.4 MG capsule 24 hr capsule Take 1 capsule by mouth Daily.      tiotropium bromide-olodaterol (Stiolto Respimat) 2.5-2.5 MCG/ACT aerosol solution inhaler Inhale 1 puff Daily. 1 each 3    venlafaxine XR (EFFEXOR-XR) 150 MG 24 hr capsule Take 1 capsule by mouth Daily. 90 capsule 3     No current facility-administered medications on file prior to visit.       Which medication are you concerned about: PT STATES THERE WAS A MIX UP WITH HIS PRESCRIPTION THAT WAS SENT INTO THE PHARMACY. BELIEVES IT SHOULD BE 20MG 3 TIMES A DAY, RX IS NOW 80 MGS TWICE A DAY. PT IS OUT OF MEDICATION AND NEEDS THIS FIXED ASAP. DISCONNECTED THE CALL BEFORE I GOT ALL OF THE INFORMATION. UNSURE OF MEDICATION. PLEASE CALL PT BACK ASAP. (MAY HAVE BEEN FLECAINIDE)

## 2023-10-02 NOTE — TELEPHONE ENCOUNTER
[facility] called for refill of Lasix. Refill was sent for 20 mg, 3 tabs BID however he has been taking 80 mg BID. Patient is currently out and request refilled ASAP.

## 2023-10-03 ENCOUNTER — TELEPHONE (OUTPATIENT)
Dept: CARDIOLOGY | Facility: CLINIC | Age: 83
End: 2023-10-03

## 2023-10-03 NOTE — TELEPHONE ENCOUNTER
Caller: Nick Caraballo    Relationship: Self    Best call back number: [phone]    What medications are you currently taking:   Current Outpatient Medications on File Prior to Visit   Medication Sig Dispense Refill    acetaminophen (TYLENOL) 650 MG 8 hr tablet Take 1 tablet by mouth Every 8 (Eight) Hours As Needed for Mild Pain.      albuterol (PROVENTIL) (2.5 MG/3ML) 0.083% nebulizer solution Take 2.5 mg by nebulization Every 4 (Four) Hours As Needed for Wheezing.      albuterol sulfate  (90 Base) MCG/ACT inhaler Inhale 2 puffs Every 4 (Four) Hours As Needed for Wheezing or Shortness of Air. 8 g 3    allopurinol (ZYLOPRIM) 300 MG tablet Take 1 tablet by mouth Daily.      ALPRAZolam (XANAX) 0.5 MG tablet Take 1 tablet by mouth 2 (Two) Times a Day As Needed for Anxiety.      aspirin 81 MG EC tablet Take 1 tablet by mouth Daily. 90 tablet 3    Cholecalciferol (VITAMIN D3) 50 MCG (2000 UT) tablet Take 1 tablet by mouth Daily.      empagliflozin (JARDIANCE) 25 MG tablet tablet Take 0.5 tablets by mouth Daily. 45 tablet 3    furosemide (LASIX) 40 MG tablet 2 tabs po bid 360 tablet 3    levothyroxine (SYNTHROID, LEVOTHROID) 25 MCG tablet Take 1 tablet by mouth Daily.      Melatonin 3 MG capsule Take 1 capsule by mouth Every Night.      metoprolol succinate XL (TOPROL-XL) 25 MG 24 hr tablet Take 0.5 tablets by mouth Daily. 5 tablet 0    Multiple Vitamins-Minerals (ICAPS AREDS 2 PO) Take 2 tablets by mouth Daily.      naproxen (Naprosyn) 500 MG tablet Take 1 tablet by mouth 2 (Two) Times a Day As Needed for Mild Pain . 60 tablet 1    nitroglycerin (NITROSTAT) 0.4 MG SL tablet Place 1 tablet under the tongue Every 5 (Five) Minutes As Needed for Chest Pain. 25 tablet 3    omeprazole (priLOSEC) 20 MG capsule Take 1 capsule by mouth Daily. (Patient taking differently: Take 2 capsules by mouth Daily. 2 capsules daily) 90 capsule 3    potassium chloride (K-DUR,KLOR-CON) 10 MEQ CR tablet Take 2 tablets by mouth 3  (Three) Times a Day.      rivaroxaban (XARELTO) 20 MG tablet Take 1 tablet by mouth Daily. 90 tablet 3    rosuvastatin (CRESTOR) 20 MG tablet Take 1 tablet by mouth Daily. 90 tablet 3    sacubitril-valsartan (ENTRESTO)  MG tablet Take 1 tablet by mouth 2 (Two) Times a Day. 60 tablet 11    tamsulosin (FLOMAX) 0.4 MG capsule 24 hr capsule Take 1 capsule by mouth Daily.      tiotropium bromide-olodaterol (Stiolto Respimat) 2.5-2.5 MCG/ACT aerosol solution inhaler Inhale 1 puff Daily. 1 each 3    venlafaxine XR (EFFEXOR-XR) 150 MG 24 hr capsule Take 1 capsule by mouth Daily. 90 capsule 3     No current facility-administered medications on file prior to visit.          When did you start taking these medications: NEW MEDICATION CHANGE WAS YESTERDAY    Which medication are you concerned about: FUROSEMIDE 40MG TWICE A DAY    Who prescribed you this medication: GERTRUDIS MUNOZ    What are your concerns: PT WOULD LIKE TO KNOW IF HE NEEDS TO BE TAKING 80MGS A DAY INSTEAD OF 40 AND THE REASON FOR IT.     How long have you had these concerns: 1 DAY       No

## 2023-10-17 ENCOUNTER — TELEPHONE (OUTPATIENT)
Dept: CARDIOLOGY | Facility: CLINIC | Age: 83
End: 2023-10-17
Payer: OTHER GOVERNMENT

## 2023-10-17 NOTE — TELEPHONE ENCOUNTER
Patient left message requesting to speak to JR regarding his red cell count being elevated. He stated he will upload labs in his patient portal.

## 2023-10-24 ENCOUNTER — TELEPHONE (OUTPATIENT)
Dept: CARDIOLOGY | Facility: CLINIC | Age: 83
End: 2023-10-24
Payer: OTHER GOVERNMENT

## 2023-10-24 NOTE — TELEPHONE ENCOUNTER
Received cardiac clearance request from  stating pt has EGD/colonoscopy scheduled for 11/20/2023 and is requiring a cardiac clearance. Placed cardiac clearance request in Jeniffer's inbox to review and address with provider.

## 2023-11-13 ENCOUNTER — TELEPHONE (OUTPATIENT)
Dept: CARDIOLOGY | Facility: CLINIC | Age: 83
End: 2023-11-13

## 2023-11-13 NOTE — TELEPHONE ENCOUNTER
Name: Nick Caraballo    Relationship: Self    Best Callback Number: [phone]     Incoming call to the Hub, requesting to  Reschedule their Device Check AND FOLLOW UP appointment on 11.17.23.     Per Hub workflow, further review is needed before the task can be completed.    Result of Call: Please reach out to the patient to reschedule

## 2023-12-04 ENCOUNTER — TELEPHONE (OUTPATIENT)
Dept: CARDIOLOGY | Facility: CLINIC | Age: 83
End: 2023-12-04
Payer: OTHER GOVERNMENT

## 2023-12-04 RX ORDER — METOPROLOL SUCCINATE 25 MG/1
12.5 TABLET, EXTENDED RELEASE ORAL DAILY
Qty: 90 TABLET | Refills: 1 | Status: SHIPPED | OUTPATIENT
Start: 2023-12-04

## 2023-12-04 NOTE — TELEPHONE ENCOUNTER
Caller: Nick Caraballo    Relationship: Self    Best call back number: [phone]    What is the best time to reach you: ANY    Who are you requesting to speak with (clinical staff, provider,  specific staff member): CLINICAL    What was the call regarding: [facility] IN 90 DAY INCREMENTS, THE MEDICATIONS IN QUESTIONS IS; VALSARTAN 97-103MG NEEDS TO BE A 3 MONTH SUPPLY. METOPROLOL 25MG NEEDS TO 3 MONTHS AS WELL.     CAN WE SWITCH THESE TO 3 MONTHS FOR THE [facility] TO FILL FOR HIM.

## 2023-12-20 ENCOUNTER — TELEPHONE (OUTPATIENT)
Dept: CARDIOLOGY | Facility: CLINIC | Age: 83
End: 2023-12-20

## 2023-12-20 NOTE — TELEPHONE ENCOUNTER
Caller: Nick Caraballo    Relationship: Self    Best call back number: [phone]    What was the call regarding: PATIENT RETURNING MISSED CALL. NO DOCUMENTATION IN HIS CHART.

## 2024-01-02 ENCOUNTER — TELEPHONE (OUTPATIENT)
Dept: CARDIOLOGY | Facility: CLINIC | Age: 84
End: 2024-01-02
Payer: OTHER GOVERNMENT

## 2024-01-02 NOTE — TELEPHONE ENCOUNTER
Patient was last seen 9/22/2022, if he needs authorization for an appointment through the [facility], then he needs to contact the [facility] for authorization

## 2024-01-02 NOTE — TELEPHONE ENCOUNTER
Per Gali Garcia...  PT NEEDS REFILL ON XARELTO- 20MG #90- SCRIPT [facility] TO [facility] (FAX: [phone])- ask that they over night it-- He is completely out and will need at least a bottle of samples please call him today- he didn't have one to take.       Refill orders placed. Patient to be notified when samples have been signed by provider and ready for pickup.

## 2024-01-02 NOTE — TELEPHONE ENCOUNTER
Nick Caraballo came into the office today, he wants to re-est care with [facility], I made an appt for him but he will need a [facility] auth before his appt.

## 2024-01-08 NOTE — TELEPHONE ENCOUNTER
Contacted patient regarding RFS form for [facility].  Patient advised to have PA for community care through the [facility] to be sent by the provider that did original referral.

## 2024-01-18 ENCOUNTER — TELEPHONE (OUTPATIENT)
Dept: CARDIOLOGY | Facility: CLINIC | Age: 84
End: 2024-01-18
Payer: OTHER GOVERNMENT

## 2024-01-18 NOTE — TELEPHONE ENCOUNTER
Received cardiac clearance request from Dr Jones stating pt has bronch scheduled for 01/31/2024 and is requiring a cardiac clearance. Placed cardiac clearance request in Jeniffer's inbox to review and address with provider.

## 2024-02-16 ENCOUNTER — OFFICE VISIT (OUTPATIENT)
Dept: CARDIOLOGY | Facility: CLINIC | Age: 84
End: 2024-02-16
Payer: OTHER GOVERNMENT

## 2024-02-16 DIAGNOSIS — I49.5 SSS (SICK SINUS SYNDROME): Primary | ICD-10-CM

## 2024-02-21 ENCOUNTER — OFFICE VISIT (OUTPATIENT)
Dept: CARDIOLOGY | Facility: CLINIC | Age: 84
End: 2024-02-21
Payer: OTHER GOVERNMENT

## 2024-02-21 VITALS
BODY MASS INDEX: 34.7 KG/M2 | HEART RATE: 98 BPM | DIASTOLIC BLOOD PRESSURE: 79 MMHG | SYSTOLIC BLOOD PRESSURE: 115 MMHG | OXYGEN SATURATION: 98 % | WEIGHT: 261.8 LBS | HEIGHT: 73 IN

## 2024-02-21 DIAGNOSIS — I10 ESSENTIAL HYPERTENSION: ICD-10-CM

## 2024-02-21 DIAGNOSIS — R09.89 CARDIAC LV EJECTION FRACTION 10-20%: ICD-10-CM

## 2024-02-21 DIAGNOSIS — I50.22 CHRONIC SYSTOLIC CONGESTIVE HEART FAILURE: ICD-10-CM

## 2024-02-21 DIAGNOSIS — I48.0 PAROXYSMAL ATRIAL FIBRILLATION: ICD-10-CM

## 2024-02-21 DIAGNOSIS — I25.84 CORONARY ARTERY DISEASE DUE TO CALCIFIED CORONARY LESION: ICD-10-CM

## 2024-02-21 DIAGNOSIS — I25.10 CORONARY ARTERY DISEASE DUE TO CALCIFIED CORONARY LESION: ICD-10-CM

## 2024-02-21 DIAGNOSIS — Z95.1 S/P CABG (CORONARY ARTERY BYPASS GRAFT): Primary | ICD-10-CM

## 2024-02-21 DIAGNOSIS — R07.2 PRECORDIAL PAIN: ICD-10-CM

## 2024-02-21 DIAGNOSIS — R06.02 SHORTNESS OF BREATH: ICD-10-CM

## 2024-02-21 RX ORDER — MAGNESIUM OXIDE 400 MG/1
400 TABLET ORAL DAILY
COMMUNITY

## 2024-02-21 RX ORDER — MULTIVIT-MIN/IRON/FOLIC ACID/K 18-600-40
CAPSULE ORAL
COMMUNITY

## 2024-02-21 RX ORDER — L.ACID,CASEI/B.ANIMAL/S.THERMO 16B CELL
1 CAPSULE ORAL DAILY
COMMUNITY

## 2024-02-21 NOTE — PROGRESS NOTES
Subjective     Nick Caraballo is a 83 y.o. male who presents to day for 6 month follow up , Hypertension, and Hyperlipidemia.    CHIEF COMPLIANT  Chief Complaint   Patient presents with    6 month follow up     Hypertension    Hyperlipidemia       Active Problems:  Problem List Items Addressed This Visit          Cardiac and Vasculature    Precordial pain    Coronary artery disease due to calcified coronary lesion    Essential hypertension    Atrial fibrillation    S/P CABG (coronary artery bypass graft) - Primary    Cardiac LV ejection fraction 10-20%    Chronic systolic congestive heart failure    Overview     Added automatically from request for surgery [id number]            Pulmonary and Pneumonias    Shortness of breath   Active Problems:  1. CAD, MI in 1998  1.1 Stenting x 5 total at [facility] , [facility]. Data  1.2 CABG x2 in Dec. 2014 at [facility] by Dr. Álvarez in setting of ACS, LIMA to LAD and reversed vein graft from aorta to diag. With RLE vein graft  1.4 Cath 10/19: Widely patent grafts with no progressive disease in the RCA or circumflex ejection fraction 50 to 55%, LVEDP 8 to 12 mmHg  1.5 left heart catheterization 9/20: Left main normal, LAD  occluded in the mid vessel with a patent LIMA beyond the occluded area, first diagonal has proximal 80% stenosis with a patent SVG to first D1, circumflex normal, RCA normal, EF 30 to 35% with mid anterior and apical wall akinesis and inferior wall hypokinesis  1.6 MUGA scan 12/20: EF 19%  1.7 left heart catheterization 12/21: Left anterior descending 100% occluded after the takeoff of the major diagonal, circumflex moderate size vessel with no obstruction, RCA anatomically dominant no significant obstruction, LIMA to LAD patent, SVG to diagonal patent  2. A-Fib Xarelto  3. HTN  3.1 echocardiogram 10/21: EF 40 to 45% mild concentric left ventricular hypertrophy grade 2 diastolic dysfunction mild to moderate left atrial enlargement, right atrium mildly dilated,  trivial MR, trivial to mild TR and AI pulmonary artery systolic pressures low 30s  4. Hyperlipidemia  5. GOPI, uses c-pap, sees Dr Jones  6. SSS  7. 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 carotid duplex 12/20: Nonobstructive carotid artery    HPI  HPI  Mr. Nick Caraballo is an 83-year-old male patient who is being followed up today for history of coronary artery disease, atrial fibrillation, and chronic arterial hypertension.    Patient does have a history of significant coronary artery disease in which he went under bypass surgery in 2014.  Most recent left heart catheterization showed patent grafts and did not require any percutaneous coronary artery intervention in 2021.  He is on rosuvastatin for high intensity statin therapy and aspirin for antiplatelet therapy.    Patient does have atrial fibrillation in which she is on Xarelto for anticoagulation.  He denies any symptoms of bleeding.  He is also on metoprolol for rate control.    Patient does have chronic arterial hypertension which she is on Entresto and metoprolol.  Today's blood pressure is well-controlled at 115/79 heart rate of 98.    Patient does have heart failure with reduced ejection fraction which she is on guideline driven medication therapy of Entresto, metoprolol, and Jardiance.    Patient does report some constant chest tightness in which she correlates with his respiratory system.  He says he will sore to get a tightness and pressure.  He says this mainly occurs with deep breathing.  He says it feels like something squeezing at that time.  He says his weight gain of 20 pounds does not seem to be helping any.  He says that at times he will even get flushed and smother.  He correlates this more with anxiety.    Patient does report shortness of breath and which has gotten worse over the last month.  He says he has been wheezing and having a lot of phlegm production.  He is scheduled to follow-up with pulmonary tomorrow and have PFTs.  He  said he did perform a walking test and his oxygen saturation dropped to 82 to 83%.  Says his shortness of breath occurs both with activity and at rest.  He says that any activity at all causes him to be dyspneic.  He says he does have orthopnea and has to sleep in a recliner.  Patient does feel like it is more associated with his lungs due to his coughing spells he has been trying to get through a dry cough that has been persistent.    He does report intermittent palpitations where he has a heavy beat and stop type sensation that occurs in his chest.  This does not occur very often.  He says he also has an occasional flutter.  He is mentioned above that he is on Xarelto for anticoagulation and metoprolol for rate control.  He says at times he just does not feel right and says is not necessarily his typical atrial fibrillation.    Patient does have lower extremity edema which she is on Lasix.  He says that lower extremity edema is mainly isolated to his ankles.    [facility]  PRIOR MEDS  Current Outpatient Medications on File Prior to Visit   Medication Sig Dispense Refill    acetaminophen (TYLENOL) 650 MG 8 hr tablet Take 1 tablet by mouth Every 8 (Eight) Hours As Needed for Mild Pain.      albuterol (PROVENTIL) (2.5 MG/3ML) 0.083% nebulizer solution Take 2.5 mg by nebulization Every 4 (Four) Hours As Needed for Wheezing.      albuterol sulfate  (90 Base) MCG/ACT inhaler Inhale 2 puffs Every 4 (Four) Hours As Needed for Wheezing or Shortness of Air. 8 g 3    allopurinol (ZYLOPRIM) 300 MG tablet Take 1 tablet by mouth Daily.      ALPRAZolam (XANAX) 0.5 MG tablet Take 1 tablet by mouth 2 (Two) Times a Day As Needed for Anxiety.      Ascorbic Acid (Vitamin C) 500 MG capsule Take  by mouth.      aspirin 81 MG EC tablet Take 1 tablet by mouth Daily. 90 tablet 3    CBD (cannabidiol) oral oil Take  by mouth. Gummies      Cholecalciferol (VITAMIN D3) 50 MCG (2000 UT)  tablet Take 1 tablet by mouth Daily.      empagliflozin (JARDIANCE) 25 MG tablet tablet Take 0.5 tablets by mouth Daily. 45 tablet 3    furosemide (LASIX) 40 MG tablet 2 tabs po bid 360 tablet 3    levothyroxine (SYNTHROID, LEVOTHROID) 25 MCG tablet Take 1 tablet by mouth Daily.      magnesium oxide (MAG-OX) 400 MG tablet Take 1 tablet by mouth Daily.      metoprolol succinate XL (TOPROL-XL) 25 MG 24 hr tablet Take 0.5 tablets by mouth Daily. 90 tablet 1    Multiple Vitamins-Minerals (ICAPS AREDS 2 PO) Take 2 tablets by mouth Daily.      nitroglycerin (NITROSTAT) 0.4 MG SL tablet Place 1 tablet under the tongue Every 5 (Five) Minutes As Needed for Chest Pain. 25 tablet 3    omeprazole (priLOSEC) 20 MG capsule Take 1 capsule by mouth Daily. (Patient taking differently: Take 2 capsules by mouth Daily. 2 capsules daily) 90 capsule 3    potassium chloride (K-DUR,KLOR-CON) 10 MEQ CR tablet Take 2 tablets by mouth 3 (Three) Times a Day.      Probiotic Product (Risaquad-2) capsule capsule Take 1 capsule by mouth Daily.      rivaroxaban (XARELTO) 20 MG tablet Take 1 tablet by mouth Daily. 14 tablet 0    rosuvastatin (CRESTOR) 20 MG tablet Take 1 tablet by mouth Daily. 90 tablet 3    sacubitril-valsartan (ENTRESTO)  MG tablet Take 1 tablet by mouth 2 (Two) Times a Day. 180 tablet 3    tamsulosin (FLOMAX) 0.4 MG capsule 24 hr capsule Take 1 capsule by mouth Daily.      tiotropium bromide-olodaterol (Stiolto Respimat) 2.5-2.5 MCG/ACT aerosol solution inhaler Inhale 1 puff Daily. 1 each 3    venlafaxine XR (EFFEXOR-XR) 150 MG 24 hr capsule Take 1 capsule by mouth Daily. 90 capsule 3    [DISCONTINUED] Melatonin 3 MG capsule Take 1 capsule by mouth Every Night.      [DISCONTINUED] naproxen (Naprosyn) 500 MG tablet Take 1 tablet by mouth 2 (Two) Times a Day As Needed for Mild Pain . 60 tablet 1     No current facility-administered medications on file prior to visit.       ALLERGIES  Amoxicillin    HISTORY  Past Medical  History:   Diagnosis Date    Acid reflux     Anxiety     Complete heart block     COPD (chronic obstructive pulmonary disease)     Coronary artery disease     COVID-19 vaccine administered 2021 - Booster 10/13/2021 - Pfizer    Dehydration     Diabetes mellitus     Gout     Hyperlipidemia     Hypertension     Hypothyroid     Myocardial infarction     Pseudophakia     Rheumatoid arthritis     Sleep apnea     Thyroid disease        Social History     Socioeconomic History    Marital status:    Tobacco Use    Smoking status: Former     Packs/day: 1.00     Years: 45.00     Additional pack years: 0.00     Total pack years: 45.00     Types: Cigarettes     Quit date:      Years since quittin.1    Smokeless tobacco: Never   Substance and Sexual Activity    Alcohol use: No    Drug use: No    Sexual activity: Defer       Family History   Problem Relation Age of Onset    No Known Problems Mother     Colon cancer Father     Heart disease Brother        Review of Systems   Constitutional:  Positive for fatigue. Negative for chills and fever.   HENT:  Positive for congestion (COPD). Negative for rhinorrhea and sore throat.    Eyes:  Negative for visual disturbance.   Respiratory:  Positive for apnea (By PAP), chest tightness (has some discomfort at times) and shortness of breath.    Cardiovascular:  Positive for palpitations (heavy beats then stops) and leg swelling (ankles). Negative for chest pain.   Gastrointestinal:  Negative for constipation, diarrhea and nausea.   Musculoskeletal:  Positive for arthralgias (hands), back pain and neck pain.   Allergic/Immunologic: Positive for environmental allergies. Negative for food allergies.   Neurological:  Positive for dizziness (getting up), weakness and light-headedness. Negative for syncope.   Hematological:  Bruises/bleeds easily.   Psychiatric/Behavioral:  Negative for sleep disturbance.        Objective     VITALS: /79 (BP Location:  "Left arm, Patient Position: Sitting, Cuff Size: Adult)   Pulse 98   Ht 185 cm (72.84\")   Wt 119 kg (261 lb 12.8 oz)   SpO2 98%   BMI 34.70 kg/m²     LABS:   Lab Results (most recent)       None            IMAGING:   No Images in the past 120 days found..    EXAM:  Physical Exam  Musculoskeletal:      Right lower leg: Edema (1+) present.      Left lower leg: Edema (1+) present.         Procedure   Procedures       Assessment & Plan    Diagnosis Plan   1. S/P CABG (coronary artery bypass graft)        2. Coronary artery disease due to calcified coronary lesion        3. Precordial pain        4. Shortness of breath        5. Paroxysmal atrial fibrillation        6. Cardiac LV ejection fraction 10-20%        7. Essential hypertension        8. Chronic systolic congestive heart failure        1.  Patient does have a history of significant coronary artery disease with history of coronary artery bypass grafting.  He is having some occasional chest pain and shortness of breath but seems to be more associated with his respiratory system he is currently going under evaluation with PFTs and a walking test in which his oxygen sat dropped to 82 to 83%.  He is going to be following up with him tomorrow.  If the are unable to determine the causes of his symptoms would suggest a repeat ischemic workup.  2.  Patient does have atrial fibrillation with occasional palpitation.  He is anticoagulated with Xarelto and denies any signs or symptoms of bleeding and rate controlled with metoprolol.  3.  Patient does have a history of heart failure with reduced ejection fraction in which she is on Entresto, metoprolol, and Jardiance.  I do not feel like he would tolerate the spironolactone at this time and therefore we will defer till later date.  4.  Patient's blood pressure is controlled on current blood pressure medication regimen.  No medication changes are warranted at this time.  Patient advised to monitor blood pressure on a daily " basis and report any persistent highs or lows.  Set goal blood pressure for patient at 130/80 or below.  5.  Informed of signs and symptoms of ACS and advised to seek emergent treatment for any new worsening symptoms.  Patient also advised sooner follow-up as needed.  Also advised to follow-up with family doctor as needed  This note is dictated utilizing voice recognition software.  Although this record has been proof read, transcriptional errors may still be present. If questions occur regarding the content of this record please do not hesitate to call our office.  I have reviewed and confirmed the accuracy of the ROS as documented by the MA/LPN/RN DAVID Stone    No follow-ups on file.    Diagnoses and all orders for this visit:    1. S/P CABG (coronary artery bypass graft) (Primary)    2. Coronary artery disease due to calcified coronary lesion    3. Precordial pain    4. Shortness of breath    5. Paroxysmal atrial fibrillation    6. Cardiac LV ejection fraction 10-20%    7. Essential hypertension    8. Chronic systolic congestive heart failure        Nick Caraballo  reports that he quit smoking about 26 years ago. His smoking use included cigarettes. He has a 45.00 pack-year smoking history. He has never used smokeless tobacco.. I have educated him on the risk of diseases from using tobacco products.          BMI is >= 30 and <35. (Class 1 Obesity). The following options were offered after discussion;: exercise counseling/recommendations and nutrition counseling/recommendations           MEDS ORDERED DURING VISIT:  No orders of the defined types were placed in this encounter.          This document has been electronically signed by DAVID Stone Jr.  February 21, 2024 15:49 EST

## 2024-04-24 ENCOUNTER — TELEPHONE (OUTPATIENT)
Dept: CARDIOLOGY | Facility: CLINIC | Age: 84
End: 2024-04-24
Payer: OTHER GOVERNMENT

## 2024-04-24 NOTE — TELEPHONE ENCOUNTER
"Vernon called triage line regarding his defibrillator. Pt states states that pressure in chest but can not take a deep breath due to his COPD and has had a lot of coughing spells that his left side of chest in breast area is lower and fatter then the right side, swelling, feel a little \"twinge or shock\" and feels sore right at the top where the defibrillator is, having weakness in his legs but not sure if it is related to his copd, oxygen and coughing. Pt states that he will be getting portable oxygen and will be getting a CT scan ordered by Dr. Jones's office of his chest and will notify our office of the results. Pt denies any active chest pain while on the phone. Pt is concerned would like to have it looked at??? Pt is aware that I will send to DAVID Stone to address but to go to the ER in the meantime if he develops any worsening symptoms.   "

## 2024-04-25 ENCOUNTER — TELEPHONE (OUTPATIENT)
Dept: CARDIOLOGY | Facility: CLINIC | Age: 84
End: 2024-04-25
Payer: OTHER GOVERNMENT

## 2024-04-25 NOTE — TELEPHONE ENCOUNTER
Patient would like recommendations on something that would help him loose weight he can not exercise. He was using Ozempic previously. I did advise that he would need to get weight loss meds from PCP. He would like to see what you think could be helpful.

## 2024-04-25 NOTE — TELEPHONE ENCOUNTER
I called patient and advised that JR would like for him to come in and have defibrillator  checked on next pacer day. He was put on the schedule for May 3rd @ 9:30. Patient was agreeable to this. He will call back with worsening symptoms or concerns.

## 2024-04-25 NOTE — TELEPHONE ENCOUNTER
Caller: Ranjan Caraballo    Relationship: Self    Best call back number: [phone]     What is the best time to reach you: ANYTIME    Who are you requesting to speak with (clinical staff, provider,  specific staff member): CLINICAL STAFF    Do you know the name of the person who called: RANJAN CARABALLO    What was the call regarding: PATIENT STATES THAT HE IS DOING BETTER. HE BELIEVES THE ISSUES WERE CAUSED BY SLEEPING WRONG.

## 2024-04-25 NOTE — TELEPHONE ENCOUNTER
Just to be safe lets get him in on the next interrogation today to check his defibrillator.  If he still would like for us to take a look at it please have him come by as a nurse visit

## 2024-04-26 NOTE — TELEPHONE ENCOUNTER
Outside of the Ozempic and Mounjaro which I know he is tried.  He would not be a candidate for any of the other diet pills due to his cardiac history.  Definitely would have to get his opinion from his PCP.

## 2024-04-29 NOTE — TELEPHONE ENCOUNTER
I called patient back and let him know of JR recommendations as follows :    Outside of the Ozempic and Mounjaro which I know he is tried.  He would not be a candidate for any of the other diet pills due to his cardiac history.  Definitely would have to get his opinion from his PCP.

## 2024-05-03 ENCOUNTER — OFFICE VISIT (OUTPATIENT)
Dept: CARDIOLOGY | Facility: CLINIC | Age: 84
End: 2024-05-03
Payer: OTHER GOVERNMENT

## 2024-05-03 DIAGNOSIS — I49.5 SSS (SICK SINUS SYNDROME): Primary | ICD-10-CM

## 2024-06-05 ENCOUNTER — TELEPHONE (OUTPATIENT)
Dept: CARDIOLOGY | Facility: CLINIC | Age: 84
End: 2024-06-05
Payer: OTHER GOVERNMENT

## 2024-06-05 NOTE — TELEPHONE ENCOUNTER
Dr. Lai's recommendations on the pt's in-office interrogation for DOS 5/3/24.  Copy placed in MA's box to review with

## 2024-06-06 NOTE — TELEPHONE ENCOUNTER
Coleman Harman APRN- reviewed recommendation by Dr. Lai for pts in-office interrogation - patient to keep routine follow up and is on correct medication.

## 2024-07-19 ENCOUNTER — OFFICE VISIT (OUTPATIENT)
Dept: CARDIOLOGY | Facility: CLINIC | Age: 84
End: 2024-07-19
Payer: OTHER GOVERNMENT

## 2024-07-19 DIAGNOSIS — I50.22 CHRONIC SYSTOLIC CONGESTIVE HEART FAILURE: ICD-10-CM

## 2024-07-19 DIAGNOSIS — I10 ESSENTIAL HYPERTENSION: ICD-10-CM

## 2024-07-19 DIAGNOSIS — R07.2 PRECORDIAL PAIN: ICD-10-CM

## 2024-07-19 DIAGNOSIS — Z95.1 S/P CABG (CORONARY ARTERY BYPASS GRAFT): ICD-10-CM

## 2024-07-19 DIAGNOSIS — I48.0 PAROXYSMAL ATRIAL FIBRILLATION: ICD-10-CM

## 2024-07-19 DIAGNOSIS — I25.10 CORONARY ARTERY DISEASE DUE TO CALCIFIED CORONARY LESION: Primary | ICD-10-CM

## 2024-07-19 DIAGNOSIS — R06.02 SHORTNESS OF BREATH: ICD-10-CM

## 2024-07-19 DIAGNOSIS — I25.84 CORONARY ARTERY DISEASE DUE TO CALCIFIED CORONARY LESION: Primary | ICD-10-CM

## 2024-07-22 VITALS
HEIGHT: 73 IN | DIASTOLIC BLOOD PRESSURE: 72 MMHG | HEART RATE: 75 BPM | BODY MASS INDEX: 36.31 KG/M2 | SYSTOLIC BLOOD PRESSURE: 105 MMHG | WEIGHT: 274 LBS | OXYGEN SATURATION: 96 %

## 2024-07-22 RX ORDER — FEXOFENADINE HCL 60 MG/1
60 TABLET, FILM COATED ORAL DAILY
COMMUNITY

## 2024-07-22 RX ORDER — FLUTICASONE PROPIONATE 50 MCG
2 SPRAY, SUSPENSION (ML) NASAL DAILY
COMMUNITY

## 2024-07-22 RX ORDER — FAMOTIDINE 20 MG/1
20 TABLET, FILM COATED ORAL 2 TIMES DAILY
COMMUNITY

## 2024-07-22 RX ORDER — LOPERAMIDE HYDROCHLORIDE 2 MG/1
2 CAPSULE ORAL 2 TIMES DAILY PRN
COMMUNITY

## 2024-07-22 RX ORDER — LIDOCAINE 50 MG/G
1 PATCH TOPICAL EVERY 24 HOURS
COMMUNITY

## 2024-07-22 NOTE — PROGRESS NOTES
Kathryn Caraballo is a 84 y.o. male who presents to day for 8 month follow up , Atrial Fibrillation, and Hypertension.    CHIEF COMPLIANT  Chief Complaint   Patient presents with    8 month follow up     Atrial Fibrillation    Hypertension       Active Problems:  Problem List Items Addressed This Visit          Cardiac and Vasculature    Precordial pain    Relevant Orders    Adult Transthoracic Echo Complete W/ Cont if Necessary Per Protocol    Stress Test With Myocardial Perfusion One Day    Pulse Oximetry Device    Coronary artery disease due to calcified coronary lesion - Primary    Relevant Orders    Adult Transthoracic Echo Complete W/ Cont if Necessary Per Protocol    Stress Test With Myocardial Perfusion One Day    Pulse Oximetry Device    Essential hypertension    Relevant Orders    Adult Transthoracic Echo Complete W/ Cont if Necessary Per Protocol    Stress Test With Myocardial Perfusion One Day    Pulse Oximetry Device    Atrial fibrillation    Relevant Orders    Adult Transthoracic Echo Complete W/ Cont if Necessary Per Protocol    Stress Test With Myocardial Perfusion One Day    Pulse Oximetry Device    S/P CABG (coronary artery bypass graft)    Relevant Orders    Adult Transthoracic Echo Complete W/ Cont if Necessary Per Protocol    Stress Test With Myocardial Perfusion One Day    Pulse Oximetry Device    Chronic systolic congestive heart failure    Overview     Added automatically from request for surgery [id number]         Relevant Orders    Adult Transthoracic Echo Complete W/ Cont if Necessary Per Protocol    Stress Test With Myocardial Perfusion One Day    Pulse Oximetry Device       Pulmonary and Pneumonias    Shortness of breath    Relevant Orders    Adult Transthoracic Echo Complete W/ Cont if Necessary Per Protocol    Stress Test With Myocardial Perfusion One Day    Pulse Oximetry Device     Shortness of breath   Active Problems:  1. CAD, MI in 1998  1.1 Stenting x 5 total at [facility]. Data  1.2 CABG x2 in Dec. 2014 at [facility] by Dr. Álvarez in setting of ACS, LIMA to LAD and reversed vein graft from aorta to diag. With RLE vein graft  1.4 Cath 10/19: Widely patent grafts with no progressive disease in the RCA or circumflex ejection fraction 50 to 55%, LVEDP 8 to 12 mmHg  1.5 left heart catheterization 9/20: Left main normal, LAD  occluded in the mid vessel with a patent LIMA beyond the occluded area, first diagonal has proximal 80% stenosis with a patent SVG to first D1, circumflex normal, RCA normal, EF 30 to 35% with mid anterior and apical wall akinesis and inferior wall hypokinesis  1.6 MUGA scan 12/20: EF 19%  1.7 left heart catheterization 12/21: Left anterior descending 100% occluded after the takeoff of the major diagonal, circumflex moderate size vessel with no obstruction, RCA anatomically dominant no significant obstruction, LIMA to LAD patent, SVG to diagonal patent  2. A-Fib Xarelto  3. HTN  3.1 echocardiogram 10/21: EF 40 to 45% mild concentric left ventricular hypertrophy grade 2 diastolic dysfunction mild to moderate left atrial enlargement, right atrium mildly dilated, trivial MR, trivial to mild TR and AI pulmonary artery systolic pressures low 30s  4. Hyperlipidemia  5. GOPI, uses c-pap, sees Dr Jones  6. SSS  7. 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 carotid duplex 12/20: Nonobstructive carotid artery    HPI  HPI  Mr. Nick Caraballo is an 84-year-old male patient being followed up today for history of coronary artery disease, atrial fibrillation, and chronic arterial hypertension.    Patient does have a history of significant coronary artery disease in which she did have a CABG in 2014.  Most recently his left heart catheterization showed patent grafts and did not require any percutaneous coronary artery intervention in 2021.  He is on medication management of rosuvastatin for high intensity statin therapy.  He had discontinued his aspirin for  antiplatelet therapy but continues to be on anticoagulation of Xarelto.    Patient does have a history of atrial fibrillation in which he is on Xarelto for anticoagulation.  He denies any signs or symptoms of bleeding.  He does metoprolol for rate control.  Patient does continue to have palpitations that occur about 1 time daily up to 2 times a week.  He says it is not bad and it is short-lived usually he says that this is much improved from previous.    Patient does have chronic arterial hypertension in which he is treated with Entresto and metoprolol.  Today his blood pressure is well-controlled at 105/72 with a heart rate of 75.  He says that predominantly his blood pressure is about 1 15-1 20 over 80s.    Patient does have a history of heart failure with reduced ejection fraction in which hhe is onEntresto, metoprolol succinate, and Jardiance.    Patient does report chest pain which she describes as pressure that is in his mid sternum with no radiation.  He says it is more often than not at least 1 time a day and lasting a few minutes.  He says it will wake him up from sleep.  He says he feels like it is most likely GI and he feels gas going up into his chest.  He has had some weight gain.    Patient does report shortness of breath that occurs with minimal levels of activity.  He does have associated cough and does wear oxygen 2 L as needed.  He does have dyspnea at rest as well as some levels of orthopnea.  He does report that his shortness of breath does seem to be getting a little bit worse.  He does have a history of emphysema as well which is followed by Dr. Karen Christianson.    Patient does report that he does feel like he has decreased strength in his legs.  Activities such as carrying stuff seems to make it worse.  He also reports that he has decreased balance and that he staggers a lot.  He does use a cane to assist in ambulation.    We did review his labs in which he provided.  These are scanned into  his chart for further reference.  PRIOR MEDS  Current Outpatient Medications on File Prior to Visit   Medication Sig Dispense Refill    allopurinol (ZYLOPRIM) 300 MG tablet Take 1 tablet by mouth Daily.      ALPRAZolam (XANAX) 0.5 MG tablet Take 1 tablet by mouth 2 (Two) Times a Day As Needed for Anxiety.      empagliflozin (JARDIANCE) 25 MG tablet tablet Take 0.5 tablets by mouth Daily. 45 tablet 3    famotidine (PEPCID) 20 MG tablet Take 1 tablet by mouth 2 (Two) Times a Day.      fexofenadine (ALLEGRA) 60 MG tablet Take 1 tablet by mouth Daily.      fluticasone (FLONASE) 50 MCG/ACT nasal spray 2 sprays into the nostril(s) as directed by provider Daily.      furosemide (LASIX) 40 MG tablet 2 tabs po bid 360 tablet 3    guaiFENesin-Codeine (GUAIFENESIN AC PO) Take 400 mg by mouth 4 (Four) Times a Day.      levothyroxine (SYNTHROID, LEVOTHROID) 25 MCG tablet Take 1 tablet by mouth Daily.      lidocaine (LIDODERM) 5 % Place 1 patch on the skin as directed by provider Daily. Remove & Discard patch within 12 hours or as directed by MD      loperamide (IMODIUM) 2 MG capsule Take 1 capsule by mouth 2 (Two) Times a Day As Needed for Diarrhea.      metoprolol succinate XL (TOPROL-XL) 25 MG 24 hr tablet Take 0.5 tablets by mouth Daily. 90 tablet 1    Multiple Vitamins-Minerals (ICAPS AREDS 2 PO) Take 2 tablets by mouth Daily.      nitroglycerin (NITROSTAT) 0.4 MG SL tablet Place 1 tablet under the tongue Every 5 (Five) Minutes As Needed for Chest Pain. 25 tablet 3    omeprazole (priLOSEC) 20 MG capsule Take 1 capsule by mouth Daily. (Patient taking differently: Take 2 capsules by mouth Daily. 2 capsules daily) 90 capsule 3    potassium chloride (K-DUR,KLOR-CON) 10 MEQ CR tablet Take 2 tablets by mouth 3 (Three) Times a Day.      rivaroxaban (XARELTO) 20 MG tablet Take 1 tablet by mouth Daily. 14 tablet 0    rosuvastatin (CRESTOR) 20 MG tablet Take 1 tablet by mouth Daily. 90 tablet 3    sacubitril-valsartan (ENTRESTO)   MG tablet Take 1 tablet by mouth 2 (Two) Times a Day. 180 tablet 3    tamsulosin (FLOMAX) 0.4 MG capsule 24 hr capsule Take 1 capsule by mouth Daily.      [DISCONTINUED] aspirin 81 MG EC tablet Take 1 tablet by mouth Daily. 90 tablet 3    magnesium oxide (MAG-OX) 400 MG tablet Take 1 tablet by mouth Daily.      [DISCONTINUED] acetaminophen (TYLENOL) 650 MG 8 hr tablet Take 1 tablet by mouth Every 8 (Eight) Hours As Needed for Mild Pain.      [DISCONTINUED] albuterol (PROVENTIL) (2.5 MG/3ML) 0.083% nebulizer solution Take 2.5 mg by nebulization Every 4 (Four) Hours As Needed for Wheezing.      [DISCONTINUED] albuterol sulfate  (90 Base) MCG/ACT inhaler Inhale 2 puffs Every 4 (Four) Hours As Needed for Wheezing or Shortness of Air. 8 g 3    [DISCONTINUED] Ascorbic Acid (Vitamin C) 500 MG capsule Take  by mouth.      [DISCONTINUED] CBD (cannabidiol) oral oil Take  by mouth. Gummies      [DISCONTINUED] Cholecalciferol (VITAMIN D3) 50 MCG (2000 UT) tablet Take 1 tablet by mouth Daily.      [DISCONTINUED] Probiotic Product (Risaquad-2) capsule capsule Take 1 capsule by mouth Daily.      [DISCONTINUED] tiotropium bromide-olodaterol (Stiolto Respimat) 2.5-2.5 MCG/ACT aerosol solution inhaler Inhale 1 puff Daily. 1 each 3    [DISCONTINUED] venlafaxine XR (EFFEXOR-XR) 150 MG 24 hr capsule Take 1 capsule by mouth Daily. 90 capsule 3     No current facility-administered medications on file prior to visit.       ALLERGIES  Amoxicillin    HISTORY  Past Medical History:   Diagnosis Date    Acid reflux     Anxiety     Complete heart block     COPD (chronic obstructive pulmonary disease)     Coronary artery disease     COVID-19 vaccine administered 01/2021 02/2021 - Booster 10/13/2021 - Pfizer    Dehydration 2022    Diabetes mellitus     Gout     Hyperlipidemia     Hypertension     Hypothyroid     Myocardial infarction 1998    Pseudophakia     Rheumatoid arthritis     Sleep apnea     Thyroid disease        Social  "History     Socioeconomic History    Marital status:    Tobacco Use    Smoking status: Former     Current packs/day: 0.00     Average packs/day: 1 pack/day for 45.0 years (45.0 ttl pk-yrs)     Types: Cigarettes     Start date:      Quit date:      Years since quittin.5    Smokeless tobacco: Never   Substance and Sexual Activity    Alcohol use: No    Drug use: No    Sexual activity: Defer       Family History   Problem Relation Age of Onset    No Known Problems Mother     Colon cancer Father     Heart disease Brother        Review of Systems   Constitutional:  Positive for fatigue.   HENT:  Positive for congestion.    Respiratory:  Positive for chest tightness and shortness of breath.    Cardiovascular:  Positive for chest pain and leg swelling.   Musculoskeletal:  Positive for arthralgias and myalgias.   Allergic/Immunologic: Positive for environmental allergies.   Neurological:  Positive for dizziness and weakness. Negative for syncope.   Hematological:  Bruises/bleeds easily.   Psychiatric/Behavioral:  Positive for sleep disturbance.        Objective     VITALS: /72 (Patient Position: Sitting, Cuff Size: Adult)   Pulse 75   Ht 185 cm (72.84\")   Wt 124 kg (274 lb)   SpO2 96%   BMI 36.31 kg/m²     LABS:   Lab Results (most recent)       None            IMAGING:   No Images in the past 120 days found..    EXAM:  Physical Exam  Vitals and nursing note reviewed.   Constitutional:       Appearance: He is well-developed.   HENT:      Head: Normocephalic.   Neck:      Thyroid: No thyroid mass.      Vascular: No carotid bruit or JVD.      Trachea: Trachea and phonation normal.   Cardiovascular:      Rate and Rhythm: Normal rate and regular rhythm.      Pulses:           Radial pulses are 2+ on the right side and 2+ on the left side.        Posterior tibial pulses are 2+ on the right side and 2+ on the left side.      Heart sounds: Normal heart sounds. No murmur heard.     No friction rub. No " gallop.   Pulmonary:      Effort: Pulmonary effort is normal. No respiratory distress.      Breath sounds: Decreased air movement present. Decreased breath sounds and rhonchi present. No wheezing or rales.   Musculoskeletal:         General: No swelling. Normal range of motion.      Cervical back: Neck supple.      Right lower leg: Edema (1-2+ venous dermatitis) present.      Left lower leg: Edema (1-2+ venous dermatitis) present.   Skin:     General: Skin is warm and dry.      Capillary Refill: Capillary refill takes less than 2 seconds.      Findings: Erythema (Bilateral lower extremities) and lesion (Left shin approximately 10 mm with serosanguineous drainage) present. No rash.   Neurological:      Mental Status: He is alert and oriented to person, place, and time.      Gait: Gait abnormal (Cane).   Psychiatric:         Speech: Speech normal.         Behavior: Behavior normal.         Thought Content: Thought content normal.         Judgment: Judgment normal.         Procedure     ECG 12 Lead    Date/Time: 7/19/2024 11:23 AM  Performed by: Coleman Harman APRN    Authorized by: Coleman Harman APRN  Comparison: compared with previous ECG from 9/26/2023  Comparison to previous ECG: New left axis  Rhythm: paced  Rate: normal  BPM: 73  QRS axis: left  Pacing: ventricular paced rhythmComments: QTc 436 ms  No history of             Assessment & Plan    Diagnosis Plan   1. Coronary artery disease due to calcified coronary lesion  Adult Transthoracic Echo Complete W/ Cont if Necessary Per Protocol    Stress Test With Myocardial Perfusion One Day    Pulse Oximetry Device      2. S/P CABG (coronary artery bypass graft)  Adult Transthoracic Echo Complete W/ Cont if Necessary Per Protocol    Stress Test With Myocardial Perfusion One Day    Pulse Oximetry Device      3. Precordial pain  Adult Transthoracic Echo Complete W/ Cont if Necessary Per Protocol    Stress Test With Myocardial Perfusion One Day    Pulse Oximetry  Device      4. Shortness of breath  Adult Transthoracic Echo Complete W/ Cont if Necessary Per Protocol    Stress Test With Myocardial Perfusion One Day    Pulse Oximetry Device      5. Paroxysmal atrial fibrillation  Adult Transthoracic Echo Complete W/ Cont if Necessary Per Protocol    Stress Test With Myocardial Perfusion One Day    Pulse Oximetry Device      6. Essential hypertension  Adult Transthoracic Echo Complete W/ Cont if Necessary Per Protocol    Stress Test With Myocardial Perfusion One Day    Pulse Oximetry Device      7. Chronic systolic congestive heart failure  Adult Transthoracic Echo Complete W/ Cont if Necessary Per Protocol    Stress Test With Myocardial Perfusion One Day    Pulse Oximetry Device      1.  Patient does have a significant history of coronary artery disease including coronary artery bypass grafting.  He does have chest pain shortness of breath that seems to be worsening therefore I would like to get a repeat ischemic workup including stress test and echocardiogram.  He is not a candidate for the treadmill.  2.  Patient's blood pressure is controlled on current blood pressure medication regimen.  No medication changes are warranted at this time.  Patient advised to monitor blood pressure on a daily basis and report any persistent highs or lows.  Set goal blood pressure for patient at 130/80 or below.  3.  Patient's palpitations have improved with his atrial fibrillation.  He denies any signs or symptoms of bleeding on the Xarelto.  We will continue it without change.  4.  Patient does have heart failure with reduced ejection fraction which by most recent echocardiogram was 40 to 45% with grade 2 diastolic dysfunction.  He will continue his current medications without change.  5.  Due to patient's cellulitic looking lower extremities especially with an open wound I would like to put him on doxycycline twice daily for 7 days.  6.  Informed of signs and symptoms of ACS and advised to  seek emergent treatment for any new worsening symptoms.  Patient also advised sooner follow-up as needed.  Also advised to follow-up with family doctor as needed  This note is dictated utilizing voice recognition software.  Although this record has been proof read, transcriptional errors may still be present. If questions occur regarding the content of this record please do not hesitate to call our office.  I have reviewed and confirmed the accuracy of the ROS as documented by the MA/LPN/RN DAVID Stone    No follow-ups on file.    Diagnoses and all orders for this visit:    1. Coronary artery disease due to calcified coronary lesion (Primary)  -     Adult Transthoracic Echo Complete W/ Cont if Necessary Per Protocol; Future  -     Stress Test With Myocardial Perfusion One Day; Future  -     Pulse Oximetry Device    2. S/P CABG (coronary artery bypass graft)  -     Adult Transthoracic Echo Complete W/ Cont if Necessary Per Protocol; Future  -     Stress Test With Myocardial Perfusion One Day; Future  -     Pulse Oximetry Device    3. Precordial pain  -     Adult Transthoracic Echo Complete W/ Cont if Necessary Per Protocol; Future  -     Stress Test With Myocardial Perfusion One Day; Future  -     Pulse Oximetry Device    4. Shortness of breath  -     Adult Transthoracic Echo Complete W/ Cont if Necessary Per Protocol; Future  -     Stress Test With Myocardial Perfusion One Day; Future  -     Pulse Oximetry Device    5. Paroxysmal atrial fibrillation  -     Adult Transthoracic Echo Complete W/ Cont if Necessary Per Protocol; Future  -     Stress Test With Myocardial Perfusion One Day; Future  -     Pulse Oximetry Device    6. Essential hypertension  -     Adult Transthoracic Echo Complete W/ Cont if Necessary Per Protocol; Future  -     Stress Test With Myocardial Perfusion One Day; Future  -     Pulse Oximetry Device    7. Chronic systolic congestive heart failure  -     Adult Transthoracic Echo Complete W/  Cont if Necessary Per Protocol; Future  -     Stress Test With Myocardial Perfusion One Day; Future  -     Pulse Oximetry Device        Nick Caraballo  reports that he quit smoking about 26 years ago. His smoking use included cigarettes. He started smoking about 71 years ago. He has a 45 pack-year smoking history. He has never used smokeless tobacco. I have educated him on the risk of diseases from using tobacco products. Patient does not smoke.    Advance Care Planning   ACP discussion was held with the patient during this visit. Patient has an advance directive in EMR which is still valid.                                MEDS ORDERED DURING VISIT:  No orders of the defined types were placed in this encounter.          This document has been electronically signed by Coleman Harman Jr., DAVID  July 25, 2024 08:12 EDT

## 2024-08-22 DIAGNOSIS — I50.22 CHRONIC SYSTOLIC CONGESTIVE HEART FAILURE: ICD-10-CM

## 2024-08-22 DIAGNOSIS — R06.02 SHORTNESS OF BREATH: ICD-10-CM

## 2024-08-22 DIAGNOSIS — I50.20 HEART FAILURE WITH REDUCED EJECTION FRACTION: ICD-10-CM

## 2024-08-22 DIAGNOSIS — M79.89 LEG SWELLING: ICD-10-CM

## 2024-08-22 RX ORDER — FUROSEMIDE 40 MG/1
TABLET ORAL
Qty: 360 TABLET | Refills: 3 | Status: SHIPPED | OUTPATIENT
Start: 2024-08-22

## 2024-08-22 RX ORDER — METOPROLOL SUCCINATE 25 MG/1
12.5 TABLET, EXTENDED RELEASE ORAL DAILY
Qty: 90 TABLET | Refills: 1 | Status: SHIPPED | OUTPATIENT
Start: 2024-08-22

## 2024-08-27 DIAGNOSIS — R06.02 SHORTNESS OF BREATH: ICD-10-CM

## 2024-08-27 DIAGNOSIS — M79.89 LEG SWELLING: ICD-10-CM

## 2024-08-27 DIAGNOSIS — I50.22 CHRONIC SYSTOLIC CONGESTIVE HEART FAILURE: ICD-10-CM

## 2024-08-27 DIAGNOSIS — I50.20 HEART FAILURE WITH REDUCED EJECTION FRACTION: ICD-10-CM

## 2024-08-27 RX ORDER — FUROSEMIDE 40 MG
TABLET ORAL
Qty: 360 TABLET | Refills: 1 | Status: SHIPPED | OUTPATIENT
Start: 2024-08-27 | End: 2024-08-29 | Stop reason: DRUGHIGH

## 2024-08-27 RX ORDER — METOPROLOL SUCCINATE 25 MG/1
12.5 TABLET, EXTENDED RELEASE ORAL DAILY
Qty: 45 TABLET | Refills: 1 | Status: SHIPPED | OUTPATIENT
Start: 2024-08-27

## 2024-08-27 NOTE — TELEPHONE ENCOUNTER
Caller: RANJAN BUSCH    Relationship: SELF    Best call back number: [phone]    Requested Prescriptions:   Requested Prescriptions     Pending Prescriptions Disp Refills    metoprolol succinate XL (TOPROL-XL) 25 MG 24 hr tablet 90 tablet 1     Sig: Take 0.5 tablets by mouth Daily.    furosemide (LASIX) 40 MG tablet 360 tablet 3     Si tabs po bid        Pharmacy where request should be sent: [facility] - [place] [address] [address]  - [phone] [facility] [phone] FX     Last office visit with prescribing clinician: 2024   Last telemedicine visit with prescribing clinician: Visit date not found   Next office visit with prescribing clinician: 10/18/2024     Additional details provided by patient: [facility]CRIPT SENT TO THEM AS WELL, PT HAS BEEN TAKING 80MG TWICE DAILY OF THE LASIX AND NEEDS THAT TO REFLECT THAT. AS WELL, THIS NEEDS TO BE SENT AS A DELIVERY FOR THEM TO SEND IT TO HIM     METOPROLOL ORDER NUMBER WAS [id number]  LASIX ORDER NUMBER WAS [id number]    HE IS ALSO REQUESTING, IF JR AND DR. SWANN WOULD BE ABLE TO SIT DOWN AND GO THROUGH HIS MEDICATION AND POSSIBLY FIND AND DETERMINE SOME MEDICATIONS HE DOESN'T NEED TO TAKE ANYMORE. PT STATES HE TAKED 15 OR MORE PILLS A DAY AND REALLY WONDERS IF THE NEED FOR ALL 15 ARE THERE. HE WANTS TO FOCUS ON THE ABSOLUTE MAIN MEDS THAT HE NEEDS TO SURVIVE.     Does the patient have less than a 3 day supply:  [] Yes  [x] No    Would you like a call back once the refill request has been completed: [] Yes [x] No    If the office needs to give you a call back, can they leave a voicemail: [] Yes [x] No    Lavell Gonzales Rep   24 10:15 EDT

## 2024-08-28 ENCOUNTER — TELEPHONE (OUTPATIENT)
Dept: CARDIOLOGY | Facility: CLINIC | Age: 84
End: 2024-08-28
Payer: OTHER GOVERNMENT

## 2024-08-28 NOTE — TELEPHONE ENCOUNTER
Pt called the triage line reporting that his Lasix prescription was sent in incorrectly.  Per the pt he has been taking Lasix 80 mg (1 tab) BID for almost a year and our office sent in Lasix 40 mg (2 tabs) BID and [facility] not [facility] until they receive the correct prescription.

## 2024-08-28 NOTE — TELEPHONE ENCOUNTER
Caller: Ilya Nick    Relationship: Self    Best call back number: [phone] (UNSURE IF BEST NUMBER, PT CALLED FROM THIS NUMBER AND DISCONNECTED CALL)    What was the call regarding: PT CHECKING ON THE STATUS OF THE REFILLS FROM YESTERDAY, ASKED WHAT furosemide (LASIX) WAS REFILLED, TOLD HIM THIS WAS THE 40MG. PT STATES THIS IS INCORRECT, WANTS THIS FIXED TODAY. PT DISCONNECTED THE CALL. NO MORE INFO GOTTEN. PLEASE ADVISE.

## 2024-08-29 RX ORDER — FUROSEMIDE 80 MG
80 TABLET ORAL 2 TIMES DAILY
Qty: 180 TABLET | Refills: 11 | Status: SHIPPED | OUTPATIENT
Start: 2024-08-29

## 2024-08-29 RX ORDER — FUROSEMIDE 80 MG
80 TABLET ORAL 2 TIMES DAILY
COMMUNITY
End: 2024-08-29 | Stop reason: SDUPTHER

## 2024-09-26 ENCOUNTER — HOSPITAL ENCOUNTER (OUTPATIENT)
Dept: CARDIOLOGY | Facility: HOSPITAL | Age: 84
Discharge: HOME | End: 2024-09-26
Payer: OTHER GOVERNMENT

## 2024-09-26 DIAGNOSIS — I10 ESSENTIAL HYPERTENSION: ICD-10-CM

## 2024-09-26 DIAGNOSIS — Z95.1 S/P CABG (CORONARY ARTERY BYPASS GRAFT): ICD-10-CM

## 2024-09-26 DIAGNOSIS — R07.2 PRECORDIAL PAIN: ICD-10-CM

## 2024-09-26 DIAGNOSIS — R06.02 SHORTNESS OF BREATH: ICD-10-CM

## 2024-09-26 DIAGNOSIS — I48.0 PAROXYSMAL ATRIAL FIBRILLATION: ICD-10-CM

## 2024-09-26 DIAGNOSIS — I25.84 CORONARY ARTERY DISEASE DUE TO CALCIFIED CORONARY LESION: ICD-10-CM

## 2024-09-26 DIAGNOSIS — I50.22 CHRONIC SYSTOLIC CONGESTIVE HEART FAILURE: ICD-10-CM

## 2024-09-26 DIAGNOSIS — I25.10 CORONARY ARTERY DISEASE DUE TO CALCIFIED CORONARY LESION: ICD-10-CM

## 2024-09-26 PROCEDURE — 93017 CV STRESS TEST TRACING ONLY: CPT

## 2024-09-26 PROCEDURE — 93306 TTE W/DOPPLER COMPLETE: CPT

## 2024-09-26 PROCEDURE — 0 TECHNETIUM SESTAMIBI: Performed by: INTERNAL MEDICINE

## 2024-09-26 PROCEDURE — A9500 TC99M SESTAMIBI: HCPCS | Performed by: INTERNAL MEDICINE

## 2024-09-26 PROCEDURE — 25010000002 REGADENOSON 0.4 MG/5ML SOLUTION: Performed by: INTERNAL MEDICINE

## 2024-09-26 PROCEDURE — 78452 HT MUSCLE IMAGE SPECT MULT: CPT

## 2024-09-26 RX ORDER — REGADENOSON 0.08 MG/ML
0.4 INJECTION, SOLUTION INTRAVENOUS
Status: COMPLETED | OUTPATIENT
Start: 2024-09-26 | End: 2024-09-26

## 2024-09-26 RX ADMIN — TECHNETIUM TC 99M SESTAMIBI 1 DOSE: 1 INJECTION INTRAVENOUS at 12:52

## 2024-09-26 RX ADMIN — REGADENOSON 0.4 MG: 0.08 INJECTION, SOLUTION INTRAVENOUS at 12:52

## 2024-09-26 RX ADMIN — TECHNETIUM TC 99M SESTAMIBI 1 DOSE: 1 INJECTION INTRAVENOUS at 11:49

## 2024-09-27 LAB
BH CV ECHO MEAS - ACS: 1.5 CM
BH CV ECHO MEAS - AO MAX PG: 5.6 MMHG
BH CV ECHO MEAS - AO MEAN PG: 3.2 MMHG
BH CV ECHO MEAS - AO ROOT DIAM: 3 CM
BH CV ECHO MEAS - AO V2 MAX: 118.1 CM/SEC
BH CV ECHO MEAS - AO V2 VTI: 23.6 CM
BH CV ECHO MEAS - AVA(I,D): 2.32 CM2
BH CV ECHO MEAS - EDV(CUBED): 154.9 ML
BH CV ECHO MEAS - EDV(MOD-SP4): 154 ML
BH CV ECHO MEAS - EF(MOD-SP4): 51.5 %
BH CV ECHO MEAS - EF_3D-VOL: 41 %
BH CV ECHO MEAS - ESV(CUBED): 40 ML
BH CV ECHO MEAS - ESV(MOD-SP4): 74.7 ML
BH CV ECHO MEAS - FS: 36.3 %
BH CV ECHO MEAS - IVS/LVPW: 1 CM
BH CV ECHO MEAS - IVSD: 1.4 CM
BH CV ECHO MEAS - LA DIMENSION: 4.9 CM
BH CV ECHO MEAS - LAT PEAK E' VEL: 11.5 CM/SEC
BH CV ECHO MEAS - LV DIASTOLIC VOL/BSA (35-75): 63.2 CM2
BH CV ECHO MEAS - LV MASS(C)D: 325.5 GRAMS
BH CV ECHO MEAS - LV MAX PG: 1.88 MMHG
BH CV ECHO MEAS - LV MEAN PG: 1.12 MMHG
BH CV ECHO MEAS - LV SYSTOLIC VOL/BSA (12-30): 30.7 CM2
BH CV ECHO MEAS - LV V1 MAX: 68.6 CM/SEC
BH CV ECHO MEAS - LV V1 VTI: 13.9 CM
BH CV ECHO MEAS - LVIDD: 5.4 CM
BH CV ECHO MEAS - LVIDS: 3.4 CM
BH CV ECHO MEAS - LVOT AREA: 3.9 CM2
BH CV ECHO MEAS - LVOT DIAM: 2.24 CM
BH CV ECHO MEAS - LVPWD: 1.4 CM
BH CV ECHO MEAS - MED PEAK E' VEL: 7.5 CM/SEC
BH CV ECHO MEAS - MV DEC TIME: 0.15 SEC
BH CV ECHO MEAS - MV E MAX VEL: 82.3 CM/SEC
BH CV ECHO MEAS - RAP SYSTOLE: 10 MMHG
BH CV ECHO MEAS - RVDD: 4.3 CM
BH CV ECHO MEAS - RVSP: 28.2 MMHG
BH CV ECHO MEAS - SV(LVOT): 54.7 ML
BH CV ECHO MEAS - SV(MOD-SP4): 79.3 ML
BH CV ECHO MEAS - SVI(LVOT): 22.5 ML/M2
BH CV ECHO MEAS - SVI(MOD-SP4): 32.6 ML/M2
BH CV ECHO MEAS - TR MAX PG: 18.2 MMHG
BH CV ECHO MEAS - TR MAX VEL: 213.1 CM/SEC
BH CV ECHO MEASUREMENTS AVERAGE E/E' RATIO: 8.66
BH CV REST NUCLEAR ISOTOPE DOSE: 10 MCI
BH CV STRESS COMMENTS STAGE 1: NORMAL
BH CV STRESS DOSE REGADENOSON STAGE 1: 0.4
BH CV STRESS DURATION MIN STAGE 1: 0
BH CV STRESS DURATION SEC STAGE 1: 10
BH CV STRESS NUCLEAR ISOTOPE DOSE: 30 MCI
BH CV STRESS PROTOCOL 1: NORMAL
BH CV STRESS RECOVERY BP: NORMAL MMHG
BH CV STRESS RECOVERY HR: 75 BPM
BH CV STRESS STAGE 1: 1
LEFT ATRIUM VOLUME INDEX: 32.8 ML/M2
LV EF NUC BP: 48 %
MAXIMAL PREDICTED HEART RATE: 137 BPM
PERCENT MAX PREDICTED HR: 56.93 %
STRESS BASELINE BP: NORMAL MMHG
STRESS BASELINE HR: 74 BPM
STRESS PERCENT HR: 67 %
STRESS POST PEAK BP: NORMAL MMHG
STRESS POST PEAK HR: 78 BPM
STRESS TARGET HR: 116 BPM

## 2024-09-30 ENCOUNTER — TELEPHONE (OUTPATIENT)
Dept: CARDIOLOGY | Facility: CLINIC | Age: 84
End: 2024-09-30
Payer: OTHER GOVERNMENT

## 2024-09-30 NOTE — TELEPHONE ENCOUNTER
I called patient and went over Echo report as follows:    EF 46 to 50%, grade 3 diastolic dysfunction, will discuss further on follow-up on 10/18/2024

## 2024-10-01 ENCOUNTER — TELEPHONE (OUTPATIENT)
Dept: CARDIOLOGY | Facility: CLINIC | Age: 84
End: 2024-10-01
Payer: OTHER GOVERNMENT

## 2024-10-01 NOTE — TELEPHONE ENCOUNTER
RELAY    I called patient and left a message on voicemail with stress test results as follows:    Positive stress test.  Will discuss further on follow-up 10/18/2024

## 2024-10-02 ENCOUNTER — TELEPHONE (OUTPATIENT)
Dept: CARDIOLOGY | Facility: CLINIC | Age: 84
End: 2024-10-02
Payer: OTHER GOVERNMENT

## 2024-10-02 NOTE — TELEPHONE ENCOUNTER
Pt called the triage line requesting a refill for his Xarelto 90 day supply [facility] due to him running out prior to his next appt.

## 2024-10-07 NOTE — TELEPHONE ENCOUNTER
Caller: Nick Caraballo    Relationship: Self    Best call back number: [phone]    Requested Prescriptions:   Requested Prescriptions     Pending Prescriptions Disp Refills    sacubitril-valsartan (ENTRESTO)  MG tablet 180 tablet 3     Sig: Take 1 tablet by mouth 2 (Two) Times a Day.        Pharmacy where request should be sent: [facility] - [address] [phone] [facility] [phone] FX     Last office visit with prescribing clinician: 7/19/2024   Last telemedicine visit with prescribing clinician: Visit date not found   Next office visit with prescribing clinician: 10/18/2024       Does the patient have less than a 3 day supply:  [x] Yes  [] No    Would you like a call back once the refill request has been completed: [] Yes [x] No    If the office needs to give you a call back, can they leave a voicemail: [x] Yes [] No    Lavell Sherwood Rep   10/07/24 10:11 EDT         DELETE AFTER READING TO PATIENT: “Thank you for sharing this information with me. I will send a message to the clinical team. Please allow 48 hours for the clinical staff to follow up on this request.”

## 2024-10-18 ENCOUNTER — OFFICE VISIT (OUTPATIENT)
Dept: CARDIOLOGY | Facility: CLINIC | Age: 84
End: 2024-10-18
Payer: OTHER GOVERNMENT

## 2024-10-18 VITALS
OXYGEN SATURATION: 98 % | BODY MASS INDEX: 35.97 KG/M2 | WEIGHT: 271.4 LBS | HEART RATE: 72 BPM | SYSTOLIC BLOOD PRESSURE: 110 MMHG | DIASTOLIC BLOOD PRESSURE: 60 MMHG | HEIGHT: 73 IN

## 2024-10-18 DIAGNOSIS — I49.5 SSS (SICK SINUS SYNDROME): Primary | ICD-10-CM

## 2024-10-18 DIAGNOSIS — Z95.1 S/P CABG (CORONARY ARTERY BYPASS GRAFT): Primary | ICD-10-CM

## 2024-10-18 DIAGNOSIS — I48.0 PAROXYSMAL ATRIAL FIBRILLATION: ICD-10-CM

## 2024-10-18 DIAGNOSIS — I10 ESSENTIAL HYPERTENSION: ICD-10-CM

## 2024-10-18 DIAGNOSIS — I50.22 CHRONIC SYSTOLIC CONGESTIVE HEART FAILURE: ICD-10-CM

## 2024-10-18 DIAGNOSIS — I25.10 CORONARY ARTERY DISEASE DUE TO CALCIFIED CORONARY LESION: ICD-10-CM

## 2024-10-18 DIAGNOSIS — M79.89 LEG SWELLING: ICD-10-CM

## 2024-10-18 DIAGNOSIS — I73.9 CLAUDICATION: ICD-10-CM

## 2024-10-18 DIAGNOSIS — I25.84 CORONARY ARTERY DISEASE DUE TO CALCIFIED CORONARY LESION: ICD-10-CM

## 2024-10-18 DIAGNOSIS — R07.2 PRECORDIAL PAIN: ICD-10-CM

## 2024-10-18 DIAGNOSIS — R09.89 DECREASED DORSALIS PEDIS PULSE: ICD-10-CM

## 2024-10-18 PROCEDURE — 93284 PRGRMG EVAL IMPLANTABLE DFB: CPT | Performed by: INTERNAL MEDICINE

## 2024-10-18 RX ORDER — PHENOL 1.4 %
600 AEROSOL, SPRAY (ML) MUCOUS MEMBRANE DAILY
COMMUNITY

## 2024-10-18 RX ORDER — VENLAFAXINE HYDROCHLORIDE 150 MG/1
150 CAPSULE, EXTENDED RELEASE ORAL DAILY
COMMUNITY

## 2024-10-18 NOTE — PROGRESS NOTES
Subjective     Nick Caraballo is a 84 y.o. male who presents to day for No chief complaint on file..    CHIEF COMPLIANT  No chief complaint on file.      Active Problems:  Problem List Items Addressed This Visit          Cardiac and Vasculature    Precordial pain    Relevant Orders    ECG 12 Lead    [facility] [facility]    CBC & Differential    Comprehensive Metabolic Panel    Coronary artery disease due to calcified coronary lesion    Relevant Orders    ECG 12 Lead    [facility] [facility]    CBC & Differential    Comprehensive Metabolic Panel    Essential hypertension    Relevant Orders    ECG 12 Lead    [facility] [place] [facility]    CBC & Differential    Comprehensive Metabolic Panel    Atrial fibrillation    Relevant Orders    ECG 12 Lead    [facility] [place] [facility]    CBC & Differential    Comprehensive Metabolic Panel    S/P CABG (coronary artery bypass graft) - Primary    Relevant Orders    ECG 12 Lead    [facility] [place] [facility]    CBC & Differential    Comprehensive Metabolic Panel    Chronic systolic congestive heart failure    Overview     Added automatically from request for surgery [id number]         Relevant Orders    ECG 12 Lead    [facility] [place] [facility]    CBC & Differential    Comprehensive Metabolic Panel     Other Visit Diagnoses       Leg swelling        Relevant Orders    ECG 12 Lead    [facility] [place] [facility]    CBC & Differential    Comprehensive Metabolic Panel    Claudication        Relevant Orders    ECG 12 Lead    Doppler Ankle Brachial Index Single Level CAR    Decreased dorsalis pedis pulse        Relevant Orders    ECG 12 Lead    Doppler Ankle Brachial Index Single Level CAR        Active Problems:  1. CAD, MI in 1998  1.1 Stenting x 5 total at [facility]. Data  1.2 CABG x2 in Dec. 2014 at [facility] by Dr. Álvarez in setting of ACS, LIMA to LAD and reversed vein graft from aorta to diag. With RLE vein graft  1.4 Cath 10/19: Widely patent grafts with no progressive disease in the RCA or circumflex  ejection fraction 50 to 55%, LVEDP 8 to 12 mmHg  1.5 left heart catheterization 9/20: Left main normal, LAD  occluded in the mid vessel with a patent LIMA beyond the occluded area, first diagonal has proximal 80% stenosis with a patent SVG to first D1, circumflex normal, RCA normal, EF 30 to 35% with mid anterior and apical wall akinesis and inferior wall hypokinesis  1.6 MUGA scan 12/20: EF 19%  1.7 left heart catheterization 12/21: Left anterior descending 100% occluded after the takeoff of the major diagonal, circumflex moderate size vessel with no obstruction, RCA anatomically dominant no significant obstruction, LIMA to LAD patent, SVG to diagonal patent  1.8 stress test 9/24: Fixed high lateral, inferolateral walls defects with reversibility extending to the lateral, inferolateral walls, and with fixed apical wall defect which is worse at the stress images the above is consistent with high lateral, inferolateral infarction with edward-infarction ischemia extending to the mid lateral inferolateral wall as well as edward-infarction ischemia at the apical wall, TID 0.94.  EF 48%  2. A-Fib Xarelto  3. HTN  3.1 echocardiogram 10/21: EF 40 to 45% mild concentric left ventricular hypertrophy grade 2 diastolic dysfunction mild to moderate left atrial enlargement, right atrium mildly dilated, trivial MR, trivial to mild TR and AI pulmonary artery systolic pressures low 30s  3.2 echocardiogram 9/24: EF 46 to 50%.  Grade 3 diastolic dysfunction  4. Hyperlipidemia  5. GOPI, uses c-pap, sees Dr Jones  6. SSS  7. 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 carotid duplex 12/20: Nonobstructive carotid artery    HPI  HPI  Mr. Nick Caraballo is an 84-year-old male patient being followed up today for history of coronary artery disease, atrial fibrillation, and chronic arterial hypertension.    Patient to go under testing due to previous reports of chest pain and shortness of breath and known coronary artery history.  His stress  test indicated a fixed high lateral, inferior lateral wall defect with reversibility extending to the lateral, inferior lateral walls and a fixed apical wall defect which is worse at the stress images The above is consistent with a high lateral inferior lateral infarction with edward-infarct ischemia extending into the mid lateral inferior lateral wall as well as edward-infarction ischemia at the apical wall.  Post-rest EF 48%.  Patient also had an ejection fraction of 46 to 50% per echocardiogram.  Also noted grade 3 diastolic dysfunction with no hemodynamically significant valvular disease.  RVSP 35 mmHg.  We did discuss these in detail.    Patient also had a [employer] BiV ICD interrogation which identified he had a battery life of 2.5 years.  He did have atrial tach/atrial fibrillation 99% of the time with 9 episodes of RVR with 240 being the fastest only lasting 10 seconds.     Patient does have a history of significant coronary artery disease in which she did have a CABG in 2014.  Most recently his left heart catheterization showed patent grafts and did not require any percutaneous coronary artery intervention in 2021.  He is on medication management of rosuvastatin for high intensity statin therapy.  He had discontinued his aspirin for antiplatelet therapy but continues to be on anticoagulation of Xarelto.     Patient does have a history of atrial fibrillation in which he is on Xarelto for anticoagulation.  He denies any signs or symptoms of bleeding.  He does metoprolol for rate control.  Patient does continue to have palpitations that occur about 1 time daily up to 2 times a week.  He says it is not bad and it is short-lived usually he says that this is much improved from previous.     Patient does have chronic arterial hypertension in which he is treated with Entresto and metoprolol.  Today his blood pressure is well-controlled at 110/60 heart rate is 72.    Patient does have a history of heart failure with  reduced ejection fraction in which hhe is onEntresto, metoprolol succinate, and Jardiance.     Patient does report chest pain which she describes as pressure that is in his mid sternum with no radiation.  He says it is more often than not at least 1-3 time a week and lasting a few minutes.  He says it will wake him up from sleep.  He says he feels like it is most likely GI and he feels gas going up into his chest.  He has had some weight gain.  He can occur with rest or exertion.     Patient does report shortness of breath that occurs with minimal levels of activity.  He does have associated cough and does wear oxygen 2 L as needed.  He does have dyspnea at rest as well as some levels of orthopnea.  He does report that his shortness of breath does seem to be getting a little bit worse.  He does have a history of emphysema as well which is followed by Dr. Karen Christianson.     Patient does report that he does feel like he has decreased strength in his legs.  Activities such as carrying stuff seems to make it worse.  He also reports that he has decreased balance and that he staggers a lot.  He does use a cane to assist in ambulation.  He also reports pain especially in his hamstring area with ambulation.     We did review patient's labs that showed his HDL was 42, triglycerides 129, LDL 60.  A1c was 5.8.  He had a normal magnesium, TSH, vitamin D.  CBC identified an increased hematocrit of 55.4 with a hemoglobin of 18.  Normal platelets.  PRIOR MEDS  Current Outpatient Medications on File Prior to Visit   Medication Sig Dispense Refill    allopurinol (ZYLOPRIM) 300 MG tablet Take 1 tablet by mouth Daily.      calcium carbonate (OS-YAIMA) 600 MG tablet Take 1 tablet by mouth Daily.      Diclofenac Sodium (VOLTAREN) 1 % gel gel Apply 4 g topically to the appropriate area as directed 4 (Four) Times a Day As Needed.      empagliflozin (JARDIANCE) 25 MG tablet tablet Take 0.5 tablets by mouth Daily. 45 tablet 3     famotidine (PEPCID) 20 MG tablet Take 1 tablet by mouth 2 (Two) Times a Day.      fexofenadine (ALLEGRA) 60 MG tablet Take 1 tablet by mouth Daily.      fluticasone (FLONASE) 50 MCG/ACT nasal spray Administer 2 sprays into the nostril(s) as directed by provider Daily.      furosemide (LASIX) 80 MG tablet Take 1 tablet by mouth 2 (Two) Times a Day. 180 tablet 11    levothyroxine (SYNTHROID, LEVOTHROID) 25 MCG tablet Take 1 tablet by mouth Daily.      lidocaine (LIDODERM) 5 % Place 1 patch on the skin as directed by provider Daily. Remove & Discard patch within 12 hours or as directed by MD      loperamide (IMODIUM) 2 MG capsule Take 1 capsule by mouth 2 (Two) Times a Day As Needed for Diarrhea.      metoprolol succinate XL (TOPROL-XL) 25 MG 24 hr tablet Take 0.5 tablets by mouth Daily. 45 tablet 1    Multiple Vitamins-Minerals (ICAPS AREDS 2 PO) Take 2 tablets by mouth Daily.      nitroglycerin (NITROSTAT) 0.4 MG SL tablet Place 1 tablet under the tongue Every 5 (Five) Minutes As Needed for Chest Pain. 25 tablet 3    omeprazole (priLOSEC) 20 MG capsule Take 1 capsule by mouth Daily. (Patient taking differently: Take 2 capsules by mouth Daily. 2 capsules daily) 90 capsule 3    potassium chloride (K-DUR,KLOR-CON) 10 MEQ CR tablet Take 2 tablets by mouth 3 (Three) Times a Day.      rivaroxaban (XARELTO) 20 MG tablet Take 1 tablet by mouth Daily. 90 tablet 3    rosuvastatin (CRESTOR) 20 MG tablet Take 1 tablet by mouth Daily. 90 tablet 3    sacubitril-valsartan (ENTRESTO)  MG tablet Take 1 tablet by mouth 2 (Two) Times a Day. 180 tablet 3    tamsulosin (FLOMAX) 0.4 MG capsule 24 hr capsule Take 1 capsule by mouth Daily.      venlafaxine XR (EFFEXOR-XR) 150 MG 24 hr capsule Take 1 capsule by mouth Daily.      [DISCONTINUED] ALPRAZolam (XANAX) 0.5 MG tablet Take 1 tablet by mouth 2 (Two) Times a Day As Needed for Anxiety.      [DISCONTINUED] guaiFENesin-Codeine (GUAIFENESIN AC PO) Take 400 mg by mouth 4 (Four) Times  a Day.      [DISCONTINUED] magnesium oxide (MAG-OX) 400 MG tablet Take 1 tablet by mouth Daily.       No current facility-administered medications on file prior to visit.       ALLERGIES  Patient has no known allergies.    HISTORY  Past Medical History:   Diagnosis Date    Acid reflux     Anxiety     Complete heart block     COPD (chronic obstructive pulmonary disease)     Coronary artery disease     COVID-19 vaccine administered 2021 - Booster 10/13/2021 - Pfizer    Dehydration     Diabetes mellitus     Gout     Hyperlipidemia     Hypertension     Hypothyroid     Myocardial infarction     Pseudophakia     Rheumatoid arthritis     Sleep apnea     Thyroid disease        Social History     Socioeconomic History    Marital status:    Tobacco Use    Smoking status: Former     Current packs/day: 0.00     Average packs/day: 1 pack/day for 45.0 years (45.0 ttl pk-yrs)     Types: Cigarettes     Start date:      Quit date:      Years since quittin.8    Smokeless tobacco: Never   Substance and Sexual Activity    Alcohol use: No    Drug use: No    Sexual activity: Defer       Family History   Problem Relation Age of Onset    No Known Problems Mother     Colon cancer Father     Heart disease Brother        Review of Systems   Constitutional:  Positive for fatigue. Negative for chills and fever.   HENT:  Positive for congestion (has chest congestion). Negative for rhinorrhea and sore throat.    Eyes:  Negative for visual disturbance.   Respiratory:  Positive for apnea (CPAP), chest tightness (has been having pressure in chest) and shortness of breath (more with activity).    Cardiovascular:  Positive for leg swelling (knee down). Negative for chest pain.   Gastrointestinal:  Negative for constipation, diarrhea and nausea.   Musculoskeletal:  Positive for arthralgias, back pain and neck pain.   Skin:  Negative for rash and wound.   Allergic/Immunologic: Positive for environmental  "allergies. Negative for food allergies.   Neurological:  Positive for dizziness (is having a lot of trouble with balance), weakness and light-headedness. Negative for syncope.   Hematological:  Bruises/bleeds easily.   Psychiatric/Behavioral:  Negative for sleep disturbance.        Objective     VITALS: /60 (BP Location: Right arm, Patient Position: Sitting, Cuff Size: Adult)   Pulse 72   Ht 185 cm (72.84\")   Wt 123 kg (271 lb 6.4 oz)   SpO2 98%   BMI 35.97 kg/m²     LABS:   Lab Results (most recent)       None            IMAGING:   Adult Transthoracic Echo Complete W/ Cont if Necessary Per Protocol    Addendum Date: 9/27/2024      Mildly reduced right ventricular systolic function noted.   There is mild calcification of the aortic valve mainly affecting the left coronary cusp(s).       EXAM:  Physical Exam  Vitals and nursing note reviewed.   Constitutional:       Appearance: He is well-developed.   HENT:      Head: Normocephalic.   Neck:      Thyroid: No thyroid mass.      Vascular: No carotid bruit or JVD.      Trachea: Trachea and phonation normal.   Cardiovascular:      Rate and Rhythm: Normal rate and regular rhythm.      Pulses:           Radial pulses are 2+ on the right side and 2+ on the left side.        Dorsalis pedis pulses are 2+ on the right side and 1+ on the left side.      Heart sounds: Normal heart sounds. No murmur heard.     No friction rub. No gallop.   Pulmonary:      Effort: Pulmonary effort is normal. No respiratory distress.      Breath sounds: Decreased air movement present. Decreased breath sounds present. No wheezing or rales.   Musculoskeletal:         General: No swelling. Normal range of motion.      Cervical back: Neck supple.      Right lower leg: Edema (2+ venous dermatitis) present.      Left lower leg: Edema (2+ venous dermatitis) present.   Skin:     General: Skin is warm and dry.      Capillary Refill: Capillary refill takes less than 2 seconds.      Findings: No " rash.   Neurological:      Mental Status: He is alert and oriented to person, place, and time.   Psychiatric:         Speech: Speech normal.         Behavior: Behavior normal.         Thought Content: Thought content normal.         Judgment: Judgment normal.         Procedure     ECG 12 Lead    Date/Time: 10/18/2024 10:21 AM  Performed by: Coleman Harman APRN    Authorized by: Coleman Harman APRN  Comparison: compared with previous ECG from 7/9/2024  Similar to previous ECG  Rhythm: paced  Rate: normal  QRS axis: indeterminate  Comments: Ventricularly paced  QTc 466             Assessment & Plan    Diagnosis Plan   1. S/P CABG (coronary artery bypass graft)  ECG 12 Lead    [place] [facility]    CBC & Differential    Comprehensive Metabolic Panel      2. Coronary artery disease due to calcified coronary lesion  ECG 12 Lead    [facility] [facility]    CBC & Differential    Comprehensive Metabolic Panel      3. Paroxysmal atrial fibrillation  ECG 12 Lead    [facility] [facility]    CBC & Differential    Comprehensive Metabolic Panel      4. Precordial pain  ECG 12 Lead    [place] [facility]    CBC & Differential    Comprehensive Metabolic Panel      5. Chronic systolic congestive heart failure  ECG 12 Lead    [facility] [facility]    CBC & Differential    Comprehensive Metabolic Panel      6. Essential hypertension  ECG 12 Lead    [facility] [facility]    CBC & Differential    Comprehensive Metabolic Panel      7. Leg swelling  ECG 12 Lead    [place] [facility]    CBC & Differential    Comprehensive Metabolic Panel      8. Claudication  ECG 12 Lead    Doppler Ankle Brachial Index Single Level CAR      9. Decreased dorsalis pedis pulse  ECG 12 Lead    Doppler Ankle Brachial Index Single Level CAR      1.  Patient did have a positive stress test that identified a Fixed high lateral, inferolateral walls defects with reversibility extending to the lateral, inferolateral walls, and with fixed apical wall defect which  is worse at the stress images the above is consistent with high lateral, inferolateral infarction with edward-infarction ischemia extending to the mid lateral inferolateral wall as well as edward-infarction ischemia at the apical wall, TID 0.94.  EF 48% with continuation of chest pain and shortness of breath therefore I like to for him to go under further evaluation including for ischemia including left heart cath.  Patient has opted to move forth with a left heart catheterization.  Benefits and risk of the heart catheterization was explained and patient wishes to continue.    We will also have patient have labs drawn proximately 1 week prior to left heart catheterization for further risk stratification    2.  Patient's blood pressure is controlled on current blood pressure medication regimen.  No medication changes are warranted at this time.  Patient advised to monitor blood pressure on a daily basis and report any persistent highs or lows.  Set goal blood pressure for patient at 130/80 or below.    3.  Patient does have atrial fibrillation which is chronic in nature.  He is anticoagulated with Xarelto rate controlled metoprolol.  Seems to be doing well and denies any palpitations.    4.  Patient does have decreased dorsalis pedis on the left with venous dermatitis and venous insufficiency and grade 3 diastolic dysfunction therefore I would like to get ABIs to further evaluate for peripheral vascular disease.  He is also reporting claudication-like symptoms especially in the back of his legs.    5.  Informed of signs and symptoms of ACS and advised to seek emergent treatment for any new worsening symptoms.  Patient also advised sooner follow-up as needed.  Also advised to follow-up with family doctor as needed  This note is dictated utilizing voice recognition software.  Although this record has been proof read, transcriptional errors may still be present. If questions occur regarding the content of this record please  do not hesitate to call our office.  I have reviewed and confirmed the accuracy of the ROS as documented by the MA/LPN/RN DAVID Stone    Return if symptoms worsen or fail to improve, for cath follow up 1-2 weeks.    Diagnoses and all orders for this visit:    1. S/P CABG (coronary artery bypass graft) (Primary)  -     ECG 12 Lead  -     [place] [facility]; Future  -     CBC & Differential; Future  -     Comprehensive Metabolic Panel; Future    2. Coronary artery disease due to calcified coronary lesion  -     ECG 12 Lead  -     [place] [facility]; Future  -     CBC & Differential; Future  -     Comprehensive Metabolic Panel; Future    3. Paroxysmal atrial fibrillation  -     ECG 12 Lead  -     [place] [facility]; Future  -     CBC & Differential; Future  -     Comprehensive Metabolic Panel; Future    4. Precordial pain  -     ECG 12 Lead  -     [place] [facility]; Future  -     CBC & Differential; Future  -     Comprehensive Metabolic Panel; Future    5. Chronic systolic congestive heart failure  -     ECG 12 Lead  -     [place] [facility]; Future  -     CBC & Differential; Future  -     Comprehensive Metabolic Panel; Future    6. Essential hypertension  -     ECG 12 Lead  -     [place] [facility]; Future  -     CBC & Differential; Future  -     Comprehensive Metabolic Panel; Future    7. Leg swelling  -     ECG 12 Lead  -     [place] [facility]; Future  -     CBC & Differential; Future  -     Comprehensive Metabolic Panel; Future    8. Claudication  -     ECG 12 Lead  -     Doppler Ankle Brachial Index Single Level CAR; Future    9. Decreased dorsalis pedis pulse  -     ECG 12 Lead  -     Doppler Ankle Brachial Index Single Level CAR; Future        Nick Caraballo  reports that he quit smoking about 26 years ago. His smoking use included cigarettes. He started smoking about 71 years ago. He has a 45 pack-year smoking history. He has never used smokeless tobacco. I have educated him on the risk  of diseases from using tobacco products. Patient does not smoke.          Advance Care Planning   ACP discussion was held with the patient during this visit. Patient has an advance directive in EMR which is still valid.                   MEDS ORDERED DURING VISIT:  No orders of the defined types were placed in this encounter.          This document has been electronically signed by Coleman Harman Jr., APRN  October 18, 2024 11:29 EDT

## 2024-10-23 DIAGNOSIS — I73.9 CLAUDICATION: ICD-10-CM

## 2024-10-23 DIAGNOSIS — R09.89 DECREASED DORSALIS PEDIS PULSE: ICD-10-CM

## 2024-10-24 ENCOUNTER — TELEPHONE (OUTPATIENT)
Dept: CARDIOLOGY | Facility: CLINIC | Age: 84
End: 2024-10-24
Payer: OTHER GOVERNMENT

## 2024-10-24 DIAGNOSIS — I10 ESSENTIAL HYPERTENSION: ICD-10-CM

## 2024-10-24 DIAGNOSIS — R09.89 CARDIAC LV EJECTION FRACTION 10-20%: ICD-10-CM

## 2024-10-24 DIAGNOSIS — I73.9 PVD (PERIPHERAL VASCULAR DISEASE) WITH CLAUDICATION: Primary | ICD-10-CM

## 2024-10-24 NOTE — TELEPHONE ENCOUNTER
Name: Nick Caraballo    Relationship: Self    Best Callback Number: [phone]    HUB PROVIDED THE RELAY MESSAGE FROM THE OFFICE   PATIENT VOICED UNDERSTANDING AND HAS NO FURTHER QUESTIONS AT THIS TIME    ADDITIONAL INFORMATION: PATIENT ALSO WANTED DAVID LONGORIA TO KNOW HE HAD BLOOD WORK DONE AT HIS RHEUMATOLOGIST OFFICE  ( [phone] FAX: [phone]). ON 10-23-24. PATIENT WILL DISCUSS RESULTS AT FOLLOW UP WITH DAVID LONGORIA

## 2024-10-24 NOTE — PROGRESS NOTES
Findings of ABIs with suggest mild bilateral lower extremity peripheral vascular disease.  It would be reasonable to get a CTA of the abdomen with bilateral lower extremity runoff if patient is continuing to have claudication-like symptoms.

## 2024-10-24 NOTE — TELEPHONE ENCOUNTER
Relay     I called patient and left a message with MIGUELANGEL results as follows:  Findings of ABIs with suggest mild bilateral lower extremity peripheral vascular disease.  It would be reasonable to get a CTA of the abdomen with bilateral lower extremity runoff if patient is continuing to have claudication-like symptoms.

## 2024-10-28 ENCOUNTER — TELEPHONE (OUTPATIENT)
Dept: CARDIOLOGY | Facility: CLINIC | Age: 84
End: 2024-10-28
Payer: OTHER GOVERNMENT

## 2024-10-28 NOTE — TELEPHONE ENCOUNTER
Caller: Nick Caraballo    Relationship: Self    Best call back number: [phone]      What is the best time to reach you: ANYTIME    Who are you requesting to speak with (clinical staff, provider,  specific staff member): CLINICAL    Do you know the name of the person who called: NOT SURE    What was the call regarding: BERTIN WAS RETURNING A MISSED CALL IF GERTRUDIS MUNOZ'S OFFICE IS TRYING TO REACH PATIENT CALL BACK.    Is it okay if the provider responds through MyChart: CALL         I have personally performed a face to face diagnostic evaluation on this patient. I have reviewed the ACP note and agree with the history, exam and plan of care, except as noted.

## 2024-11-01 ENCOUNTER — TELEPHONE (OUTPATIENT)
Dept: CARDIOLOGY | Facility: CLINIC | Age: 84
End: 2024-11-01
Payer: OTHER GOVERNMENT

## 2024-11-01 NOTE — TELEPHONE ENCOUNTER
He went to the ED yesterday. His pain was not appendicitis. He thinks his weight gain is causing his pain. Dr. Pierre said it could be due to muscle inflammation. The patient was given topical voltaren gel.  Patient has an appointment 11/21/24. Patient was seen at [facility] we will get the records.

## 2024-11-21 ENCOUNTER — OFFICE VISIT (OUTPATIENT)
Dept: CARDIOLOGY | Facility: CLINIC | Age: 84
End: 2024-11-21
Payer: OTHER GOVERNMENT

## 2024-11-21 VITALS
OXYGEN SATURATION: 92 % | BODY MASS INDEX: 34.35 KG/M2 | SYSTOLIC BLOOD PRESSURE: 102 MMHG | HEIGHT: 73 IN | DIASTOLIC BLOOD PRESSURE: 71 MMHG | WEIGHT: 259.2 LBS | HEART RATE: 69 BPM

## 2024-11-21 DIAGNOSIS — R07.2 PRECORDIAL PAIN: ICD-10-CM

## 2024-11-21 DIAGNOSIS — I10 ESSENTIAL HYPERTENSION: ICD-10-CM

## 2024-11-21 DIAGNOSIS — I50.22 CHRONIC SYSTOLIC CONGESTIVE HEART FAILURE: ICD-10-CM

## 2024-11-21 DIAGNOSIS — M79.89 LEG SWELLING: ICD-10-CM

## 2024-11-21 DIAGNOSIS — I49.5 SSS (SICK SINUS SYNDROME): ICD-10-CM

## 2024-11-21 DIAGNOSIS — R06.02 SHORTNESS OF BREATH: ICD-10-CM

## 2024-11-21 DIAGNOSIS — I48.0 PAROXYSMAL ATRIAL FIBRILLATION: ICD-10-CM

## 2024-11-21 DIAGNOSIS — I25.84 CORONARY ARTERY DISEASE DUE TO CALCIFIED CORONARY LESION: Primary | ICD-10-CM

## 2024-11-21 DIAGNOSIS — I25.10 CORONARY ARTERY DISEASE DUE TO CALCIFIED CORONARY LESION: Primary | ICD-10-CM

## 2024-11-21 DIAGNOSIS — Z95.1 S/P CABG (CORONARY ARTERY BYPASS GRAFT): ICD-10-CM

## 2024-11-21 RX ORDER — SACUBITRIL AND VALSARTAN 49; 51 MG/1; MG/1
1 TABLET, FILM COATED ORAL 2 TIMES DAILY
Qty: 180 TABLET | Refills: 3 | Status: SHIPPED | OUTPATIENT
Start: 2024-11-21

## 2024-11-21 NOTE — PROGRESS NOTES
Subjective     Nick Caraballo is a 84 y.o. male who presents to [facility] for Cath and [facility] follow up  (11/04/24 [facility] no stenting ).    CHIEF COMPLIANT  Chief Complaint   Patient presents with    Cath and [facility] follow up      11/04/24 [facility] [place] no stenting        Active Problems:  Problem List Items Addressed This Visit          Cardiac and Vasculature    Precordial pain    Relevant Orders    ECG 12 Lead    Coronary artery disease due to calcified coronary lesion - Primary    Relevant Medications    sacubitril-valsartan (Entresto) 49-51 MG tablet    Other Relevant Orders    ECG 12 Lead    Essential hypertension    Relevant Orders    ECG 12 Lead    Atrial fibrillation    Relevant Medications    sacubitril-valsartan (Entresto) 49-51 MG tablet    Other Relevant Orders    ECG 12 Lead    S/P CABG (coronary artery bypass graft)    Relevant Orders    ECG 12 Lead    SSS (sick sinus syndrome)    Relevant Medications    sacubitril-valsartan (Entresto) 49-51 MG tablet    Other Relevant Orders    ECG 12 Lead    Chronic systolic congestive heart failure    Overview     Added automatically from request for surgery [id number]         Relevant Medications    sacubitril-valsartan (Entresto) 49-51 MG tablet    Other Relevant Orders    ECG 12 Lead       Pulmonary and Pneumonias    Shortness of breath    Relevant Orders    ECG 12 Lead     Other Visit Diagnoses       Leg swelling        Relevant Orders    ECG 12 Lead        Active Problems:  1. CAD, MI in 1998  1.1 Stenting x 5 total at [facility]. Data  1.2 CABG x2 in Dec. 2014 at [facility] by Dr. Álvarez in setting of ACS, LIMA to LAD and reversed vein graft from aorta to diag. With RLE vein graft  1.4 Cath 10/19: Widely patent grafts with no progressive disease in the RCA or circumflex ejection fraction 50 to 55%, LVEDP 8 to 12 mmHg  1.5 left heart catheterization 9/20: Left main normal, LAD  occluded in the mid vessel with a patent LIMA beyond the  occluded area, first diagonal has proximal 80% stenosis with a patent SVG to first D1, circumflex normal, RCA normal, EF 30 to 35% with mid anterior and apical wall akinesis and inferior wall hypokinesis  1.6 MUGA scan 12/20: EF 19%  1.7 left heart catheterization 12/21: Left anterior descending 100% occluded after the takeoff of the major diagonal, circumflex moderate size vessel with no obstruction, RCA anatomically dominant no significant obstruction, LIMA to LAD patent, SVG to diagonal patent  1.8 Left heart catheterization 11/24: Left main was normal, LAD was 90% in the proximal portion there is a patent SVG to the diagonal branch which supplies antegrade and retrograde flow.  There is a patent LIMA to LAD , circumflex was normal, RCA normal, it was felt that complete revascularization with left LIMA and SVG to the LAD system.    2. A-Fib Xarelto  3. HTN  3.1 echocardiogram 10/21: EF 40 to 45% mild concentric left ventricular hypertrophy grade 2 diastolic dysfunction mild to moderate left atrial enlargement, right atrium mildly dilated, trivial MR, trivial to mild TR and AI pulmonary artery systolic pressures low 30s  3.2 echocardiogram 9/24: EF 46 to 50%.  Grade 3 diastolic dysfunction  4. Hyperlipidemia  5. GOPI, uses c-pap, sees Dr Jones  6. SSS  7. 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 carotid duplex 12/20: Nonobstructive carotid artery    HPI  HPI  Mr. Nick Caraballo is an 84-year-old male patient being followed up today for history of coronary artery disease, atrial fibrillation, and chronic arterial hypertension.     Patient to go under testing due to previous reports of chest pain and shortness of breath and known coronary artery history.  His stress test indicated a fixed high lateral, inferior lateral wall defect with reversibility extending to the lateral, inferior lateral walls and a fixed apical wall defect which is worse at the stress images The above is consistent with a high lateral  inferior lateral infarction with edward-infarct ischemia extending into the mid lateral inferior lateral wall as well as edward-infarction ischemia at the apical wall.  Post-rest EF 48%.  Patient also had an ejection fraction of 46 to 50% per echocardiogram.  Also noted grade 3 diastolic dysfunction with no hemodynamically significant valvular disease.  RVSP 35 mmHg.  We did discuss these in detail.     Patient also had a [employer] BiV ICD interrogation which identified he had a battery life of 2.5 years.  He did have atrial tach/atrial fibrillation 99% of the time with 9 episodes of RVR with 240 being the fastest only lasting 10 seconds.     Patient does have a history of significant coronary artery disease in which she did have a CABG in 2014.  Most recently his left heart catheterization showed patent grafts and did not require any percutaneous coronary artery intervention in 2021.  He is on medication management of rosuvastatin for high intensity statin therapy.  He had discontinued his aspirin for antiplatelet therapy but continues to be on anticoagulation of Xarelto.    Patient did have a left heart catheterization 11/24: Left main was normal, LAD was 90% in the proximal portion there is a patent SVG to the diagonal branch which supplies antegrade and retrograde flow.  There is a patent LIMA to LAD , circumflex was normal, RCA normal, it was felt that complete revascularization with left LIMA and SVG to the LAD system.  He did have a d ascending aorta.    Patient does have a history of atrial fibrillation in which he is on Xarelto for anticoagulation.  He denies any signs or symptoms of bleeding.  He does metoprolol for rate control.  Patient does continue to have palpitations that occur about 1 time daily up to 2 times a week.  He says it is not bad and it is short-lived usually he says that this is much improved from previous.     Patient does have chronic arterial hypertension in which he is treated with  Entresto and metoprolol.  Today his blood pressure is     Patient does have a history of heart failure with reduced ejection fraction in which he is onEntresto, metoprolol succinate, and Jardiance.      Patient does report chest pain in which he is not experienced ever since he has got out of the hospital.  He felt like this may be more associated with a small bowel obstruction.    Patient does have chronic shortness of breath that occurs with exertion.  He says bending over or walking or exerting himself causes him to become dyspneic.  He does have some level of orthopnea.  However he denies any shortness of breath that occurs at rest.    Patient does have lower extremity edema that is chronic he does have venous dermatitis bilaterally.  1-2+ bilateral lower extremity today.  Mainly isolated above the ankles around the sock line.  He does say that the Bumex does seem to be helping.    Patient's major complaint today is that he has weakness in the legs.  He says it never goes away and he utilizes a cane for ambulation.  He has pain and weakness that mainly goes down the back of his legs into his calves.  This was considered claudication-like symptoms and ABIs were performed in which she had 0.9 bilaterally and he was scheduled for a CTA of the abdomen with bilateral lower extremity runoff.  However he is unable to get this done due to the fact he was hospitalized.  Therefore would like to reorder this for further evaluation.    Patient says that he workup on the 31st had nausea vomiting and then developed chest pain went into the emergency department and was released.  He went back on the third because he got sick again with nausea vomiting he ended up with a NG tube and went under left heart catheterization on the fourth as discussed above.  He was diagnosed with a bowel obstruction at this time.    PRIOR MEDS  Current Outpatient Medications on File Prior to Visit   Medication Sig Dispense Refill    allopurinol  (ZYLOPRIM) 300 MG tablet Take 1 tablet by mouth Daily.      calcium carbonate (OS-YAIMA) 600 MG tablet Take 1 tablet by mouth Daily.      Diclofenac Sodium (VOLTAREN) 1 % gel gel Apply 4 g topically to the appropriate area as directed 4 (Four) Times a Day As Needed.      empagliflozin (JARDIANCE) 25 MG tablet tablet Take 0.5 tablets by mouth Daily. 45 tablet 3    famotidine (PEPCID) 20 MG tablet Take 1 tablet by mouth 2 (Two) Times a Day.      fexofenadine (ALLEGRA) 60 MG tablet Take 1 tablet by mouth Daily.      fluticasone (FLONASE) 50 MCG/ACT nasal spray Administer 2 sprays into the nostril(s) as directed by provider Daily.      furosemide (LASIX) 80 MG tablet Take 1 tablet by mouth 2 (Two) Times a Day. 180 tablet 11    levothyroxine (SYNTHROID, LEVOTHROID) 25 MCG tablet Take 1 tablet by mouth Daily.      lidocaine (LIDODERM) 5 % Place 1 patch on the skin as directed by provider Daily. Remove & Discard patch within 12 hours or as directed by MD      loperamide (IMODIUM) 2 MG capsule Take 1 capsule by mouth 2 (Two) Times a Day As Needed for Diarrhea.      metoprolol succinate XL (TOPROL-XL) 25 MG 24 hr tablet Take 0.5 tablets by mouth Daily. 45 tablet 1    Multiple Vitamins-Minerals (ICAPS AREDS 2 PO) Take 2 tablets by mouth Daily.      nitroglycerin (NITROSTAT) 0.4 MG SL tablet Place 1 tablet under the tongue Every 5 (Five) Minutes As Needed for Chest Pain. 25 tablet 3    omeprazole (priLOSEC) 20 MG capsule Take 1 capsule by mouth Daily. (Patient taking differently: Take 2 capsules by mouth Daily. 2 capsules daily) 90 capsule 3    potassium chloride (K-DUR,KLOR-CON) 10 MEQ CR tablet Take 2 tablets by mouth 3 (Three) Times a Day.      rivaroxaban (XARELTO) 20 MG tablet Take 1 tablet by mouth Daily. 90 tablet 3    rosuvastatin (CRESTOR) 20 MG tablet Take 1 tablet by mouth Daily. 90 tablet 3    tamsulosin (FLOMAX) 0.4 MG capsule 24 hr capsule Take 1 capsule by mouth Daily.      venlafaxine XR (EFFEXOR-XR) 150 MG 24 hr  capsule Take 1 capsule by mouth Daily.      [DISCONTINUED] sacubitril-valsartan (ENTRESTO)  MG tablet Take 1 tablet by mouth 2 (Two) Times a Day. 180 tablet 3     No current facility-administered medications on file prior to visit.       ALLERGIES  Patient has no known allergies.    HISTORY  Past Medical History:   Diagnosis Date    Acid reflux     Anxiety     Bowel obstruction 2024    Complete heart block     COPD (chronic obstructive pulmonary disease)     Coronary artery disease     COVID-19 vaccine administered 2021 - Booster 10/13/2021 - Pfizer    Dehydration     Diabetes mellitus     Gout     Hyperlipidemia     Hypertension     Hypothyroid     Myocardial infarction     Pseudophakia     Rheumatoid arthritis     Sleep apnea     Thyroid disease        Social History     Socioeconomic History    Marital status:    Tobacco Use    Smoking status: Former     Current packs/day: 0.00     Average packs/day: 1 pack/day for 45.0 years (45.0 ttl pk-yrs)     Types: Cigarettes     Start date:      Quit date:      Years since quittin.9    Smokeless tobacco: Never   Substance and Sexual Activity    Alcohol use: No    Drug use: No    Sexual activity: Defer       Family History   Problem Relation Age of Onset    No Known Problems Mother     Colon cancer Father     Heart disease Brother        Review of Systems   Constitutional:  Positive for fatigue. Negative for chills and fever.   HENT:  Positive for rhinorrhea. Negative for congestion and sore throat.    Eyes:  Negative for visual disturbance.   Respiratory:  Positive for apnea (CPAP), chest tightness (pressure) and shortness of breath (with exertion or bending over).    Cardiovascular:  Positive for leg swelling (mild swelling in ankles). Negative for chest pain and palpitations.   Gastrointestinal:  Negative for constipation, diarrhea and nausea.   Musculoskeletal:  Positive for arthralgias and back pain. Negative for neck  "pain.   Skin:  Positive for wound (wounds on arms). Negative for rash.   Allergic/Immunologic: Positive for environmental allergies. Negative for food allergies.   Neurological:  Positive for dizziness (Patient says that he stays dizzy and lightheaded), weakness and light-headedness. Negative for syncope.   Hematological:  Bruises/bleeds easily.   Psychiatric/Behavioral:  Negative for sleep disturbance.        Objective     VITALS: /71 (BP Location: Right arm, Patient Position: Sitting, Cuff Size: Adult)   Pulse 69   Ht 185 cm (72.84\")   Wt 118 kg (259 lb 3.2 oz)   SpO2 92%   BMI 34.35 kg/m²     LABS:   Lab Results (most recent)       None            IMAGING:   Adult Transthoracic Echo Complete W/ Cont if Necessary Per Protocol    Addendum Date: 9/27/2024      Mildly reduced right ventricular systolic function noted.   There is mild calcification of the aortic valve mainly affecting the left coronary cusp(s).       EXAM:  Physical Exam  Vitals and nursing note reviewed.   Constitutional:       Appearance: He is well-developed.   HENT:      Head: Normocephalic.   Neck:      Thyroid: No thyroid mass.      Vascular: No carotid bruit or JVD.      Trachea: Trachea and phonation normal.   Cardiovascular:      Rate and Rhythm: Normal rate and regular rhythm.      Pulses:           Radial pulses are 2+ on the right side and 2+ on the left side.        Posterior tibial pulses are 2+ on the right side and 2+ on the left side.      Heart sounds: Normal heart sounds. No murmur heard.     No friction rub. Gallop present.   Pulmonary:      Effort: Pulmonary effort is normal. No respiratory distress.      Breath sounds: Decreased air movement present. Decreased breath sounds present. No wheezing or rales.   Musculoskeletal:         General: Normal range of motion.      Cervical back: Neck supple.      Right lower leg: Edema (2+) present.      Left lower leg: Edema (2+) present.   Skin:     General: Skin is warm and dry. "      Capillary Refill: Capillary refill takes less than 2 seconds.      Findings: Rash (venous dermatitis) present.   Neurological:      Mental Status: He is alert and oriented to person, place, and time.   Psychiatric:         Speech: Speech normal.         Behavior: Behavior normal.         Thought Content: Thought content normal.         Judgment: Judgment normal.         Procedure     ECG 12 Lead    Date/Time: 11/21/2024 11:12 AM  Performed by: Coleman Harman APRN    Authorized by: Coleman Harman APRN  Comparison: compared with previous ECG from 11/3/2024  Rhythm: paced  Rate: normal  BPM: 69  QRS axis: indeterminate  Pacing: ventricular paced rhythmComments: Qtc 433 ms  No acute changes             Assessment & Plan    Diagnosis Plan   1. Coronary artery disease due to calcified coronary lesion  ECG 12 Lead      2. S/P CABG (coronary artery bypass graft)  ECG 12 Lead      3. Paroxysmal atrial fibrillation  ECG 12 Lead      4. SSS (sick sinus syndrome)  ECG 12 Lead      5. Chronic systolic congestive heart failure  ECG 12 Lead    sacubitril-valsartan (Entresto) 49-51 MG tablet      6. Precordial pain  ECG 12 Lead      7. Shortness of breath  ECG 12 Lead      8. Leg swelling  ECG 12 Lead      9. Essential hypertension  ECG 12 Lead      1.  Patient's blood pressure is controlled on current blood pressure medication regimen.  We will reduce patient's Entresto due to the fact that he has been running hypotensive to low normal and has extensive fatigue..  Patient advised to monitor blood pressure on a daily basis and report any persistent highs or lows.  Set goal blood pressure for patient at 130/80 or below.    2.  Patient does have an extensive history of coronary artery disease in which he recently went under left heart catheterization appears to be well revascularized with bypass surgery.  He we will continue his current medication regimen without change.  He denies any complications at the femoral access  site.    3.  We will continue patient on guideline driven medication therapy for his heart failure with reduced ejection fraction.  With the exception of decreasing his Entresto due to periods of hypotension.    4.  Patient does have chronic dyspnea and fatigue.  I do think the hypotensive episodes may be playing a role in his fatigue and would like to decrease his Entresto as discussed above.    5.  Patient does have lower extremity edema as well as diastolic dysfunction.  We will continue his diuretic therapy at current dosing.    6.  Patient does have paroxysmal atrial fibrillation he denies any palpitations.  He is on anticoagulation in which she denies any bleeding.  Will continue his current medication regimen without change.    7.  Informed of signs and symptoms of ACS and advised to seek emergent treatment for any new worsening symptoms.  Patient also advised sooner follow-up as needed.  Also advised to follow-up with family doctor as needed  This note is dictated utilizing voice recognition software.  Although this record has been proof read, transcriptional errors may still be present. If questions occur regarding the content of this record please do not hesitate to call our office.  I have reviewed and confirmed the accuracy of the ROS as documented by the MA/LPN/RN DAVID Stone    No follow-ups on file.    Diagnoses and all orders for this visit:    1. Coronary artery disease due to calcified coronary lesion (Primary)  -     ECG 12 Lead    2. S/P CABG (coronary artery bypass graft)  -     ECG 12 Lead    3. Paroxysmal atrial fibrillation  -     ECG 12 Lead    4. SSS (sick sinus syndrome)  -     ECG 12 Lead    5. Chronic systolic congestive heart failure  -     ECG 12 Lead  -     sacubitril-valsartan (Entresto) 49-51 MG tablet; Take 1 tablet by mouth 2 (Two) Times a Day.  Dispense: 180 tablet; Refill: 3    6. Precordial pain  -     ECG 12 Lead    7. Shortness of breath  -     ECG 12 Lead    8. Leg  swelling  -     ECG 12 Lead    9. Essential hypertension  -     ECG 12 Lead        Nick Caraballo  reports that he quit smoking about 26 years ago. His smoking use included cigarettes. He started smoking about 71 years ago. He has a 45 pack-year smoking history. He has never used smokeless tobacco. I have educated him on the risk of diseases from using tobacco products. Patient does not smoke.     Advance Care Planning   ACP discussion was held with the patient during this visit. Patient has an advance directive in EMR which is still valid.                           MEDS ORDERED DURING VISIT:  New Medications Ordered This Visit   Medications    sacubitril-valsartan (Entresto) 49-51 MG tablet     Sig: Take 1 tablet by mouth 2 (Two) Times a Day.     Dispense:  180 tablet     Refill:  3           This document has been electronically signed by Coleman Haramn Jr., APRN  November 21, 2024 11:58 EST

## 2024-12-02 ENCOUNTER — TELEPHONE (OUTPATIENT)
Dept: CARDIOLOGY | Facility: CLINIC | Age: 84
End: 2024-12-02
Payer: OTHER GOVERNMENT

## 2024-12-02 NOTE — TELEPHONE ENCOUNTER
CTA ABD AORTA  Pt notified of no acute findings. Provider will discuss results at f/u. Pt reminded of appt date and time.    ----- Message from Bee BORRERO sent at 12/2/2024  4:20 PM EST -----    ----- Message -----  From: Coleman Harman APRN  Sent: 12/2/2024   1:15 PM EST  To: Bee Merrill MA    No hemodynamically significant peripheral vascular disease per CTA of the abdomen and pelvis with bilateral lower extremity runoff.  Keep follow-up.

## 2025-03-06 ENCOUNTER — TELEPHONE (OUTPATIENT)
Dept: CARDIOLOGY | Facility: CLINIC | Age: 85
End: 2025-03-06

## 2025-03-06 RX ORDER — METOPROLOL SUCCINATE 25 MG/1
12.5 TABLET, EXTENDED RELEASE ORAL DAILY
Qty: 45 TABLET | Refills: 3 | Status: SHIPPED | OUTPATIENT
Start: 2025-03-06

## 2025-03-06 NOTE — TELEPHONE ENCOUNTER
Has been seeing Dr Alcala said patient has RA is on Plaquenil is having discomfort in hands and feet. He said that after second visit he started taking prednisone 5 mg bid. He started having a lot more issues with balance numbness in feet. He was having tightness in chest felt like a heavy gas. It was hard to breath his Blood pressure was 146/95 140/94 168/92 147/89 . His O2 levels were high 90's he was reading up on plaquenil and prednisone. He quit both meds. He stated that blood pressures 126/83 128/86 146/95 . Heart rates have been normal 83 74 74. He has had water blister and feet swelling up into ankles . He said that the blisters have gotten better he is sending pictures of feet. He wanted to make sure that this is not related to battery life de fibrillator.

## 2025-03-06 NOTE — TELEPHONE ENCOUNTER
Caller: Nick Caraballo    Relationship: Self    Best call back number: [phone]    Requested Prescriptions:   Requested Prescriptions     Pending Prescriptions Disp Refills    metoprolol succinate XL (TOPROL-XL) 25 MG 24 hr tablet 45 tablet 1     Sig: Take 0.5 tablets by mouth Daily.        Pharmacy where request should be sent: [facility] - [address] - [phone] [facility] [phone] FX     Last office visit with prescribing clinician: 11/21/2024   Last telemedicine visit with prescribing clinician: Visit date not found   Next office visit with prescribing clinician: 5/2/2025     Additional details provided by patient:     Does the patient have less than a 3 day supply:  [] Yes  [x] No    Would you like a call back once the refill request has been completed: [] Yes [x] No    If the office needs to give you a call back, can they leave a voicemail: [] Yes [x] No    Lavell Alva   03/06/25 13:06 EST

## 2025-03-06 NOTE — TELEPHONE ENCOUNTER
Caller: Ilya Nick    Relationship: Self    Best call back number: [phone]    What is the best time to reach you: ANY    Who are you requesting to speak with (clinical staff, provider,  specific staff member): CLINICAL        What was the call regarding: PATIENT ADVISING HE WAS PLACED ON 2 MEDICATIONS BY RHEUMATOLOGY AND SINCE IS HAVING HIGH BP AND WATER BLISTERS ON HIS TOES, FEET. WAS HAVING TIGHTNESS IN HIS CHEST, SOB LAST WEEK DUE TO THIS MEDICATION. HE HAS STOPPED THEM HIMSELF AND WANTS TO TALK WITH JR. PLEASE CALL THE PATIENT ASAP TO DISCUSS/ADVISE.     Is it okay if the provider responds through MyChart: NO

## 2025-03-10 ENCOUNTER — TELEPHONE (OUTPATIENT)
Dept: CARDIOLOGY | Facility: CLINIC | Age: 85
End: 2025-03-10
Payer: OTHER GOVERNMENT

## 2025-03-10 DIAGNOSIS — S81.801A WOUND OF RIGHT LOWER EXTREMITY, INITIAL ENCOUNTER: Primary | ICD-10-CM

## 2025-03-10 DIAGNOSIS — R06.02 SHORTNESS OF BREATH: ICD-10-CM

## 2025-03-10 DIAGNOSIS — I10 ESSENTIAL HYPERTENSION: ICD-10-CM

## 2025-03-10 DIAGNOSIS — I73.9 CLAUDICATION: ICD-10-CM

## 2025-03-10 DIAGNOSIS — R60.0 EDEMA LEG: ICD-10-CM

## 2025-03-10 RX ORDER — METOLAZONE 5 MG/1
TABLET ORAL
Qty: 3 TABLET | Refills: 0 | Status: SHIPPED | OUTPATIENT
Start: 2025-03-10

## 2025-03-10 NOTE — TELEPHONE ENCOUNTER
He had 2.5 years of generator life remaining and November 2024.  If he is concerned about the end of battery life would get him scheduled for the next [employer] pacer interrogation today.  I would not think this would have any correlation with his fluid retention

## 2025-03-10 NOTE — TELEPHONE ENCOUNTER
Orders placed per JR recommendations  Metolazone 5 mg 30 min prior to AM Lasix dose x 3 days. Arteral Ultrasound Right lower extremity due to wound and check for vascular compromise.

## 2025-03-13 ENCOUNTER — TELEPHONE (OUTPATIENT)
Dept: CARDIOLOGY | Facility: CLINIC | Age: 85
End: 2025-03-13
Payer: OTHER GOVERNMENT

## 2025-03-13 NOTE — TELEPHONE ENCOUNTER
CALLED PT TO SCHEDULE SOONER INTERROGATION, PER PT HE DOES NOT NEED IT. PT STATED HE WILL KEEP HIS PACER CHECK IN MAY

## 2025-04-01 ENCOUNTER — TELEPHONE (OUTPATIENT)
Dept: CARDIOLOGY | Facility: CLINIC | Age: 85
End: 2025-04-01
Payer: OTHER GOVERNMENT

## 2025-04-01 NOTE — TELEPHONE ENCOUNTER
"Called and explained testing could not be done on 25 as that type testing is not done on . Explained I can have scheduled for another day that way the results are available on appointment with JR scheduled 25. Vance was upset \"that we are running in circles\" and does not feel we are willing to help him. He was upset that he received a call while in traffic telling him his appointment for tomorrow had to be rescheduled. Explained [facility] not yet [facility] continued cardiology care since authorization  on 3/3/25. He did not think he had an appointment tomorrow as it should have been on  not . Once again tried to explain the lower extremity testing could not be completed on Friday and was never scheduled that day. He was upset and stated to forget about it and he would discuss with JR before hanging up the phone.   "

## 2025-04-01 NOTE — TELEPHONE ENCOUNTER
"  Caller: Nick Caraballo    Relationship: Self    Best call back number: [phone]     What is the best time to reach you: ANYTIME    Who are you requesting to speak with (clinical staff, provider,  specific staff member): PROVIDER/BRAD    Do you know the name of the person who called: BRAD    What was the call regarding: PT CALLS RETURNING CALL TO [facility] ABOUT US LOWER EXTREMITY. PT IS CONFUSED AS TO WHY IT WAS NOT SCHEDULED 5.2.25 INSTEAD OF 4.2.25, AS HE REPORTS IT SHOULDVE BEEN SCHEDULED FOR 5.2.25. I LET PT KNOW [facility] HAS TO A DO AN AUTH ON TESTING. PT REPORTS HE WOULD LIKE SOMEONE IN THE OFFICE TO GET HIM RESCHEDULED FOR HIS US LOWER EXTREM. ON MAY 2ND, 2025 AND IF WE CANNOT THEN TO \"FORGET ABOUT IT.\" I LET THE PT KNOW WE WOULD TRY OUR BEST TO OBTAIN A [facility] AUTH FOR THIS TESTING AND TO GET THAT RESCHEDULED FOR HIM AND SOMEONE WOULD BE IN TOUCH ASAP TO FOLLOW UP. PLEASE ADVISE.     Is it okay if the provider responds through MyChart: NO    "

## 2025-04-10 ENCOUNTER — HOSPITAL ENCOUNTER (OUTPATIENT)
Dept: CARDIOLOGY | Facility: HOSPITAL | Age: 85
Discharge: HOME | End: 2025-04-10
Admitting: NURSE PRACTITIONER
Payer: OTHER GOVERNMENT

## 2025-04-10 DIAGNOSIS — I10 ESSENTIAL HYPERTENSION: ICD-10-CM

## 2025-04-10 DIAGNOSIS — S81.801A WOUND OF RIGHT LOWER EXTREMITY, INITIAL ENCOUNTER: ICD-10-CM

## 2025-04-10 DIAGNOSIS — R06.02 SHORTNESS OF BREATH: ICD-10-CM

## 2025-04-10 DIAGNOSIS — I73.9 CLAUDICATION: ICD-10-CM

## 2025-04-10 PROCEDURE — 93923 UPR/LXTR ART STDY 3+ LVLS: CPT

## 2025-04-10 PROCEDURE — 93923 UPR/LXTR ART STDY 3+ LVLS: CPT | Performed by: RADIOLOGY

## 2025-04-14 ENCOUNTER — RESULTS FOLLOW-UP (OUTPATIENT)
Dept: CARDIOLOGY | Facility: CLINIC | Age: 85
End: 2025-04-14
Payer: OTHER GOVERNMENT

## 2025-04-14 NOTE — TELEPHONE ENCOUNTER
----- Message from Kristina HERNANDEZ sent at 4/14/2025 12:22 PM EDT -----    ----- Message -----  From: Cesar Robles PA  Sent: 4/14/2025   9:01 AM EDT  To: KRISTIAN Crawford    Routine follow-up.  ----- Message -----  From: Kristina Mejia RegSched Rep  Sent: 4/10/2025   5:24 PM EDT  To: KATIE Del Rio      ----- Message -----  From: Omer [facility] Results [place]  Sent: 4/10/2025   3:29 PM EDT  To: DAVID Stone

## 2025-05-01 NOTE — PROGRESS NOTES
Subjective     Nick Caraballo is a 84 y.o. male who presents to day for follow up with pacer check, Hypertension, and Coronary Artery Disease.    CHIEF COMPLIANT  Chief Complaint   Patient presents with    follow up with pacer check    Hypertension    Coronary Artery Disease       Active Problems:  Problem List Items Addressed This Visit          Cardiac and Vasculature    Coronary artery disease due to calcified coronary lesion    Relevant Medications    Semaglutide,0.25 or 0.5MG/DOS, (Ozempic, 0.25 or 0.5 MG/DOSE,) 2 MG/1.5ML solution pen-injector    Other Relevant Orders    Comprehensive Metabolic Panel    Lipid Panel    Atrial fibrillation    Relevant Medications    Semaglutide,0.25 or 0.5MG/DOS, (Ozempic, 0.25 or 0.5 MG/DOSE,) 2 MG/1.5ML solution pen-injector    Other Relevant Orders    Comprehensive Metabolic Panel    Lipid Panel    S/P CABG (coronary artery bypass graft)    Relevant Medications    Semaglutide,0.25 or 0.5MG/DOS, (Ozempic, 0.25 or 0.5 MG/DOSE,) 2 MG/1.5ML solution pen-injector    Other Relevant Orders    Comprehensive Metabolic Panel    Lipid Panel       Pulmonary and Pneumonias    Shortness of breath - Primary    Relevant Medications    Semaglutide,0.25 or 0.5MG/DOS, (Ozempic, 0.25 or 0.5 MG/DOSE,) 2 MG/1.5ML solution pen-injector    Other Relevant Orders    Comprehensive Metabolic Panel    Lipid Panel     Other Visit Diagnoses         Heart failure with reduced ejection fraction        Relevant Medications    Semaglutide,0.25 or 0.5MG/DOS, (Ozempic, 0.25 or 0.5 MG/DOSE,) 2 MG/1.5ML solution pen-injector    Other Relevant Orders    Comprehensive Metabolic Panel    Lipid Panel      Obesity (BMI 30-39.9)        Relevant Medications    Semaglutide,0.25 or 0.5MG/DOS, (Ozempic, 0.25 or 0.5 MG/DOSE,) 2 MG/1.5ML solution pen-injector    Other Relevant Orders    Comprehensive Metabolic Panel    Lipid Panel      DM (diabetes mellitus), type 2 with peripheral vascular complications        Relevant  Medications    Semaglutide,0.25 or 0.5MG/DOS, (Ozempic, 0.25 or 0.5 MG/DOSE,) 2 MG/1.5ML solution pen-injector    Other Relevant Orders    Comprehensive Metabolic Panel    Lipid Panel        1. CAD, MI in 1998  1.1 Stenting x 5 total at [facility]. Data  1.2 CABG x2 in Dec. 2014 at [facility] by Dr. Álvarez in setting of ACS, LIMA to LAD and reversed vein graft from aorta to diag. With RLE vein graft  1.4 Cath 10/19: Widely patent grafts with no progressive disease in the RCA or circumflex ejection fraction 50 to 55%, LVEDP 8 to 12 mmHg  1.5 left heart catheterization 9/20: Left main normal, LAD  occluded in the mid vessel with a patent LIMA beyond the occluded area, first diagonal has proximal 80% stenosis with a patent SVG to first D1, circumflex normal, RCA normal, EF 30 to 35% with mid anterior and apical wall akinesis and inferior wall hypokinesis  1.6 MUGA scan 12/20: EF 19%  1.7 left heart catheterization 12/21: Left anterior descending 100% occluded after the takeoff of the major diagonal, circumflex moderate size vessel with no obstruction, RCA anatomically dominant no significant obstruction, LIMA to LAD patent, SVG to diagonal patent  1.8 Left heart catheterization 11/24: Left main was normal, LAD was 90% in the proximal portion there is a patent SVG to the diagonal branch which supplies antegrade and retrograde flow.  There is a patent LIMA to LAD , circumflex was normal, RCA normal, it was felt that complete revascularization with left LIMA and SVG to the LAD system.    2. A-Fib Xarelto  3. HTN  3.1 echocardiogram 10/21: EF 40 to 45% mild concentric left ventricular hypertrophy grade 2 diastolic dysfunction mild to moderate left atrial enlargement, right atrium mildly dilated, trivial MR, trivial to mild TR and AI pulmonary artery systolic pressures low 30s  3.2 echocardiogram 9/24: EF 46 to 50%.  Grade 3 diastolic dysfunction  4. Hyperlipidemia  5. GOPI, uses c-pap, sees Dr Jones  6. SSS  7.  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 carotid duplex 12/20: Nonobstructive carotid artery    HPI  HPI  Mr. Nick Caraballo is an 84-year-old male patient being followed up today for history of coronary artery disease, atrial fibrillation, and chronic arterial hypertension.    Patient does have a history of significant coronary artery disease in which she did have a CABG in 2014.  Most recently his left heart catheterization showed patent grafts and did not require any percutaneous coronary artery intervention in 2021.  He is on medication management of rosuvastatin for high intensity statin therapy.  He had discontinued his aspirin for antiplatelet therapy but continues to be on anticoagulation of Xarelto. Patient did have a left heart catheterization 11/24: Left main was normal, LAD was 90% in the proximal portion there is a patent SVG to the diagonal branch which supplies antegrade and retrograde flow.  There is a patent LIMA to LAD , circumflex was normal, RCA normal, it was felt that complete revascularization with left LIMA and SVG to the LAD system.    Patient does have a history of atrial fibrillation in which he is on Xarelto for anticoagulation.  He denies any signs or symptoms of bleeding.  He does metoprolol for rate control.  Patient does continue to have palpitations that occur about 1 time daily up to 2 times a week.  He says it is not bad and it is short-lived usually he says that this is much improved from previous.  He denies any bleeding on the Xarelto.     Patient does have chronic arterial hypertension in which he is treated with Entresto and metoprolol.  Today his blood pressure is 138/79 heart rate of 96     Patient does have a history of heart failure with reduced ejection fraction in which he is onEntresto, metoprolol succinate, and Jardiance.  Patient also had a [employer] BiV ICD.  His device was interrogated today that showed 2.1 years of battery life remaining.  Atrial  fibrillation 96% of the time.  Patient have 5 runs of nonsustained VT.    We did review his labs as well that showed a vitamin D of 66.9 hemoglobin A1c of 5.7, unremarkable CBC,    Patient does have shortness of breath occurs with activity.  This is unchanged nonprogressive.  He also reports dyspnea that occurs at rest but is usually after he has exerted himself.  He also has some level of orthopnea.  He does use O2 as needed.    Patient does have lower extremity edema in which he reports [facility] wants to decrease his furosemide.  He did go down to 40 mg twice daily he tried it for 1 to 2 weeks and his weight increased and he did not have any change in his dizziness.  He also reports that he stopped the alprazolam, prednisone, and hydrochloroquine.    Patient does report dizziness where he stays dizzy and off balance.  He says he feels like he has a pressure and fogginess in his head.  It is worse when he first stands up.  He is scheduled to see Dr. Hammond later this month for ENT evaluation.  PRIOR MEDS  Current Outpatient Medications on File Prior to Visit   Medication Sig Dispense Refill    allopurinol (ZYLOPRIM) 300 MG tablet Take 1 tablet by mouth Daily.      calcium carbonate (OS-YAIMA) 600 MG tablet Take 1 tablet by mouth Daily.      Diclofenac Sodium (VOLTAREN) 1 % gel gel Apply 4 g topically to the appropriate area as directed 4 (Four) Times a Day As Needed.      empagliflozin (JARDIANCE) 25 MG tablet tablet Take 0.5 tablets by mouth Daily. 45 tablet 3    fexofenadine (ALLEGRA) 60 MG tablet Take 1 tablet by mouth Daily.      fluticasone (FLONASE) 50 MCG/ACT nasal spray Administer 2 sprays into the nostril(s) as directed by provider Daily.      furosemide (LASIX) 80 MG tablet Take 1 tablet by mouth 2 (Two) Times a Day. (Patient taking differently: Take 0.5 tablets by mouth 2 (Two) Times a Day.) 180 tablet 11    levothyroxine (SYNTHROID, LEVOTHROID) 25 MCG tablet Take 1 tablet by mouth Daily.      lidocaine (LIDODERM)  5 % Place 1 patch on the skin as directed by provider Daily. Remove & Discard patch within 12 hours or as directed by MD      loperamide (IMODIUM) 2 MG capsule Take 1 capsule by mouth 2 (Two) Times a Day As Needed for Diarrhea.      metoprolol succinate XL (TOPROL-XL) 25 MG 24 hr tablet Take 0.5 tablets by mouth Daily. 45 tablet 3    Multiple Vitamins-Minerals (ICAPS AREDS 2 PO) Take 2 tablets by mouth Daily.      nitroglycerin (NITROSTAT) 0.4 MG SL tablet Place 1 tablet under the tongue Every 5 (Five) Minutes As Needed for Chest Pain. 25 tablet 3    omeprazole (priLOSEC) 20 MG capsule Take 1 capsule by mouth Daily. (Patient taking differently: Take 2 capsules by mouth Daily. 2 capsules daily) 90 capsule 3    potassium chloride (K-DUR,KLOR-CON) 10 MEQ CR tablet Take 2 tablets by mouth 3 (Three) Times a Day.      rivaroxaban (XARELTO) 20 MG tablet Take 1 tablet by mouth Daily. 90 tablet 3    sacubitril-valsartan (Entresto) 49-51 MG tablet Take 1 tablet by mouth 2 (Two) Times a Day. 180 tablet 3    tamsulosin (FLOMAX) 0.4 MG capsule 24 hr capsule Take 1 capsule by mouth Daily.      venlafaxine XR (EFFEXOR-XR) 150 MG 24 hr capsule Take 1 capsule by mouth Daily.      [DISCONTINUED] famotidine (PEPCID) 20 MG tablet Take 1 tablet by mouth 2 (Two) Times a Day.      [DISCONTINUED] hydroxychloroquine (PLAQUENIL) 200 MG tablet Take 1 tablet by mouth Daily.      [DISCONTINUED] predniSONE (DELTASONE) 5 MG tablet Take 1 tablet by mouth Daily.      [DISCONTINUED] metOLazone (ZAROXOLYN) 5 MG tablet 1 tab let 30 minuites prior to Lasix AM dose x 3 days 3 tablet 0    [DISCONTINUED] rosuvastatin (CRESTOR) 20 MG tablet Take 1 tablet by mouth Daily. 90 tablet 3     No current facility-administered medications on file prior to visit.       ALLERGIES  Patient has no known allergies.    HISTORY  Past Medical History:   Diagnosis Date    Acid reflux     Anxiety     Bowel obstruction 11/2024    Complete heart block     COPD (chronic  obstructive pulmonary disease)     Coronary artery disease     COVID-19 vaccine administered 2021 - Booster 10/13/2021 - Pfizer    Dehydration     Diabetes mellitus     Gout     Hyperlipidemia     Hypertension     Hypothyroid     Myocardial infarction     Pseudophakia     Rheumatoid arthritis     Sleep apnea     Thyroid disease        Social History     Socioeconomic History    Marital status:    Tobacco Use    Smoking status: Former     Current packs/day: 0.00     Average packs/day: 1 pack/day for 45.0 years (45.0 ttl pk-yrs)     Types: Cigarettes     Start date:      Quit date:      Years since quittin.3    Smokeless tobacco: Never   Vaping Use    Vaping status: Never Used   Substance and Sexual Activity    Alcohol use: No    Drug use: No    Sexual activity: Defer       Family History   Problem Relation Age of Onset    No Known Problems Mother     Colon cancer Father     Heart disease Brother        Review of Systems   Constitutional:  Positive for fatigue. Negative for chills and fever.   HENT:  Negative for congestion, rhinorrhea and sore throat.    Eyes:  Negative for visual disturbance.   Respiratory:  Positive for shortness of breath (more with activity has oxygen when he needs it). Negative for apnea and chest tightness.    Cardiovascular:  Negative for chest pain, palpitations and leg swelling.   Gastrointestinal:  Negative for constipation, diarrhea and nausea.   Musculoskeletal:  Positive for arthralgias, back pain and neck pain.   Skin:  Negative for rash and wound.   Allergic/Immunologic: Positive for environmental allergies. Negative for food allergies.   Neurological:  Positive for dizziness (stays dizzy), weakness (in legs) and light-headedness. Negative for syncope.   Hematological:  Bruises/bleeds easily.   Psychiatric/Behavioral:  Negative for sleep disturbance.        Objective     VITALS: /79 (BP Location: Left arm, Patient Position: Sitting, Cuff  "Size: Adult)   Pulse 96   Ht 185 cm (72.84\")   Wt 118 kg (259 lb 12.8 oz)   SpO2 97%   BMI 34.43 kg/m²     LABS:   Lab Results (most recent)       None            IMAGING:   US Arterial Doppler Lower Extremity Bilateral  Result Date: 4/10/2025    No segments of hemodynamically significant stenosis or occlusion.  This report was finalized on 4/10/2025 3:27 PM by Dr. Mario Moreno MD.        EXAM:  Physical Exam  Vitals and nursing note reviewed.   Constitutional:       Appearance: He is well-developed.   HENT:      Head: Normocephalic.   Neck:      Thyroid: No thyroid mass.      Vascular: No carotid bruit or JVD.      Trachea: Trachea and phonation normal.   Cardiovascular:      Rate and Rhythm: Normal rate and regular rhythm.      Pulses:           Radial pulses are 2+ on the right side and 2+ on the left side.        Posterior tibial pulses are 2+ on the right side and 2+ on the left side.      Heart sounds: No murmur heard.     No friction rub. Gallop present.   Pulmonary:      Effort: Pulmonary effort is normal. No respiratory distress.      Breath sounds: Normal breath sounds. No wheezing or rales.   Musculoskeletal:         General: Deformity (hands) present. No swelling. Normal range of motion.      Cervical back: Neck supple.   Skin:     General: Skin is warm and dry.      Capillary Refill: Capillary refill takes less than 2 seconds.      Findings: Bruising (bilateral arms) and rash (venous dermatitis Bilateral) present.   Neurological:      Mental Status: He is alert and oriented to person, place, and time.   Psychiatric:         Speech: Speech normal.         Behavior: Behavior normal.         Thought Content: Thought content normal.         Judgment: Judgment normal.         Procedure   Procedures       Assessment & Plan    Diagnosis Plan   1. Shortness of breath  Semaglutide,0.25 or 0.5MG/DOS, (Ozempic, 0.25 or 0.5 MG/DOSE,) 2 MG/1.5ML solution pen-injector    Comprehensive Metabolic Panel    Lipid " Panel      2. Coronary artery disease due to calcified coronary lesion  Semaglutide,0.25 or 0.5MG/DOS, (Ozempic, 0.25 or 0.5 MG/DOSE,) 2 MG/1.5ML solution pen-injector    Comprehensive Metabolic Panel    Lipid Panel      3. S/P CABG (coronary artery bypass graft)  Semaglutide,0.25 or 0.5MG/DOS, (Ozempic, 0.25 or 0.5 MG/DOSE,) 2 MG/1.5ML solution pen-injector    Comprehensive Metabolic Panel    Lipid Panel      4. Paroxysmal atrial fibrillation  Semaglutide,0.25 or 0.5MG/DOS, (Ozempic, 0.25 or 0.5 MG/DOSE,) 2 MG/1.5ML solution pen-injector    Comprehensive Metabolic Panel    Lipid Panel      5. Heart failure with reduced ejection fraction  Semaglutide,0.25 or 0.5MG/DOS, (Ozempic, 0.25 or 0.5 MG/DOSE,) 2 MG/1.5ML solution pen-injector    Comprehensive Metabolic Panel    Lipid Panel      6. Obesity (BMI 30-39.9)  Semaglutide,0.25 or 0.5MG/DOS, (Ozempic, 0.25 or 0.5 MG/DOSE,) 2 MG/1.5ML solution pen-injector    Comprehensive Metabolic Panel    Lipid Panel      7. DM (diabetes mellitus), type 2 with peripheral vascular complications  Semaglutide,0.25 or 0.5MG/DOS, (Ozempic, 0.25 or 0.5 MG/DOSE,) 2 MG/1.5ML solution pen-injector    Comprehensive Metabolic Panel    Lipid Panel      1.  Patient does have shortness of breath but is unchanged nonprogressive.  We will continue to monitor at this time.  2.  Patient does have hyperlipidemia which she has discontinued rosuvastatin for unknown reason.  He is willing to restart it back.  Will send in a prescription into his pharmacy.  Will repeat a CMP and lipid panel.  3.  Patient does have lower extremity edema that seems to be relatively well-controlled on 80 mg of Lasix twice daily.  He did try to decrease the Lasix but had worsening of swelling and no change in his dizziness.  4.  Patient does have paroxysmal atrial fibrillation which she is in A-fib 96% of the time.  He denies any bleeding on the Xarelto.  Will continue his current medications without change.  5.  Patient  request semaglutide to help lose weight as well as to control his diabetes.  Given his cardiac history of open heart surgery as well as heart failure and a BMI of 34.43 I do think this is appropriate.  6.  Informed of signs and symptoms of ACS and advised to seek emergent treatment for any new worsening symptoms.  Patient also advised sooner follow-up as needed.  Also advised to follow-up with family doctor as needed  This note is dictated utilizing voice recognition software.  Although this record has been proof read, transcriptional errors may still be present. If questions occur regarding the content of this record please do not hesitate to call our office.  I have reviewed and confirmed the accuracy of the ROS as documented by the MA/LPN/RN DAVID Stone    Return in about 6 months (around 11/2/2025), or if symptoms worsen or fail to improve, for Next scheduled follow up.    Diagnoses and all orders for this visit:    1. Shortness of breath (Primary)  -     Semaglutide,0.25 or 0.5MG/DOS, (Ozempic, 0.25 or 0.5 MG/DOSE,) 2 MG/1.5ML solution pen-injector; Inject 0.25 mg under the skin into the appropriate area as directed 1 (One) Time Per Week.  Dispense: 1.5 mL; Refill: 2  -     Comprehensive Metabolic Panel; Future  -     Lipid Panel; Future    2. Coronary artery disease due to calcified coronary lesion  -     Semaglutide,0.25 or 0.5MG/DOS, (Ozempic, 0.25 or 0.5 MG/DOSE,) 2 MG/1.5ML solution pen-injector; Inject 0.25 mg under the skin into the appropriate area as directed 1 (One) Time Per Week.  Dispense: 1.5 mL; Refill: 2  -     Comprehensive Metabolic Panel; Future  -     Lipid Panel; Future    3. S/P CABG (coronary artery bypass graft)  -     Semaglutide,0.25 or 0.5MG/DOS, (Ozempic, 0.25 or 0.5 MG/DOSE,) 2 MG/1.5ML solution pen-injector; Inject 0.25 mg under the skin into the appropriate area as directed 1 (One) Time Per Week.  Dispense: 1.5 mL; Refill: 2  -     Comprehensive Metabolic Panel; Future  -      Lipid Panel; Future    4. Paroxysmal atrial fibrillation  -     Semaglutide,0.25 or 0.5MG/DOS, (Ozempic, 0.25 or 0.5 MG/DOSE,) 2 MG/1.5ML solution pen-injector; Inject 0.25 mg under the skin into the appropriate area as directed 1 (One) Time Per Week.  Dispense: 1.5 mL; Refill: 2  -     Comprehensive Metabolic Panel; Future  -     Lipid Panel; Future    5. Heart failure with reduced ejection fraction  -     Semaglutide,0.25 or 0.5MG/DOS, (Ozempic, 0.25 or 0.5 MG/DOSE,) 2 MG/1.5ML solution pen-injector; Inject 0.25 mg under the skin into the appropriate area as directed 1 (One) Time Per Week.  Dispense: 1.5 mL; Refill: 2  -     Comprehensive Metabolic Panel; Future  -     Lipid Panel; Future    6. Obesity (BMI 30-39.9)  -     Semaglutide,0.25 or 0.5MG/DOS, (Ozempic, 0.25 or 0.5 MG/DOSE,) 2 MG/1.5ML solution pen-injector; Inject 0.25 mg under the skin into the appropriate area as directed 1 (One) Time Per Week.  Dispense: 1.5 mL; Refill: 2  -     Comprehensive Metabolic Panel; Future  -     Lipid Panel; Future    7. DM (diabetes mellitus), type 2 with peripheral vascular complications  -     Semaglutide,0.25 or 0.5MG/DOS, (Ozempic, 0.25 or 0.5 MG/DOSE,) 2 MG/1.5ML solution pen-injector; Inject 0.25 mg under the skin into the appropriate area as directed 1 (One) Time Per Week.  Dispense: 1.5 mL; Refill: 2  -     Comprehensive Metabolic Panel; Future  -     Lipid Panel; Future        Nick Caraballo  reports that he quit smoking about 27 years ago. His smoking use included cigarettes. He started smoking about 72 years ago. He has a 45 pack-year smoking history. He has never used smokeless tobacco. I have educated him on the risk of diseases from using tobacco products patient does not smoke.           BMI is >= 30 and <35. (Class 1 Obesity). The following options were offered after discussion;: exercise counseling/recommendations and nutrition counseling/recommendations    Advance Care Planning   ACP discussion was  held with the patient during this visit. Patient has an advance directive in EMR which is still valid.                MEDS ORDERED DURING VISIT:  New Medications Ordered This Visit   Medications    Semaglutide,0.25 or 0.5MG/DOS, (Ozempic, 0.25 or 0.5 MG/DOSE,) 2 MG/1.5ML solution pen-injector     Sig: Inject 0.25 mg under the skin into the appropriate area as directed 1 (One) Time Per Week.     Dispense:  1.5 mL     Refill:  2           This document has been electronically signed by Coleman Harman Jr., APRN  May 2, 2025 11:59 EDT

## 2025-05-02 ENCOUNTER — OFFICE VISIT (OUTPATIENT)
Dept: CARDIOLOGY | Facility: CLINIC | Age: 85
End: 2025-05-02
Payer: OTHER GOVERNMENT

## 2025-05-02 VITALS
BODY MASS INDEX: 34.43 KG/M2 | DIASTOLIC BLOOD PRESSURE: 79 MMHG | HEIGHT: 73 IN | WEIGHT: 259.8 LBS | HEART RATE: 96 BPM | OXYGEN SATURATION: 97 % | SYSTOLIC BLOOD PRESSURE: 138 MMHG

## 2025-05-02 DIAGNOSIS — I50.20 HEART FAILURE WITH REDUCED EJECTION FRACTION: ICD-10-CM

## 2025-05-02 DIAGNOSIS — I48.0 PAROXYSMAL ATRIAL FIBRILLATION: ICD-10-CM

## 2025-05-02 DIAGNOSIS — R06.02 SHORTNESS OF BREATH: Primary | ICD-10-CM

## 2025-05-02 DIAGNOSIS — I49.5 SSS (SICK SINUS SYNDROME): Primary | ICD-10-CM

## 2025-05-02 DIAGNOSIS — I25.10 CORONARY ARTERY DISEASE DUE TO CALCIFIED CORONARY LESION: ICD-10-CM

## 2025-05-02 DIAGNOSIS — E66.9 OBESITY (BMI 30-39.9): ICD-10-CM

## 2025-05-02 DIAGNOSIS — I25.84 CORONARY ARTERY DISEASE DUE TO CALCIFIED CORONARY LESION: ICD-10-CM

## 2025-05-02 DIAGNOSIS — E11.51 DM (DIABETES MELLITUS), TYPE 2 WITH PERIPHERAL VASCULAR COMPLICATIONS: ICD-10-CM

## 2025-05-02 DIAGNOSIS — Z95.1 S/P CABG (CORONARY ARTERY BYPASS GRAFT): ICD-10-CM

## 2025-05-02 PROCEDURE — 99214 OFFICE O/P EST MOD 30 MIN: CPT | Performed by: NURSE PRACTITIONER

## 2025-05-02 RX ORDER — PREDNISONE 5 MG/1
5 TABLET ORAL DAILY
COMMUNITY
End: 2025-05-02

## 2025-05-02 RX ORDER — MECLIZINE HYDROCHLORIDE 25 MG/1
25 TABLET ORAL 3 TIMES DAILY PRN
Qty: 30 TABLET | Refills: 0 | Status: SHIPPED | OUTPATIENT
Start: 2025-05-02

## 2025-05-02 RX ORDER — SEMAGLUTIDE 1.34 MG/ML
0.25 INJECTION, SOLUTION SUBCUTANEOUS WEEKLY
Qty: 1.5 ML | Refills: 2 | Status: SHIPPED | OUTPATIENT
Start: 2025-05-02

## 2025-05-02 RX ORDER — HYDROXYCHLOROQUINE SULFATE 200 MG/1
200 TABLET, FILM COATED ORAL DAILY
COMMUNITY
End: 2025-05-02

## 2025-05-02 NOTE — LETTER
May 12, 2025     Amy Sands MD  [address] [facility] [address]    Patient: Nick Caraballo   YOB: 1940   Date of Visit: 5/2/2025       Dear mAy Sands MD    Nick Caraballo was in my office today. Below is a copy of my note.    If you have questions, please do not hesitate to call me. I look forward to following Nick along with you.         Sincerely,        DAVID Stone        CC: No Recipients    Subjective    Nick Caraballo is a 84 y.o. male who presents to day for follow up with pacer check, Hypertension, and Coronary Artery Disease.    CHIEF COMPLIANT  Chief Complaint   Patient presents with   • follow up with pacer check   • Hypertension   • Coronary Artery Disease       Active Problems:  Problem List Items Addressed This Visit          Cardiac and Vasculature    Coronary artery disease due to calcified coronary lesion    Relevant Medications    Semaglutide,0.25 or 0.5MG/DOS, (Ozempic, 0.25 or 0.5 MG/DOSE,) 2 MG/1.5ML solution pen-injector    Other Relevant Orders    Comprehensive Metabolic Panel    Lipid Panel    Atrial fibrillation    Relevant Medications    Semaglutide,0.25 or 0.5MG/DOS, (Ozempic, 0.25 or 0.5 MG/DOSE,) 2 MG/1.5ML solution pen-injector    Other Relevant Orders    Comprehensive Metabolic Panel    Lipid Panel    S/P CABG (coronary artery bypass graft)    Relevant Medications    Semaglutide,0.25 or 0.5MG/DOS, (Ozempic, 0.25 or 0.5 MG/DOSE,) 2 MG/1.5ML solution pen-injector    Other Relevant Orders    Comprehensive Metabolic Panel    Lipid Panel       Pulmonary and Pneumonias    Shortness of breath - Primary    Relevant Medications    Semaglutide,0.25 or 0.5MG/DOS, (Ozempic, 0.25 or 0.5 MG/DOSE,) 2 MG/1.5ML solution pen-injector    Other Relevant Orders    Comprehensive Metabolic Panel    Lipid Panel     Other Visit Diagnoses         Heart failure with reduced ejection fraction        Relevant Medications    Semaglutide,0.25 or 0.5MG/DOS,  (Ozempic, 0.25 or 0.5 MG/DOSE,) 2 MG/1.5ML solution pen-injector    Other Relevant Orders    Comprehensive Metabolic Panel    Lipid Panel      Obesity (BMI 30-39.9)        Relevant Medications    Semaglutide,0.25 or 0.5MG/DOS, (Ozempic, 0.25 or 0.5 MG/DOSE,) 2 MG/1.5ML solution pen-injector    Other Relevant Orders    Comprehensive Metabolic Panel    Lipid Panel      DM (diabetes mellitus), type 2 with peripheral vascular complications        Relevant Medications    Semaglutide,0.25 or 0.5MG/DOS, (Ozempic, 0.25 or 0.5 MG/DOSE,) 2 MG/1.5ML solution pen-injector    Other Relevant Orders    Comprehensive Metabolic Panel    Lipid Panel        1. CAD, MI in 1998  1.1 Stenting x 5 total at [facility]. Data  1.2 CABG x2 in Dec. 2014 at [facility] by Dr. Álvarez in setting of ACS, LIMA to LAD and reversed vein graft from aorta to diag. With RLE vein graft  1.4 Cath 10/19: Widely patent grafts with no progressive disease in the RCA or circumflex ejection fraction 50 to 55%, LVEDP 8 to 12 mmHg  1.5 left heart catheterization 9/20: Left main normal, LAD  occluded in the mid vessel with a patent LIMA beyond the occluded area, first diagonal has proximal 80% stenosis with a patent SVG to first D1, circumflex normal, RCA normal, EF 30 to 35% with mid anterior and apical wall akinesis and inferior wall hypokinesis  1.6 MUGA scan 12/20: EF 19%  1.7 left heart catheterization 12/21: Left anterior descending 100% occluded after the takeoff of the major diagonal, circumflex moderate size vessel with no obstruction, RCA anatomically dominant no significant obstruction, LIMA to LAD patent, SVG to diagonal patent  1.8 Left heart catheterization 11/24: Left main was normal, LAD was 90% in the proximal portion there is a patent SVG to the diagonal branch which supplies antegrade and retrograde flow.  There is a patent LIMA to LAD , circumflex was normal, RCA normal, it was felt that complete revascularization with left LIMA and  SVG to the LAD system.    2. A-Fib Xarelto  3. HTN  3.1 echocardiogram 10/21: EF 40 to 45% mild concentric left ventricular hypertrophy grade 2 diastolic dysfunction mild to moderate left atrial enlargement, right atrium mildly dilated, trivial MR, trivial to mild TR and AI pulmonary artery systolic pressures low 30s  3.2 echocardiogram 9/24: EF 46 to 50%.  Grade 3 diastolic dysfunction  4. Hyperlipidemia  5. GOPI, uses c-pap, sees Dr Jones  6. SSS  7. 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 carotid duplex 12/20: Nonobstructive carotid artery    HPI  HPI  Mr. Nick Caraballo is an 84-year-old male patient being followed up today for history of coronary artery disease, atrial fibrillation, and chronic arterial hypertension.    Patient does have a history of significant coronary artery disease in which she did have a CABG in 2014.  Most recently his left heart catheterization showed patent grafts and did not require any percutaneous coronary artery intervention in 2021.  He is on medication management of rosuvastatin for high intensity statin therapy.  He had discontinued his aspirin for antiplatelet therapy but continues to be on anticoagulation of Xarelto. Patient did have a left heart catheterization 11/24: Left main was normal, LAD was 90% in the proximal portion there is a patent SVG to the diagonal branch which supplies antegrade and retrograde flow.  There is a patent LIMA to LAD , circumflex was normal, RCA normal, it was felt that complete revascularization with left LIMA and SVG to the LAD system.    Patient does have a history of atrial fibrillation in which he is on Xarelto for anticoagulation.  He denies any signs or symptoms of bleeding.  He does metoprolol for rate control.  Patient does continue to have palpitations that occur about 1 time daily up to 2 times a week.  He says it is not bad and it is short-lived usually he says that this is much improved from previous.  He denies any bleeding on  the Xarelto.     Patient does have chronic arterial hypertension in which he is treated with Entresto and metoprolol.  Today his blood pressure is 138/79 heart rate of 96     Patient does have a history of heart failure with reduced ejection fraction in which he is onEntresto, metoprolol succinate, and Jardiance.  Patient also had a [employer] BiV ICD.  His device was interrogated today that showed 2.1 years of battery life remaining.  Atrial fibrillation 96% of the time.  Patient have 5 runs of nonsustained VT.    We did review his labs as well that showed a vitamin D of 66.9 hemoglobin A1c of 5.7, unremarkable CBC,    Patient does have shortness of breath occurs with activity.  This is unchanged nonprogressive.  He also reports dyspnea that occurs at rest but is usually after he has exerted himself.  He also has some level of orthopnea.  He does use O2 as needed.    Patient does have lower extremity edema in which he reports [facility].  He did go down to 40 mg twice daily he tried it for 1 to 2 weeks and his weight increased and he did not have any change in his dizziness.  He also reports that he stopped the alprazolam, prednisone, and hydrochloroquine.    Patient does report dizziness where he stays dizzy and off balance.  He says he feels like he has a pressure and fogginess in his head.  It is worse when he first stands up.  He is scheduled to see Dr. Hammond later this month for ENT evaluation.  PRIOR MEDS  Current Outpatient Medications on File Prior to Visit   Medication Sig Dispense Refill   • allopurinol (ZYLOPRIM) 300 MG tablet Take 1 tablet by mouth Daily.     • calcium carbonate (OS-YAIMA) 600 MG tablet Take 1 tablet by mouth Daily.     • Diclofenac Sodium (VOLTAREN) 1 % gel gel Apply 4 g topically to the appropriate area as directed 4 (Four) Times a Day As Needed.     • empagliflozin (JARDIANCE) 25 MG tablet tablet Take 0.5 tablets by mouth Daily. 45 tablet 3   • fexofenadine  (ALLEGRA) 60 MG tablet Take 1 tablet by mouth Daily.     • fluticasone (FLONASE) 50 MCG/ACT nasal spray Administer 2 sprays into the nostril(s) as directed by provider Daily.     • furosemide (LASIX) 80 MG tablet Take 1 tablet by mouth 2 (Two) Times a Day. (Patient taking differently: Take 0.5 tablets by mouth 2 (Two) Times a Day.) 180 tablet 11   • levothyroxine (SYNTHROID, LEVOTHROID) 25 MCG tablet Take 1 tablet by mouth Daily.     • lidocaine (LIDODERM) 5 % Place 1 patch on the skin as directed by provider Daily. Remove & Discard patch within 12 hours or as directed by MD     • loperamide (IMODIUM) 2 MG capsule Take 1 capsule by mouth 2 (Two) Times a Day As Needed for Diarrhea.     • metoprolol succinate XL (TOPROL-XL) 25 MG 24 hr tablet Take 0.5 tablets by mouth Daily. 45 tablet 3   • Multiple Vitamins-Minerals (ICAPS AREDS 2 PO) Take 2 tablets by mouth Daily.     • nitroglycerin (NITROSTAT) 0.4 MG SL tablet Place 1 tablet under the tongue Every 5 (Five) Minutes As Needed for Chest Pain. 25 tablet 3   • omeprazole (priLOSEC) 20 MG capsule Take 1 capsule by mouth Daily. (Patient taking differently: Take 2 capsules by mouth Daily. 2 capsules daily) 90 capsule 3   • potassium chloride (K-DUR,KLOR-CON) 10 MEQ CR tablet Take 2 tablets by mouth 3 (Three) Times a Day.     • rivaroxaban (XARELTO) 20 MG tablet Take 1 tablet by mouth Daily. 90 tablet 3   • sacubitril-valsartan (Entresto) 49-51 MG tablet Take 1 tablet by mouth 2 (Two) Times a Day. 180 tablet 3   • tamsulosin (FLOMAX) 0.4 MG capsule 24 hr capsule Take 1 capsule by mouth Daily.     • venlafaxine XR (EFFEXOR-XR) 150 MG 24 hr capsule Take 1 capsule by mouth Daily.     • [DISCONTINUED] famotidine (PEPCID) 20 MG tablet Take 1 tablet by mouth 2 (Two) Times a Day.     • [DISCONTINUED] hydroxychloroquine (PLAQUENIL) 200 MG tablet Take 1 tablet by mouth Daily.     • [DISCONTINUED] predniSONE (DELTASONE) 5 MG tablet Take 1 tablet by mouth Daily.     • [DISCONTINUED]  metOLazone (ZAROXOLYN) 5 MG tablet 1 tab let 30 minuites prior to Lasix AM dose x 3 days 3 tablet 0   • [DISCONTINUED] rosuvastatin (CRESTOR) 20 MG tablet Take 1 tablet by mouth Daily. 90 tablet 3     No current facility-administered medications on file prior to visit.       ALLERGIES  Patient has no known allergies.    HISTORY  Past Medical History:   Diagnosis Date   • Acid reflux    • Anxiety    • Bowel obstruction 2024   • Complete heart block    • COPD (chronic obstructive pulmonary disease)    • Coronary artery disease    • COVID-19 vaccine administered 2021 - Booster 10/13/2021 - Pf[employer]   • Dehydration    • Diabetes mellitus    • Gout    • Hyperlipidemia    • Hypertension    • Hypothyroid    • Myocardial infarction    • Pseudophakia    • Rheumatoid arthritis    • Sleep apnea    • Thyroid disease        Social History     Socioeconomic History   • Marital status:    Tobacco Use   • Smoking status: Former     Current packs/day: 0.00     Average packs/day: 1 pack/day for 45.0 years (45.0 ttl pk-yrs)     Types: Cigarettes     Start date:      Quit date:      Years since quittin.3   • Smokeless tobacco: Never   Vaping Use   • Vaping status: Never Used   Substance and Sexual Activity   • Alcohol use: No   • Drug use: No   • Sexual activity: Defer       Family History   Problem Relation Age of Onset   • No Known Problems Mother    • Colon cancer Father    • Heart disease Brother        Review of Systems   Constitutional:  Positive for fatigue. Negative for chills and fever.   HENT:  Negative for congestion, rhinorrhea and sore throat.    Eyes:  Negative for visual disturbance.   Respiratory:  Positive for shortness of breath (more with activity has oxygen when he needs it). Negative for apnea and chest tightness.    Cardiovascular:  Negative for chest pain, palpitations and leg swelling.   Gastrointestinal:  Negative for constipation, diarrhea and nausea.  "  Musculoskeletal:  Positive for arthralgias, back pain and neck pain.   Skin:  Negative for rash and wound.   Allergic/Immunologic: Positive for environmental allergies. Negative for food allergies.   Neurological:  Positive for dizziness (stays dizzy), weakness (in legs) and light-headedness. Negative for syncope.   Hematological:  Bruises/bleeds easily.   Psychiatric/Behavioral:  Negative for sleep disturbance.        Objective    VITALS: /79 (BP Location: Left arm, Patient Position: Sitting, Cuff Size: Adult)   Pulse 96   Ht 185 cm (72.84\")   Wt 118 kg (259 lb 12.8 oz)   SpO2 97%   BMI 34.43 kg/m²     LABS:   Lab Results (most recent)       None            IMAGING:   US Arterial Doppler Lower Extremity Bilateral  Result Date: 4/10/2025    No segments of hemodynamically significant stenosis or occlusion.  This report was finalized on 4/10/2025 3:27 PM by Dr. Mario Moreno MD.        EXAM:  Physical Exam  Vitals and nursing note reviewed.   Constitutional:       Appearance: He is well-developed.   HENT:      Head: Normocephalic.   Neck:      Thyroid: No thyroid mass.      Vascular: No carotid bruit or JVD.      Trachea: Trachea and phonation normal.   Cardiovascular:      Rate and Rhythm: Normal rate and regular rhythm.      Pulses:           Radial pulses are 2+ on the right side and 2+ on the left side.        Posterior tibial pulses are 2+ on the right side and 2+ on the left side.      Heart sounds: No murmur heard.     No friction rub. Gallop present.   Pulmonary:      Effort: Pulmonary effort is normal. No respiratory distress.      Breath sounds: Normal breath sounds. No wheezing or rales.   Musculoskeletal:         General: Deformity (hands) present. No swelling. Normal range of motion.      Cervical back: Neck supple.   Skin:     General: Skin is warm and dry.      Capillary Refill: Capillary refill takes less than 2 seconds.      Findings: Bruising (bilateral arms) and rash (venous dermatitis " Bilateral) present.   Neurological:      Mental Status: He is alert and oriented to person, place, and time.   Psychiatric:         Speech: Speech normal.         Behavior: Behavior normal.         Thought Content: Thought content normal.         Judgment: Judgment normal.         Procedure  Procedures       Assessment & Plan   Diagnosis Plan   1. Shortness of breath  Semaglutide,0.25 or 0.5MG/DOS, (Ozempic, 0.25 or 0.5 MG/DOSE,) 2 MG/1.5ML solution pen-injector    Comprehensive Metabolic Panel    Lipid Panel      2. Coronary artery disease due to calcified coronary lesion  Semaglutide,0.25 or 0.5MG/DOS, (Ozempic, 0.25 or 0.5 MG/DOSE,) 2 MG/1.5ML solution pen-injector    Comprehensive Metabolic Panel    Lipid Panel      3. S/P CABG (coronary artery bypass graft)  Semaglutide,0.25 or 0.5MG/DOS, (Ozempic, 0.25 or 0.5 MG/DOSE,) 2 MG/1.5ML solution pen-injector    Comprehensive Metabolic Panel    Lipid Panel      4. Paroxysmal atrial fibrillation  Semaglutide,0.25 or 0.5MG/DOS, (Ozempic, 0.25 or 0.5 MG/DOSE,) 2 MG/1.5ML solution pen-injector    Comprehensive Metabolic Panel    Lipid Panel      5. Heart failure with reduced ejection fraction  Semaglutide,0.25 or 0.5MG/DOS, (Ozempic, 0.25 or 0.5 MG/DOSE,) 2 MG/1.5ML solution pen-injector    Comprehensive Metabolic Panel    Lipid Panel      6. Obesity (BMI 30-39.9)  Semaglutide,0.25 or 0.5MG/DOS, (Ozempic, 0.25 or 0.5 MG/DOSE,) 2 MG/1.5ML solution pen-injector    Comprehensive Metabolic Panel    Lipid Panel      7. DM (diabetes mellitus), type 2 with peripheral vascular complications  Semaglutide,0.25 or 0.5MG/DOS, (Ozempic, 0.25 or 0.5 MG/DOSE,) 2 MG/1.5ML solution pen-injector    Comprehensive Metabolic Panel    Lipid Panel      1.  Patient does have shortness of breath but is unchanged nonprogressive.  We will continue to monitor at this time.  2.  Patient does have hyperlipidemia which she has discontinued rosuvastatin for unknown reason.  He is willing to restart it  back.  Will send in a prescription into his pharmacy.  Will repeat a CMP and lipid panel.  3.  Patient does have lower extremity edema that seems to be relatively well-controlled on 80 mg of Lasix twice daily.  He did try to decrease the Lasix but had worsening of swelling and no change in his dizziness.  4.  Patient does have paroxysmal atrial fibrillation which she is in A-fib 96% of the time.  He denies any bleeding on the Xarelto.  Will continue his current medications without change.  5.  Patient request semaglutide to help lose weight as well as to control his diabetes.  Given his cardiac history of open heart surgery as well as heart failure and a BMI of 34.43 I do think this is appropriate.  6.  Informed of signs and symptoms of ACS and advised to seek emergent treatment for any new worsening symptoms.  Patient also advised sooner follow-up as needed.  Also advised to follow-up with family doctor as needed  This note is dictated utilizing voice recognition software.  Although this record has been proof read, transcriptional errors may still be present. If questions occur regarding the content of this record please do not hesitate to call our office.  I have reviewed and confirmed the accuracy of the ROS as documented by the MA/LPN/RN DAVID Stone    Return in about 6 months (around 11/2/2025), or if symptoms worsen or fail to improve, for Next scheduled follow up.    Diagnoses and all orders for this visit:    1. Shortness of breath (Primary)  -     Semaglutide,0.25 or 0.5MG/DOS, (Ozempic, 0.25 or 0.5 MG/DOSE,) 2 MG/1.5ML solution pen-injector; Inject 0.25 mg under the skin into the appropriate area as directed 1 (One) Time Per Week.  Dispense: 1.5 mL; Refill: 2  -     Comprehensive Metabolic Panel; Future  -     Lipid Panel; Future    2. Coronary artery disease due to calcified coronary lesion  -     Semaglutide,0.25 or 0.5MG/DOS, (Ozempic, 0.25 or 0.5 MG/DOSE,) 2 MG/1.5ML solution pen-injector;  Inject 0.25 mg under the skin into the appropriate area as directed 1 (One) Time Per Week.  Dispense: 1.5 mL; Refill: 2  -     Comprehensive Metabolic Panel; Future  -     Lipid Panel; Future    3. S/P CABG (coronary artery bypass graft)  -     Semaglutide,0.25 or 0.5MG/DOS, (Ozempic, 0.25 or 0.5 MG/DOSE,) 2 MG/1.5ML solution pen-injector; Inject 0.25 mg under the skin into the appropriate area as directed 1 (One) Time Per Week.  Dispense: 1.5 mL; Refill: 2  -     Comprehensive Metabolic Panel; Future  -     Lipid Panel; Future    4. Paroxysmal atrial fibrillation  -     Semaglutide,0.25 or 0.5MG/DOS, (Ozempic, 0.25 or 0.5 MG/DOSE,) 2 MG/1.5ML solution pen-injector; Inject 0.25 mg under the skin into the appropriate area as directed 1 (One) Time Per Week.  Dispense: 1.5 mL; Refill: 2  -     Comprehensive Metabolic Panel; Future  -     Lipid Panel; Future    5. Heart failure with reduced ejection fraction  -     Semaglutide,0.25 or 0.5MG/DOS, (Ozempic, 0.25 or 0.5 MG/DOSE,) 2 MG/1.5ML solution pen-injector; Inject 0.25 mg under the skin into the appropriate area as directed 1 (One) Time Per Week.  Dispense: 1.5 mL; Refill: 2  -     Comprehensive Metabolic Panel; Future  -     Lipid Panel; Future    6. Obesity (BMI 30-39.9)  -     Semaglutide,0.25 or 0.5MG/DOS, (Ozempic, 0.25 or 0.5 MG/DOSE,) 2 MG/1.5ML solution pen-injector; Inject 0.25 mg under the skin into the appropriate area as directed 1 (One) Time Per Week.  Dispense: 1.5 mL; Refill: 2  -     Comprehensive Metabolic Panel; Future  -     Lipid Panel; Future    7. DM (diabetes mellitus), type 2 with peripheral vascular complications  -     Semaglutide,0.25 or 0.5MG/DOS, (Ozempic, 0.25 or 0.5 MG/DOSE,) 2 MG/1.5ML solution pen-injector; Inject 0.25 mg under the skin into the appropriate area as directed 1 (One) Time Per Week.  Dispense: 1.5 mL; Refill: 2  -     Comprehensive Metabolic Panel; Future  -     Lipid Panel; Future        Nick Caraballo  reports that he  quit smoking about 27 years ago. His smoking use included cigarettes. He started smoking about 72 years ago. He has a 45 pack-year smoking history. He has never used smokeless tobacco. I have educated him on the risk of diseases from using tobacco products patient does not smoke.           BMI is >= 30 and <35. (Class 1 Obesity). The following options were offered after discussion;: exercise counseling/recommendations and nutrition counseling/recommendations    Advance Care Planning  ACP discussion was held with the patient during this visit. Patient has an advance directive in EMR which is still valid.                MEDS ORDERED DURING VISIT:  New Medications Ordered This Visit   Medications   • Semaglutide,0.25 or 0.5MG/DOS, (Ozempic, 0.25 or 0.5 MG/DOSE,) 2 MG/1.5ML solution pen-injector     Sig: Inject 0.25 mg under the skin into the appropriate area as directed 1 (One) Time Per Week.     Dispense:  1.5 mL     Refill:  2           This document has been electronically signed by Coleman Harman Jr., APRN  May 2, 2025 11:59 EDT

## 2025-05-12 ENCOUNTER — TELEPHONE (OUTPATIENT)
Dept: CARDIOLOGY | Facility: CLINIC | Age: 85
End: 2025-05-12
Payer: OTHER GOVERNMENT

## 2025-05-12 RX ORDER — ATORVASTATIN CALCIUM 80 MG/1
80 TABLET, FILM COATED ORAL DAILY
Qty: 30 TABLET | Refills: 11 | Status: SHIPPED | OUTPATIENT
Start: 2025-05-12

## 2025-05-12 NOTE — TELEPHONE ENCOUNTER
I called patient and went over JR recommendations as follows:    Coleman Harman APRN to Me (Selected Message)      5/12/25  3:43 PM  Note      Have patient continue his regular medications.  I do suggest him restarting his statin as we discussed in the office.  This should get his cholesterol back within goal.

## 2025-05-14 ENCOUNTER — TELEPHONE (OUTPATIENT)
Dept: CARDIOLOGY | Facility: CLINIC | Age: 85
End: 2025-05-14
Payer: OTHER GOVERNMENT

## 2025-05-14 NOTE — TELEPHONE ENCOUNTER
We [facility] a letter from the [facility] that [facility] Auth was denied for Ozempic . He will need to go through [facility] to see what is preferred drug of choice through them. I will contact patient and let him know this. I am sending a [employer] message.

## 2025-07-22 ENCOUNTER — PATIENT MESSAGE (OUTPATIENT)
Dept: CARDIOLOGY | Facility: CLINIC | Age: 85
End: 2025-07-22
Payer: OTHER GOVERNMENT

## 2025-07-22 NOTE — TELEPHONE ENCOUNTER
If he wants to hold it for 2 weeks to see if the side effects subside we can look at switching it to something else

## (undated) DEVICE — INTRO TEAR AWAY/LVD W/SD PRT 9.5F 13CM

## (undated) DEVICE — PENCL ES MEGADINE EZ/CLEAN BUTN W/HOLSTR 10FT

## (undated) DEVICE — LIMB HOLDER, WRIST/ANKLE: Brand: DEROYAL

## (undated) DEVICE — CATH DIAG EXPO .056 LCB 6F 100CM

## (undated) DEVICE — GW PERIPH VASC ADX J/TP SS .035 150CM 3MM

## (undated) DEVICE — INTRO TEAR AWAY/LVD W/SD PRT 6F 13CM

## (undated) DEVICE — GUIDE CATHETER: Brand: ACUITY® PRO

## (undated) DEVICE — CATH DIAG EXPO .045 AL1 5F 100CM

## (undated) DEVICE — Device

## (undated) DEVICE — CATH DIAG EXPO .045 MPA1 5F 100CM

## (undated) DEVICE — ARM SLING II: Brand: DEROYAL

## (undated) DEVICE — LEX CATH LAB MINOR: Brand: MEDLINE INDUSTRIES, INC.

## (undated) DEVICE — CATH DIAG EXPO M/ PK 6FR FL4/FR4 PIG 3PK

## (undated) DEVICE — KT MANIFOLD CATHLAB CUST

## (undated) DEVICE — PK CATH CARD 10

## (undated) DEVICE — SKIN AFFIX SURG ADHESIVE 72/CS 0.55ML: Brand: MEDLINE

## (undated) DEVICE — SKIN PREP TRAY W/CHG: Brand: MEDLINE INDUSTRIES, INC.

## (undated) DEVICE — ST INF PRI SMRTSTE 20DRP 2VLV 24ML 117

## (undated) DEVICE — ANGIO-SEAL VIP VASCULAR CLOSURE DEVICE: Brand: ANGIO-SEAL

## (undated) DEVICE — NDL PERC 1PRT THNWALL W/BASEPLT 18G 7CM

## (undated) DEVICE — 3M™ STERI-STRIP™ REINFORCED ADHESIVE SKIN CLOSURES, R1547, 1/2 IN X 4 IN (12 MM X 100 MM), 6 STRIPS/ENVELOPE: Brand: 3M™ STERI-STRIP™

## (undated) DEVICE — BALN PRESS WEDGE 6F 110CM

## (undated) DEVICE — PINNACLE INTRODUCER SHEATH: Brand: PINNACLE

## (undated) DEVICE — RADIFOCUS GLIDEWIRE: Brand: GLIDEWIRE

## (undated) DEVICE — GW LUGE .014 182 CM

## (undated) DEVICE — 3M™ TEGADERM™ CHG DRESSING 25/CARTON 4 CARTONS/CASE 1659: Brand: TEGADERM™

## (undated) DEVICE — INTRO TEAR AWAY/LVD W/SD PRT 7F 13CM